# Patient Record
Sex: FEMALE | Race: WHITE | NOT HISPANIC OR LATINO | Employment: OTHER | URBAN - METROPOLITAN AREA
[De-identification: names, ages, dates, MRNs, and addresses within clinical notes are randomized per-mention and may not be internally consistent; named-entity substitution may affect disease eponyms.]

---

## 2018-01-22 ENCOUNTER — OFFICE VISIT (OUTPATIENT)
Dept: CARDIOLOGY | Facility: MEDICAL CENTER | Age: 63
End: 2018-01-22
Payer: COMMERCIAL

## 2018-01-22 VITALS
DIASTOLIC BLOOD PRESSURE: 70 MMHG | SYSTOLIC BLOOD PRESSURE: 118 MMHG | HEART RATE: 70 BPM | HEIGHT: 60 IN | BODY MASS INDEX: 25.13 KG/M2 | OXYGEN SATURATION: 94 % | WEIGHT: 128 LBS

## 2018-01-22 DIAGNOSIS — M35.02 SJOGREN'S SYNDROME WITH LUNG INVOLVEMENT (HCC): ICD-10-CM

## 2018-01-22 DIAGNOSIS — I34.0 MITRAL VALVE INSUFFICIENCY, UNSPECIFIED ETIOLOGY: ICD-10-CM

## 2018-01-22 DIAGNOSIS — I34.0 NON-RHEUMATIC MITRAL REGURGITATION: ICD-10-CM

## 2018-01-22 DIAGNOSIS — I71.20 THORACIC AORTIC ANEURYSM WITHOUT RUPTURE (HCC): ICD-10-CM

## 2018-01-22 DIAGNOSIS — J41.0 SIMPLE CHRONIC BRONCHITIS (HCC): ICD-10-CM

## 2018-01-22 DIAGNOSIS — I35.1 NONRHEUMATIC AORTIC VALVE INSUFFICIENCY: ICD-10-CM

## 2018-01-22 DIAGNOSIS — Z95.2 H/O MECHANICAL AORTIC VALVE REPLACEMENT: ICD-10-CM

## 2018-01-22 DIAGNOSIS — Z79.01 WARFARIN ANTICOAGULATION: ICD-10-CM

## 2018-01-22 PROBLEM — M35.00 SJOEGREN SYNDROME: Status: ACTIVE | Noted: 2018-01-22

## 2018-01-22 LAB — EKG IMPRESSION: NORMAL

## 2018-01-22 PROCEDURE — 99204 OFFICE O/P NEW MOD 45 MIN: CPT | Performed by: INTERNAL MEDICINE

## 2018-01-22 PROCEDURE — 93000 ELECTROCARDIOGRAM COMPLETE: CPT | Performed by: INTERNAL MEDICINE

## 2018-01-22 RX ORDER — CHOLECALCIFEROL (VITAMIN D3) 125 MCG
CAPSULE ORAL
COMMUNITY

## 2018-01-22 RX ORDER — GLUCOSAMINE/D3/BOSWELLIA SERRA 1500MG-400
TABLET ORAL
COMMUNITY
End: 2022-10-11

## 2018-01-22 RX ORDER — ROSUVASTATIN CALCIUM 20 MG/1
20 TABLET, COATED ORAL EVERY EVENING
COMMUNITY
End: 2019-01-17 | Stop reason: SDUPTHER

## 2018-01-22 RX ORDER — CELECOXIB 200 MG/1
200 CAPSULE ORAL 2 TIMES DAILY
COMMUNITY
End: 2018-12-10

## 2018-01-22 RX ORDER — AZITHROMYCIN 250 MG/1
250 TABLET, FILM COATED ORAL DAILY
COMMUNITY
End: 2018-09-18 | Stop reason: SDUPTHER

## 2018-01-22 RX ORDER — DIMENHYDRINATE 50 MG
TABLET ORAL 2 TIMES DAILY
COMMUNITY

## 2018-01-22 RX ORDER — ASCORBIC ACID 500 MG
500 TABLET ORAL DAILY
COMMUNITY

## 2018-01-22 RX ORDER — WARFARIN SODIUM 5 MG/1
5 TABLET ORAL DAILY
COMMUNITY
End: 2018-07-13 | Stop reason: SDUPTHER

## 2018-01-22 ASSESSMENT — ENCOUNTER SYMPTOMS
WEIGHT LOSS: 0
COUGH: 0
PALPITATIONS: 0
INSOMNIA: 0
NAUSEA: 0
HEARTBURN: 0
SHORTNESS OF BREATH: 0
MYALGIAS: 0
BRUISES/BLEEDS EASILY: 0
LOSS OF CONSCIOUSNESS: 0
EYES NEGATIVE: 1
NERVOUS/ANXIOUS: 0
DIZZINESS: 0

## 2018-01-22 NOTE — PROGRESS NOTES
Subjective:   Marline Perry is a 62 y.o. female who presents today as a new patient. She is here to establish care as a new patient after moving here from [place].    In with her   Feels healthy and active, enjoying being here  No setbacks although she is back in antibiotics for her chronic upper respiratory infections    Has followed with her cardiologist twice a year, she gets an echo every year. Very diligent with her warfarin. No bleeding    Medications as directed, no setbacks. Has a primary in [place] but looking for one here.      History   Smoking Status   • Never Smoker   Smokeless Tobacco   • Never Used     Allergies   Allergen Reactions   • Spironolactone Rash     ALL OVER BODY    • Neomycin Rash     CONTACT DERMATITIS    • Tape      Adhesives-RASH      Outpatient Encounter Prescriptions as of 1/22/2018   Medication Sig Dispense Refill   • warfarin (COUMADIN) 5 MG Tab Take 6 mg by mouth every day.     • rosuvastatin (CRESTOR) 20 MG Tab Take 20 mg by mouth every evening.     • azithromycin (ZITHROMAX) 250 MG Tab Take 250 mg by mouth every day.     • ascorbic acid (ASCORBIC ACID) 500 MG Tab Take 500 mg by mouth every day.     • Biotin 59149 MCG Tab Take  by mouth.     • Flaxseed, Linseed, (FLAX SEED OIL) 1000 MG Cap Take  by mouth 2 Times a Day.     • Cholecalciferol (VITAMIN D3) 2000 units Tab Take  by mouth.     • celecoxib (CELEBREX) 200 MG Cap Take 200 mg by mouth 2 times a day.     • conjugated estrogen (PREMARIN) 0.625 MG Tab Take 0.625 mg by mouth every 7 days.       No facility-administered encounter medications on file as of 1/22/2018.      Review of Systems   Constitutional: Negative for malaise/fatigue and weight loss.   Eyes: Negative.    Respiratory: Negative for cough and shortness of breath.         Chronic bronchitis, back on antibiotics. Dry eyes and dry mouth concomitant with her other diseases   Cardiovascular: Negative for chest pain, palpitations and leg swelling.    Gastrointestinal: Negative for heartburn and nausea.   Genitourinary: Negative.    Musculoskeletal: Negative for myalgias.   Neurological: Negative for dizziness and loss of consciousness.   Endo/Heme/Allergies: Does not bruise/bleed easily.   Psychiatric/Behavioral: The patient is not nervous/anxious and does not have insomnia.    All other systems reviewed and are negative.       Objective:   /70   Pulse 70   Ht 1.524 m (5')   Wt 58.1 kg (128 lb)   SpO2 94%   BMI 25.00 kg/m²     Physical Exam   Constitutional: She is oriented to person, place, and time. She appears well-developed and well-nourished.   HENT:   Head: Normocephalic and atraumatic.   Neck: No JVD present.   Cardiovascular: Normal rate, regular rhythm and intact distal pulses.    Murmur (2-6 blowing systolic across the precordium, well-healed scar, prominent click) heard.  Abdominal: Bowel sounds are normal.   Musculoskeletal: She exhibits no edema or tenderness.   Lymphadenopathy:     She has no cervical adenopathy.   Neurological: She is alert and oriented to person, place, and time. She exhibits normal muscle tone. Coordination normal.   Skin: Skin is warm and dry. No rash noted.   Psychiatric: She has a normal mood and affect. Her behavior is normal.       Assessment:     1. Mitral valve insufficiency, unspecified etiology  EKG   2. Non-rheumatic mitral regurgitation     3. Nonrheumatic aortic valve insufficiency     4. H/O mechanical aortic valve replacement     5. Sjogren's syndrome with lung involvement (CMS-HCC)     6. Simple chronic bronchitis (CMS-[facility])     7. Warfarin anticoagulation     8. Thoracic aortic aneurysm without rupture (CMS-[facility])         Medical Decision Making:  Today's Assessment / Status / Plan:     To EKG done today and reviewed by me, sinus rhythm normal intervals, normal EKG      Severe aortic insufficiency status post aortic valve. Mechanical with rudy arch replacement for aneurysm  Notes are reviewed, they're  very detailed and quite good  Discussed pathophysiology and prognosis  Annual echo in the fall, she has moderate advancing mitral regurgitation which appears stable the last few echoes    Likely needs CAT scans at least once every 2 or 3 years a stable   We will check with her prior cardiologist to see if this has been done, MRA is also feasible with her valve      Mitral regurgitation  Exam seems clinically stable  Talked about physiology particularly as it relates to her pulmonary disease and pulmonary pressures  Echo in the fall    Lipids and blood pressure will be followed by her primary, questions answered  Coumadin will always need a bridge with her mechanical valve  Endocarditis prophylaxis    RTC 6 months sooner with concerns. Echo will be ordered at that point

## 2018-01-23 ENCOUNTER — TELEPHONE (OUTPATIENT)
Dept: CARDIOLOGY | Facility: MEDICAL CENTER | Age: 63
End: 2018-01-23

## 2018-01-23 DIAGNOSIS — Z95.2 H/O MECHANICAL AORTIC VALVE REPLACEMENT: ICD-10-CM

## 2018-01-23 DIAGNOSIS — Z79.01 WARFARIN ANTICOAGULATION: ICD-10-CM

## 2018-01-23 NOTE — TELEPHONE ENCOUNTER
----- Message -----  From: Marline Perry  Sent: 1/22/2018   1:09 PM  To: Rae Singh  Subject: Test Result Question                             Re:  INR Monitoring for Coumadin.  Hello:   I have a home monitoring unit and have been calling in results to a monitoring agency ([employer]), and also calling in INR results to my former cardiologist's office ([facility]).      I am due to complete an INR check this week and need to know -  1) To whom should I call in results in Dr. Marcus's office for INR consultation?  2)  Is there a specific individual or separate number to whom I should speak with or leave a message?    Thank you,  Marline    =======================================================================    Referral to [facility] initiated    Rich reply message sent to pt.

## 2018-01-24 ENCOUNTER — ANTICOAGULATION MONITORING (OUTPATIENT)
Dept: VASCULAR LAB | Facility: MEDICAL CENTER | Age: 63
End: 2018-01-24

## 2018-01-24 ENCOUNTER — TELEPHONE (OUTPATIENT)
Dept: CARDIOLOGY | Facility: MEDICAL CENTER | Age: 63
End: 2018-01-24

## 2018-01-24 ENCOUNTER — TELEPHONE (OUTPATIENT)
Dept: VASCULAR LAB | Facility: MEDICAL CENTER | Age: 63
End: 2018-01-24

## 2018-01-24 DIAGNOSIS — Z95.2 H/O MECHANICAL AORTIC VALVE REPLACEMENT: ICD-10-CM

## 2018-01-24 LAB — INR PPP: 3 (ref 2–3.5)

## 2018-01-24 NOTE — TELEPHONE ENCOUNTER
----- Message -----  From: Marline Perry  Sent: 1/22/2018   1:09 PM  To: Rae Singh  Subject: Test Result Question                             Re:  INR Monitoring for Coumadin.  Hello:   I have a home monitoring unit and have been calling in results to a monitoring agency ([email or web address]), and also calling in INR results to my former cardiologist's office (their [facility]).      I am due to complete an INR check this week and need to know -  1) To whom should I call in results in Dr. Marcus's office for INR consultation?  2)  Is there a specific individual or separate number to whom I should speak with or leave a message?    Thank you,  Marline    =======================================================================    Referral to [facility] initiated.    Rich reply message sent to pt regarding above info.    FYI to Yossi DURAN

## 2018-01-24 NOTE — PROGRESS NOTES
Anticoagulation Summary  As of 1/24/2018    INR goal:   2.0-3.0   TTR:   --   Today's INR:   No new INR was available at the time of this encounter.   Maintenance plan:   6 mg (6 mg x 1) on Wed, Sat; 5 mg (5 mg x 1) all other days   Weekly total:   37 mg   Plan last modified:   Yossi Khalil, PharmD (1/24/2018)   Next INR check:   1/24/2018   Target end date:   Indefinite    Indications    H/O mechanical aortic valve replacement [Z95.2]             Anticoagulation Episode Summary     INR check location:   Home Draw    Preferred lab:       Send INR reminders to:       Comments:   [facility]      Anticoagulation Care Providers     Provider Role Specialty Phone number    Chinyere Marcus M.D. Referring Cardiology [phone]    Yossi Khalil, PharmD Responsible          Anticoagulation Patient Findings    New referral for anticoagulation management. Has been on warfarin for > 16 years. Hx of aortic mechanical valve replacement and TAA repair. Has a home monitor through [facility]. Confirmed previous target INR range of 2-3 from [facility]. Spoke to patient on phone. Confirmed currently warfarin regimen as listed above. She will test her INR today and call results to clinic.    Yossi Khalil, PharmD

## 2018-01-25 NOTE — TELEPHONE ENCOUNTER
Initial anticoagulation clinic note and most recent cardiology reviewed    Pending further recommendations, we will continue with indefinite anticoagulation with warfarin for mechanical aortic valve as directed by cardiology    Will defer all other cardio vascular care, aside from anticoagulation, including surveillance of aortic disease to cardiology unless otherwise requested    Michael J. Bloch, MD  Anticoagulation Center    Cc: Dora Marcus

## 2018-01-30 ENCOUNTER — ANTICOAGULATION MONITORING (OUTPATIENT)
Dept: VASCULAR LAB | Facility: MEDICAL CENTER | Age: 63
End: 2018-01-30

## 2018-01-30 DIAGNOSIS — Z95.2 H/O MECHANICAL AORTIC VALVE REPLACEMENT: ICD-10-CM

## 2018-02-14 ENCOUNTER — ANTICOAGULATION MONITORING (OUTPATIENT)
Dept: VASCULAR LAB | Facility: MEDICAL CENTER | Age: 63
End: 2018-02-14

## 2018-02-14 DIAGNOSIS — Z95.2 H/O MECHANICAL AORTIC VALVE REPLACEMENT: ICD-10-CM

## 2018-02-14 LAB
INR PPP: 2.5 (ref 2–3.5)
INR PPP: 2.5 (ref 2–3.5)

## 2018-02-15 NOTE — PROGRESS NOTES
OP Anticoagulation Telephone Note    Date: 2/14/2018  Anticoagulation Summary  As of 2/14/2018    INR goal:   2.0-3.0   TTR:   100.0 % (1.6 wk)   Today's INR:   2.5   Maintenance plan:   6 mg (6 mg x 1) on Wed, Sat; 5 mg (5 mg x 1) all other days   Weekly total:   37 mg   No change documented:   August Ferrell Ass't   Plan last modified:   Yossi Khalil PharmD (1/24/2018)   Next INR check:   2/28/2018   Target end date:   Indefinite    Indications    H/O mechanical aortic valve replacement [Z95.2]             Anticoagulation Episode Summary     INR check location:   Home Draw    Preferred lab:       Send INR reminders to:       Comments:   [facility]      Anticoagulation Care Providers     Provider Role Specialty Phone number    Chinyere Marcus M.D. Referring Cardiology [phone]    Yossi Khalil, PharmD Responsible          Plan:  Spoke with patient on the phone. Patient is therapeutic today. Patient denies any changes in medications or diet. Patient denies any signs or symptoms of bleeding or clotting. Instructed patient to call clinic if any unusual bleeding or bruising occurs. Will continue dosing as outlined above. Will follow-up with patient in 2 weeks.    Rosanna Christianson, Medical Assistant

## 2018-02-15 NOTE — PROGRESS NOTES
OP Anticoagulation Telephone Note    Date: 2/14/2018  Anticoagulation Summary  As of 2/14/2018    INR goal:   2.0-3.0   TTR:   100.0 % (1.6 wk)   Today's INR:   2.5   Maintenance plan:   6 mg (6 mg x 1) on Wed, Sat; 5 mg (5 mg x 1) all other days   Weekly total:   37 mg   No change documented:   August Ferrell Ass't   Plan last modified:   Yossi Khalil PharmD (1/24/2018)   Next INR check:   2/28/2018   Target end date:   Indefinite    Indications    H/O mechanical aortic valve replacement [Z95.2]             Anticoagulation Episode Summary     INR check location:   Home Draw    Preferred lab:       Send INR reminders to:       Comments:   [facility]      Anticoagulation Care Providers     Provider Role Specialty Phone number    Chinyere Marcus M.D. Referring Cardiology [phone]    Yossi Khalil PharmD Responsible          Anticoagulation Patient Findings  Patient Findings     Negatives:   Signs/symptoms of thrombosis, Signs/symptoms of bleeding, Laboratory test error suspected, Change in health, Change in alcohol use, Change in activity, Upcoming invasive procedure, Emergency department visit, Upcoming dental procedure, Missed doses, Extra doses, Change in medications, Change in diet/appetite, Hospital admission, Bruising, Other complaints      Plan:  Spoke with patient on the phone. Patient is therapeutic today. Patient denies any changes in medications or diet. Patient denies any signs or symptoms of bleeding or clotting. Instructed patient to call clinic if any unusual bleeding or bruising occurs. Will continue dosing as outlined above. Will follow-up with patient in 2 weeks.    Rosanna Christianson, Medical Assistant    BRANDIE an in agreement with above stated plan.  Tulio Sotelo, SantoD

## 2018-03-01 ENCOUNTER — ANTICOAGULATION MONITORING (OUTPATIENT)
Dept: VASCULAR LAB | Facility: MEDICAL CENTER | Age: 63
End: 2018-03-01

## 2018-03-01 DIAGNOSIS — Z95.2 H/O MECHANICAL AORTIC VALVE REPLACEMENT: ICD-10-CM

## 2018-03-01 LAB — INR PPP: 3.4 (ref 2–3.5)

## 2018-03-01 NOTE — PROGRESS NOTES
Anticoagulation Summary  As of 3/1/2018    INR goal:   2.0-3.0   TTR:   74.4 % (3.7 wk)   Today's INR:   3.4!   Maintenance plan:   6 mg (6 mg x 1) on Wed, Sat; 5 mg (5 mg x 1) all other days   Weekly total:   37 mg   Plan last modified:   Yossi Khalil, PharmD (1/24/2018)   Next INR check:   3/15/2018   Target end date:   Indefinite    Indications    H/O mechanical aortic valve replacement [Z95.2]             Anticoagulation Episode Summary     INR check location:   Home Draw    Preferred lab:       Send INR reminders to:       Comments:   [facility]      Anticoagulation Care Providers     Provider Role Specialty Phone number    Chinyere Marcus M.D. Referring Cardiology [phone]    Yossi Khalil, PharmD Responsible          Anticoagulation Patient Findings    Spoke with patient.  INR is SUPRA therapeutic.   Pt hasn't been having as much greens.   Pt denies any unusual s/s of bleeding, bruising, clotting or any changes to diet or medications. Denies any etoh, cranberries, supplements, or illness.   Pt verifies warfarin weekly dosing.     Will have pt HOLD her warfarin dose today and then resume her normal warfarin dosing.     Repeat INR in 2 weeks.     Griselda Hutchinson, PharmD

## 2018-03-16 ENCOUNTER — ANTICOAGULATION MONITORING (OUTPATIENT)
Dept: VASCULAR LAB | Facility: MEDICAL CENTER | Age: 63
End: 2018-03-16

## 2018-03-16 DIAGNOSIS — Z95.2 H/O MECHANICAL AORTIC VALVE REPLACEMENT: ICD-10-CM

## 2018-03-16 LAB — INR PPP: 3.1 (ref 2–3.5)

## 2018-03-16 NOTE — PROGRESS NOTES
Anticoagulation Summary  As of 3/16/2018    INR goal:   2.0-3.0   TTR:   47.2 % (1.4 mo)   Today's INR:   3.1!   Maintenance plan:   5 mg (5 mg x 1) on Wed, Sat; 6 mg (6 mg x 1) all other days   Weekly total:   40 mg   Plan last modified:   Mu Mann, PharmD (3/16/2018)   Next INR check:   3/30/2018   Target end date:   Indefinite    Indications    H/O mechanical aortic valve replacement [Z95.2]             Anticoagulation Episode Summary     INR check location:   Home Draw    Preferred lab:       Send INR reminders to:       Comments:   [facility]      Anticoagulation Care Providers     Provider Role Specialty Phone number    Chinyere Marcus M.D. Referring Cardiology [phone]    Yossi Khalil, PharmD Responsible          Anticoagulation Patient Findings    HPI:  Marline Orlando, on anticoagulation therapy with warfarin for Mech Valve.  Changes to current medical/health status since last appt: none  Denies signs/symptoms of bleeding and/or thrombosis since the last appt.    Denies any interval changes to diet  Denies any interval changes to medications since last appt.   Denies any complications or cost restrictions with current therapy.   Confirmed dosing regimen. Pt was taking warfarin slightly differently than how calendar reflects.  Pt consumed large amount of alcohol, likely the reason for elevated INR.     A/P   INR  SUPRA-therapeutic.   Reduce today then Pt is to continue with current warfarin dosing regimen.     Next INR in 2 week(s).    Mu Mann, PharmD

## 2018-04-02 ENCOUNTER — ANTICOAGULATION MONITORING (OUTPATIENT)
Dept: VASCULAR LAB | Facility: MEDICAL CENTER | Age: 63
End: 2018-04-02

## 2018-04-02 DIAGNOSIS — Z95.2 H/O MECHANICAL AORTIC VALVE REPLACEMENT: ICD-10-CM

## 2018-04-02 LAB — INR PPP: 2.8 (ref 2–3.5)

## 2018-04-03 NOTE — PROGRESS NOTES
OP Telephone Anticoagulation Service Note    Date: 4/3/2018      Anticoagulation Summary  As of 4/2/2018    INR goal:   2.0-3.0   TTR:   52.9 % (1.9 mo)   Today's INR:   2.8   Maintenance plan:   5 mg (5 mg x 1) on Wed, Sat; 6 mg (6 mg x 1) all other days   Weekly total:   40 mg   Plan last modified:   Mu Mann, PharmD (3/16/2018)   Next INR check:   4/16/2018   Target end date:   Indefinite    Indications    H/O mechanical aortic valve replacement [Z95.2]             Anticoagulation Episode Summary     INR check location:   Home Draw    Preferred lab:       Send INR reminders to:       Comments:   [facility]      Anticoagulation Care Providers     Provider Role Specialty Phone number    Chinyere Marcus M.D. Referring Cardiology [phone]    Yossi Khalil, PharmD Responsible          Anticoagulation Patient Findings        Plan: INR is in range. Left message on patient's answering machine/voicemail. Instructed patient to call back with any concerns regarding any unusual bleeding or bruising, any medication or diet changes or any signs or symptoms of thrombosis. Instructed patient to resume medication as outlined above. Patient to follow up in 2 week(s).       Cindi Luevano, SantoD

## 2018-04-19 ENCOUNTER — ANTICOAGULATION MONITORING (OUTPATIENT)
Dept: VASCULAR LAB | Facility: MEDICAL CENTER | Age: 63
End: 2018-04-19

## 2018-04-19 ENCOUNTER — OFFICE VISIT (OUTPATIENT)
Dept: PULMONOLOGY | Facility: HOSPICE | Age: 63
End: 2018-04-19
Payer: COMMERCIAL

## 2018-04-19 VITALS
HEIGHT: 60 IN | BODY MASS INDEX: 24.58 KG/M2 | TEMPERATURE: 97.3 F | HEART RATE: 86 BPM | RESPIRATION RATE: 16 BRPM | DIASTOLIC BLOOD PRESSURE: 70 MMHG | OXYGEN SATURATION: 95 % | SYSTOLIC BLOOD PRESSURE: 116 MMHG | WEIGHT: 125.2 LBS

## 2018-04-19 DIAGNOSIS — M35.00 SJOGREN'S SYNDROME, WITH UNSPECIFIED ORGAN INVOLVEMENT (HCC): ICD-10-CM

## 2018-04-19 DIAGNOSIS — Z79.01 WARFARIN ANTICOAGULATION: ICD-10-CM

## 2018-04-19 DIAGNOSIS — Z78.9 NONSMOKER: ICD-10-CM

## 2018-04-19 DIAGNOSIS — J47.1 BRONCHIECTASIS WITH ACUTE EXACERBATION (HCC): ICD-10-CM

## 2018-04-19 DIAGNOSIS — Z95.2 H/O MECHANICAL AORTIC VALVE REPLACEMENT: ICD-10-CM

## 2018-04-19 LAB — INR PPP: 3.5 (ref 2–3.5)

## 2018-04-19 PROCEDURE — 99204 OFFICE O/P NEW MOD 45 MIN: CPT | Performed by: INTERNAL MEDICINE

## 2018-04-19 RX ORDER — PREDNISONE 10 MG/1
TABLET ORAL
Qty: 18 TAB | Refills: 0 | Status: SHIPPED | OUTPATIENT
Start: 2018-04-19 | End: 2018-08-14

## 2018-04-19 RX ORDER — WARFARIN SODIUM 6 MG/1
6 TABLET ORAL DAILY
COMMUNITY
End: 2018-04-20 | Stop reason: SDUPTHER

## 2018-04-19 NOTE — PROGRESS NOTES
"Chief Complaint   Patient presents with   • Establish Care     Self Referral for Bronchiectasis       HPI: This patient is a 62 y.o. Female who is self-referred to establish pulmonary care. She moved from [place] where she was followed by pulmonology for bronchiectasis. Medical records are being requested from Dr. Blas in [place], [place]. She was diagnosed with bronchiectasis in 2008 and undergone bronchoscopy in the past. She is unclear of any specific infections such as  MAC or Pseudomonas. She had felt well up until Christmas 2017 when she developed a productive cough. She has been treated with multiple courses of antibiotics without subjective benefit. She had sputum cultures which allegedly grew \"Staph aureus\", treated with Bactrim. Sputum is described as \"green\", and she denies associated hemoptysis, dyspnea, fevers, night sweats or weight loss. She is a lifelong non-smoker. She is compliant with daily Breo 100ug inhaler. She rarely requires her albuterol inhaler. She is a lifelong nonsmoker.      Past Medical History:   Diagnosis Date   • Bronchiectasis (CMS-HCC)    • Bronchitis    • Chickenpox    • COPD (chronic obstructive pulmonary disease) (CMS-HCC)    • Hyperlipidemia    • Influenza    • Migraines    • Mumps    • Nasal drainage    • Sjogren's disease (CMS-HCC)        Social History     Social History   • Marital status:      Spouse name: N/A   • Number of children: N/A   • Years of education: N/A     Occupational History   • Not on file.     Social History Main Topics   • Smoking status: Never Smoker   • Smokeless tobacco: Never Used   • Alcohol use 2.4 oz/week     4 Shots of liquor per week   • Drug use: No   • Sexual activity: Not on file     Other Topics Concern   • Not on file     Social History Narrative   • No narrative on file       Family History   Problem Relation Age of Onset   • Arthritis Mother    • Hypertension Father    • Kidney Disease Father    • Arthritis Sister    • No " Known Problems Brother    • No Known Problems Brother    • No Known Problems Son        Current Outpatient Prescriptions on File Prior to Visit   Medication Sig Dispense Refill   • warfarin (COUMADIN) 5 MG Tab Take 5 mg by mouth every day. Take 1 5mg tablet 2 days a week     • rosuvastatin (CRESTOR) 20 MG Tab Take 20 mg by mouth every evening.     • azithromycin (ZITHROMAX) 250 MG Tab Take 250 mg by mouth every day.     • ascorbic acid (ASCORBIC ACID) 500 MG Tab Take 500 mg by mouth every day.     • Biotin 08339 MCG Tab Take  by mouth.     • Flaxseed, Linseed, (FLAX SEED OIL) 1000 MG Cap Take  by mouth 2 Times a Day.     • Cholecalciferol (VITAMIN D3) 2000 units Tab Take  by mouth.     • celecoxib (CELEBREX) 200 MG Cap Take 200 mg by mouth 2 times a day.     • conjugated estrogen (PREMARIN) 0.625 MG Tab Take 0.625 mg by mouth every 7 days.       No current facility-administered medications on file prior to visit.        Allergies: Spironolactone; Neomycin; and Tape    ROS:   Constitutional: Denies fevers, chills, night sweats, +fatigue, denies weight loss  Eyes: Denies vision loss, pain, drainage, double vision  Ears, Nose, Throat: Denies earache, difficulty hearing, tinnitus, +nasal congestion, +hoarseness  Cardiovascular: + chest pain, tightness, denies palpitations, orthopnea or edema  Respiratory: As in history of present illness  Sleep: Denies daytime sleepiness, snoring, apneas, insomnia, morning headaches  GI: Denies heartburn, +dysphagia, denies nausea, abdominal pain, diarrhea,+constipation  : Denies frequent urination, hematuria, discharge or painful urination  Musculoskeletal: +back pain, painful joints, sore muscles  Neurological: Denies weakness or headaches  Skin: No rashes    Blood pressure 116/70, pulse 86, temperature 36.3 °C (97.3 °F), resp. rate 16, height 1.524 m (5'), weight 56.8 kg (125 lb 3.2 oz), SpO2 95 %.    Physical Exam:  Appearance: Well-nourished, well-developed, in no acute  distress  HEENT: Normocephalic, atraumatic, white sclera, PERRLA, oropharynx clear  Neck: No adenopathy or masses  Respiratory: no intercostal retractions or accessory muscle use  Lungs auscultation: Diminished breath sounds, scattered rhonchi  Cardiovascular: Regular rate rhythm. No murmurs, rubs or gallops.  No LE edema  Abdomen: soft, nondistended  Gait: Normal  Digits: No clubbing, cyanosis  Motor: No focal deficits  Orientation: Oriented to time, person and place    Diagnosis:  1. Bronchiectasis with acute exacerbation ([facility])  CULTURE RESP W/O GRAM STAIN    AFB CULTURE    CT-CHEST, HIGH RESOLUTION LUNG   2. Warfarin anticoagulation     3. Sjogren's syndrome, with unspecified organ involvement (CMS-HCC)     4. Nonsmoker         Plan:  The patient has a long-time history of bronchiectasis first diagnosed in 2008 in [place], [place], with acute exacerbation over the past 5 months. She has been followed by pulmonology in [place]. Medical records are requested from Dr. Doron Blas #[phone].   Obtain HRCT chest, sputum bacterial, fungal, AFB cultures.  Continue Breo 100ug 1 puff QD with albuterol HFA PRN.   Start prednisone 30 mg daily for 3 days tapered by 10 mg every 3 days.   Return for after CT scan.

## 2018-04-19 NOTE — PROGRESS NOTES
Anticoagulation Summary  As of 4/19/2018    INR goal:   2.0-3.0   TTR:   47.4 % (2.5 mo)   Today's INR:   3.5!   Maintenance plan:   5 mg (5 mg x 1) on Wed, Sat; 6 mg (6 mg x 1) all other days   Weekly total:   40 mg   Plan last modified:   Mu Mann, PharmD (3/16/2018)   Next INR check:   5/3/2018   Target end date:   Indefinite    Indications    H/O mechanical aortic valve replacement [Z95.2]             Anticoagulation Episode Summary     INR check location:   Home Draw    Preferred lab:       Send INR reminders to:       Comments:   [facility]      Anticoagulation Care Providers     Provider Role Specialty Phone number    Chinyere Marcus M.D. Referring Cardiology [phone]    Yossi Khalil, PharmD Responsible          Anticoagulation Patient Findings    HPI:  Marline Perry, on anticoagulation therapy with warfarin for Mechanical valve.   Changes to current medical/health status since last appt: none.   Denies signs/symptoms of bleeding and/or thrombosis since the last appt.    Pt states she skipped greens the last few days.   Denies any interval changes to medications since last appt.   Denies any complications or cost restrictions with current therapy.     A/P   INR  SUB-therapeutic.   Reduce today then Pt is to continue with current warfarin dosing regimen. Pt to resume previous diet.    Next INR in 2 week(s).    Mu Mann, PharmD

## 2018-04-20 RX ORDER — WARFARIN SODIUM 6 MG/1
6 TABLET ORAL DAILY
Qty: 30 TAB | Refills: 3 | Status: SHIPPED | OUTPATIENT
Start: 2018-04-20 | End: 2018-04-27 | Stop reason: SDUPTHER

## 2018-04-23 ENCOUNTER — HOSPITAL ENCOUNTER (OUTPATIENT)
Facility: MEDICAL CENTER | Age: 63
End: 2018-04-23
Attending: INTERNAL MEDICINE
Payer: COMMERCIAL

## 2018-04-23 DIAGNOSIS — J47.1 BRONCHIECTASIS WITH ACUTE EXACERBATION (HCC): ICD-10-CM

## 2018-04-23 LAB
GRAM STN SPEC: NORMAL
RHODAMINE-AURAMINE STN SPEC: NORMAL
SIGNIFICANT IND 70042: NORMAL
SIGNIFICANT IND 70042: NORMAL
SITE SITE: NORMAL
SITE SITE: NORMAL
SOURCE SOURCE: NORMAL
SOURCE SOURCE: NORMAL

## 2018-04-23 PROCEDURE — 87206 SMEAR FLUORESCENT/ACID STAI: CPT

## 2018-04-23 PROCEDURE — 87015 SPECIMEN INFECT AGNT CONCNTJ: CPT

## 2018-04-23 PROCEDURE — 87116 MYCOBACTERIA CULTURE: CPT

## 2018-04-23 PROCEDURE — 87205 SMEAR GRAM STAIN: CPT

## 2018-04-24 ENCOUNTER — TELEPHONE (OUTPATIENT)
Dept: PULMONOLOGY | Facility: HOSPICE | Age: 63
End: 2018-04-24

## 2018-04-24 NOTE — TELEPHONE ENCOUNTER
Called patient and left a voice mail informing her that her sputum culture was contaminated and needs to be redone.told patient to call back if she had any questions

## 2018-04-25 ENCOUNTER — APPOINTMENT (OUTPATIENT)
Dept: RADIOLOGY | Facility: MEDICAL CENTER | Age: 63
End: 2018-04-25
Attending: INTERNAL MEDICINE
Payer: COMMERCIAL

## 2018-04-26 ENCOUNTER — HOSPITAL ENCOUNTER (OUTPATIENT)
Dept: RADIOLOGY | Facility: MEDICAL CENTER | Age: 63
End: 2018-04-26
Attending: INTERNAL MEDICINE
Payer: COMMERCIAL

## 2018-04-26 DIAGNOSIS — J47.1 BRONCHIECTASIS WITH ACUTE EXACERBATION (HCC): ICD-10-CM

## 2018-04-26 PROCEDURE — 71250 CT THORAX DX C-: CPT

## 2018-04-27 ENCOUNTER — HOSPITAL ENCOUNTER (OUTPATIENT)
Facility: MEDICAL CENTER | Age: 63
End: 2018-04-27
Attending: INTERNAL MEDICINE
Payer: COMMERCIAL

## 2018-04-27 LAB
GRAM STN SPEC: NORMAL
SIGNIFICANT IND 70042: NORMAL
SITE SITE: NORMAL
SOURCE SOURCE: NORMAL

## 2018-04-27 PROCEDURE — 87186 SC STD MICRODIL/AGAR DIL: CPT

## 2018-04-27 PROCEDURE — 87070 CULTURE OTHR SPECIMN AEROBIC: CPT

## 2018-04-27 PROCEDURE — 87205 SMEAR GRAM STAIN: CPT

## 2018-04-27 PROCEDURE — 87077 CULTURE AEROBIC IDENTIFY: CPT

## 2018-04-27 RX ORDER — WARFARIN SODIUM 6 MG/1
6 TABLET ORAL DAILY
Qty: 30 TAB | Refills: 3 | Status: SHIPPED | OUTPATIENT
Start: 2018-04-27 | End: 2018-05-07 | Stop reason: SDUPTHER

## 2018-04-30 LAB
BACTERIA SPEC RESP CULT: ABNORMAL
BACTERIA SPEC RESP CULT: ABNORMAL
GRAM STN SPEC: ABNORMAL
SIGNIFICANT IND 70042: ABNORMAL
SITE SITE: ABNORMAL
SOURCE SOURCE: ABNORMAL

## 2018-05-07 ENCOUNTER — TELEPHONE (OUTPATIENT)
Dept: VASCULAR LAB | Facility: MEDICAL CENTER | Age: 63
End: 2018-05-07

## 2018-05-07 ENCOUNTER — ANTICOAGULATION MONITORING (OUTPATIENT)
Dept: VASCULAR LAB | Facility: MEDICAL CENTER | Age: 63
End: 2018-05-07

## 2018-05-07 DIAGNOSIS — Z95.2 H/O MECHANICAL AORTIC VALVE REPLACEMENT: ICD-10-CM

## 2018-05-07 LAB — INR PPP: 3.5 (ref 2–3.5)

## 2018-05-07 RX ORDER — WARFARIN SODIUM 6 MG/1
6 TABLET ORAL DAILY
Qty: 90 TAB | Refills: 1 | Status: SHIPPED | OUTPATIENT
Start: 2018-05-07 | End: 2019-05-09

## 2018-05-07 NOTE — PROGRESS NOTES
Anticoagulation Summary  As of 5/7/2018    INR goal:   2.0-3.0   TTR:   38.2 % (3.1 mo)   Today's INR:   3.5!   Warfarin maintenance plan:   5 mg (5 mg x 1) on Wed, Sat; 6 mg (6 mg x 1) all other days   Weekly warfarin total:   40 mg   Plan last modified:   Santo LakeD (3/16/2018)   Next INR check:   5/14/2018   Target end date:   Indefinite    Indications    H/O mechanical aortic valve replacement [Z95.2]             Anticoagulation Episode Summary     INR check location:   Home Draw    Preferred lab:       Send INR reminders to:       Comments:   [facility]      Anticoagulation Care Providers     Provider Role Specialty Phone number    Chinyere Marcus M.D. Referring Cardiology [phone]    Yossi Khalil, PharmD Responsible          Anticoagulation Patient Findings        Spoke to patient on the phone.   INR  supra-therapeutic today is the last day of steroids .   Denies signs/symptoms of bleeding and/or thrombosis.   Follow up appointment in 1 week(s).    Hold today Continue weekly warfarin dose as noted      Brian Ribera, PharmD

## 2018-05-08 ENCOUNTER — ANTICOAGULATION MONITORING (OUTPATIENT)
Dept: VASCULAR LAB | Facility: MEDICAL CENTER | Age: 63
End: 2018-05-08

## 2018-05-08 ENCOUNTER — TELEPHONE (OUTPATIENT)
Dept: PULMONOLOGY | Facility: HOSPICE | Age: 63
End: 2018-05-08

## 2018-05-08 DIAGNOSIS — R05.9 COUGH: ICD-10-CM

## 2018-05-08 DIAGNOSIS — Z95.2 H/O MECHANICAL AORTIC VALVE REPLACEMENT: ICD-10-CM

## 2018-05-08 RX ORDER — PREDNISONE 10 MG/1
TABLET ORAL
Qty: 18 TAB | Refills: 0 | Status: SHIPPED | OUTPATIENT
Start: 2018-05-08 | End: 2018-08-14

## 2018-05-08 NOTE — TELEPHONE ENCOUNTER
Dr. Talbot,    Pt reports that she has an on going cough, states that she has completed a round of Prednisone yesterday, still has some sob and wheezing on exertion, no fever or chills and is taking inhalers as directed.  Pt states that she is expecting to receive an rx for Azithromycin from her mail order pharmacy and would like to know if she is to take another round of prednisone she would need to contact the coumadin clinic.  Otherwise the pt would like to know what she should do?    Last seen: 4/19/18- Dr. Talbot  Next ov: 6/14/18  Bronchiectasis w/ acute exacerbation

## 2018-05-09 NOTE — TELEPHONE ENCOUNTER
Pt notified of Dr. Talbot's last message and informed that rx for prednisone was sent to pharmacy.

## 2018-05-14 ENCOUNTER — ANTICOAGULATION MONITORING (OUTPATIENT)
Dept: VASCULAR LAB | Facility: MEDICAL CENTER | Age: 63
End: 2018-05-14

## 2018-05-14 DIAGNOSIS — Z95.2 H/O MECHANICAL AORTIC VALVE REPLACEMENT: ICD-10-CM

## 2018-05-14 LAB — INR PPP: 1.9 (ref 2–3.5)

## 2018-05-14 NOTE — PROGRESS NOTES
Anticoagulation Summary  As of 5/14/2018    INR goal:   2.0-3.0   TTR:   39.9 % (3.3 mo)   Today's INR:   1.9!   Warfarin maintenance plan:   6 mg (6 mg x 1) every day   Weekly warfarin total:   42 mg   Plan last modified:   Brian Ribera PharmD (5/14/2018)   Next INR check:   5/21/2018   Target end date:   Indefinite    Indications    H/O mechanical aortic valve replacement [Z95.2]             Anticoagulation Episode Summary     INR check location:   Home Draw    Preferred lab:       Send INR reminders to:       Comments:   Alere      Anticoagulation Care Providers     Provider Role Specialty Phone number    Chinyere Marcus M.D. Referring Cardiology [phone]    Yossi Khalil, PharmD Responsible          Anticoagulation Patient Findings    Spoke to patient on the phone.   INR  sub-therapeutic. She will start Prednisone soon,   Denies signs/symptoms of bleeding and/or thrombosis.   Follow up appointment in 1 week(s) or 3 days after starting the steroid, whichever is first.     Increase weekly warfarin dose as noted      Brian Ribera, Analilia

## 2018-05-22 ENCOUNTER — ANTICOAGULATION MONITORING (OUTPATIENT)
Dept: VASCULAR LAB | Facility: MEDICAL CENTER | Age: 63
End: 2018-05-22

## 2018-05-22 DIAGNOSIS — Z95.2 H/O MECHANICAL AORTIC VALVE REPLACEMENT: ICD-10-CM

## 2018-05-22 LAB — INR PPP: 4 (ref 2–3.5)

## 2018-05-22 NOTE — PROGRESS NOTES
Anticoagulation Summary  As of 5/22/2018    INR goal:   2.0-3.0   TTR:   40.5 % (3.6 mo)   Today's INR:   4.0!   Warfarin maintenance plan:   6 mg (6 mg x 1) every day   Weekly warfarin total:   42 mg   Plan last modified:   Brian Ribera PharmD (5/14/2018)   Next INR check:   5/29/2018   Target end date:   Indefinite    Indications    H/O mechanical aortic valve replacement [Z95.2]             Anticoagulation Episode Summary     INR check location:   Home Draw    Preferred lab:       Send INR reminders to:       Comments:   Alere      Anticoagulation Care Providers     Provider Role Specialty Phone number    Chinyere Marcus M.D. Referring Cardiology [phone]    Yossi Khalil, PharmD Responsible          Anticoagulation Patient Findings      INR  supra-therapeutic.   Left a voice message for the patient, asked patient to please call the anticoagulation clinic if they have any signs/symptoms of bleeding and/or thrombosis or any changes to diet or medications.    Follow up appointment in 1 week(s)    Hold tonight then continue weekly warfarin dose as noted      Brian Ribera, PharmD

## 2018-05-29 ENCOUNTER — ANTICOAGULATION MONITORING (OUTPATIENT)
Dept: VASCULAR LAB | Facility: MEDICAL CENTER | Age: 63
End: 2018-05-29

## 2018-05-29 DIAGNOSIS — Z95.2 H/O MECHANICAL AORTIC VALVE REPLACEMENT: ICD-10-CM

## 2018-05-29 LAB — INR PPP: 3.1 (ref 2–3.5)

## 2018-05-29 NOTE — PROGRESS NOTES
Anticoagulation Summary  As of 5/29/2018    INR goal:   2.0-3.0   TTR:   38.0 % (3.8 mo)   Today's INR:   3.1!   Warfarin maintenance plan:   5 mg (5 mg x 1) on Mon; 6 mg (6 mg x 1) all other days   Weekly warfarin total:   41 mg   Plan last modified:   Mu Mann, PharmD (5/29/2018)   Next INR check:   6/5/2018   Target end date:   Indefinite    Indications    H/O mechanical aortic valve replacement [Z95.2]             Anticoagulation Episode Summary     INR check location:   Home Draw    Preferred lab:       Send INR reminders to:       Comments:   Alere      Anticoagulation Care Providers     Provider Role Specialty Phone number    Chinyere Marcus M.D. Referring Cardiology [phone]    Yossi Khalil, PharmD Responsible          Anticoagulation Patient Findings    Left voicemail message to report a SUPRA therapeutic INR of 3.1.  Pt to begin reduced warfarin dosing regimen. Requested pt contact the clinic for any s/s of unusual bleeding, bruising, clotting or any changes to diet or medication. FU 1 weeks.    Mu Mann, PharmD

## 2018-06-14 ENCOUNTER — OFFICE VISIT (OUTPATIENT)
Dept: PULMONOLOGY | Facility: HOSPICE | Age: 63
End: 2018-06-14
Payer: COMMERCIAL

## 2018-06-14 VITALS
RESPIRATION RATE: 15 BRPM | SYSTOLIC BLOOD PRESSURE: 126 MMHG | HEIGHT: 60 IN | OXYGEN SATURATION: 95 % | BODY MASS INDEX: 25.09 KG/M2 | WEIGHT: 127.8 LBS | HEART RATE: 72 BPM | TEMPERATURE: 97.7 F | DIASTOLIC BLOOD PRESSURE: 72 MMHG

## 2018-06-14 DIAGNOSIS — M35.00 SJOGREN'S SYNDROME, WITH UNSPECIFIED ORGAN INVOLVEMENT (HCC): ICD-10-CM

## 2018-06-14 DIAGNOSIS — J47.9 BRONCHIECTASIS WITHOUT COMPLICATION (HCC): ICD-10-CM

## 2018-06-14 DIAGNOSIS — Z79.01 WARFARIN ANTICOAGULATION: ICD-10-CM

## 2018-06-14 DIAGNOSIS — Z78.9 NONSMOKER: ICD-10-CM

## 2018-06-14 PROCEDURE — 99214 OFFICE O/P EST MOD 30 MIN: CPT | Performed by: INTERNAL MEDICINE

## 2018-06-14 NOTE — PROGRESS NOTES
"Chief Complaint   Patient presents with   • Follow-Up     CT, lab results     HPI: This patient is a 62 y.o. Female who returns for bronchiectasis. She moved from [place] where she was followed by pulmonology. Medical records are reviewed from Dr. Blas in [place], [place]. She was diagnosed with bronchiectasis in 2008 and has undergone bronchoscopy in the past. She denies any specific associated respite infections. She had felt well up until Christmas 2017 when she developed a productive cough. She has been treated with multiple courses of antibiotics without subjective benefit.  Prednisone has been of benefit.  She had sputum cultures which allegedly grew \"Staph aureus\", treated with Bactrim. Sputum is described as \"green\", and she denies associated hemoptysis, dyspnea, fevers, night sweats or weight loss. She is a lifelong nonsmoker. She is compliant with daily Breo 100ug inhaler and azithromycin. She rarely requires her albuterol inhaler.   Sputum cultures could not be performed as the samples were inadequate.  Chest CAT scan was personally reviewed shows mild right lower lobe bronchiectasis, no consolidation, infiltrates or masses.    Past Medical History:   Diagnosis Date   • Bronchiectasis (HCC)    • Bronchitis    • Chickenpox    • COPD (chronic obstructive pulmonary disease) (HCC)    • Hyperlipidemia    • Influenza    • Migraines    • Mumps    • Nasal drainage    • Sjogren's disease (HCC)        Social History     Social History   • Marital status:      Spouse name: N/A   • Number of children: N/A   • Years of education: N/A     Occupational History   • Not on file.     Social History Main Topics   • Smoking status: Never Smoker   • Smokeless tobacco: Never Used   • Alcohol use 2.4 oz/week     4 Shots of liquor per week   • Drug use: No   • Sexual activity: Not on file     Other Topics Concern   • Not on file     Social History Narrative   • No narrative on file       Family History   Problem " Relation Age of Onset   • Arthritis Mother    • Hypertension Father    • Kidney Disease Father    • Arthritis Sister    • No Known Problems Brother    • No Known Problems Brother    • No Known Problems Son        Current Outpatient Prescriptions on File Prior to Visit   Medication Sig Dispense Refill   • warfarin (COUMADIN) 6 MG Tab Take 1 Tab by mouth every day. 90 Tab 1   • Fluticasone Furoate-Vilanterol (BREO) 100-25 MCG/INH AEROSOL POWDER, BREATH ACTIVATED Inhale 1 Puff by mouth every day.     • rosuvastatin (CRESTOR) 20 MG Tab Take 20 mg by mouth every evening.     • azithromycin (ZITHROMAX) 250 MG Tab Take 250 mg by mouth every day.     • ascorbic acid (ASCORBIC ACID) 500 MG Tab Take 500 mg by mouth every day.     • Biotin 72895 MCG Tab Take  by mouth.     • Flaxseed, Linseed, (FLAX SEED OIL) 1000 MG Cap Take  by mouth 2 Times a Day.     • Cholecalciferol (VITAMIN D3) 2000 units Tab Take  by mouth.     • celecoxib (CELEBREX) 200 MG Cap Take 200 mg by mouth 2 times a day.     • conjugated estrogen (PREMARIN) 0.625 MG Tab Take 0.625 mg by mouth every 7 days.     • predniSONE (DELTASONE) 10 MG Tab Take 30mg x 3 days, then take 20mg x 3 days, then take 10mg x 3 days, with food, then discontinue. 18 Tab 0   • predniSONE (DELTASONE) 10 MG Tab Take 30mg x 3 days, then take 20mg x 3 days, then take 10mg x 3 days, with food, then discontinue. 18 Tab 0   • warfarin (COUMADIN) 5 MG Tab Take 5 mg by mouth every day. Take 1 5mg tablet 2 days a week       No current facility-administered medications on file prior to visit.        Allergies: Spironolactone; Neomycin; and Tape    ROS:   Constitutional: Denies fevers, chills, night sweats, fatigue or weight loss  Eyes: Denies vision loss, pain, drainage, double vision  Ears, Nose, Throat: Denies earache, difficulty hearing, tinnitus, nasal congestion, hoarseness  Cardiovascular: Denies chest pain, tightness, palpitations, orthopnea or edema  Respiratory: As in HPI  Sleep:  Denies daytime sleepiness, snoring, apneas, insomnia, morning headaches  GI: Denies heartburn, dysphagia, nausea, abdominal pain, diarrhea or constipation  : Denies frequent urination, hematuria, discharge or painful urination  Musculoskeletal: Denies back pain, painful joints, sore muscles  Neurological: Denies weakness or headaches  Skin: No rashes    Blood pressure 126/72, pulse 72, temperature 36.5 °C (97.7 °F), resp. rate 15, height 1.524 m (5'), weight 58 kg (127 lb 12.8 oz), SpO2 95 %.    Physical Exam:  Appearance: Well-nourished, well-developed, in no acute distress  HEENT: Normocephalic, atraumatic, white sclera, PERRLA, oropharynx clear  Neck: No adenopathy or masses  Respiratory: no intercostal retractions or accessory muscle use  Lungs auscultation: Clear to auscultation bilaterally  Cardiovascular: Regular rate rhythm. No murmurs, rubs or gallops.  No LE edema  Abdomen: soft, nondistended  Gait: Normal  Digits: No clubbing, cyanosis  Motor: No focal deficits  Orientation: Oriented to time, person and place    Diagnosis:  1. Bronchiectasis without complication (HCC)  Tiotropium Bromide Monohydrate (SPIRIVA RESPIMAT) 2.5 MCG/ACT Aero Soln   2. Nonsmoker     3. Warfarin anticoagulation     4. Sjogren's syndrome, with unspecified organ involvement (HCC)         Plan:  The patient's chest CAT scan shows focal right lower lobe bronchiectasis, no evidence of pneumonia.  Sputum culture were nondiagnostic.  She is on daily suppressive azithromycin and ICS/LABA inhaler with persistent symptoms.  Recommend adding Spiriva Respimat 2.5ug 2 puffs QD.  Increase fluids for hydration.  Exercise, mucolytics, flutter valve can be beneficial for sputum expectoration. Consider nebulizer if symptoms persist.  Pneumococcal vaccine and annual influenza vaccine advised.  Return in about 3 months (around 9/14/2018).

## 2018-06-15 ENCOUNTER — ANTICOAGULATION MONITORING (OUTPATIENT)
Dept: VASCULAR LAB | Facility: MEDICAL CENTER | Age: 63
End: 2018-06-15

## 2018-06-15 DIAGNOSIS — Z95.2 H/O MECHANICAL AORTIC VALVE REPLACEMENT: ICD-10-CM

## 2018-06-15 LAB — INR PPP: 3.5 (ref 2–3.5)

## 2018-06-15 NOTE — PROGRESS NOTES
Anticoagulation Summary  As of 6/15/2018    INR goal:   2.0-3.0   TTR:   33.1 % (4.4 mo)   Today's INR:   3.5!   Warfarin maintenance plan:   5 mg (5 mg x 1) on Mon, Wed, Fri; 6 mg (6 mg x 1) all other days   Weekly warfarin total:   39 mg   Plan last modified:   Brian Ribera PharmD (6/15/2018)   Next INR check:   6/29/2018   Target end date:   Indefinite    Indications    H/O mechanical aortic valve replacement [Z95.2]             Anticoagulation Episode Summary     INR check location:   Home Draw    Preferred lab:       Send INR reminders to:       Comments:   Alere      Anticoagulation Care Providers     Provider Role Specialty Phone number    Chinyere Marcus M.D. Referring Cardiology [phone]    Yossi Khalil, PharmD Responsible          Anticoagulation Patient Findings      INR  supra-therapeutic.   Left a voice message for the patient, asked patient to please call the anticoagulation clinic if they have any signs/symptoms of bleeding and/or thrombosis or any changes to diet or medications.    Follow up appointment in 2 week(s)    Decrease weekly warfarin dose as noted      Brian Ribera, PharmD

## 2018-06-18 LAB
MYCOBACTERIUM SPEC CULT: NORMAL
RHODAMINE-AURAMINE STN SPEC: NORMAL
SIGNIFICANT IND 70042: NORMAL
SITE SITE: NORMAL
SOURCE SOURCE: NORMAL

## 2018-06-28 ENCOUNTER — ANTICOAGULATION MONITORING (OUTPATIENT)
Dept: VASCULAR LAB | Facility: MEDICAL CENTER | Age: 63
End: 2018-06-28

## 2018-06-28 DIAGNOSIS — Z95.2 H/O MECHANICAL AORTIC VALVE REPLACEMENT: ICD-10-CM

## 2018-06-28 LAB — INR PPP: 3.5 (ref 2–3.5)

## 2018-06-28 NOTE — PROGRESS NOTES
Anticoagulation Summary  As of 6/28/2018    INR goal:   2.0-3.0   TTR:   30.1 % (4.8 mo)   Today's INR:   3.5!   Warfarin maintenance plan:   5 mg (5 mg x 1) every day   Weekly warfarin total:   35 mg   Plan last modified:   Marilyn Plummer PharmD (6/28/2018)   Next INR check:   7/12/2018   Target end date:   Indefinite    Indications    H/O mechanical aortic valve replacement [Z95.2]             Anticoagulation Episode Summary     INR check location:   Home Draw    Preferred lab:       Send INR reminders to:       Comments:   Brad      Anticoagulation Care Providers     Provider Role Specialty Phone number    Chinyere Marcus M.D. Referring Cardiology [phone]    Yossi Khalil, PharmD Responsible          Anticoagulation Patient Findings    Spoke with Marline to report a supra therapeutic INR of 3.5.  Will decrease weekly dose by 10%. Pt denies any unusual s/s of bleeding, bruising, clotting or any changes to diet or medications.  Follow up in 2 weeks, to reduce risk of adverse events related to this high risk medication,  Warfarin.    Marilyn Plummer, SantoD

## 2018-07-13 ENCOUNTER — ANTICOAGULATION MONITORING (OUTPATIENT)
Dept: VASCULAR LAB | Facility: MEDICAL CENTER | Age: 63
End: 2018-07-13

## 2018-07-13 DIAGNOSIS — Z95.2 H/O MECHANICAL AORTIC VALVE REPLACEMENT: ICD-10-CM

## 2018-07-13 LAB — INR PPP: 2.2 (ref 2–3.5)

## 2018-07-13 NOTE — PROGRESS NOTES
OP Anticoagulation Service Note    Date: 7/13/2018  Anticoagulation Summary  As of 7/13/2018    INR goal:   2.0-3.0   TTR:   33.1 % (5.3 mo)   Today's INR:   2.2   Warfarin maintenance plan:   5 mg (5 mg x 1) every day   Weekly warfarin total:   35 mg   No change documented:   August Beard Ass't   Plan last modified:   Marilyn Plummer PharmD (6/28/2018)   Next INR check:   7/27/2018   Target end date:   Indefinite    Indications    H/O mechanical aortic valve replacement [Z95.2]             Anticoagulation Episode Summary     INR check location:   Home Draw    Preferred lab:       Send INR reminders to:       Comments:   Alere      Anticoagulation Care Providers     Provider Role Specialty Phone number    Chinyere Marcus M.D. Referring Cardiology [phone]    Santo EncisoD Responsible          Anticoagulation Patient Findings  Patient Findings     Negatives:   Signs/symptoms of thrombosis, Signs/symptoms of bleeding, Laboratory test error suspected, Change in health, Change in alcohol use, Change in activity, Upcoming invasive procedure, Emergency department visit, Upcoming dental procedure, Missed doses, Extra doses, Change in medications, Change in diet/appetite, Hospital admission, Bruising, Other complaints        Plan: Spoke to patient. Patient is therapeutic and will remain on the same dose. Patient reports no unusual bleeding or bruising and no changes to medication or diet. Patient is to be checked again in 2 weeks.    August Beard. Ass't  [facility]    I have reviewed and am in agreement with the above stated plan on 7-13-18.  Tulio Sotelo, SantoD

## 2018-07-18 RX ORDER — WARFARIN SODIUM 5 MG/1
TABLET ORAL
Qty: 45 TAB | Refills: 6 | Status: SHIPPED | OUTPATIENT
Start: 2018-07-18 | End: 2018-09-13 | Stop reason: SDUPTHER

## 2018-07-27 ENCOUNTER — ANTICOAGULATION MONITORING (OUTPATIENT)
Dept: VASCULAR LAB | Facility: MEDICAL CENTER | Age: 63
End: 2018-07-27

## 2018-07-27 DIAGNOSIS — Z95.2 H/O MECHANICAL AORTIC VALVE REPLACEMENT: ICD-10-CM

## 2018-07-27 LAB — INR PPP: 2.7 (ref 2–3.5)

## 2018-07-28 NOTE — PROGRESS NOTES
Anticoagulation Summary  As of 7/27/2018    INR goal:   2.0-3.0   TTR:   38.5 % (5.8 mo)   Today's INR:   2.7   Warfarin maintenance plan:   5 mg (5 mg x 1) every day   Weekly warfarin total:   35 mg   Plan last modified:   Marilyn Plummer PharmD (6/28/2018)   Next INR check:   8/10/2018   Target end date:   Indefinite    Indications    H/O mechanical aortic valve replacement [Z95.2]             Anticoagulation Episode Summary     INR check location:   Home Draw    Preferred lab:       Send INR reminders to:       Comments:   Brad      Anticoagulation Care Providers     Provider Role Specialty Phone number    Chinyere Marcus M.D. Referring Cardiology [phone]    Santo EncisoD Responsible          Anticoagulation Patient Findings    Left voicemail message to report a therapeutic INR of 2.7.  Pt to continue with current warfarin dosing regimen. Requested pt contact the clinic for any s/s of unusual bleeding, bruising, clotting or any changes to diet or medication. FU 2 weeks.    Marilyn Plummer, SantoD

## 2018-08-13 ENCOUNTER — ANTICOAGULATION MONITORING (OUTPATIENT)
Dept: VASCULAR LAB | Facility: MEDICAL CENTER | Age: 63
End: 2018-08-13

## 2018-08-13 DIAGNOSIS — Z95.2 H/O MECHANICAL AORTIC VALVE REPLACEMENT: ICD-10-CM

## 2018-08-13 LAB
INR PPP: 3.5 (ref 2–3.5)
INR PPP: 3.5 (ref 2–3.5)

## 2018-08-13 NOTE — PROGRESS NOTES
Anticoagulation Summary  As of 8/13/2018             INR goal:   2.0-3.0   TTR:   38.4 % (6.4 mo)   Today's INR:   3.5!   Warfarin maintenance plan:   5 mg (5 mg x 1) every day   Weekly warfarin total:   35 mg   Plan last modified:   Marilyn Plummer, PharmD (6/28/2018)   Next INR check:   8/27/2018   Target end date:   Indefinite    Indications    H/O mechanical aortic valve replacement [Z95.2]                                Anticoagulation Episode Summary              INR check location:   Home Draw     Preferred lab:         Send INR reminders to:         Comments:   Brad                     Anticoagulation Care Providers      Provider Role Specialty Phone number     Chinyere Marcus M.D. Referring Cardiology [phone]     Yossi Khalil, SantoD Responsible              Anticoagulation Patient Findings  HPI:  Marline Perry, on anticoagulation therapy with warfarin for history of aortic valve replacement.  Changes to current medical/health status since last appt: none  Denies signs/symptoms of bleeding and/or thrombosis since the last appt.    Patient reported eating a few less salads over the past week and drinking a couple wine coolers.   Denies any interval changes to medications since last appt.   Denies any complications or cost restrictions with current therapy.      A/P   INR SUPRA-therapeutic.      Instructed patient to HOLD tonight's dose of warfarin then to continue with 5 mg daily. Instructed her to monitor salad and wine cooler intake.     Next INR in 2 week(s).     Nikki Enamorado, PharmD

## 2018-08-13 NOTE — PROGRESS NOTES
Anticoagulation Summary  As of 8/13/2018    INR goal:   2.0-3.0   TTR:   38.4 % (6.4 mo)   Today's INR:   3.5!   Warfarin maintenance plan:   5 mg (5 mg x 1) every day   Weekly warfarin total:   35 mg   Plan last modified:   Marilyn Plummer, PharmD (6/28/2018)   Next INR check:   8/27/2018   Target end date:   Indefinite    Indications    H/O mechanical aortic valve replacement [Z95.2]             Anticoagulation Episode Summary     INR check location:   Home Draw    Preferred lab:       Send INR reminders to:       Comments:   Brad      Anticoagulation Care Providers     Provider Role Specialty Phone number    Chinyere Marcus M.D. Referring Cardiology [phone]    Yossi Khalil, PharmD Responsible          Anticoagulation Patient Findings    HPI:  Marline Perry, on anticoagulation therapy with warfarin for history of aortic valve replacement.  Changes to current medical/health status since last appt: none  Denies signs/symptoms of bleeding and/or thrombosis since the last appt.    Patient reported eating a few less salads over the past week and drinking a couple wine coolers.   Denies any interval changes to medications since last appt.   Denies any complications or cost restrictions with current therapy.     A/P   INR SUPRA-therapeutic.     Instructed patient to HOLD tonight's dose of warfarin then to continue with 5 mg daily. Instructed her to monitor salad and wine cooler intake.    Next INR in 2 week(s).    Nikki Enamorado, PharmD

## 2018-08-14 ENCOUNTER — OFFICE VISIT (OUTPATIENT)
Dept: CARDIOLOGY | Facility: MEDICAL CENTER | Age: 63
End: 2018-08-14
Payer: COMMERCIAL

## 2018-08-14 VITALS
DIASTOLIC BLOOD PRESSURE: 63 MMHG | SYSTOLIC BLOOD PRESSURE: 126 MMHG | WEIGHT: 122 LBS | HEART RATE: 88 BPM | BODY MASS INDEX: 23.95 KG/M2 | OXYGEN SATURATION: 94 % | HEIGHT: 60 IN

## 2018-08-14 DIAGNOSIS — Z79.01 WARFARIN ANTICOAGULATION: ICD-10-CM

## 2018-08-14 DIAGNOSIS — I71.20 THORACIC AORTIC ANEURYSM WITHOUT RUPTURE (HCC): ICD-10-CM

## 2018-08-14 DIAGNOSIS — I34.0 NON-RHEUMATIC MITRAL REGURGITATION: ICD-10-CM

## 2018-08-14 DIAGNOSIS — Z95.2 H/O MECHANICAL AORTIC VALVE REPLACEMENT: ICD-10-CM

## 2018-08-14 DIAGNOSIS — I35.1 NONRHEUMATIC AORTIC VALVE INSUFFICIENCY: ICD-10-CM

## 2018-08-14 PROBLEM — J41.0 SIMPLE CHRONIC BRONCHITIS (HCC): Status: RESOLVED | Noted: 2018-01-22 | Resolved: 2018-08-14

## 2018-08-14 PROCEDURE — 99214 OFFICE O/P EST MOD 30 MIN: CPT | Performed by: INTERNAL MEDICINE

## 2018-08-14 ASSESSMENT — ENCOUNTER SYMPTOMS
DEPRESSION: 0
LOSS OF CONSCIOUSNESS: 0
DIZZINESS: 0
INSOMNIA: 0
SPUTUM PRODUCTION: 1
HEARTBURN: 0
FEVER: 0
COUGH: 1
NERVOUS/ANXIOUS: 0
PND: 0
SHORTNESS OF BREATH: 1
MUSCULOSKELETAL NEGATIVE: 1
CHILLS: 0
EYES NEGATIVE: 1
PALPITATIONS: 0

## 2018-08-14 NOTE — PROGRESS NOTES
Chief Complaint   Patient presents with   • Hyperlipidemia     follow up       Subjective:   Marline Perry is a 62 y.o. female who presents today in follow-up in regards to her valvular heart disease, severe aortic insufficiency in 2001 that was remedied by aortic valve replacement/question mechanical and concomitant arch repair with Dr. Child at [place] as well as mitral valve disease details unknown     doing well, still tired  In with her   Enjoying traveling, [place] and [place], children are doing well  They do not miss [facility], been here almost a year  Still following with cardiology there in regards to long-term disability    Medications as directed including warfarin    Past Medical History:   Diagnosis Date   • Bronchiectasis (HCC)    • Bronchitis    • Chickenpox    • COPD (chronic obstructive pulmonary disease) ([facility])    • Hyperlipidemia    • Influenza    • Migraines    • Mumps    • Nasal drainage    • Sjogren's disease ([facility])      Past Surgical History:   Procedure Laterality Date   • AORTIC VALVE REPLACEMENT     • BRONCHOSCOPY     • HYSTERECTOMY LAPAROSCOPY     • SINUSCOPE       Family History   Problem Relation Age of Onset   • Arthritis Mother    • Hypertension Father    • Kidney Disease Father    • Arthritis Sister    • No Known Problems Brother    • No Known Problems Brother    • No Known Problems Son      Social History     Social History   • Marital status:      Spouse name: N/A   • Number of children: N/A   • Years of education: N/A     Occupational History   • Not on file.     Social History Main Topics   • Smoking status: Never Smoker   • Smokeless tobacco: Never Used   • Alcohol use 2.4 oz/week     4 Shots of liquor per week   • Drug use: No   • Sexual activity: Not on file     Other Topics Concern   • Not on file     Social History Narrative   • No narrative on file     Allergies   Allergen Reactions   • Spironolactone Rash     ALL OVER BODY    • Neomycin Rash      CONTACT DERMATITIS    • Tape      Adhesives-RASH      Outpatient Encounter Prescriptions as of 8/14/2018   Medication Sig Dispense Refill   • warfarin (COUMADIN) 5 MG Tab Take 1 tab po q day or as directed by clinic 45 Tab 6   • Tiotropium Bromide Monohydrate (SPIRIVA RESPIMAT) 2.5 MCG/ACT Aero Soln Inhale 2 Inhalation by mouth every day. Assemble and prime. 1 Inhaler 6   • warfarin (COUMADIN) 6 MG Tab Take 1 Tab by mouth every day. 90 Tab 1   • Fluticasone Furoate-Vilanterol (BREO) 100-25 MCG/INH AEROSOL POWDER, BREATH ACTIVATED Inhale 1 Puff by mouth every day.     • rosuvastatin (CRESTOR) 20 MG Tab Take 20 mg by mouth every evening.     • azithromycin (ZITHROMAX) 250 MG Tab Take 250 mg by mouth every day.     • ascorbic acid (ASCORBIC ACID) 500 MG Tab Take 500 mg by mouth every day.     • Biotin 68631 MCG Tab Take  by mouth.     • Flaxseed, Linseed, (FLAX SEED OIL) 1000 MG Cap Take  by mouth 2 Times a Day.     • Cholecalciferol (VITAMIN D3) 2000 units Tab Take  by mouth.     • celecoxib (CELEBREX) 200 MG Cap Take 200 mg by mouth 2 times a day.     • conjugated estrogen (PREMARIN) 0.625 MG Tab Take 0.625 mg by mouth every 7 days.     • [DISCONTINUED] predniSONE (DELTASONE) 10 MG Tab Take 30mg x 3 days, then take 20mg x 3 days, then take 10mg x 3 days, with food, then discontinue. (Patient not taking: Reported on 8/14/2018) 18 Tab 0   • [DISCONTINUED] predniSONE (DELTASONE) 10 MG Tab Take 30mg x 3 days, then take 20mg x 3 days, then take 10mg x 3 days, with food, then discontinue. (Patient not taking: Reported on 8/14/2018) 18 Tab 0     No facility-administered encounter medications on file as of 8/14/2018.      Review of Systems   Constitutional: Positive for malaise/fatigue. Negative for chills and fever.   Eyes: Negative.    Respiratory: Positive for cough, sputum production and shortness of breath.    Cardiovascular: Negative for chest pain, palpitations, leg swelling and PND.   Gastrointestinal: Negative for  heartburn.   Musculoskeletal: Negative.    Neurological: Negative for dizziness and loss of consciousness.   Psychiatric/Behavioral: Negative for depression. The patient is not nervous/anxious and does not have insomnia.    All other systems reviewed and are negative.       Objective:   /63   Pulse 88   Ht 1.524 m (5')   Wt 55.3 kg (122 lb)   SpO2 94%   BMI 23.83 kg/m²     Physical Exam   Constitutional: She is oriented to person, place, and time. She appears well-developed and well-nourished.   HENT:   Head: Normocephalic and atraumatic.   Eyes: Pupils are equal, round, and reactive to light. EOM are normal. No scleral icterus.   Neck: No JVD present. No thyromegaly present.   Cardiovascular: Normal rate, regular rhythm and intact distal pulses.    Murmur (2 out of 6 systolic murmur blowing, well-healed sternal scar normal carotid upstrokes no JVD) heard.  Pulmonary/Chest: Breath sounds normal. No respiratory distress.   Musculoskeletal: She exhibits no edema or tenderness.   Lymphadenopathy:     She has no cervical adenopathy.   Neurological: She is alert and oriented to person, place, and time. No cranial nerve deficit. She exhibits normal muscle tone. Coordination normal.   Skin: Skin is warm and dry. No rash noted.   Psychiatric: She has a normal mood and affect. Her behavior is normal.       Assessment:     1. Warfarin anticoagulation     2. Thoracic aortic aneurysm without rupture (HCC)  Echocardiogram Comp w/o Cont   3. Nonrheumatic aortic valve insufficiency     4. H/O mechanical aortic valve replacement     5. Non-rheumatic mitral regurgitation         Medical Decision Making:  Today's Assessment / Status / Plan:     Valve disease  Exam unchanged, notes from her prior cardiologist are a bit confusing, she does know she has a mechanical valve, many of his notes say it is the mitral valve and/or her aortic valve but I do not have the op notes from 2001  Doing well, respiratory symptoms likely due  to her bronchiectasis  Repeat echo to discern nature of the valves     PAD  Status post arch repair  Repeat CAT scan may be in 1 year, will continue following with her primary cardiologist  Medications as is    Spent more than 30 minutes going over anatomy E TC  Echo as above RTC 6 months

## 2018-08-28 ENCOUNTER — ANTICOAGULATION MONITORING (OUTPATIENT)
Dept: VASCULAR LAB | Facility: MEDICAL CENTER | Age: 63
End: 2018-08-28

## 2018-08-28 DIAGNOSIS — Z95.2 H/O MECHANICAL AORTIC VALVE REPLACEMENT: ICD-10-CM

## 2018-08-28 LAB — INR PPP: 2.3 (ref 2–3.5)

## 2018-08-29 NOTE — PROGRESS NOTES
Anticoagulation Summary  As of 8/28/2018    INR goal:   2.0-3.0   TTR:   39.8 % (6.9 mo)   Today's INR:   2.3   Warfarin maintenance plan:   5 mg (5 mg x 1) every day   Weekly warfarin total:   35 mg   Plan last modified:   Marilyn Plummer, PharmD (6/28/2018)   Next INR check:   9/11/2018   Target end date:   Indefinite    Indications    H/O mechanical aortic valve replacement [Z95.2]             Anticoagulation Episode Summary     INR check location:   Home Draw    Preferred lab:       Send INR reminders to:       Comments:   Brad      Anticoagulation Care Providers     Provider Role Specialty Phone number    Chinyere Marcus M.D. Referring Cardiology [phone]    Yossi Khalil, PharmD Responsible          Anticoagulation Patient Findings    Left voicemail message to report a therapeutic INR of 2.3.  Pt to continue with current warfarin dosing regimen. Requested pt contact the clinic for any s/s of unusual bleeding, bruising, clotting or any changes to diet or medication. FU 2 weeks.  Tulio Sotelo, PharmD

## 2018-09-11 ENCOUNTER — HOSPITAL ENCOUNTER (OUTPATIENT)
Dept: CARDIOLOGY | Facility: MEDICAL CENTER | Age: 63
End: 2018-09-11
Attending: INTERNAL MEDICINE
Payer: COMMERCIAL

## 2018-09-11 DIAGNOSIS — I71.20 THORACIC AORTIC ANEURYSM WITHOUT RUPTURE (HCC): ICD-10-CM

## 2018-09-11 PROCEDURE — 93308 TTE F-UP OR LMTD: CPT | Mod: 26 | Performed by: INTERNAL MEDICINE

## 2018-09-11 PROCEDURE — 93306 TTE W/DOPPLER COMPLETE: CPT

## 2018-09-12 LAB
LV EJECT FRACT  99904: 65
LV EJECT FRACT MOD 2C 99903: 61.95
LV EJECT FRACT MOD 4C 99902: 64.95
LV EJECT FRACT MOD BP 99901: 63.51

## 2018-09-13 ENCOUNTER — ANTICOAGULATION MONITORING (OUTPATIENT)
Dept: VASCULAR LAB | Facility: MEDICAL CENTER | Age: 63
End: 2018-09-13

## 2018-09-13 DIAGNOSIS — Z95.2 H/O MECHANICAL AORTIC VALVE REPLACEMENT: ICD-10-CM

## 2018-09-13 LAB — INR PPP: 3.3 (ref 2–3.5)

## 2018-09-13 RX ORDER — WARFARIN SODIUM 5 MG/1
TABLET ORAL
Qty: 90 TAB | Refills: 1 | Status: SHIPPED | OUTPATIENT
Start: 2018-09-13 | End: 2019-03-31 | Stop reason: SDUPTHER

## 2018-09-13 NOTE — PROGRESS NOTES
Anticoagulation Summary  As of 9/13/2018    INR goal:   2.0-3.0   TTR:   42.0 % (7.4 mo)   Today's INR:   3.3!   Warfarin maintenance plan:   5 mg (5 mg x 1) every day   Weekly warfarin total:   35 mg   Plan last modified:   Marilyn Plummer, PharmD (6/28/2018)   Next INR check:      Target end date:   Indefinite    Indications    H/O mechanical aortic valve replacement [Z95.2]             Anticoagulation Episode Summary     INR check location:   Home Draw    Preferred lab:       Send INR reminders to:       Comments:   Alere      Anticoagulation Care Providers     Provider Role Specialty Phone number    Chinyere Marcus M.D. Referring Cardiology [phone]    Yossi Khalil, PharmD Responsible          Anticoagulation Patient Findings        Spoke to patient on the phone.   INR  supra-therapeutic.   Still on azithro  Denies signs/symptoms of bleeding and/or thrombosis.   Denies changes to diet or medications.   Follow up appointment in 2 week(s).    2.5mg today then continue weekly warfarin dose as noted    Sent Rx to mail order for 90 days per pt     Brian Ribera, PharmD

## 2018-09-14 ENCOUNTER — TELEPHONE (OUTPATIENT)
Dept: CARDIOLOGY | Facility: MEDICAL CENTER | Age: 63
End: 2018-09-14

## 2018-09-14 NOTE — TELEPHONE ENCOUNTER
----- Message -----   From: Chinyere Marcus M.D.   Sent: 9/13/2018  12:58 PM   To: Bianca Noble R.N.     The heart and the aorta and the valve look excellent.  Great news on echo      Attempted to call pt, no answer, left vm to call back

## 2018-09-18 ENCOUNTER — OFFICE VISIT (OUTPATIENT)
Dept: PULMONOLOGY | Facility: HOSPICE | Age: 63
End: 2018-09-18
Payer: COMMERCIAL

## 2018-09-18 VITALS
WEIGHT: 123 LBS | DIASTOLIC BLOOD PRESSURE: 68 MMHG | HEART RATE: 87 BPM | TEMPERATURE: 97.5 F | RESPIRATION RATE: 16 BRPM | HEIGHT: 60 IN | BODY MASS INDEX: 24.15 KG/M2 | SYSTOLIC BLOOD PRESSURE: 118 MMHG | OXYGEN SATURATION: 99 %

## 2018-09-18 DIAGNOSIS — Z95.2 H/O MECHANICAL AORTIC VALVE REPLACEMENT: ICD-10-CM

## 2018-09-18 DIAGNOSIS — J47.9 BRONCHIECTASIS WITHOUT COMPLICATION (HCC): ICD-10-CM

## 2018-09-18 DIAGNOSIS — B37.0 ORAL THRUSH: ICD-10-CM

## 2018-09-18 DIAGNOSIS — Z23 NEED FOR VACCINATION: ICD-10-CM

## 2018-09-18 PROCEDURE — 90471 IMMUNIZATION ADMIN: CPT | Performed by: NURSE PRACTITIONER

## 2018-09-18 PROCEDURE — 99214 OFFICE O/P EST MOD 30 MIN: CPT | Mod: 25 | Performed by: NURSE PRACTITIONER

## 2018-09-18 PROCEDURE — 90686 IIV4 VACC NO PRSV 0.5 ML IM: CPT | Performed by: NURSE PRACTITIONER

## 2018-09-18 RX ORDER — AZITHROMYCIN 250 MG/1
250 TABLET, FILM COATED ORAL DAILY
Qty: 90 TAB | Refills: 1 | Status: SHIPPED | OUTPATIENT
Start: 2018-09-18 | End: 2018-12-10

## 2018-09-18 RX ORDER — AZITHROMYCIN 250 MG/1
250 TABLET, FILM COATED ORAL DAILY
Qty: 90 TAB | Refills: 1 | Status: SHIPPED | OUTPATIENT
Start: 2018-09-18 | End: 2018-09-18 | Stop reason: SDUPTHER

## 2018-09-18 ASSESSMENT — ENCOUNTER SYMPTOMS
PSYCHIATRIC NEGATIVE: 1
HEMOPTYSIS: 0
EYE DISCHARGE: 0
SPUTUM PRODUCTION: 0
WHEEZING: 0
COUGH: 1
CONSTITUTIONAL NEGATIVE: 1
CARDIOVASCULAR NEGATIVE: 1
SHORTNESS OF BREATH: 0
NEUROLOGICAL NEGATIVE: 1
GASTROINTESTINAL NEGATIVE: 1
BRUISES/BLEEDS EASILY: 0
MUSCULOSKELETAL NEGATIVE: 1
EYE PAIN: 0

## 2018-09-18 NOTE — PROGRESS NOTES
Chief Complaint   Patient presents with   • Follow-Up     Bronchiectasis         HPI: This patient is a 62 y.o. female, who presents for three-month follow-up bronchiectasis.  She was last seen in June with Dr Talbot.    To reiterate, the patient is a lifelong non-smoker.  She recently relocated here from [place] where she was followed by pulmonology. She was diagnosed with bronchiectasis in 2008 and has undergone bronchoscopy in the past. Chest CAT scan was reviewed by Dr Talbot at last visit shows mild right lower lobe bronchiectasis, no consolidation, infiltrates or mass.    The patient is compliant with daily Breo 100ug inhaler and daily suppressive azithromycin.  She rarely requires use of albuterol.  Spiriva was added at the time of the patient's last visit.  She feels this has been somewhat beneficial.  She continues to report a mild cough.  The cough will fluctuate between green and brown sputum and clear sputum.  Currently sputum is clear.  She denies frequent wheeze, she denies fever, chills, night sweats, unintentional weight loss or malaise.  She requests refill of daily suppressive azithromycin.  She is tried to go off this in the past and had recurrent flares of her bronchiectasis requiring multiple rounds of antibiotics.  She does not have a nebulizer or flutter valve.    She has a history of mechanical valve replacement and is on chronic anticoagulation.  She follows with cardiology Dr. Marcus.  She denies shortness of breath, chest pain, pressure or palpitations    Past Medical History:   Diagnosis Date   • Bronchiectasis (HCC)    • Bronchitis    • Chickenpox    • COPD (chronic obstructive pulmonary disease) ([facility])    • Hyperlipidemia    • Influenza    • Migraines    • Mumps    • Nasal drainage    • Sjogren's disease ([facility])        Social History   Substance Use Topics   • Smoking status: Never Smoker   • Smokeless tobacco: Never Used   • Alcohol use 2.4 oz/week     4 Shots of liquor per week        Family History   Problem Relation Age of Onset   • Arthritis Mother    • Hypertension Father    • Kidney Disease Father    • Arthritis Sister    • No Known Problems Brother    • No Known Problems Brother    • No Known Problems Son        Immunization History   Administered Date(s) Administered   • Influenza (IM) Preservative Free 01/10/2018   • Influenza TIV (IM) 01/10/2018   • TD Vaccine 07/03/2011       Current medications as of today   Current Outpatient Prescriptions   Medication Sig Dispense Refill   • azithromycin (ZITHROMAX) 250 MG Tab Take 1 Tab by mouth every day. 90 Tab 1   • nystatin (MYCOSTATIN) 877186 UNIT/ML Suspension Swish and swallow 1 teaspoonful (= 5mL) 4 times daily. 250 mL 0   • warfarin (COUMADIN) 5 MG Tab Take 1 tab po q day or as directed by clinic 90 Tab 1   • Tiotropium Bromide Monohydrate (SPIRIVA RESPIMAT) 2.5 MCG/ACT Aero Soln Inhale 2 Inhalation by mouth every day. Assemble and prime. 1 Inhaler 6   • warfarin (COUMADIN) 6 MG Tab Take 1 Tab by mouth every day. 90 Tab 1   • Fluticasone Furoate-Vilanterol (BREO) 100-25 MCG/INH AEROSOL POWDER, BREATH ACTIVATED Inhale 1 Puff by mouth every day.     • rosuvastatin (CRESTOR) 20 MG Tab Take 20 mg by mouth every evening.     • ascorbic acid (ASCORBIC ACID) 500 MG Tab Take 500 mg by mouth every day.     • Biotin 18379 MCG Tab Take  by mouth.     • Flaxseed, Linseed, (FLAX SEED OIL) 1000 MG Cap Take  by mouth 2 Times a Day.     • Cholecalciferol (VITAMIN D3) 2000 units Tab Take  by mouth.     • celecoxib (CELEBREX) 200 MG Cap Take 200 mg by mouth 2 times a day.     • conjugated estrogen (PREMARIN) 0.625 MG Tab Take 0.625 mg by mouth every 7 days.       No current facility-administered medications for this visit.        Allergies: Spironolactone; Neomycin; and Tape    Blood pressure 118/68, pulse 87, temperature 36.4 °C (97.5 °F), resp. rate 16, height 1.524 m (5'), weight 55.8 kg (123 lb), SpO2 99 %.      Review of Systems   Constitutional:  Negative.    HENT: Negative.    Eyes: Negative for pain and discharge.   Respiratory: Positive for cough. Negative for hemoptysis, sputum production, shortness of breath and wheezing.    Cardiovascular: Negative.    Gastrointestinal: Negative.    Musculoskeletal: Negative.    Skin: Negative.    Neurological: Negative.    Endo/Heme/Allergies: Negative for environmental allergies. Does not bruise/bleed easily.   Psychiatric/Behavioral: Negative.        Physical Exam   Constitutional: She is oriented to person, place, and time and well-developed, well-nourished, and in no distress.   HENT:   Head: Normocephalic and atraumatic.   Thrush present on posterior tongue and oropharynx   Eyes: Pupils are equal, round, and reactive to light.   Neck: Normal range of motion. Neck supple. No tracheal deviation present.   Cardiovascular: Normal rate, regular rhythm and normal heart sounds.    Pulmonary/Chest: Effort normal and breath sounds normal. No respiratory distress. She has no wheezes. She has no rales.   Musculoskeletal: Normal range of motion.   Neurological: She is alert and oriented to person, place, and time. Gait normal.   Skin: Skin is warm and dry.   Psychiatric: Mood, memory, affect and judgment normal.   Vitals reviewed.      Diagnoses/Plan:    1. Need for vaccination    - INFLUENZA VACCINE QUAD INJ >3Y(PF)    2. Bronchiectasis without complication (HCC)  Symptoms are currently stable.  She will continue bronchodilator therapy, continue daily suppressive azithromycin.  I will send a prescription to her pharmacy for this.  I have recommended that she use a nebulizer with flutter valve twice per day.   - DME NEBULIZER    3. Oral thrush  Will treat oral thrush with nystatin.  Patient denies sore throat or hoarseness.  - nystatin (MYCOSTATIN) 140580 UNIT/ML Suspension; Swish and swallow 1 teaspoonful (= 5mL) 4 times daily.  Dispense: 250 mL; Refill: 0    4. H/O mechanical aortic valve replacement  She has reestablished  with cardiology.  She requires chronic anticoagulation and follows with the Coumadin clinic.  She denies signs or symptoms of bleeding.  She understands changes and antibiotics can affect her INR.  Her INR has stabilized with daily azithromycin.      She will follow-up here in 6 months, sooner if necessary    This dictation was created using voice recognition software. The accuracy of the dictation is limited to the abilities of the software. I expect there may be some errors of grammar and possibly content.

## 2018-10-01 ENCOUNTER — TELEPHONE (OUTPATIENT)
Dept: PULMONOLOGY | Facility: HOSPICE | Age: 63
End: 2018-10-01

## 2018-10-01 NOTE — TELEPHONE ENCOUNTER
Marline called to ask what her nebulizer should be set on and what medication is used for her nebulizer. She also wants to know how she uses her nebulizer with the flutter valve.

## 2018-10-02 NOTE — TELEPHONE ENCOUNTER
Bridgette's note indicates that she wants her to use the nebulizer and flutter valve 2 times a day. If she needs instructions on how to use the nebulizer she will need to contact her DME that supplied the device. She should have Albuterol to use in the nebulizer. I can send in an RX. Some DME's supply medications as well. Please see who provided her nebulizer to see if they supply medication as well. If they don't I can send a script to her local pharmacy.

## 2018-10-04 ENCOUNTER — ANTICOAGULATION MONITORING (OUTPATIENT)
Dept: VASCULAR LAB | Facility: MEDICAL CENTER | Age: 63
End: 2018-10-04

## 2018-10-04 DIAGNOSIS — Z95.2 H/O MECHANICAL AORTIC VALVE REPLACEMENT: ICD-10-CM

## 2018-10-04 LAB — INR PPP: 2.5 (ref 2–3.5)

## 2018-10-05 NOTE — PROGRESS NOTES
OP Anticoagulation Telephone Note    Date: 10/5/2018  Anticoagulation Summary  As of 10/4/2018    INR goal:   2.0-3.0   TTR:   43.8 % (8.1 mo)   Today's INR:   2.5   Warfarin maintenance plan:   5 mg (5 mg x 1) every day   Weekly warfarin total:   35 mg   No change documented:   Rosanna Christianson, Med Ass't   Plan last modified:   Marilyn Plummer, PharmD (6/28/2018)   Next INR check:   10/11/2018   Target end date:   Indefinite    Indications    H/O mechanical aortic valve replacement [Z95.2]             Anticoagulation Episode Summary     INR check location:   Home Draw    Preferred lab:       Send INR reminders to:       Comments:   Alere      Anticoagulation Care Providers     Provider Role Specialty Phone number    Chinyere Marcus M.D. Referring Cardiology [phone]    Yossi Khalil, PharmD Responsible          Anticoagulation Patient Findings  Patient Findings     Negatives:   Signs/symptoms of thrombosis, Signs/symptoms of bleeding, Laboratory test error suspected, Change in health, Change in alcohol use, Change in activity, Upcoming invasive procedure, Emergency department visit, Upcoming dental procedure, Missed doses, Extra doses, Change in medications, Change in diet/appetite, Hospital admission, Bruising, Other complaints      Plan:  Left message on patient's answering machine/ voicemail. Instructed patient to call back with any concerns regarding any unusual bleeding or bruising, any medication or diet changes, or any signs or symptoms of thrombosis. Instructed patient to resume medication as outlined above. Patient to follow up in 2 weeks.     Rosanna Christianson, Medical Assistant    I have reviewed and am in agreement with the above stated plan on 10-5-18.  Tulio Sotelo, SantoD

## 2018-10-08 ENCOUNTER — TELEPHONE (OUTPATIENT)
Dept: PULMONOLOGY | Facility: HOSPICE | Age: 63
End: 2018-10-08

## 2018-10-08 NOTE — TELEPHONE ENCOUNTER
Patient called to know how to use the nebulizer and flutter if together or at separate times because her DME said to contact her provider, she would also like a Rx sent to [facility]. And on the flutter setting where it should be on.

## 2018-10-15 ENCOUNTER — PATIENT MESSAGE (OUTPATIENT)
Dept: CARDIOLOGY | Facility: MEDICAL CENTER | Age: 63
End: 2018-10-15

## 2018-10-15 ENCOUNTER — TELEPHONE (OUTPATIENT)
Dept: PULMONOLOGY | Facility: HOSPICE | Age: 63
End: 2018-10-15

## 2018-10-15 ENCOUNTER — OFFICE VISIT (OUTPATIENT)
Dept: CARDIOLOGY | Facility: MEDICAL CENTER | Age: 63
End: 2018-10-15
Payer: COMMERCIAL

## 2018-10-15 VITALS
HEART RATE: 74 BPM | DIASTOLIC BLOOD PRESSURE: 60 MMHG | BODY MASS INDEX: 23.95 KG/M2 | HEIGHT: 60 IN | SYSTOLIC BLOOD PRESSURE: 130 MMHG | WEIGHT: 122 LBS | OXYGEN SATURATION: 94 %

## 2018-10-15 DIAGNOSIS — J47.9 BRONCHIECTASIS WITHOUT COMPLICATION (HCC): ICD-10-CM

## 2018-10-15 DIAGNOSIS — I34.0 NON-RHEUMATIC MITRAL REGURGITATION: ICD-10-CM

## 2018-10-15 DIAGNOSIS — I71.20 THORACIC AORTIC ANEURYSM WITHOUT RUPTURE (HCC): ICD-10-CM

## 2018-10-15 DIAGNOSIS — Z95.2 H/O MECHANICAL AORTIC VALVE REPLACEMENT: ICD-10-CM

## 2018-10-15 DIAGNOSIS — I35.1 NONRHEUMATIC AORTIC VALVE INSUFFICIENCY: ICD-10-CM

## 2018-10-15 DIAGNOSIS — Z79.01 WARFARIN ANTICOAGULATION: ICD-10-CM

## 2018-10-15 PROBLEM — Z23 NEED FOR VACCINATION: Status: RESOLVED | Noted: 2018-09-18 | Resolved: 2018-10-15

## 2018-10-15 PROCEDURE — 99214 OFFICE O/P EST MOD 30 MIN: CPT | Performed by: INTERNAL MEDICINE

## 2018-10-15 RX ORDER — ALBUTEROL SULFATE 2.5 MG/3ML
2.5 SOLUTION RESPIRATORY (INHALATION) EVERY 4 HOURS PRN
Qty: 120 ML | Refills: 11 | Status: SHIPPED | OUTPATIENT
Start: 2018-10-15 | End: 2018-10-18 | Stop reason: SDUPTHER

## 2018-10-15 ASSESSMENT — ENCOUNTER SYMPTOMS
EYES NEGATIVE: 1
HEARTBURN: 0
DEPRESSION: 0
INSOMNIA: 0
NAUSEA: 0
NERVOUS/ANXIOUS: 0
PND: 0
COUGH: 1
FEVER: 0
DIZZINESS: 0
SPUTUM PRODUCTION: 1
PALPITATIONS: 0
SHORTNESS OF BREATH: 0
MUSCULOSKELETAL NEGATIVE: 1
CHILLS: 0
LOSS OF CONSCIOUSNESS: 0

## 2018-10-15 NOTE — TELEPHONE ENCOUNTER
Please call the patient and reviewed with her how to use nebulizer with flutter valve.  I would like for her to use her nebulizer and complete a treatment twice per day.  Immediately followed by flutter valve use for a few minutes as tolerated.  This should loosen secretions and promote sputum clearance. I have sent albuterol to pharmacy for her to use in her nebulizer.

## 2018-10-15 NOTE — PROGRESS NOTES
Chief Complaint   Patient presents with   • Hyperlipidemia     follow up echo results       Subjective:   Marline Perry is a 62 y.o. female who presents today in follow-up in regards to her valvular heart disease, severe aortic insufficiency in 2001 that was remedied by aortic valve replacement/question mechanical and concomitant arch repair with Dr. Child at [facility] as well as mitral valve disease details unknown     In with her  as usual  Enjoying traveling, [place] and [place], children are still doing well  They do not miss [facility], been here over a year  Still following with cardiology there in regards to long-term disability  Frustrated waiting for pulmonary, enjoys seeing them, struggles with her lung disease    Medications as directed including warfarin    Past Medical History:   Diagnosis Date   • Bronchiectasis ([facility])    • Bronchitis    • Chickenpox    • COPD (chronic obstructive pulmonary disease) ([facility])    • Hyperlipidemia    • Influenza    • Migraines    • Mumps    • Nasal drainage    • Sjogren's disease (HCC)      Past Surgical History:   Procedure Laterality Date   • AORTIC VALVE REPLACEMENT     • BRONCHOSCOPY     • HYSTERECTOMY LAPAROSCOPY     • SINUSCOPE       Family History   Problem Relation Age of Onset   • Arthritis Mother    • Hypertension Father    • Kidney Disease Father    • Arthritis Sister    • No Known Problems Brother    • No Known Problems Brother    • No Known Problems Son      Social History     Social History   • Marital status:      Spouse name: N/A   • Number of children: N/A   • Years of education: N/A     Occupational History   • Not on file.     Social History Main Topics   • Smoking status: Never Smoker   • Smokeless tobacco: Never Used   • Alcohol use 2.4 oz/week     4 Shots of liquor per week   • Drug use: No   • Sexual activity: Not on file     Other Topics Concern   • Not on file     Social History Narrative   • No narrative on file      Allergies   Allergen Reactions   • Spironolactone Rash     ALL OVER BODY    • Neomycin Rash     CONTACT DERMATITIS    • Tape      Adhesives-RASH      Outpatient Encounter Prescriptions as of 10/15/2018   Medication Sig Dispense Refill   • azithromycin (ZITHROMAX) 250 MG Tab Take 1 Tab by mouth every day. 90 Tab 1   • warfarin (COUMADIN) 5 MG Tab Take 1 tab po q day or as directed by clinic 90 Tab 1   • Tiotropium Bromide Monohydrate (SPIRIVA RESPIMAT) 2.5 MCG/ACT Aero Soln Inhale 2 Inhalation by mouth every day. Assemble and prime. 1 Inhaler 6   • warfarin (COUMADIN) 6 MG Tab Take 1 Tab by mouth every day. 90 Tab 1   • Fluticasone Furoate-Vilanterol (BREO) 100-25 MCG/INH AEROSOL POWDER, BREATH ACTIVATED Inhale 1 Puff by mouth every day.     • rosuvastatin (CRESTOR) 20 MG Tab Take 20 mg by mouth every evening.     • ascorbic acid (ASCORBIC ACID) 500 MG Tab Take 500 mg by mouth every day.     • Biotin 90374 MCG Tab Take  by mouth.     • Flaxseed, Linseed, (FLAX SEED OIL) 1000 MG Cap Take  by mouth 2 Times a Day.     • Cholecalciferol (VITAMIN D3) 2000 units Tab Take  by mouth.     • celecoxib (CELEBREX) 200 MG Cap Take 200 mg by mouth 2 times a day.     • conjugated estrogen (PREMARIN) 0.625 MG Tab Take 0.625 mg by mouth every 7 days.     • [DISCONTINUED] nystatin (MYCOSTATIN) 477523 UNIT/ML Suspension Swish and swallow 1 teaspoonful (= 5mL) 4 times daily. 250 mL 0     No facility-administered encounter medications on file as of 10/15/2018.      Review of Systems   Constitutional: Positive for malaise/fatigue. Negative for chills and fever.   Eyes: Negative.    Respiratory: Positive for cough and sputum production. Negative for shortness of breath.    Cardiovascular: Negative for chest pain, palpitations, leg swelling and PND.   Gastrointestinal: Negative for heartburn and nausea.   Musculoskeletal: Negative.    Neurological: Negative for dizziness and loss of consciousness.   Psychiatric/Behavioral: Negative for  depression. The patient is not nervous/anxious and does not have insomnia.    All other systems reviewed and are negative.       Objective:   /60 (BP Location: Left arm, Patient Position: Sitting)   Pulse 74   Ht 1.524 m (5')   Wt 55.3 kg (122 lb)   SpO2 94%   BMI 23.83 kg/m²     Physical Exam   Constitutional: She is oriented to person, place, and time. She appears well-developed and well-nourished.   Patient seen and examined again today, changes noted   HENT:   Head: Normocephalic and atraumatic.   Mouth/Throat: No oropharyngeal exudate.   Eyes: Pupils are equal, round, and reactive to light. EOM are normal. No scleral icterus.   Neck: No JVD present. No thyromegaly present.   Cardiovascular: Normal rate, regular rhythm and intact distal pulses.    Murmur (2 out of 6 systolic murmur blowing, well-healed sternal scar normal carotid upstrokes no JVD) heard.  Pulmonary/Chest: Effort normal and breath sounds normal. No respiratory distress. She has no wheezes. She has no rales. She exhibits no tenderness.   Abdominal: Bowel sounds are normal. She exhibits no distension.   Musculoskeletal: She exhibits no edema or tenderness.   Lymphadenopathy:     She has no cervical adenopathy.   Neurological: She is alert and oriented to person, place, and time. No cranial nerve deficit. She exhibits normal muscle tone. Coordination normal.   Skin: Skin is warm and dry. No rash noted.   Psychiatric: She has a normal mood and affect. Her behavior is normal.       Assessment:     1. Non-rheumatic mitral regurgitation     2. Warfarin anticoagulation     3. Thoracic aortic aneurysm without rupture (HCC)     4. Nonrheumatic aortic valve insufficiency     5. H/O mechanical aortic valve replacement         Medical Decision Making:  Today's Assessment / Status / Plan:     Valve disease  Exam unchanged, notes from her prior cardiologist are a bit confusing, she does know she has a mechanical valve, many of his notes say it is the  mitral valve and/or her aortic valve but I do not have the op notes from 2001  Echo reviewed as his CAT scan.  We looked at images together  Mitral valve disease appreciated repeat in 1 year  Doing well, respiratory symptoms likely due to her bronchiectasis    Lung disease  I did send a note to her lung providers so she can follow-up    PAD  Status post arch repair  Looked at images as above  Repeat CAT scan may be in 1 year, will continue following with her primary cardiologist  Medications as is  Reviewed lab work from PCP she is getting it done again next month in [place]  She is seeing cardiology there in 6 months    Spent more than 30 minutes going over anatomy ETC as we do each time  RTC 6 months

## 2018-10-16 ENCOUNTER — HOSPITAL ENCOUNTER (OUTPATIENT)
Dept: LAB | Facility: MEDICAL CENTER | Age: 63
End: 2018-10-16
Attending: FAMILY MEDICINE
Payer: COMMERCIAL

## 2018-10-16 LAB
ALBUMIN SERPL BCP-MCNC: 4.3 G/DL (ref 3.2–4.9)
ALBUMIN/GLOB SERPL: 1.3 G/DL
ALP SERPL-CCNC: 69 U/L (ref 30–99)
ALT SERPL-CCNC: 39 U/L (ref 2–50)
ANION GAP SERPL CALC-SCNC: 7 MMOL/L (ref 0–11.9)
AST SERPL-CCNC: 30 U/L (ref 12–45)
BASOPHILS # BLD AUTO: 1.2 % (ref 0–1.8)
BASOPHILS # BLD: 0.08 K/UL (ref 0–0.12)
BILIRUB SERPL-MCNC: 0.7 MG/DL (ref 0.1–1.5)
BUN SERPL-MCNC: 19 MG/DL (ref 8–22)
CALCIUM SERPL-MCNC: 9.4 MG/DL (ref 8.5–10.5)
CHLORIDE SERPL-SCNC: 105 MMOL/L (ref 96–112)
CHOLEST SERPL-MCNC: 149 MG/DL (ref 100–199)
CO2 SERPL-SCNC: 27 MMOL/L (ref 20–33)
CREAT SERPL-MCNC: 0.84 MG/DL (ref 0.5–1.4)
EOSINOPHIL # BLD AUTO: 0.09 K/UL (ref 0–0.51)
EOSINOPHIL NFR BLD: 1.3 % (ref 0–6.9)
ERYTHROCYTE [DISTWIDTH] IN BLOOD BY AUTOMATED COUNT: 40.5 FL (ref 35.9–50)
GLOBULIN SER CALC-MCNC: 3.3 G/DL (ref 1.9–3.5)
GLUCOSE SERPL-MCNC: 81 MG/DL (ref 65–99)
HCT VFR BLD AUTO: 43.1 % (ref 37–47)
HDLC SERPL-MCNC: 60 MG/DL
HGB BLD-MCNC: 14.5 G/DL (ref 12–16)
IMM GRANULOCYTES # BLD AUTO: 0.01 K/UL (ref 0–0.11)
IMM GRANULOCYTES NFR BLD AUTO: 0.1 % (ref 0–0.9)
LDLC SERPL CALC-MCNC: 77 MG/DL
LYMPHOCYTES # BLD AUTO: 1.66 K/UL (ref 1–4.8)
LYMPHOCYTES NFR BLD: 24.3 % (ref 22–41)
MCH RBC QN AUTO: 30.7 PG (ref 27–33)
MCHC RBC AUTO-ENTMCNC: 33.6 G/DL (ref 33.6–35)
MCV RBC AUTO: 91.3 FL (ref 81.4–97.8)
MONOCYTES # BLD AUTO: 0.63 K/UL (ref 0–0.85)
MONOCYTES NFR BLD AUTO: 9.2 % (ref 0–13.4)
NEUTROPHILS # BLD AUTO: 4.35 K/UL (ref 2–7.15)
NEUTROPHILS NFR BLD: 63.9 % (ref 44–72)
NRBC # BLD AUTO: 0 K/UL
NRBC BLD-RTO: 0 /100 WBC
PLATELET # BLD AUTO: 223 K/UL (ref 164–446)
PMV BLD AUTO: 9.6 FL (ref 9–12.9)
POTASSIUM SERPL-SCNC: 4.1 MMOL/L (ref 3.6–5.5)
PROT SERPL-MCNC: 7.6 G/DL (ref 6–8.2)
RBC # BLD AUTO: 4.72 M/UL (ref 4.2–5.4)
SODIUM SERPL-SCNC: 139 MMOL/L (ref 135–145)
TRIGL SERPL-MCNC: 58 MG/DL (ref 0–149)
WBC # BLD AUTO: 6.8 K/UL (ref 4.8–10.8)

## 2018-10-16 PROCEDURE — 80061 LIPID PANEL: CPT

## 2018-10-16 PROCEDURE — 80053 COMPREHEN METABOLIC PANEL: CPT

## 2018-10-16 PROCEDURE — 36415 COLL VENOUS BLD VENIPUNCTURE: CPT

## 2018-10-16 PROCEDURE — 85025 COMPLETE CBC W/AUTO DIFF WBC: CPT

## 2018-10-18 DIAGNOSIS — J47.9 BRONCHIECTASIS WITHOUT COMPLICATION (HCC): ICD-10-CM

## 2018-10-18 RX ORDER — ALBUTEROL SULFATE 2.5 MG/3ML
2.5 SOLUTION RESPIRATORY (INHALATION) EVERY 4 HOURS PRN
Qty: 360 ML | Refills: 3 | Status: SHIPPED | OUTPATIENT
Start: 2018-10-18 | End: 2021-06-02

## 2018-10-18 NOTE — PATIENT COMMUNICATION
Spoke to patient, advised her to use neb followed by flutter valve. Patient will call us with additional questions./ap

## 2018-10-19 ENCOUNTER — ANTICOAGULATION MONITORING (OUTPATIENT)
Dept: VASCULAR LAB | Facility: MEDICAL CENTER | Age: 63
End: 2018-10-19

## 2018-10-19 DIAGNOSIS — Z95.2 H/O MECHANICAL AORTIC VALVE REPLACEMENT: ICD-10-CM

## 2018-10-19 LAB — INR PPP: 3.1 (ref 2–3.5)

## 2018-10-19 NOTE — PROGRESS NOTES
Anticoagulation Summary  As of 10/19/2018    INR goal:   2.0-3.0   TTR:   46.1 % (8.6 mo)   Today's INR:   3.1!   Warfarin maintenance plan:   5 mg (5 mg x 1) every day   Weekly warfarin total:   35 mg   Plan last modified:   Marilyn Plummer, PharmD (6/28/2018)   Next INR check:   11/2/2018   Target end date:   Indefinite    Indications    H/O mechanical aortic valve replacement [Z95.2]             Anticoagulation Episode Summary     INR check location:   Home Draw    Preferred lab:       Send INR reminders to:       Comments:   Alere      Anticoagulation Care Providers     Provider Role Specialty Phone number    Chinyere Marcus M.D. Referring Cardiology [phone]    Yossi Khalil, PharmD Responsible          Anticoagulation Patient Findings    HPI:  Marline Perry, on anticoagulation therapy with warfarin for mechanical valve.   Changes to current medical/health status since last appt: none  Denies signs/symptoms of bleeding and/or thrombosis since the last appt.    Denies any interval changes to diet  Denies any interval changes to medications since last appt.   Denies any complications or cost restrictions with current therapy.     A/P   INR  SUPRA -therapeutic.   Reduce today then Pt is to continue with current warfarin dosing regimen.     Next INR in 2 week(s).    Mu Mann, PharmD

## 2018-11-06 ENCOUNTER — ANTICOAGULATION MONITORING (OUTPATIENT)
Dept: VASCULAR LAB | Facility: MEDICAL CENTER | Age: 63
End: 2018-11-06

## 2018-11-06 DIAGNOSIS — Z95.2 H/O MECHANICAL AORTIC VALVE REPLACEMENT: ICD-10-CM

## 2018-11-06 LAB — INR PPP: 3.7 (ref 2–3.5)

## 2018-11-07 NOTE — PROGRESS NOTES
Anticoagulation Summary  As of 11/6/2018    INR goal:   2.0-3.0   TTR:   43.1 % (9.2 mo)   Today's INR:   3.7!   Warfarin maintenance plan:   5 mg (5 mg x 1) every day   Weekly warfarin total:   35 mg   Plan last modified:   Mu Mann, PharmD (11/6/2018)   Next INR check:   11/20/2018   Target end date:   Indefinite    Indications    H/O mechanical aortic valve replacement [Z95.2]             Anticoagulation Episode Summary     INR check location:   Home Draw    Preferred lab:       Send INR reminders to:       Comments:   Alere      Anticoagulation Care Providers     Provider Role Specialty Phone number    Chinyere Marcus M.D. Referring Cardiology [phone]    Yossi Khalil, PharmD Responsible          Anticoagulation Patient Findings    HPI:  Marline Perry, on anticoagulation therapy with warfarin for mechanical valve.   Changes to current medical/health status since last appt: none  Denies signs/symptoms of bleeding and/or thrombosis since the last appt.    Pt states she stopped eating greens for a few days.   Denies any interval changes to medications since last appt.   Denies any complications or cost restrictions with current therapy.     A/P   INR  SUPRA-therapeutic.   Hold today then Pt is to continue with current warfarin dosing regimen.     Next INR in 2 week(s).    Mu Mann, PharmD

## 2018-11-21 ENCOUNTER — ANTICOAGULATION MONITORING (OUTPATIENT)
Dept: VASCULAR LAB | Facility: MEDICAL CENTER | Age: 63
End: 2018-11-21

## 2018-11-21 DIAGNOSIS — Z95.2 H/O MECHANICAL AORTIC VALVE REPLACEMENT: ICD-10-CM

## 2018-11-21 LAB — INR PPP: 2.1 (ref 2–3.5)

## 2018-11-21 NOTE — PROGRESS NOTES
OP Telephone Anticoagulation Service Note    Date: 11/21/2018      Anticoagulation Summary  As of 11/21/2018    INR goal:   2.0-3.0   TTR:   43.8 % (9.7 mo)   Today's INR:   2.1   Warfarin maintenance plan:   5 mg (5 mg x 1) every day   Weekly warfarin total:   35 mg   Plan last modified:   Mu Mann, PharmD (11/6/2018)   Next INR check:   12/5/2018   Target end date:   Indefinite    Indications    H/O mechanical aortic valve replacement [Z95.2]             Anticoagulation Episode Summary     INR check location:   Home Draw    Preferred lab:       Send INR reminders to:       Comments:   Alere      Anticoagulation Care Providers     Provider Role Specialty Phone number    Chinyere Marcus M.D. Referring Cardiology [phone]    Yossi Khalil, PharmD Responsible          Anticoagulation Patient Findings      INR therapeutic at 2.1.  Spoke w/ pt on phone.  Verified regimen w/ pt.  Pt to continue on with current regimen. Pt stated that she did miss a dose yesterday but is still therapeutic.  NO s/s bleeding reported per pt.  NO changes in diet reported per pt.  NO changes in medications reported per pt.  Check INR in 2 week(s).  Instructed pt to call clinic at [phone] if there are any questions.  Pt stated understanding.    Huseyin Conrad, Pharmacy Intern

## 2018-12-07 ENCOUNTER — TELEPHONE (OUTPATIENT)
Dept: MEDICAL GROUP | Facility: PHYSICIAN GROUP | Age: 63
End: 2018-12-07

## 2018-12-07 NOTE — LETTER
[facility]  Yuliet Iyer M.D.  [address]  [place] [address]  Fax: [phone]   Authorization for Release/Disclosure of   Protected Health Information   Name: MARLINE PERRY : 1955 SSN: [id number]   Address: [place] [address] Phone:    [phone] (home)    I authorize the entity listed below to release/disclose the PHI below to:   [facility]/Pcp Not In Computer and Pcp Not In Computer   Provider or Entity Name:Dr.Dean Dempsey     Address   City, State, Zip   Phone:      Fax:[phone]     Reason for request: continuity of care   Information to be released:    [  ] LAST COLONOSCOPY,  including any PATH REPORT and follow-up  [  ] LAST FIT/COLOGUARD RESULT [  ] LAST DEXA  [  ] LAST MAMMOGRAM  [  ] LAST PAP  [  ] LAST LABS [  ] RETINA EXAM REPORT  [  ] IMMUNIZATION RECORDS  [ XXX ] Release all info      [  ] Check here and initial the line next to each item to release ALL health information INCLUDING  _____ Care and treatment for drug and / or alcohol abuse  _____ HIV testing, infection status, or AIDS  _____ Genetic Testing    DATES OF SERVICE OR TIME PERIOD TO BE DISCLOSED: _____________  I understand and acknowledge that:  * This Authorization may be revoked at any time by you in writing, except if your health information has already been used or disclosed.  * Your health information that will be used or disclosed as a result of you signing this authorization could be re-disclosed by the recipient. If this occurs, your re-disclosed health information may no longer be protected by State or Federal laws.  * You may refuse to sign this Authorization. Your refusal will not affect your ability to obtain treatment.  * This Authorization becomes effective upon signing and will  on (date) __________.      If no date is indicated, this Authorization will  one (1) year from the signature date.    Name: Marline Perry    Signature: Continuity of care    Date:     12/7/2018       PLEASE FAX REQUESTED RECORDS BACK TO: [phone]

## 2018-12-07 NOTE — TELEPHONE ENCOUNTER
Future Appointments       Provider Department Center    12/10/2018 9:15 AM Yuliet Iyer M.D. [facility]    3/18/2019 9:00 AM [facility]IAN[facility] Pulmonary Medicine     5/3/2019 9:15 AM Chinyere Marcus M.D. [facility]         NEW PATIENT VISIT PRE-VISIT PLANNING    1.  [employer] Patient is checked in Patient Demographics? YES    2.  Immunizations were updated in [facility] using WebIZ?: Yes       •  Web Iz Recommendations: TDAP and ZOSTAVAX (Shingles)    3.  Is this appointment scheduled as a Hospital Follow-Up? No    4.  Patient is due for the following Health Maintenance Topics:   Health Maintenance Due   Topic Date Due   • PAP SMEAR  11/24/1976   • MAMMOGRAM  11/24/1995   • COLONOSCOPY  11/24/2005   • IMM ZOSTER VACCINES (1 of 2) 11/24/2005   • IMM DTaP/Tdap/Td Vaccine (1 - Tdap) 07/04/2011       5.  Reviewed/Updated the following with patient:   •   Preferred Pharmacy? YES       •   Preferred Lab? YES       •   Preferred Communication? YES       •   Allergies? YES       •   Medications? YES. Was Abstract Encounter opened and chart updated? YES       •   Social History? YES. Was Abstract Encounter opened and chart updated? YES       •   Family History (document living status of immediate family members and if + hx of cancer, diabetes, hypertension, hyperlipidemia, heart attack, stroke) YES. Was Abstract Encounter opened and chart updated? YES    6.  Updated Care Team?       •   DME Company (gait device, O2, CPAP, etc.) YES       •   Other Specialists (eye doctor, derm, GYN, cardiology, endo, etc): YES    7.  MDX printed for Provider? NO    8.  Patient was informed to arrive 15 min prior to their   scheduled appointment and bring in their medication bottles.

## 2018-12-10 ENCOUNTER — OFFICE VISIT (OUTPATIENT)
Dept: MEDICAL GROUP | Facility: PHYSICIAN GROUP | Age: 63
End: 2018-12-10
Payer: COMMERCIAL

## 2018-12-10 VITALS
OXYGEN SATURATION: 93 % | BODY MASS INDEX: 24.15 KG/M2 | HEART RATE: 77 BPM | RESPIRATION RATE: 18 BRPM | TEMPERATURE: 97.5 F | HEIGHT: 60 IN | DIASTOLIC BLOOD PRESSURE: 80 MMHG | WEIGHT: 123 LBS | SYSTOLIC BLOOD PRESSURE: 122 MMHG

## 2018-12-10 DIAGNOSIS — Z90.710 HISTORY OF HYSTERECTOMY: ICD-10-CM

## 2018-12-10 DIAGNOSIS — Z12.31 ENCOUNTER FOR SCREENING MAMMOGRAM FOR BREAST CANCER: ICD-10-CM

## 2018-12-10 DIAGNOSIS — M35.00 SJOGREN'S SYNDROME, WITH UNSPECIFIED ORGAN INVOLVEMENT (HCC): ICD-10-CM

## 2018-12-10 DIAGNOSIS — Z79.01 WARFARIN ANTICOAGULATION: ICD-10-CM

## 2018-12-10 DIAGNOSIS — Z12.12 SCREENING FOR COLORECTAL CANCER: ICD-10-CM

## 2018-12-10 DIAGNOSIS — Z12.11 SCREENING FOR COLORECTAL CANCER: ICD-10-CM

## 2018-12-10 DIAGNOSIS — M35.9 DIFFUSE CONNECTIVE TISSUE DISEASE (HCC): ICD-10-CM

## 2018-12-10 PROCEDURE — 99203 OFFICE O/P NEW LOW 30 MIN: CPT | Performed by: FAMILY MEDICINE

## 2018-12-10 RX ORDER — SUMATRIPTAN 50 MG/1
TABLET, FILM COATED ORAL
COMMUNITY
Start: 2018-10-18 | End: 2023-05-25

## 2018-12-10 ASSESSMENT — PATIENT HEALTH QUESTIONNAIRE - PHQ9
CLINICAL INTERPRETATION OF PHQ2 SCORE: 2
5. POOR APPETITE OR OVEREATING: 1 - SEVERAL DAYS
SUM OF ALL RESPONSES TO PHQ QUESTIONS 1-9: 8

## 2018-12-10 NOTE — PROGRESS NOTES
cc: chronic pain and SOB      Subjective:     Marline Perry is a 63 y.o. female presenting for the following:     Warfarin: patient has been taking warfarin chronically. Feels well. No CP, palpitations. Does have home machine and checks INR regularly.     Sjogren and mixed connective tissue disease:  Patient was seeing a rheumatologist years ago for this problem but her joint pain and fatigue had been stable for a few years and so she only saw her PCP. She does have difficulty with pain in many joints and if she works more than a few hours she will need to rest due to the fatigue. She does normally take tylenol for the pain but takes celebrex a few times a week as well.     Sees Pulmonary for her chronic SOB/bronchiectasis. Is using spiriva and breo. Her symptoms has been partially controlled with this. No flairs recently. She is unable to walk for distances or up stairs but this is chronic for her. No CP, swelling, productive cough.     Has been on vaginal premarin once weekly since her hysterectomy. Had hysterectomy in the 1980s for endometriosis. Non-malignant. She has not had any vaginal dryness. No hot flashes.       Review of systems:  All others reviewed and are negative.       Current Outpatient Prescriptions:   •  SUMAtriptan (IMITREX) 50 MG Tab, , Disp: , Rfl:   •  albuterol (PROVENTIL) 2.5mg/3ml Nebu Soln solution for nebulization, 3 mL by Nebulization route every four hours as needed for Shortness of Breath., Disp: 360 mL, Rfl: 3  •  warfarin (COUMADIN) 5 MG Tab, Take 1 tab po q day or as directed by clinic, Disp: 90 Tab, Rfl: 1  •  Tiotropium Bromide Monohydrate (SPIRIVA RESPIMAT) 2.5 MCG/ACT Aero Soln, Inhale 2 Inhalation by mouth every day. Assemble and prime., Disp: 1 Inhaler, Rfl: 6  •  warfarin (COUMADIN) 6 MG Tab, Take 1 Tab by mouth every day., Disp: 90 Tab, Rfl: 1  •  Fluticasone Furoate-Vilanterol (BREO) 100-25 MCG/INH AEROSOL POWDER, BREATH ACTIVATED, Inhale 1 Puff by mouth every day.,  Disp: , Rfl:   •  rosuvastatin (CRESTOR) 20 MG Tab, Take 20 mg by mouth every evening., Disp: , Rfl:   •  ascorbic acid (ASCORBIC ACID) 500 MG Tab, Take 500 mg by mouth every day., Disp: , Rfl:   •  Biotin 32838 MCG Tab, Take  by mouth., Disp: , Rfl:   •  Flaxseed, Linseed, (FLAX SEED OIL) 1000 MG Cap, Take  by mouth 2 Times a Day., Disp: , Rfl:   •  Cholecalciferol (VITAMIN D3) 2000 units Tab, Take  by mouth., Disp: , Rfl:     Allergies, past medical history, past surgical history, family history, social history reviewed and updated    Objective:     Vitals: /80 (BP Location: Right arm, Patient Position: Sitting, BP Cuff Size: Adult)   Pulse 77   Temp 36.4 °C (97.5 °F) (Temporal)   Resp 18   Ht 1.524 m (5')   Wt 55.8 kg (123 lb)   SpO2 93%   BMI 24.02 kg/m²   General: Alert, pleasant, NAD  HEENT: Normocephalic.   EOMI, no icterus or pallor.  Conjunctivae and lids normal. External ears normal. Oropharynx non-erythematous, mucous membranes moist.    Neck supple.  No thyromegaly or masses palpated. No cervical or supraclavicular lymphadenopathy.  Heart: Regular rate and rhythm.    Respiratory: Normal respiratory effort.  Clear to auscultation bilaterally.  Abdomen: Non-distended, soft  Skin: Warm, dry, no rashes.  Musculoskeletal: Gait is normal.    Extremities: No leg edema.    Neurological:  CN2-12 grossly intact  Psych:  Affect is normal,, grooming is appropriate.    Assessment/Plan:     Marline was seen today for annual exam.    Diagnoses and all orders for this visit:      Sjogren's syndrome, with unspecified organ involvement (HCC)/Diffuse connective tissue disease (HCC)/Warfarin anticoagulation: Patient was difficulty with chronic pain due to rheumatologic disease.  Explained today that Celebrex is not a good choice for her pain, due to chronic warfarin.  Patient also with chronic fatigue and joint pain that is not well treated.  -     REFERRAL TO RHEUMATOLOGY      History of hysterectomy: no  menopausal or vaginal symptoms. Suggest trial of stopping premarin cream, as may not be needed.     Encounter for screening mammogram for breast cancer No family history of breast cancer in a first degree relative. No breast pain or masses, breast skin changes, or nipple discharge.   -     MA-SCREENING MAMMO BILAT W/TOMOSYNTHESIS W/CAD; Future    Screening for colorectal cancer Patient agrees to screening for colon cancer.  Currently without symptoms- no abdominal pain, changes in bowel habit, weight loss, blood in stool, or melena. Denies history of polyps.   -     OCCULT BLOOD FECES IMMUNOASSAY (FIT); Future      Return in about 3 months (around 3/10/2019).

## 2018-12-14 ENCOUNTER — TELEPHONE (OUTPATIENT)
Dept: MEDICAL GROUP | Facility: PHYSICIAN GROUP | Age: 63
End: 2018-12-14

## 2018-12-14 DIAGNOSIS — M35.9 DIFFUSE CONNECTIVE TISSUE DISEASE (HCC): ICD-10-CM

## 2018-12-14 DIAGNOSIS — M35.00 SJOGREN'S SYNDROME, WITH UNSPECIFIED ORGAN INVOLVEMENT (HCC): ICD-10-CM

## 2018-12-14 NOTE — TELEPHONE ENCOUNTER
Message received regarding rheumatology referral:  Your referral for Marline Perry has been received. Per our order set for Sjogrens syndrome and connective tissue disorder, we request for you to order the following:   ILIANA, C3, C4, dsDNA, TPO, anti-thyroglobulin, ANCA, CH50, KORI panel, UA, Beck Ab, Rheumatoid factor, CCP and CPK.        I have now ordered these labs and will have MA let patient know that she needs to have a lab draw.

## 2018-12-21 ENCOUNTER — ANTICOAGULATION MONITORING (OUTPATIENT)
Dept: VASCULAR LAB | Facility: MEDICAL CENTER | Age: 63
End: 2018-12-21

## 2018-12-21 DIAGNOSIS — Z95.2 H/O MECHANICAL AORTIC VALVE REPLACEMENT: ICD-10-CM

## 2018-12-21 LAB — INR PPP: 2 (ref 2–3.5)

## 2018-12-21 NOTE — PROGRESS NOTES
Anticoagulation Summary  As of 12/21/2018    INR goal:   2.0-3.0   TTR:   49.0 % (10.7 mo)   Today's INR:   2.0   Warfarin maintenance plan:   5 mg (5 mg x 1) every day   Weekly warfarin total:   35 mg   No change documented:   Gabriela Gonzalez Med Ass't   Plan last modified:   Mu Mann, PharmD (11/6/2018)   Next INR check:   1/4/2019   Target end date:   Indefinite    Indications    H/O mechanical aortic valve replacement [Z95.2]             Anticoagulation Episode Summary     INR check location:   Home Draw    Preferred lab:       Send INR reminders to:       Comments:   Alere      Anticoagulation Care Providers     Provider Role Specialty Phone number    Chiynere Marcus M.D. Referring Cardiology [phone]    Yossi Khalil, PharmD Responsible          Anticoagulation Patient Findings  Patient Findings     Negatives:   Signs/symptoms of thrombosis, Signs/symptoms of bleeding, Laboratory test error suspected, Change in health, Change in alcohol use, Change in activity, Upcoming invasive procedure, Emergency department visit, Upcoming dental procedure, Missed doses, Extra doses, Change in medications, Change in diet/appetite, Hospital admission, Bruising, Other complaints      Spoke with patient to report a therapeutic INR.  Pt instructed to continue with current warfarin dosing regimen. Pt denies any s/s of bleeding, bruising, clotting or any changes to diet or medication.  Will follow up in 2 weeks.    August Eng Ass't      I have reviewed and concur with the above plan     Brian Ribera, SantoD

## 2018-12-26 ENCOUNTER — HOSPITAL ENCOUNTER (OUTPATIENT)
Dept: RADIOLOGY | Facility: MEDICAL CENTER | Age: 63
End: 2018-12-26
Attending: FAMILY MEDICINE
Payer: COMMERCIAL

## 2018-12-26 DIAGNOSIS — Z12.31 ENCOUNTER FOR SCREENING MAMMOGRAM FOR BREAST CANCER: ICD-10-CM

## 2018-12-26 PROCEDURE — 77067 SCR MAMMO BI INCL CAD: CPT

## 2018-12-29 DIAGNOSIS — J47.9 BRONCHIECTASIS WITHOUT COMPLICATION (HCC): ICD-10-CM

## 2018-12-31 RX ORDER — TIOTROPIUM BROMIDE INHALATION SPRAY 3.12 UG/1
SPRAY, METERED RESPIRATORY (INHALATION)
Qty: 1 INHALER | Refills: 5 | Status: SHIPPED | OUTPATIENT
Start: 2018-12-31 | End: 2019-09-07 | Stop reason: SDUPTHER

## 2018-12-31 NOTE — TELEPHONE ENCOUNTER
Caller Name: Marline Perry                 Call Back Number: [phone] (home)         Patient approves a detailed voicemail message: N\A    Have we ever prescribed this med? Yes.  If yes, what date? 6/14/18    Last OV: 9/18/18 ANITA DUONG     Next OV: 3/8/19 AD Jacinto     DX: 2. Bronchiectasis without complication (HCC)    Medications:  Current Outpatient Prescriptions   Medication Sig Dispense Refill   • SUMAtriptan (IMITREX) 50 MG Tab      • albuterol (PROVENTIL) 2.5mg/3ml Nebu Soln solution for nebulization 3 mL by Nebulization route every four hours as needed for Shortness of Breath. 360 mL 3   • warfarin (COUMADIN) 5 MG Tab Take 1 tab po q day or as directed by clinic 90 Tab 1   • Tiotropium Bromide Monohydrate (SPIRIVA RESPIMAT) 2.5 MCG/ACT Aero Soln Inhale 2 Inhalation by mouth every day. Assemble and prime. 1 Inhaler 6   • warfarin (COUMADIN) 6 MG Tab Take 1 Tab by mouth every day. 90 Tab 1   • Fluticasone Furoate-Vilanterol (BREO) 100-25 MCG/INH AEROSOL POWDER, BREATH ACTIVATED Inhale 1 Puff by mouth every day.     • rosuvastatin (CRESTOR) 20 MG Tab Take 20 mg by mouth every evening.     • ascorbic acid (ASCORBIC ACID) 500 MG Tab Take 500 mg by mouth every day.     • Biotin 44132 MCG Tab Take  by mouth.     • Flaxseed, Linseed, (FLAX SEED OIL) 1000 MG Cap Take  by mouth 2 Times a Day.     • Cholecalciferol (VITAMIN D3) 2000 units Tab Take  by mouth.       No current facility-administered medications for this visit.

## 2019-01-03 ENCOUNTER — HOSPITAL ENCOUNTER (OUTPATIENT)
Dept: LAB | Facility: MEDICAL CENTER | Age: 64
End: 2019-01-03
Attending: FAMILY MEDICINE
Payer: COMMERCIAL

## 2019-01-03 DIAGNOSIS — M35.00 SJOGREN'S SYNDROME, WITH UNSPECIFIED ORGAN INVOLVEMENT (HCC): ICD-10-CM

## 2019-01-03 DIAGNOSIS — M35.9 DIFFUSE CONNECTIVE TISSUE DISEASE (HCC): ICD-10-CM

## 2019-01-03 LAB
APPEARANCE UR: CLEAR
BACTERIA #/AREA URNS HPF: NEGATIVE /HPF
BILIRUB UR QL STRIP.AUTO: NEGATIVE
C3 SERPL-MCNC: 168 MG/DL (ref 87–200)
C4 SERPL-MCNC: 51 MG/DL (ref 19–52)
CK SERPL-CCNC: 85 U/L (ref 0–154)
COLOR UR: YELLOW
EPI CELLS #/AREA URNS HPF: ABNORMAL /HPF
GLUCOSE UR STRIP.AUTO-MCNC: NEGATIVE MG/DL
HYALINE CASTS #/AREA URNS LPF: ABNORMAL /LPF
KETONES UR STRIP.AUTO-MCNC: NEGATIVE MG/DL
LEUKOCYTE ESTERASE UR QL STRIP.AUTO: ABNORMAL
MICRO URNS: ABNORMAL
NITRITE UR QL STRIP.AUTO: NEGATIVE
PH UR STRIP.AUTO: 5.5 [PH]
PROT UR QL STRIP: NEGATIVE MG/DL
RBC # URNS HPF: ABNORMAL /HPF
RBC UR QL AUTO: NEGATIVE
RHEUMATOID FACT SER IA-ACNC: <10 IU/ML (ref 0–14)
SP GR UR STRIP.AUTO: 1.01
THYROPEROXIDASE AB SERPL-ACNC: 2.4 IU/ML (ref 0–9)
UROBILINOGEN UR STRIP.AUTO-MCNC: 0.2 MG/DL
WBC #/AREA URNS HPF: ABNORMAL /HPF

## 2019-01-03 PROCEDURE — 86038 ANTINUCLEAR ANTIBODIES: CPT

## 2019-01-03 PROCEDURE — 86431 RHEUMATOID FACTOR QUANT: CPT

## 2019-01-03 PROCEDURE — 86160 COMPLEMENT ANTIGEN: CPT

## 2019-01-03 PROCEDURE — 82550 ASSAY OF CK (CPK): CPT

## 2019-01-03 PROCEDURE — 81001 URINALYSIS AUTO W/SCOPE: CPT

## 2019-01-03 PROCEDURE — 86255 FLUORESCENT ANTIBODY SCREEN: CPT

## 2019-01-03 PROCEDURE — 86800 THYROGLOBULIN ANTIBODY: CPT

## 2019-01-03 PROCEDURE — 86162 COMPLEMENT TOTAL (CH50): CPT

## 2019-01-03 PROCEDURE — 86039 ANTINUCLEAR ANTIBODIES (ANA): CPT

## 2019-01-03 PROCEDURE — 86200 CCP ANTIBODY: CPT

## 2019-01-03 PROCEDURE — 86376 MICROSOMAL ANTIBODY EACH: CPT

## 2019-01-03 PROCEDURE — 86235 NUCLEAR ANTIGEN ANTIBODY: CPT | Mod: 91

## 2019-01-03 PROCEDURE — 86225 DNA ANTIBODY NATIVE: CPT

## 2019-01-03 PROCEDURE — 36415 COLL VENOUS BLD VENIPUNCTURE: CPT

## 2019-01-05 LAB
ANCA IGG TITR SER IF: NORMAL {TITER}
CCP IGG SERPL-ACNC: 4 UNITS (ref 0–19)
DSDNA AB TITR SER CLIF: NORMAL {TITER}
NUCLEAR IGG SER QL IA: DETECTED
THYROGLOB AB SERPL-ACNC: <0.9 IU/ML (ref 0–4)

## 2019-01-06 LAB
ANA INTERPRETIVE COMMENT Q5143: ABNORMAL
ANA PATTERN Q5144: ABNORMAL
ANA TITER Q5145: ABNORMAL
ANTINUCLEAR ANTIBODY (ANA), HEP-2, IGG Q5142: DETECTED
CH50 SERPL-ACNC: 232 CAE UNITS (ref 60–144)
ENA SM IGG SER-ACNC: 0 AU/ML (ref 0–40)
ENA SM IGG SER-ACNC: 0 AU/ML (ref 0–40)
ENA SS-B IGG SER IA-ACNC: 1 AU/ML (ref 0–40)
SSA52 R0ENA AB IGG Q0420: 90 AU/ML (ref 0–40)
SSA60 R0ENA AB IGG Q0419: 53 AU/ML (ref 0–40)
U1 SNRNP IGG SER QL: 0 AU/ML (ref 0–40)

## 2019-01-07 ENCOUNTER — HOSPITAL ENCOUNTER (OUTPATIENT)
Dept: RADIOLOGY | Facility: MEDICAL CENTER | Age: 64
End: 2019-01-07

## 2019-01-10 ENCOUNTER — ANTICOAGULATION MONITORING (OUTPATIENT)
Dept: VASCULAR LAB | Facility: MEDICAL CENTER | Age: 64
End: 2019-01-10

## 2019-01-10 DIAGNOSIS — Z95.2 H/O MECHANICAL AORTIC VALVE REPLACEMENT: ICD-10-CM

## 2019-01-10 LAB — INR PPP: 3.1 (ref 2–3.5)

## 2019-01-10 NOTE — PROGRESS NOTES
Anticoagulation Summary  As of 1/10/2019    INR goal:   2.0-3.0   TTR:   51.5 % (11.4 mo)   Today's INR:   3.1!   Warfarin maintenance plan:   5 mg (5 mg x 1) every day   Weekly warfarin total:   35 mg   Plan last modified:   Mu Mann, PharmD (11/6/2018)   Next INR check:   1/17/2019   Target end date:   Indefinite    Indications    H/O mechanical aortic valve replacement [Z95.2]             Anticoagulation Episode Summary     INR check location:   Home Draw    Preferred lab:       Send INR reminders to:       Comments:   Brad      Anticoagulation Care Providers     Provider Role Specialty Phone number    Chinyere Marcus M.D. Referring Cardiology [phone]    Yossi Khalil, PharmD Responsible          Anticoagulation Patient Findings      Spoke with Marline.  INR is SUPRA therapeutic.   Pt denies any unusual s/s of bleeding, bruising, clotting. Denies any , cranberries, supplements, or illness.   Pt claims she may have had a little more greens and etoh the past few days.   Pt states she is no longer taking celecoxib.   Pt verifies warfarin weekly dosing.   CHADSVASC = 1  Clot hx N/A    Will have pt decrease the dose tonight and take 2.5 mg (5 mg x 0.5) and then continue with her weekly warfarin schedule as outlined above.     Repeat INR in 1 week(s).     Discussed with Farrukh Kay, Pharmacy Intern

## 2019-01-17 ENCOUNTER — ANTICOAGULATION MONITORING (OUTPATIENT)
Dept: VASCULAR LAB | Facility: MEDICAL CENTER | Age: 64
End: 2019-01-17

## 2019-01-17 ENCOUNTER — ANTICOAGULATION MONITORING (OUTPATIENT)
Dept: MEDICAL GROUP | Facility: MEDICAL CENTER | Age: 64
End: 2019-01-17

## 2019-01-17 DIAGNOSIS — Z95.2 H/O MECHANICAL AORTIC VALVE REPLACEMENT: ICD-10-CM

## 2019-01-17 DIAGNOSIS — E78.5 HYPERLIPIDEMIA, UNSPECIFIED HYPERLIPIDEMIA TYPE: Primary | ICD-10-CM

## 2019-01-17 LAB
INR PPP: 2.5 (ref 2–3.5)
INR PPP: 2.5 (ref 2–3.5)

## 2019-01-17 RX ORDER — ROSUVASTATIN CALCIUM 20 MG/1
20 TABLET, COATED ORAL EVERY EVENING
Qty: 90 TAB | Refills: 3 | Status: SHIPPED | OUTPATIENT
Start: 2019-01-17 | End: 2020-01-13

## 2019-01-17 NOTE — PROGRESS NOTES
Anticoagulation Summary  As of 1/17/2019    INR goal:   2.0-3.0   TTR:   52.1 % (11.6 mo)   Today's INR:   2.5   Warfarin maintenance plan:   5 mg (5 mg x 1) every day   Weekly warfarin total:   35 mg   Plan last modified:   Mu Mann, PharmD (11/6/2018)   Next INR check:      Target end date:   Indefinite    Indications    H/O mechanical aortic valve replacement [Z95.2]             Anticoagulation Episode Summary     INR check location:   Home Draw    Preferred lab:       Send INR reminders to:       Comments:   Alere      Anticoagulation Care Providers     Provider Role Specialty Phone number    Chinyere Marcus M.D. Referring Cardiology [phone]    Yossi Khalil, PharmD Responsible          Anticoagulation Patient Findings      Spoke with patient to report a therapeutic INR.    Pt instructed to continue with current warfarin dosing regimen, confirms dosing.   Pt denies any s/s of bleeding, bruising, clotting or any changes to diet.  Pt reports that she is still taking azithromycin every day.     Will follow up in 2 week(s).     Poncho Beck, Pharmacy Intern

## 2019-02-14 ENCOUNTER — ANTICOAGULATION MONITORING (OUTPATIENT)
Dept: VASCULAR LAB | Facility: MEDICAL CENTER | Age: 64
End: 2019-02-14

## 2019-02-14 DIAGNOSIS — Z95.2 H/O MECHANICAL AORTIC VALVE REPLACEMENT: ICD-10-CM

## 2019-02-14 LAB — INR PPP: 3.5 (ref 2–3.5)

## 2019-02-15 NOTE — PROGRESS NOTES
Anticoagulation Summary  As of 2/14/2019    INR goal:   2.0-3.0   TTR:   52.0 % (1 y)   INR used for dosing:   3.5! (2/14/2019)   Warfarin maintenance plan:   5 mg (5 mg x 1) every day   Weekly warfarin total:   35 mg   Plan last modified:   Mu Mann, PharmD (11/6/2018)   Next INR check:   2/28/2019   Target end date:   Indefinite    Indications    H/O mechanical aortic valve replacement [Z95.2]             Anticoagulation Episode Summary     INR check location:   Home Draw    Preferred lab:       Send INR reminders to:       Comments:   Alebrian      Anticoagulation Care Providers     Provider Role Specialty Phone number    Chinyere Marcus M.D. Referring Cardiology [phone]    Yossi Khalil, PharmD Responsible          Anticoagulation Patient Findings    HPI:  Marline Perry, on anticoagulation therapy with warfarin for mechanical valve.   Changes to current medical/health status since last appt: none  Denies signs/symptoms of bleeding and/or thrombosis since the last appt.    Pt states she ate less greeens than usual, but will begin eating her usual amount.   Denies any interval changes to medications since last appt.   Denies any complications or cost restrictions with current therapy.     A/P   INR  SUPRA-therapeutic.   Hold today, Pt is to continue with current warfarin dosing regimen.     Next INR in 2 week(s).    Mu Mann, PharmD

## 2019-02-25 ENCOUNTER — HOSPITAL ENCOUNTER (OUTPATIENT)
Dept: LAB | Facility: MEDICAL CENTER | Age: 64
End: 2019-02-25
Attending: FAMILY MEDICINE
Payer: COMMERCIAL

## 2019-02-25 ENCOUNTER — OFFICE VISIT (OUTPATIENT)
Dept: MEDICAL GROUP | Facility: PHYSICIAN GROUP | Age: 64
End: 2019-02-25
Payer: COMMERCIAL

## 2019-02-25 VITALS
TEMPERATURE: 97 F | HEART RATE: 82 BPM | SYSTOLIC BLOOD PRESSURE: 124 MMHG | BODY MASS INDEX: 24.15 KG/M2 | HEIGHT: 60 IN | OXYGEN SATURATION: 95 % | WEIGHT: 123 LBS | RESPIRATION RATE: 18 BRPM | DIASTOLIC BLOOD PRESSURE: 82 MMHG

## 2019-02-25 DIAGNOSIS — M35.9 DIFFUSE CONNECTIVE TISSUE DISEASE (HCC): ICD-10-CM

## 2019-02-25 DIAGNOSIS — M25.511 CHRONIC PAIN OF BOTH SHOULDERS: ICD-10-CM

## 2019-02-25 DIAGNOSIS — J47.9 BRONCHIECTASIS WITHOUT COMPLICATION (HCC): ICD-10-CM

## 2019-02-25 DIAGNOSIS — M35.00 SJOGREN'S SYNDROME, WITH UNSPECIFIED ORGAN INVOLVEMENT (HCC): ICD-10-CM

## 2019-02-25 DIAGNOSIS — G89.29 OTHER CHRONIC PAIN: ICD-10-CM

## 2019-02-25 DIAGNOSIS — M25.512 CHRONIC PAIN OF BOTH SHOULDERS: ICD-10-CM

## 2019-02-25 DIAGNOSIS — G89.29 CHRONIC PAIN OF BOTH SHOULDERS: ICD-10-CM

## 2019-02-25 LAB — ERYTHROCYTE [SEDIMENTATION RATE] IN BLOOD BY WESTERGREN METHOD: 17 MM/HOUR (ref 0–30)

## 2019-02-25 PROCEDURE — 85652 RBC SED RATE AUTOMATED: CPT

## 2019-02-25 PROCEDURE — 36415 COLL VENOUS BLD VENIPUNCTURE: CPT

## 2019-02-25 PROCEDURE — 99214 OFFICE O/P EST MOD 30 MIN: CPT | Performed by: FAMILY MEDICINE

## 2019-02-25 RX ORDER — PREDNISONE 20 MG/1
40 TABLET ORAL DAILY
Qty: 10 TAB | Refills: 0 | Status: SHIPPED | OUTPATIENT
Start: 2019-02-25 | End: 2019-03-02

## 2019-02-25 RX ORDER — AZITHROMYCIN 250 MG/1
250 TABLET, FILM COATED ORAL DAILY
COMMUNITY
End: 2019-03-26

## 2019-02-25 ASSESSMENT — PATIENT HEALTH QUESTIONNAIRE - PHQ9: CLINICAL INTERPRETATION OF PHQ2 SCORE: 0

## 2019-02-25 NOTE — PROGRESS NOTES
cc: worsening chronic pain      Subjective:     Marline Perry is a 63 y.o. female presenting for the following:     Chronic bronchiectasis: patient is seeing pulmonology regularly. Currently on azithromycin 250mg daily. She is at her baseline SOB. She is okay at rest but has difficulty with walking more than 100ft or with any stairs. This is normal for her.     Chronic pain/Sjorgens/Diffuse connective tissue disease: Patient has had difficulty with chronic pain present over her shoulders and diffusely over her pelvis to knees for many years.  She was previously taking Celebrex regularly from her Rheumatologist with good result but stopped this a few months ago when she was told to not take Celebrex with warfarin. Her pain is now not well controlled at all. She is waking up with muscle stiffness and having to limit her activity during the day. Only able to complete a few chores each day before the pain and fatigue become overwhelming and she needs to rest.     Review of systems:  All others reviewed and are negative.       Current Outpatient Prescriptions:   •  azithromycin (ZITHROMAX) 250 MG Tab, Take 250 mg by mouth every day., Disp: , Rfl:   •  predniSONE (DELTASONE) 20 MG Tab, Take 2 Tabs by mouth every day for 5 days., Disp: 10 Tab, Rfl: 0  •  rosuvastatin (CRESTOR) 20 MG Tab, Take 1 Tab by mouth every evening., Disp: 90 Tab, Rfl: 3  •  SPIRIVA RESPIMAT 2.5 MCG/ACT Aero Soln, 2 INHALATIONS BY MOUTH EVERY DAY . ASSEMBLE AND PRIME, Disp: 1 Inhaler, Rfl: 5  •  SUMAtriptan (IMITREX) 50 MG Tab, , Disp: , Rfl:   •  albuterol (PROVENTIL) 2.5mg/3ml Nebu Soln solution for nebulization, 3 mL by Nebulization route every four hours as needed for Shortness of Breath., Disp: 360 mL, Rfl: 3  •  warfarin (COUMADIN) 5 MG Tab, Take 1 tab po q day or as directed by clinic, Disp: 90 Tab, Rfl: 1  •  warfarin (COUMADIN) 6 MG Tab, Take 1 Tab by mouth every day., Disp: 90 Tab, Rfl: 1  •  Fluticasone Furoate-Vilanterol (BREO) 100-25  MCG/INH AEROSOL POWDER, BREATH ACTIVATED, Inhale 1 Puff by mouth every day., Disp: , Rfl:   •  ascorbic acid (ASCORBIC ACID) 500 MG Tab, Take 500 mg by mouth every day., Disp: , Rfl:   •  Biotin 84169 MCG Tab, Take  by mouth., Disp: , Rfl:   •  Flaxseed, Linseed, (FLAX SEED OIL) 1000 MG Cap, Take  by mouth 2 Times a Day., Disp: , Rfl:   •  Cholecalciferol (VITAMIN D3) 2000 units Tab, Take  by mouth., Disp: , Rfl:     Allergies, past medical history, past surgical history, family history, social history reviewed and updated    Objective:     Vitals: /82 (BP Location: Left arm, Patient Position: Sitting, BP Cuff Size: Adult)   Pulse 82   Temp 36.1 °C (97 °F) (Temporal)   Resp 18   Ht 1.524 m (5')   Wt 55.8 kg (123 lb)   SpO2 95%   BMI 24.02 kg/m²   General: Alert, pleasant, NAD  HEENT:   Neck supple.  No thyromegaly or masses palpated. No cervical or supraclavicular lymphadenopathy.  Heart: Regular rate and rhythm.  S1 and S2 normal.  No murmurs appreciated.  Respiratory: Normal respiratory effort.  Clear to auscultation bilaterally.  Skin: Warm, dry, no rashes.  Musculoskeletal: Shoulders: full passive ROM but limited active abduction and rotation due to pain.   Extremities: No leg edema.    Neurological: gait is normal, CN2-12 grossly intact  Psych:  Affect is normal, judgement is good, memory is intact, grooming is appropriate.    Assessment/Plan:     Marline was seen today for medication management.    Diagnoses and all orders for this visit:    Sjogren's syndrome, with unspecified organ involvement (HCC)/Other chronic pain/Diffuse connective tissue disease (HCC)/Chronic pain of both shoulders: Patient previously controlled pain with Celebrex but this is not recommended in the setting of chronic warfarin.  Pain is very limiting for patient's function.  Diffuse pain over her shoulders hips and thighs possibly from diffuse connective tissue disease but I also have concern for polymyalgia rheumatica. Will  obtain ESR to evaluate this further. Patient is awaiting an appointment with a new rheumatologist but will likely be more months before she can have an appointment due to shortage of practitioners.  Discussed the risks of long-term prednisone use today and patient is hesitant to try this.  Will trial 5-day burst of prednisone and patient to monitor for symptom improvement.  -     WESTERGREN SED RATE; Future  Other orders  -     predniSONE (DELTASONE) 20 MG Tab; Take 2 Tabs by mouth every day for 5 days.      Bronchiectasis without complication (HCC): Chronic stable but poorly controlled problem and patient is seeing pulmonology regularly.      Return in about 3 months (around 5/25/2019).

## 2019-02-28 ENCOUNTER — OFFICE VISIT (OUTPATIENT)
Dept: DERMATOLOGY | Facility: IMAGING CENTER | Age: 64
End: 2019-02-28
Payer: COMMERCIAL

## 2019-02-28 VITALS — WEIGHT: 120 LBS | HEIGHT: 60 IN | TEMPERATURE: 98.7 F | BODY MASS INDEX: 23.56 KG/M2

## 2019-02-28 DIAGNOSIS — L85.3 XEROSIS CUTIS: ICD-10-CM

## 2019-02-28 DIAGNOSIS — L73.9 FOLLICULITIS: ICD-10-CM

## 2019-02-28 DIAGNOSIS — L68.9 HYPERTRICHOSIS: ICD-10-CM

## 2019-02-28 DIAGNOSIS — L28.0 NEURODERMATITIS: ICD-10-CM

## 2019-02-28 DIAGNOSIS — L60.3 NAIL DYSTROPHY: ICD-10-CM

## 2019-02-28 PROCEDURE — 99203 OFFICE O/P NEW LOW 30 MIN: CPT | Performed by: DERMATOLOGY

## 2019-02-28 NOTE — PROGRESS NOTES
CC: Nail fungus and spots on face    New patient here for:   toe nail fungus x 5 years great toes, has been treated in the past with both topical and oral medications . Was seen by a podiatrist last year was advised to use bleach soak . Has improved some but not completely gone .  Trx most recently 3 months without resolution.    HPI: white little bumps on jaw line no treatments   Time present: 1 year   Painful lesion: No  Itching lesion: No  Enlarging lesion: No  Anything make it better or worse?no  Hair on chin, may be worsening, but no known ingrown hairs noted.  Tried aggressive scrubbing to treat.    Scalp irritation/sensitivity.  No rashes/lesions.  Notes when brushes hair.  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 Has been able to control symptoms with use of soft hair brushes, tender care.     Dryness of hands/skin.  Has not tried to treat.  No known aggravating/alleviating factors.  Denies hx of thyroid problems on prior routine screening.     History of skin cancer: No  History of biopsies:No  History of blistering/severe sunburns: yes   Family history of skin cancer:No   Family history of atypical moles:No    Tobacco use: Never  Alcohol use:DRINKS: occasionally in social situations    ROS: no fevers/chills. No itch.   DermPMH: no skin cancer/melanoma  No problem-specific Assessment & Plan notes found for this encounter.    Relevant PMH: connective tissue dz - sjoegren syndrome. Artificial heart valve-warfarin  Social:non smoker    PE: Gen:WDWN female in NAD. Skin: focal exam: face/eyes/lips - without rashes/lesions, chin - white coarse hairs noted.  Few papules present -folliculocentric.  Fingernails - striations of thumb nails, otherwise free of dystrophy.  Toenails - great toenails with yellowing and lifting.  Subungual debris, left nail.     A/P: neurodermatitis/scalp dysesthesia:  -counseled re: dx/tx  -in setting of good control with change of hair brushes - uses ones with soft tips, will not proceed with  additional/alternate trx at this time    Xerosis, skin:   -provided handout of moisturizers to try  -enc f/u PCP for thyroid tests, if sx develop    Nail dystrophy, consider onychomycosis, likely:  -counseled re: dx/tx  -will proceed with topical trifecta - to avoid oral/risks  -file nail with pumice/emery board-->amlactin cream-->lamisil cream OTC-->may's vaporrub/tincture of thymol.  Can cover with bandaid  -trx X 6-12 months  -consider add of ciclopirox if no effective treatment  -consider f/u Podiatry for nail trimming/avulsion     Hypertrichosis/folliculitis, face:  -counseled re: dx/tx  -gentle water washing  -poor candidate for laser hair removal secondary to pigment loss of hair  -consider physical methods to remove hair without plucking    I have reviewed medications relevant to my specialty.    This was a 30-minute visit; greater than 50% was spent counseling the patient regarding skin findings and treatments and sun protection/skin cancer detection.

## 2019-03-01 ENCOUNTER — ANTICOAGULATION MONITORING (OUTPATIENT)
Dept: VASCULAR LAB | Facility: MEDICAL CENTER | Age: 64
End: 2019-03-01

## 2019-03-01 DIAGNOSIS — Z95.2 H/O MECHANICAL AORTIC VALVE REPLACEMENT: ICD-10-CM

## 2019-03-01 LAB — INR PPP: 3.1 (ref 2–3.5)

## 2019-03-01 NOTE — PROGRESS NOTES
OP Anticoagulation Telephone Note    Date: 3/1/2019       Plan:  Spoke with pt on the phone. Pt is slightly supratherapeutic today. Denies any changes in medications or diet. Denies any s/sx of bleeding or clotting. Will continue warfarin dosing as outlined below and follow-up with pt in 2wks.    Increase green vegetable intake by 1 extra serving/wk    Anticoagulation Summary  As of 3/1/2019    INR goal:   2.0-3.0   TTR:   50.0 % (1.1 y)   INR used for dosing:   3.1! (3/1/2019)   Warfarin maintenance plan:   5 mg (5 mg x 1) every day   Weekly warfarin total:   35 mg   Plan last modified:   Mu Mann, PharmD (11/6/2018)   Next INR check:   3/15/2019   Target end date:   Indefinite    Indications    H/O mechanical aortic valve replacement [Z95.2]             Anticoagulation Episode Summary     INR check location:   Home Draw    Preferred lab:       Send INR reminders to:       Comments:   [facility]      Anticoagulation Care Providers     Provider Role Specialty Phone number    Chinyere Marcus M.D. Referring Cardiology [phone]    Yossi Khalil, PharmD Responsible                [facility]  [facility]

## 2019-03-18 ENCOUNTER — APPOINTMENT (OUTPATIENT)
Dept: PULMONOLOGY | Facility: HOSPICE | Age: 64
End: 2019-03-18
Payer: COMMERCIAL

## 2019-03-19 ENCOUNTER — ANTICOAGULATION MONITORING (OUTPATIENT)
Dept: VASCULAR LAB | Facility: MEDICAL CENTER | Age: 64
End: 2019-03-19

## 2019-03-19 DIAGNOSIS — Z95.2 H/O MECHANICAL AORTIC VALVE REPLACEMENT: ICD-10-CM

## 2019-03-19 LAB — INR PPP: 3.2 (ref 2–3.5)

## 2019-03-19 NOTE — PROGRESS NOTES
Anticoagulation Summary  As of 3/19/2019    INR goal:   2.0-3.0   TTR:   47.8 % (1.1 y)   INR used for dosing:   3.2! (3/19/2019)   Warfarin maintenance plan:   2.5 mg (5 mg x 0.5) every Tue; 5 mg (5 mg x 1) all other days   Weekly warfarin total:   32.5 mg   Plan last modified:   Brian Ribera PharmD (3/19/2019)   Next INR check:   4/2/2019   Target end date:   Indefinite    Indications    H/O mechanical aortic valve replacement [Z95.2]             Anticoagulation Episode Summary     INR check location:   Home Draw    Preferred lab:       Send INR reminders to:       Comments:   Ale[facility]      Anticoagulation Care Providers     Provider Role Specialty Phone number    Chinyere Marcus M.D. Referring Cardiology [phone]    Yossi Khalil, PharmD Responsible          Anticoagulation Patient Findings      Spoke to patient on the phone.   INR  supra-therapeutic.   Denies signs/symptoms of bleeding and/or thrombosis.   Denies changes to diet or medications.   Follow up appointment in 2 week(s).    Decrease weekly warfarin dose as noted      Brian Ribera, PharmD

## 2019-03-26 ENCOUNTER — OFFICE VISIT (OUTPATIENT)
Dept: PULMONOLOGY | Facility: HOSPICE | Age: 64
End: 2019-03-26
Payer: COMMERCIAL

## 2019-03-26 VITALS
OXYGEN SATURATION: 93 % | WEIGHT: 130.4 LBS | SYSTOLIC BLOOD PRESSURE: 114 MMHG | HEART RATE: 83 BPM | HEIGHT: 60 IN | BODY MASS INDEX: 25.6 KG/M2 | RESPIRATION RATE: 16 BRPM | DIASTOLIC BLOOD PRESSURE: 60 MMHG

## 2019-03-26 DIAGNOSIS — J47.9 BRONCHIECTASIS WITHOUT COMPLICATION (HCC): ICD-10-CM

## 2019-03-26 DIAGNOSIS — J41.0 SIMPLE CHRONIC BRONCHITIS (HCC): ICD-10-CM

## 2019-03-26 DIAGNOSIS — I34.0 NON-RHEUMATIC MITRAL REGURGITATION: ICD-10-CM

## 2019-03-26 DIAGNOSIS — M35.9 DIFFUSE CONNECTIVE TISSUE DISEASE (HCC): ICD-10-CM

## 2019-03-26 PROCEDURE — 99214 OFFICE O/P EST MOD 30 MIN: CPT | Performed by: NURSE PRACTITIONER

## 2019-03-26 RX ORDER — AZITHROMYCIN 250 MG/1
TABLET, FILM COATED ORAL
Qty: 12 TAB | Refills: 3 | Status: SHIPPED | OUTPATIENT
Start: 2019-03-26 | End: 2019-04-04 | Stop reason: SDUPTHER

## 2019-03-26 ASSESSMENT — ENCOUNTER SYMPTOMS
EYE DISCHARGE: 0
GASTROINTESTINAL NEGATIVE: 1
CONSTITUTIONAL NEGATIVE: 1
MUSCULOSKELETAL NEGATIVE: 1
WHEEZING: 0
SPUTUM PRODUCTION: 0
BRUISES/BLEEDS EASILY: 0
EYE PAIN: 0
HEMOPTYSIS: 0
PSYCHIATRIC NEGATIVE: 1
COUGH: 0
CARDIOVASCULAR NEGATIVE: 1
SHORTNESS OF BREATH: 1
NEUROLOGICAL NEGATIVE: 1

## 2019-03-26 NOTE — PROGRESS NOTES
Chief Complaint   Patient presents with   • Follow-Up     Bronchiectasis Last seen 09/18/18         HPI: This patient is a 63 y.o. female, who presents for follow-up bronchiectasis.  Her  accompanies her today.     To reiterate, the patient is a lifelong non-smoker.  She  relocated here from [place] where she was followed by pulmonology. She was diagnosed with bronchiectasis in 2008 and has undergone bronchoscopy in the past. Chest CAT scan was reviewed by Dr Talbot at last visit shows mild right lower lobe bronchiectasis, no consolidation, infiltrates or mass. Marline is compliant with daily Breo 100/25, Spiriva daily and daily suppressive azithromycin daily, she reports being on suppressive antibiotics for many years.  She rarely requires use of albuterol.  She was ordered nebulizer and flutter valve at the time of her last visit, she is been using this sporadically.  She denies productive cough or wheeze.  Dyspnea is baseline and only with significant exertion.    Patient has a history of CTD pending evaluation with rheumatology.  HRCT April 2018 showed no evidence of ILD. Recent serology shows elevated markers consistent with diagnosis of Sjogren's.  Elevated ILIANA, SSA     She has a history of mechanical valve replacement and is on chronic anticoagulation.  She follows with cardiology Dr. Marcus.    Past Medical History:   Diagnosis Date   • Bronchiectasis (HCC)    • Bronchitis    • Chickenpox    • COPD (chronic obstructive pulmonary disease) (HCC)    • Hyperlipidemia    • Influenza    • Migraines    • Mumps    • Nasal drainage    • Sjogren's disease (HCC)        Social History   Substance Use Topics   • Smoking status: Never Smoker   • Smokeless tobacco: Never Used   • Alcohol use 2.4 oz/week     4 Shots of liquor per week       Family History   Problem Relation Age of Onset   • Arthritis Mother    • Hypertension Father    • Kidney Disease Father    • Arthritis Sister    • No Known Problems Brother    •  No Known Problems Brother    • No Known Problems Son    • Heart Attack Maternal Grandmother    • Stroke Maternal Grandfather    • Heart Disease Paternal Grandmother    • No Known Problems Paternal Grandfather        Immunization History   Administered Date(s) Administered   • Influenza (IM) Preservative Free 01/10/2018   • Influenza TIV (IM) 01/10/2018   • Influenza Vaccine Quad Inj (Pf) 09/18/2018   • TD Vaccine 07/03/2011       Current medications as of today   Current Outpatient Prescriptions   Medication Sig Dispense Refill   • rosuvastatin (CRESTOR) 20 MG Tab Take 1 Tab by mouth every evening. 90 Tab 3   • SPIRIVA RESPIMAT 2.5 MCG/ACT Aero Soln 2 INHALATIONS BY MOUTH EVERY DAY . ASSEMBLE AND PRIME 1 Inhaler 5   • SUMAtriptan (IMITREX) 50 MG Tab      • warfarin (COUMADIN) 5 MG Tab Take 1 tab po q day or as directed by clinic 90 Tab 1   • Fluticasone Furoate-Vilanterol (BREO) 100-25 MCG/INH AEROSOL POWDER, BREATH ACTIVATED Inhale 1 Puff by mouth every day.     • ascorbic acid (ASCORBIC ACID) 500 MG Tab Take 500 mg by mouth every day.     • Biotin 91524 MCG Tab Take  by mouth.     • Flaxseed, Linseed, (FLAX SEED OIL) 1000 MG Cap Take  by mouth 2 Times a Day.     • Cholecalciferol (VITAMIN D3) 2000 units Tab Take  by mouth.     • albuterol (PROVENTIL) 2.5mg/3ml Nebu Soln solution for nebulization 3 mL by Nebulization route every four hours as needed for Shortness of Breath. 360 mL 3   • warfarin (COUMADIN) 6 MG Tab Take 1 Tab by mouth every day. (Patient not taking: Reported on 3/26/2019) 90 Tab 1     No current facility-administered medications for this visit.        Allergies: Spironolactone; Neomycin; and Tape    Blood pressure 114/60, pulse 83, resp. rate 16, height 1.524 m (5'), weight 59.1 kg (130 lb 6.4 oz), SpO2 93 %.      Review of Systems   Constitutional: Negative.    HENT: Negative.    Eyes: Negative for pain and discharge.   Respiratory: Positive for shortness of breath. Negative for cough, hemoptysis,  sputum production and wheezing.    Cardiovascular: Negative.    Gastrointestinal: Negative.    Musculoskeletal: Negative.    Skin: Negative.    Neurological: Negative.    Endo/Heme/Allergies: Negative for environmental allergies. Does not bruise/bleed easily.   Psychiatric/Behavioral: Negative.        Physical Exam   Constitutional: She is oriented to person, place, and time and well-developed, well-nourished, and in no distress.   HENT:   Head: Normocephalic and atraumatic.   Mouth/Throat: Oropharynx is clear and moist.   Eyes: Pupils are equal, round, and reactive to light.   Neck: Normal range of motion. Neck supple. No tracheal deviation present.   Cardiovascular: Normal rate and regular rhythm.    Murmur heard.  Pulmonary/Chest: Effort normal and breath sounds normal. No respiratory distress. She has no wheezes. She has no rales.   No evidence of bibasilar crackles   Musculoskeletal: Normal range of motion.   Neurological: She is alert and oriented to person, place, and time. Gait normal.   Skin: Skin is warm and dry.   Psychiatric: Mood, memory, affect and judgment normal.   Vitals reviewed.      Diagnoses/Plan:    1. Bronchiectasis without complication (HCC)  Stable, she will continue current bronchodilators.  She is encouraged to use flutter valve and albuterol as needed.  I recommended reducing suppressive antibiotics to Monday Wednesday Friday schedule. Rx provided.  Should she have any increased symptoms we can resume daily dosing.  She understands her Coumadin dosing will need to be adjusted with decrease in azithromycin.  I have encouraged her to follow-up with Coumadin clinic ASAP.    2. Diffuse connective tissue disease (HCC)  Recommended updated pulmonary function testing given history of CTD.  Recent serology shows elevated markers.  She is pending follow-up with rheumatology.  - PULMONARY FUNCTION TESTS -Test requested: Complete Pulmonary Function Test; Future    3. Non-rheumatic mitral  regurgitation  Stable, remains anticoagulated with Coumadin follows with Dr. Marcus.  She denies signs or symptoms of bleeding.    I like her to follow-up in 4-6 weeks for pulmonary function testing                This dictation was created using voice recognition software. The accuracy of the dictation is limited to the abilities of the software. I expect there may be some errors of grammar and possibly content.

## 2019-04-03 ENCOUNTER — ANTICOAGULATION MONITORING (OUTPATIENT)
Dept: VASCULAR LAB | Facility: MEDICAL CENTER | Age: 64
End: 2019-04-03

## 2019-04-03 DIAGNOSIS — Z95.2 H/O MECHANICAL AORTIC VALVE REPLACEMENT: ICD-10-CM

## 2019-04-03 LAB — INR PPP: 2.4 (ref 2–3.5)

## 2019-04-03 NOTE — PROGRESS NOTES
Anticoagulation Summary  As of 4/3/2019    INR goal:   2.0-3.0   TTR:   48.7 % (1.2 y)   INR used for dosin.4 (4/3/2019)   Warfarin maintenance plan:   5 mg (5 mg x 1) every day   Weekly warfarin total:   35 mg   Plan last modified:   Tulio Sotelo PharmD (4/3/2019)   Next INR check:   2019   Target end date:   Indefinite    Indications    H/O mechanical aortic valve replacement [Z95.2]             Anticoagulation Episode Summary     INR check location:   Home Draw    Preferred lab:       Send INR reminders to:       Comments:   Brad      Anticoagulation Care Providers     Provider Role Specialty Phone number    Chinyere Marcus M.D. Referring Cardiology [phone]    Santo EncisoD Responsible          Anticoagulation Patient Findings  Patient Findings     Negatives:   Signs/symptoms of thrombosis, Signs/symptoms of bleeding, Laboratory test error suspected, Change in health, Change in alcohol use, Change in activity, Upcoming invasive procedure, Emergency department visit, Upcoming dental procedure, Missed doses, Extra doses, Change in medications, Change in diet/appetite, Hospital admission, Bruising, Other complaints        Spoke with patient today regarding therapeutic INR of 2.4.  Patient denies any signs/symptoms of bruising or bleeding or any changes in diet and medications.  Instructed patient to call clinic with any questions or concerns.  Pt is to continue with current warfarin dosing regimen.  Follow up in 2 weeks, to reduce risk of adverse events related to this high risk medication,  Warfarin.    Tulio Sotelo, Analilia

## 2019-04-04 DIAGNOSIS — J47.9 BRONCHIECTASIS WITHOUT COMPLICATION (HCC): ICD-10-CM

## 2019-04-04 RX ORDER — AZITHROMYCIN 250 MG/1
TABLET, FILM COATED ORAL
Qty: 36 TAB | Refills: 3 | Status: SHIPPED | OUTPATIENT
Start: 2019-04-04 | End: 2019-08-20

## 2019-04-04 NOTE — TELEPHONE ENCOUNTER
The patient called and she wants a 90 day supply for mail order.      Have we ever prescribed this med? Yes.  If yes, what date? 3/26/2019 - 90 day supply    Last OV: 3/26/2019    Next OV: 5/7/2019    DX: Bronchiectasis    Medications: Azithromycin

## 2019-04-18 ENCOUNTER — ANTICOAGULATION MONITORING (OUTPATIENT)
Dept: VASCULAR LAB | Facility: MEDICAL CENTER | Age: 64
End: 2019-04-18

## 2019-04-18 DIAGNOSIS — Z95.2 H/O MECHANICAL AORTIC VALVE REPLACEMENT: ICD-10-CM

## 2019-04-18 LAB — INR PPP: 3.2 (ref 2–3.5)

## 2019-04-18 NOTE — PROGRESS NOTES
Anticoagulation Summary  As of 4/18/2019    INR goal:   2.0-3.0   TTR:   49.6 % (1.2 y)   INR used for dosing:   3.2! (4/18/2019)   Warfarin maintenance plan:   5 mg (5 mg x 1) every day   Weekly warfarin total:   35 mg   Plan last modified:   Santo GreenD (4/3/2019)   Next INR check:   5/2/2019   Target end date:   Indefinite    Indications    H/O mechanical aortic valve replacement [Z95.2]             Anticoagulation Episode Summary     INR check location:   Home Draw    Preferred lab:       Send INR reminders to:       Comments:   [facility]      Anticoagulation Care Providers     Provider Role Specialty Phone number    Chinyere Marcus M.D. Referring Cardiology [phone]    Santo EncisoD Responsible          Anticoagulation Patient Findings    Spoke with patient to report a SUPRA-therapeutic INR of 3.2    Patient denies any changes to current medications, diet, or any unusual s/sx of bleeding, bruising, or clotting. Instructed patient to contact clinic with any questions or concerns.     Patient to DECREASE warfarin dose to 2.5 mg TODAY only, and then to continue with current warfarin dosing regimen as stated above. Follow-up INR in 2 weeks, May 2.     Ivette Whyte, Pharmacy Intern  Discussed warfarin dosing plan with Griselda Hutchinson, SantoD

## 2019-05-01 ENCOUNTER — ANTICOAGULATION MONITORING (OUTPATIENT)
Dept: VASCULAR LAB | Facility: MEDICAL CENTER | Age: 64
End: 2019-05-01

## 2019-05-01 DIAGNOSIS — Z95.2 H/O MECHANICAL AORTIC VALVE REPLACEMENT: ICD-10-CM

## 2019-05-01 LAB — INR PPP: 2.4 (ref 2–3.5)

## 2019-05-01 NOTE — PROGRESS NOTES
Anticoagulation Summary  As of 2019    INR goal:   2.0-3.0   TTR:   50.4 % (1.2 y)   INR used for dosin.40 (2019)   Warfarin maintenance plan:   5 mg (5 mg x 1) every day   Weekly warfarin total:   35 mg   Plan last modified:   Tulio Sotelo, PharmD (4/3/2019)   Next INR check:   5/15/2019   Target end date:   Indefinite    Indications    H/O mechanical aortic valve replacement [Z95.2]             Anticoagulation Episode Summary     INR check location:   Home Draw    Preferred lab:       Send INR reminders to:       Comments:   Brad      Anticoagulation Care Providers     Provider Role Specialty Phone number    Chinyere Marcus M.D. Referring Cardiology [phone]    Yossi Khalil, PharmD Responsible          Anticoagulation Patient Findings    Spoke with Marline to report a therapeutic IRN of 2.4. Continue current dosing regimen.  Follow up in 2 weeks, to reduce the risk of adverse events related to this high risk medication, warfarin.    Marilyn Plummer, Clinical Pharmacist

## 2019-05-07 ENCOUNTER — OFFICE VISIT (OUTPATIENT)
Dept: PULMONOLOGY | Facility: HOSPICE | Age: 64
End: 2019-05-07
Payer: COMMERCIAL

## 2019-05-07 ENCOUNTER — NON-PROVIDER VISIT (OUTPATIENT)
Dept: PULMONOLOGY | Facility: HOSPICE | Age: 64
End: 2019-05-07
Attending: NURSE PRACTITIONER
Payer: COMMERCIAL

## 2019-05-07 VITALS
RESPIRATION RATE: 16 BRPM | BODY MASS INDEX: 24.54 KG/M2 | SYSTOLIC BLOOD PRESSURE: 90 MMHG | OXYGEN SATURATION: 96 % | HEART RATE: 86 BPM | WEIGHT: 125 LBS | DIASTOLIC BLOOD PRESSURE: 54 MMHG | HEIGHT: 60 IN

## 2019-05-07 DIAGNOSIS — M35.9 DIFFUSE CONNECTIVE TISSUE DISEASE (HCC): ICD-10-CM

## 2019-05-07 DIAGNOSIS — R05.9 COUGH: ICD-10-CM

## 2019-05-07 DIAGNOSIS — J47.9 BRONCHIECTASIS WITHOUT COMPLICATION (HCC): ICD-10-CM

## 2019-05-07 PROBLEM — J41.0 SIMPLE CHRONIC BRONCHITIS (HCC): Status: RESOLVED | Noted: 2018-01-22 | Resolved: 2019-05-07

## 2019-05-07 PROCEDURE — 94726 PLETHYSMOGRAPHY LUNG VOLUMES: CPT | Performed by: INTERNAL MEDICINE

## 2019-05-07 PROCEDURE — 94060 EVALUATION OF WHEEZING: CPT | Performed by: INTERNAL MEDICINE

## 2019-05-07 PROCEDURE — 94729 DIFFUSING CAPACITY: CPT | Performed by: INTERNAL MEDICINE

## 2019-05-07 PROCEDURE — 99214 OFFICE O/P EST MOD 30 MIN: CPT | Performed by: NURSE PRACTITIONER

## 2019-05-07 ASSESSMENT — PULMONARY FUNCTION TESTS
FEV1/FVC: 81
FEV1/FVC_PERCENT_PREDICTED: 103
FEV1/FVC_PERCENT_CHANGE: 2
FEV1: 1.78
FVC: 2.24
FEV1_PREDICTED: 2.09
FVC_LLN: 2.28
FEV1_PERCENT_PREDICTED: 85
FEV1/FVC: 79
FEV1/FVC_PERCENT_PREDICTED: 105
FEV1_PERCENT_CHANGE: 6
FVC: 2.39
FEV1/FVC_PERCENT_PREDICTED: 105
FEV1/FVC: 80
FVC_PERCENT_PREDICTED: 81
FEV1/FVC_PERCENT_LLN: 64
FVC_PREDICTED: 2.73
FEV1/FVC: 81.17
FEV1/FVC_PERCENT_PREDICTED: 107
FEV1/FVC_PREDICTED: 77
FEV1_PERCENT_CHANGE: 9
FVC_PERCENT_PREDICTED: 87
FEV1/FVC_PERCENT_CHANGE: 150
FEV1_LLN: 1.75
FEV1: 1.94
FEV1/FVC_PERCENT_PREDICTED: 77
FEV1_PERCENT_PREDICTED: 93

## 2019-05-07 ASSESSMENT — ENCOUNTER SYMPTOMS
EYE DISCHARGE: 0
GASTROINTESTINAL NEGATIVE: 1
COUGH: 1
CONSTITUTIONAL NEGATIVE: 1
BRUISES/BLEEDS EASILY: 0
EYE PAIN: 0
HEMOPTYSIS: 0
PSYCHIATRIC NEGATIVE: 1
NEUROLOGICAL NEGATIVE: 1
SPUTUM PRODUCTION: 0
SHORTNESS OF BREATH: 0
MUSCULOSKELETAL NEGATIVE: 1
CARDIOVASCULAR NEGATIVE: 1
WHEEZING: 0

## 2019-05-07 NOTE — PATIENT INSTRUCTIONS
1. Continue current inhalers  2. Start using nebulizer 2 x per day with the flutter valve  3. Start Mucinex 2 x per day  4. Continue azithromycin MWF  5. F/U in 6 months, sooner if needed

## 2019-05-07 NOTE — PROCEDURES
Good patient effort & cooperation.  The results of this test meet the ATS/ERS standards for acceptability and repeatability.  Predicted equations for Spirometry are N-Lesly II, ITS for lung volumes, and [facility] for DLCO.  The DLCO was uncorrected for Hgb.  A bronchodilator of Ventolin HFA- 2puffs via spacer were administered.  DLCO was performed during dilation period.    The FVC is 2.39 L or 87%, FEV1 is 1.94 L or 93%, FEV1/FVC: 81%.  Total lung capacity: 104%.  DLCO: 87%.  Normal flow volume loop.  No bronchodilator response by ATS guidelines.    Interpretation:  Normal pulmonary function and gas transfer.

## 2019-05-07 NOTE — PROGRESS NOTES
Chief Complaint   Patient presents with   • Results     PFT 05/07/19   • Follow-Up     Bronchiectasis without complication Last Seen 03/26/19         HPI: This patient is a 63 y.o. female, who presents for follow-up with PFT results.  She has a history of bronchiectasis.    To reiterate, the patient is a lifelong non-smoker.  She relocated here from [place] where she was followed by pulmonology. She was diagnosed with bronchiectasis in 2008 and has undergone bronchoscopy in the past. Chest CAT scan was reviewed by Dr Talbot at previous visit shows mild right lower lobe bronchiectasis, no consolidation, infiltrates or mass. Marline is compliant with daily Breo 100/25, Spiriva daily and daily suppressive azithromycin, which was started by pulmonary in [place].  She has not had any problems with pneumonia or respiratory infections in quite some time.  At her last visit I had advised her to decrease suppressive azithromycin to Monday Wednesday Friday.Since that time she is noticed a mild increase in cough at night.  Cough is productive of yellow to clear sputum.  She denies fever, chills, night sweats.  She denies increased dyspnea or wheeze.       Patient has a history of CTD pending evaluation with rheumatology.  HRCT April 2018 showed no evidence of ILD. Recent serology shows elevated markers consistent with diagnosis of Sjogren's.  Elevated ILIANA, SSA.  PFTs were done to rule out any restrictive process/lung involvement.  PFTs are essentially normal and indicate an FEV1 1.78 L 85% predicted, FEV1 FVC ratio of 80, TLC is normal at 104, DLCO 87% but corrects to 123%.  There is significant bronchodilator response in the small airways.     She has a history of mechanical valve replacement and is on chronic anticoagulation.  She follows with cardiology Dr. Marcus.  She denies chest pain, palpitations or pressure.  She denies unusual signs or symptoms of bleeding.    Past Medical History:   Diagnosis Date   • Bronchiectasis  ([facility])    • Bronchitis    • Chickenpox    • COPD (chronic obstructive pulmonary disease) ([facility])    • Hyperlipidemia    • Influenza    • Migraines    • Mumps    • Nasal drainage    • Sjogren's disease ([facility])        Social History   Substance Use Topics   • Smoking status: Never Smoker   • Smokeless tobacco: Never Used   • Alcohol use 2.4 oz/week     4 Shots of liquor per week       Family History   Problem Relation Age of Onset   • Arthritis Mother    • Hypertension Father    • Kidney Disease Father    • Arthritis Sister    • No Known Problems Brother    • No Known Problems Brother    • No Known Problems Son    • Heart Attack Maternal Grandmother    • Stroke Maternal Grandfather    • Heart Disease Paternal Grandmother    • No Known Problems Paternal Grandfather        Immunization History   Administered Date(s) Administered   • Influenza (IM) Preservative Free 01/10/2018   • Influenza TIV (IM) 01/10/2018   • Influenza Vaccine Quad Inj (Pf) 09/18/2018   • TD Vaccine 07/03/2011       Current medications as of today   Current Outpatient Prescriptions   Medication Sig Dispense Refill   • azithromycin (ZITHROMAX) 250 MG Tab Take one tab PO on M,W,F 36 Tab 3   • warfarin (COUMADIN) 5 MG Tab Take 0.5 to 1 tablets by mouth daily as directed by Coumadin Clinic. 90 Tab 1   • rosuvastatin (CRESTOR) 20 MG Tab Take 1 Tab by mouth every evening. 90 Tab 3   • SPIRIVA RESPIMAT 2.5 MCG/ACT Aero Soln 2 INHALATIONS BY MOUTH EVERY DAY . ASSEMBLE AND PRIME 1 Inhaler 5   • SUMAtriptan (IMITREX) 50 MG Tab      • albuterol (PROVENTIL) 2.5mg/3ml Nebu Soln solution for nebulization 3 mL by Nebulization route every four hours as needed for Shortness of Breath. 360 mL 3   • Fluticasone Furoate-Vilanterol (BREO) 100-25 MCG/INH AEROSOL POWDER, BREATH ACTIVATED Inhale 1 Puff by mouth every day.     • ascorbic acid (ASCORBIC ACID) 500 MG Tab Take 500 mg by mouth every day.     • Biotin 47745 MCG Tab Take  by mouth.     • Flaxseed, Linseed, (FLAX SEED  OIL) 1000 MG Cap Take  by mouth 2 Times a Day.     • Cholecalciferol (VITAMIN D3) 2000 units Tab Take  by mouth.     • warfarin (COUMADIN) 6 MG Tab Take 1 Tab by mouth every day. (Patient not taking: Reported on 3/26/2019) 90 Tab 1     No current facility-administered medications for this visit.        Allergies: Spironolactone; Neomycin; and Tape    BP (!) 90/54 (BP Location: Left arm, Patient Position: Sitting, BP Cuff Size: Adult)   Pulse 86   Resp 16   Ht 1.524 m (5')   Wt 56.7 kg (125 lb)   SpO2 96%       Review of Systems   Constitutional: Negative.    HENT: Negative.    Eyes: Negative for pain and discharge.   Respiratory: Positive for cough. Negative for hemoptysis, sputum production, shortness of breath and wheezing.    Cardiovascular: Negative.    Gastrointestinal: Negative.    Musculoskeletal: Negative.    Skin: Negative.    Neurological: Negative.    Endo/Heme/Allergies: Negative for environmental allergies. Does not bruise/bleed easily.   Psychiatric/Behavioral: Negative.        Physical Exam   Constitutional: She is oriented to person, place, and time and well-developed, well-nourished, and in no distress.   HENT:   Head: Normocephalic and atraumatic.   Mouth/Throat: Oropharynx is clear and moist.   Eyes: Pupils are equal, round, and reactive to light.   Neck: Normal range of motion. Neck supple. No tracheal deviation present.   Cardiovascular: Normal rate, regular rhythm and normal heart sounds.    No murmur heard.  Pulmonary/Chest: Effort normal and breath sounds normal. No respiratory distress. She has no wheezes. She has no rales.   Musculoskeletal: Normal range of motion.   Neurological: She is alert and oriented to person, place, and time.   Skin: Skin is warm and dry.   Psychiatric: Mood, memory, affect and judgment normal.   Vitals reviewed.      Diagnoses/Plan:    1. Bronchiectasis without complication (HCC)  She will continue current bronchodilators, Breo and Symbicort daily.  She is  encouraged to use her nebulizer twice per day with flutter valve.  I have also encouraged her to start Mucinex BID.    2. Cough  She understands with bronchiectasis she will have a chronic cough.  She has noticed increased productive cough particularly at night since cutting back on suppressive antibiotics from daily to Monday Wednesday Friday schedule.  Her cough has been disruptive to her sleep.  She is encouraged to try the above measures.  If no improvement can resume daily suppressive azithromycin.    3. Diffuse connective tissue disease (HCC)  She is pending evaluation with rheumatology.  I provided her the name of 2 rheumatologists one in [place] if she can get a sooner appointment.  There is no evidence of interstitial involvement at this time.    She will follow-up in 6 months, sooner if needed.            This dictation was created using voice recognition software. The accuracy of the dictation is limited to the abilities of the software. I expect there may be some errors of grammar and possibly content.

## 2019-05-09 ENCOUNTER — OFFICE VISIT (OUTPATIENT)
Dept: MEDICAL GROUP | Facility: PHYSICIAN GROUP | Age: 64
End: 2019-05-09
Payer: COMMERCIAL

## 2019-05-09 VITALS
RESPIRATION RATE: 18 BRPM | WEIGHT: 125 LBS | BODY MASS INDEX: 24.54 KG/M2 | TEMPERATURE: 97.6 F | OXYGEN SATURATION: 94 % | HEART RATE: 88 BPM | DIASTOLIC BLOOD PRESSURE: 74 MMHG | HEIGHT: 60 IN | SYSTOLIC BLOOD PRESSURE: 124 MMHG

## 2019-05-09 DIAGNOSIS — Z02.89 ENCOUNTER FOR COMPLETION OF FORM WITH PATIENT: ICD-10-CM

## 2019-05-09 DIAGNOSIS — G89.29 OTHER CHRONIC PAIN: ICD-10-CM

## 2019-05-09 DIAGNOSIS — M35.9 DIFFUSE CONNECTIVE TISSUE DISEASE (HCC): ICD-10-CM

## 2019-05-09 DIAGNOSIS — J47.9 BRONCHIECTASIS WITHOUT COMPLICATION (HCC): ICD-10-CM

## 2019-05-09 PROCEDURE — 99214 OFFICE O/P EST MOD 30 MIN: CPT | Performed by: FAMILY MEDICINE

## 2019-05-09 NOTE — PROGRESS NOTES
cc: chronic pain       Subjective:     Marline Perry is a 63 y.o. female presenting for the following:     Patient has been struggling with her chronic pain and fatigue. 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 Muscle pain and joint pain. She does try to be functional, but the pain is always present and is limiting her function regularly. And then, due to her bronchiectasis, she has a chronic cough and shortness of breath with exercise that can flair her pain as well.     On a good day, she can walk about 100ft without stopping, on a bad day, she can only go about 20ft. She can only sometimes lindsey on uneven surfaces. She has pain if she sits for too long.   Unable to climb more than a few stairs without stopping to rest due to the cough and joint pain.     She has difficulty with computer work due to eye strain and repetitive motions of the arms and hands will cause pain and she will drop things.     Review of systems:  All others reviewed and are negative.       Current Outpatient Prescriptions:   •  azithromycin (ZITHROMAX) 250 MG Tab, Take one tab PO on M,W,F, Disp: 36 Tab, Rfl: 3  •  warfarin (COUMADIN) 5 MG Tab, Take 0.5 to 1 tablets by mouth daily as directed by [facility]., Disp: 90 Tab, Rfl: 1  •  rosuvastatin (CRESTOR) 20 MG Tab, Take 1 Tab by mouth every evening., Disp: 90 Tab, Rfl: 3  •  SPIRIVA RESPIMAT 2.5 MCG/ACT Aero Soln, 2 INHALATIONS BY MOUTH EVERY DAY . ASSEMBLE AND PRIME, Disp: 1 Inhaler, Rfl: 5  •  SUMAtriptan (IMITREX) 50 MG Tab, , Disp: , Rfl:   •  albuterol (PROVENTIL) 2.5mg/3ml Nebu Soln solution for nebulization, 3 mL by Nebulization route every four hours as needed for Shortness of Breath., Disp: 360 mL, Rfl: 3  •  Fluticasone Furoate-Vilanterol (BREO) 100-25 MCG/INH AEROSOL POWDER, BREATH ACTIVATED, Inhale 1 Puff by mouth every day., Disp: , Rfl:   •   ascorbic acid (ASCORBIC ACID) 500 MG Tab, Take 500 mg by mouth every day., Disp: , Rfl:   •  Biotin 95340 MCG Tab, Take  by mouth., Disp: , Rfl:   •  Flaxseed, Linseed, (FLAX SEED OIL) 1000 MG Cap, Take  by mouth 2 Times a Day., Disp: , Rfl:   •  Cholecalciferol (VITAMIN D3) 2000 units Tab, Take  by mouth., Disp: , Rfl:     Allergies, past medical history, past surgical history, family history, social history reviewed and updated    Objective:     Vitals: /74 (BP Location: Left arm, Patient Position: Sitting, BP Cuff Size: Adult)   Pulse 88   Temp 36.4 °C (97.6 °F) (Temporal)   Resp 18   Ht 1.524 m (5')   Wt 56.7 kg (125 lb)   SpO2 94%   BMI 24.41 kg/m²   General: Alert, pleasant, NAD, +cough during interview  Eyes with mild bilateral conjunctival injection.   Heart: Regular rate and rhythm.   Respiratory: Normal respiratory effort.  Faint crackles bases b/l.   Musculoskeletal: Gait is normal.  Moves all extremities well.  Extremities: No leg edema.    Neurological: Slight limp with walking CN2-12 grossly intact  Psych:  Affect is normal, judgement is good, memory is intact, grooming is appropriate.    Assessment/Plan:     Marline was seen today for follow-up.    Diagnoses and all orders for this visit:    Filled out form today for patient's disability.  She has had difficulty with work since 2016 and her symptoms are mostly unchanged. Patient was seen for 25 minutes face to face of which > 50% of appointment time was spent on counseling and coordination of care regarding the above.    Other chronic pain    Diffuse connective tissue disease (HCC)    Bronchiectasis without complication (HCC)    Encounter for completion of form with patient    Return in about 3 months (around 8/9/2019), or if symptoms worsen or fail to improve.

## 2019-05-15 ENCOUNTER — TELEPHONE (OUTPATIENT)
Dept: PULMONOLOGY | Facility: HOSPICE | Age: 64
End: 2019-05-15

## 2019-05-15 DIAGNOSIS — J47.9 BRONCHIECTASIS WITHOUT COMPLICATION (HCC): ICD-10-CM

## 2019-05-15 NOTE — TELEPHONE ENCOUNTER
Dark green or lemon yellow sputum, cough with a wheeze and seal like bark. Pt is unable to sleep. This has been on going for about 1 week now. Pt is currently taking azithromycin (ZITHROMAX) 250 MG Tab 3 times weekly. Pt is wanting some relief. Should she make an apt or should she try something else?    Pt has tried multiple OTC medication with no help. She is aslo using the nebulizer in the evenings. I advised she could use this every 4 to 6 hrs PRN.     Denies all other symptoms .

## 2019-05-15 NOTE — TELEPHONE ENCOUNTER
Spoke to pt. She agrees to start daily abx. She will call back in a week or so id symptoms persist

## 2019-05-16 ENCOUNTER — TELEPHONE (OUTPATIENT)
Dept: MEDICAL GROUP | Facility: PHYSICIAN GROUP | Age: 64
End: 2019-05-16

## 2019-05-16 DIAGNOSIS — M35.9 DIFFUSE CONNECTIVE TISSUE DISEASE (HCC): ICD-10-CM

## 2019-05-16 DIAGNOSIS — M35.00 SJOGREN'S SYNDROME, WITH UNSPECIFIED ORGAN INVOLVEMENT (HCC): ICD-10-CM

## 2019-05-16 NOTE — TELEPHONE ENCOUNTER
Patient called and LVM that if she is to go back on  daily abx she will need a new Rx sent to [facility], the active Rx is only for 3 days a week.   Please advice.

## 2019-05-16 NOTE — TELEPHONE ENCOUNTER
1. Caller Name: Marline                                         Call Back Number: [phone] (home)       Patient approves a detailed voicemail message: N\A    2. SPECIFIC Action To Be Taken: Referral pending, please sign.    3. Diagnosis/Clinical Reason for Request: Rheumatology     4. Specialty & Provider Name/Lab/Imaging Location: [facility]    5. Is appointment scheduled for requested order/referral: no    Patient was not informed they will receive a return phone call from the office ONLY if there are any questions before processing their request. Advised to call back if they haven't received a call from the referral department in 5 days.    Cesar Givens

## 2019-05-21 ENCOUNTER — ANTICOAGULATION MONITORING (OUTPATIENT)
Dept: VASCULAR LAB | Facility: MEDICAL CENTER | Age: 64
End: 2019-05-21

## 2019-05-21 DIAGNOSIS — Z95.2 H/O MECHANICAL AORTIC VALVE REPLACEMENT: ICD-10-CM

## 2019-05-21 LAB — INR PPP: 2.7 (ref 2–3.5)

## 2019-05-22 NOTE — PROGRESS NOTES
Anticoagulation Summary  As of 2019    INR goal:   2.0-3.0   TTR:   52.5 % (1.3 y)   INR used for dosin.70 (2019)   Warfarin maintenance plan:   5 mg (5 mg x 1) every day   Weekly warfarin total:   35 mg   No change documented:   Samaria Montelongo, Med Ass't   Plan last modified:   Tulio Sotelo, PharmD (4/3/2019)   Next INR check:   2019   Target end date:   Indefinite    Indications    H/O mechanical aortic valve replacement [Z95.2]             Anticoagulation Episode Summary     INR check location:   Home Draw    Preferred lab:       Send INR reminders to:       Comments:   Alere      Anticoagulation Care Providers     Provider Role Specialty Phone number    Chinyere Marcus M.D. Referring Cardiology [phone]    Yossi Khalil, PharmD Responsible          Anticoagulation Patient Findings  Patient Findings     Negatives:   Signs/symptoms of thrombosis, Signs/symptoms of bleeding, Laboratory test error suspected, Change in health, Change in alcohol use, Change in activity, Upcoming invasive procedure, Emergency department visit, Upcoming dental procedure, Missed doses, Extra doses, Change in medications, Change in diet/appetite, Hospital admission, Bruising, Other complaints         Spoke with patient to report a therapeutic INR.  Pt instructed to continue with current warfarin dosing regimen. Pt denies any s/s of bleeding, bruising, clotting or any changes to diet or medication.  Will follow up in 2 weeks.    Samaria Montelongo, Med Ass't    19  Luke Hutchinson, PharmD

## 2019-06-06 ENCOUNTER — ANTICOAGULATION MONITORING (OUTPATIENT)
Dept: VASCULAR LAB | Facility: MEDICAL CENTER | Age: 64
End: 2019-06-06

## 2019-06-06 DIAGNOSIS — Z95.2 H/O MECHANICAL AORTIC VALVE REPLACEMENT: ICD-10-CM

## 2019-06-06 LAB — INR PPP: 2 (ref 2–3.5)

## 2019-06-07 NOTE — PROGRESS NOTES
Anticoagulation Summary  As of 2019    INR goal:   2.0-3.0   TTR:   54.0 % (1.3 y)   INR used for dosin.00 (2019)   Warfarin maintenance plan:   5 mg (5 mg x 1) every day   Weekly warfarin total:   35 mg   Plan last modified:   Marilyn Plummer (2019)   Next INR check:   2019   Target end date:   Indefinite    Indications    H/O mechanical aortic valve replacement [Z95.2]             Anticoagulation Episode Summary     INR check location:   Home Draw    Preferred lab:       Send INR reminders to:       Comments:   Denny       Anticoagulation Care Providers     Provider Role Specialty Phone number    Chinyere Marcus M.D. Referring Cardiology [phone]    Yossi Khalil, PharmD Responsible          Anticoagulation Patient Findings       Spoke with Marline to report a therpaeutic INR of 2.0. Continue current dosing regimen.  Follow up in 2 weeks, to reduce the risk of adverse events related to this high risk medication, warfarin.    Marilyn Plummer, Clinical Pharmacist

## 2019-06-18 DIAGNOSIS — J47.9 BRONCHIECTASIS WITHOUT COMPLICATION (HCC): ICD-10-CM

## 2019-06-18 NOTE — TELEPHONE ENCOUNTER
CHRISTIANE Montoya 16 minutes ago (8:51 AM)         ASAP Please.   I need a new prescription for Breo Ellipta 100/25 mcg medication.  [facility] is usual provider, 90 day supply by mail is usual. When I called for refill they said new prescription needed as there are 0 refills.     I only have a week left - please fill ASAP.     Thank you.               Have we ever prescribed this med? Yes.  If yes, what date? 01/09/18     Last OV: 05/07/19 Andres APRN     Next OV: 11/04/19 Andres APRN     DX: Bronchiectasis without complication     Medications: Fluticasone Furoate-Vilanterol (BREO) 100-25 MCG/INH AEROSOL POWDER, BREATH ACTIVATED

## 2019-06-24 ENCOUNTER — ANTICOAGULATION MONITORING (OUTPATIENT)
Dept: VASCULAR LAB | Facility: MEDICAL CENTER | Age: 64
End: 2019-06-24

## 2019-06-24 DIAGNOSIS — Z95.2 H/O MECHANICAL AORTIC VALVE REPLACEMENT: ICD-10-CM

## 2019-06-24 LAB — INR PPP: 3 (ref 2–3.5)

## 2019-06-24 NOTE — PROGRESS NOTES
Anticoagulation Summary  As of 6/24/2019    INR goal:   2.0-3.0   TTR:   55.7 % (1.4 y)   INR used for dosing:   3.00 (6/24/2019)   Warfarin maintenance plan:   5 mg (5 mg x 1) every day   Weekly warfarin total:   35 mg   No change documented:   Gabriela Gonzalez Med Ass't   Plan last modified:   Marilyn M Filter (6/7/2019)   Next INR check:   7/8/2019   Target end date:   Indefinite    Indications    H/O mechanical aortic valve replacement [Z95.2]             Anticoagulation Episode Summary     INR check location:   Home Draw    Preferred lab:       Send INR reminders to:       Comments:   Denny       Anticoagulation Care Providers     Provider Role Specialty Phone number    Chinyere Marcus M.D. Referring Cardiology [phone]    Yossi Khalil, PharmD Responsible          Anticoagulation Patient Findings  Patient Findings     Negatives:   Signs/symptoms of thrombosis, Signs/symptoms of bleeding, Laboratory test error suspected, Change in health, Change in alcohol use, Change in activity, Upcoming invasive procedure, Emergency department visit, Upcoming dental procedure, Missed doses, Extra doses, Change in medications, Change in diet/appetite, Hospital admission, Bruising, Other complaints      Left voicemail message to report 3.0 therapeutic INR of 2.0-3.0.  Patient to continue with current warfarin dosing regimen. Requested pt contact the clinic for any change to diet or medication, and to report any signs or symptoms of bleeding, bruising or clotting.  Pt to follow up in 2 weeks.  Gabriela Gonzalez Med Ass't      I have reviewed and concur with the above plan     Brian Ribera, SantoD

## 2019-07-08 ENCOUNTER — ANTICOAGULATION MONITORING (OUTPATIENT)
Dept: MEDICAL GROUP | Facility: PHYSICIAN GROUP | Age: 64
End: 2019-07-08

## 2019-07-08 DIAGNOSIS — Z95.2 H/O MECHANICAL AORTIC VALVE REPLACEMENT: ICD-10-CM

## 2019-07-08 LAB — INR PPP: 3.4 (ref 2–3.5)

## 2019-07-08 NOTE — PROGRESS NOTES
Anticoagulation Summary  As of 7/8/2019    INR goal:   2.0-3.0   TTR:   54.2 % (1.4 y)   INR used for dosing:   3.40! (7/8/2019)   Warfarin maintenance plan:   5 mg (5 mg x 1) every day   Weekly warfarin total:   35 mg   Plan last modified:   Marilyn AMARAL Filter (6/7/2019)   Next INR check:   7/22/2019   Target end date:   Indefinite    Indications    H/O mechanical aortic valve replacement [Z95.2]             Anticoagulation Episode Summary     INR check location:   Home Draw    Preferred lab:       Send INR reminders to:       Comments:   Denny       Anticoagulation Care Providers     Provider Role Specialty Phone number    Chinyere Marcus M.D. Referring Cardiology [phone]    Yossi Khalil, PharmD Responsible          Anticoagulation Patient Findings        Spoke to patient on the phone.   INR  supra-therapeutic.   Denies signs/symptoms of bleeding and/or thrombosis.   Denies changes to diet or medications.   Follow up appointment in 2 week(s).    2.5mg today then continue weekly warfarin dose as noted      Brian Ribera, PharmD

## 2019-07-23 ENCOUNTER — ANTICOAGULATION MONITORING (OUTPATIENT)
Dept: VASCULAR LAB | Facility: MEDICAL CENTER | Age: 64
End: 2019-07-23

## 2019-07-23 DIAGNOSIS — Z95.2 H/O MECHANICAL AORTIC VALVE REPLACEMENT: ICD-10-CM

## 2019-07-23 LAB — INR PPP: 1.8 (ref 2–3.5)

## 2019-07-24 NOTE — PROGRESS NOTES
Anticoagulation Summary  As of 2019    INR goal:   2.0-3.0   TTR:   54.4 % (1.5 y)   INR used for dosin.80! (2019)   Warfarin maintenance plan:   5 mg (5 mg x 1) every day   Weekly warfarin total:   35 mg   Plan last modified:   Marilyn Plummer (2019)   Next INR check:      Target end date:   Indefinite    Indications    H/O mechanical aortic valve replacement [Z95.2]             Anticoagulation Episode Summary     INR check location:   Home Draw    Preferred lab:       Send INR reminders to:       Comments:   Denny       Anticoagulation Care Providers     Provider Role Specialty Phone number    Chinyere Marcus M.D. Referring Cardiology [phone]    Yossi Khalil, PharmD Responsible          Anticoagulation Patient Findings          Spoke with Marline to report a sub therapeutic INR of 1.8.  Pt ETOH consumption is erratic and not consistent.  Will bolus bolus dose with 7.5mg tonight, then resume current dosing regimen. Follow up in 1 weeks, to reduce the risk of adverse events related to this high risk medication, warfarin.    Marilyn Plummer, Clinical Pharmacist

## 2019-08-01 ENCOUNTER — ANTICOAGULATION MONITORING (OUTPATIENT)
Dept: MEDICAL GROUP | Facility: PHYSICIAN GROUP | Age: 64
End: 2019-08-01

## 2019-08-01 DIAGNOSIS — Z95.2 H/O MECHANICAL AORTIC VALVE REPLACEMENT: ICD-10-CM

## 2019-08-01 LAB — INR PPP: 3.1 (ref 2–3.5)

## 2019-08-01 NOTE — PROGRESS NOTES
Anticoagulation Summary  As of 8/1/2019    INR goal:   2.0-3.0   TTR:   54.8 % (1.5 y)   INR used for dosing:   3.10! (8/1/2019)   Warfarin maintenance plan:   5 mg (5 mg x 1) every day   Weekly warfarin total:   35 mg   Plan last modified:   Brian Ribera, PharmD (8/1/2019)   Next INR check:   8/8/2019   Target end date:   Indefinite    Indications    H/O mechanical aortic valve replacement [Z95.2]             Anticoagulation Episode Summary     INR check location:   Home Draw    Preferred lab:       Send INR reminders to:       Comments:   Denny CELESTIN      Anticoagulation Care Providers     Provider Role Specialty Phone number    Chinyere Marcus M.D. Referring Cardiology [phone]    Yossi Khalil, PharmD Responsible          Anticoagulation Patient Findings      INR  supra-therapeutic.   Left a voice message for the patient, asked patient to please call the anticoagulation clinic if they have any signs/symptoms of bleeding and/or thrombosis or any changes to diet or medications.    Follow up appointment in 1 week(s)    2.5mg today then continue weekly warfarin dose as noted      Brian Ribera, PharmD

## 2019-08-13 ENCOUNTER — HOSPITAL ENCOUNTER (OUTPATIENT)
Dept: LAB | Facility: MEDICAL CENTER | Age: 64
End: 2019-08-13
Attending: INTERNAL MEDICINE
Payer: COMMERCIAL

## 2019-08-13 LAB
CRP SERPL HS-MCNC: 0.09 MG/DL (ref 0–0.75)
HCV AB SER QL: NEGATIVE

## 2019-08-13 PROCEDURE — 86140 C-REACTIVE PROTEIN: CPT

## 2019-08-13 PROCEDURE — 36415 COLL VENOUS BLD VENIPUNCTURE: CPT

## 2019-08-13 PROCEDURE — 86803 HEPATITIS C AB TEST: CPT

## 2019-08-20 ENCOUNTER — OFFICE VISIT (OUTPATIENT)
Dept: CARDIOLOGY | Facility: MEDICAL CENTER | Age: 64
End: 2019-08-20
Payer: COMMERCIAL

## 2019-08-20 VITALS
WEIGHT: 125 LBS | BODY MASS INDEX: 24.54 KG/M2 | DIASTOLIC BLOOD PRESSURE: 64 MMHG | HEART RATE: 70 BPM | SYSTOLIC BLOOD PRESSURE: 124 MMHG | HEIGHT: 60 IN | OXYGEN SATURATION: 94 %

## 2019-08-20 DIAGNOSIS — I71.20 THORACIC AORTIC ANEURYSM WITHOUT RUPTURE (HCC): ICD-10-CM

## 2019-08-20 DIAGNOSIS — Z79.01 WARFARIN ANTICOAGULATION: ICD-10-CM

## 2019-08-20 DIAGNOSIS — I34.0 NON-RHEUMATIC MITRAL REGURGITATION: ICD-10-CM

## 2019-08-20 DIAGNOSIS — I35.1 NONRHEUMATIC AORTIC VALVE INSUFFICIENCY: ICD-10-CM

## 2019-08-20 DIAGNOSIS — Z95.2 H/O MECHANICAL AORTIC VALVE REPLACEMENT: ICD-10-CM

## 2019-08-20 DIAGNOSIS — R01.1 UNDIAGNOSED CARDIAC MURMURS: ICD-10-CM

## 2019-08-20 PROCEDURE — 99214 OFFICE O/P EST MOD 30 MIN: CPT | Performed by: INTERNAL MEDICINE

## 2019-08-20 ASSESSMENT — ENCOUNTER SYMPTOMS
INSOMNIA: 0
SPEECH CHANGE: 0
ABDOMINAL PAIN: 0
SHORTNESS OF BREATH: 1
COUGH: 1
BLURRED VISION: 0
WHEEZING: 0
POLYDIPSIA: 0
NAUSEA: 0
FALLS: 0
BRUISES/BLEEDS EASILY: 0
SPUTUM PRODUCTION: 1
NERVOUS/ANXIOUS: 0
EYE DISCHARGE: 0
PALPITATIONS: 0
DEPRESSION: 0
CLAUDICATION: 0
HEARTBURN: 0
HEMOPTYSIS: 0
MYALGIAS: 1
CHILLS: 0
EYE PAIN: 0
MEMORY LOSS: 0
FEVER: 0
DIZZINESS: 0
VOMITING: 0
PND: 0
LOSS OF CONSCIOUSNESS: 0

## 2019-08-20 NOTE — PROGRESS NOTES
Chief Complaint   Patient presents with   • Mitral Stenosis/Insufficiency     follow up       Subjective:   Marline Perry is a 63 y.o. female who presents today in follow-up in regards to her complex valvular heart disease and history of thoracic aortic ascending repair    I met her and her  in January 2018 after moving here from [place].  Their daughter lives in [place] and they travel to [place].  Enjoying the summer  In 2001 after getting a second opinion it was realized that she had severe AI and needed mechanical aortic valve replacement as well as an arch repair which was done by Dr. Child at [facility].  She has mitral valve disease which we have been monitoring    She is on long-term disability and still follows in [place] for this    Aches and pains, saw an outside rheumatologist.  The visit was upsetting to her as the doctor did not feel that she had legitimate concerns.  Told she has to wait a year to get into see another rheumatologist.  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      Past Medical History:   Diagnosis Date   • Bronchiectasis ([facility])    • Bronchitis    • Chickenpox    • COPD (chronic obstructive pulmonary disease) ([facility])    • Hyperlipidemia    • Influenza    • Migraines    • Mumps    • Nasal drainage    • Sjogren's disease ([facility])      Past Surgical History:   Procedure Laterality Date   • AORTIC VALVE REPLACEMENT     • BRONCHOSCOPY     • HYSTERECTOMY LAPAROSCOPY     • SINUSCOPE       Family History   Problem Relation Age of Onset   • Arthritis Mother    • Hypertension Father    • Kidney Disease Father    • Arthritis Sister    • No Known Problems Brother    • No Known Problems Brother    • No Known Problems Son    • Heart Attack Maternal Grandmother    • Stroke Maternal Grandfather    • Heart Disease Paternal Grandmother    • No Known Problems Paternal Grandfather      Social History     Socioeconomic History   • Marital status:      Spouse name: Not on file   • Number of  children: Not on file   • Years of education: Not on file   • Highest education level: Not on file   Occupational History   • Not on file   Social Needs   • Financial resource strain: Not on file   • Food insecurity:     Worry: Not on file     Inability: Not on file   • Transportation needs:     Medical: Not on file     Non-medical: Not on file   Tobacco Use   • Smoking status: Never Smoker   • Smokeless tobacco: Never Used   Substance and Sexual Activity   • Alcohol use: Yes     Alcohol/week: 2.4 oz     Types: 4 Shots of liquor per week   • Drug use: No   • Sexual activity: Not on file   Lifestyle   • Physical activity:     Days per week: Not on file     Minutes per session: Not on file   • Stress: Not on file   Relationships   • Social connections:     Talks on phone: Not on file     Gets together: Not on file     Attends [employer] service: Not on file     Active member of club or organization: Not on file     Attends meetings of clubs or organizations: Not on file     Relationship status: Not on file   • Intimate partner violence:     Fear of current or ex partner: Not on file     Emotionally abused: Not on file     Physically abused: Not on file     Forced sexual activity: Not on file   Other Topics Concern   • Not on file   Social History Narrative   • Not on file     Allergies   Allergen Reactions   • Spironolactone Rash     ALL OVER BODY    • Neomycin Rash     CONTACT DERMATITIS    • Tape      Adhesives-RASH      Outpatient Encounter Medications as of 8/20/2019   Medication Sig Dispense Refill   • Fluticasone Furoate-Vilanterol (BREO) 100-25 MCG/INH AEROSOL POWDER, BREATH ACTIVATED Inhale 1 Puff by mouth every day. 3 Each 3   • azithromycin (ZITHROMAX) 250 MG Tab Take one tab PO on M,W,F 36 Tab 3   • warfarin (COUMADIN) 5 MG Tab Take 0.5 to 1 tablets by mouth daily as directed by [facility]. 90 Tab 1   • rosuvastatin (CRESTOR) 20 MG Tab Take 1 Tab by mouth every evening. 90 Tab 3   • SPIRIVA RESPIMAT 2.5  MCG/ACT Aero Soln 2 INHALATIONS BY MOUTH EVERY DAY . ASSEMBLE AND PRIME 1 Inhaler 5   • SUMAtriptan (IMITREX) 50 MG Tab      • albuterol (PROVENTIL) 2.5mg/3ml Nebu Soln solution for nebulization 3 mL by Nebulization route every four hours as needed for Shortness of Breath. 360 mL 3   • ascorbic acid (ASCORBIC ACID) 500 MG Tab Take 500 mg by mouth every day.     • Biotin 42256 MCG Tab Take  by mouth.     • Flaxseed, Linseed, (FLAX SEED OIL) 1000 MG Cap Take  by mouth 2 Times a Day.     • Cholecalciferol (VITAMIN D3) 2000 units Tab Take  by mouth.       No facility-administered encounter medications on file as of 8/20/2019.      Review of Systems   Constitutional: Positive for malaise/fatigue. Negative for chills and fever.   HENT: Negative for congestion and hearing loss.    Eyes: Negative for blurred vision, pain and discharge.   Respiratory: Positive for cough, sputum production and shortness of breath. Negative for hemoptysis and wheezing.    Cardiovascular: Positive for chest pain. Negative for palpitations, claudication, leg swelling and PND.   Gastrointestinal: Negative for abdominal pain, heartburn, nausea and vomiting.   Musculoskeletal: Positive for joint pain and myalgias. Negative for falls.   Skin: Negative for itching and rash.   Neurological: Negative for dizziness, speech change and loss of consciousness.   Endo/Heme/Allergies: Positive for environmental allergies. Negative for polydipsia. Does not bruise/bleed easily.   Psychiatric/Behavioral: Negative for depression and memory loss. The patient is not nervous/anxious and does not have insomnia.    All other systems reviewed and are negative.       Objective:   /64 (BP Location: Left arm, Patient Position: Sitting)   Pulse 70   Ht 1.524 m (5')   Wt 56.7 kg (125 lb)   SpO2 94%   BMI 24.41 kg/m²     Physical Exam   Constitutional: She is oriented to person, place, and time. She appears well-developed and well-nourished. No distress.   Patient  seen and examined again today changes noted   HENT:   Head: Normocephalic and atraumatic.   Eyes: Pupils are equal, round, and reactive to light. EOM are normal. No scleral icterus.   Neck: Neck supple. No JVD present.   Cardiovascular: Normal rate, regular rhythm and intact distal pulses.   Murmur (2 out of 6 systolic murmur blowing, well-healed sternal scar normal carotid upstrokes no JVD) heard.  Pulmonary/Chest: Effort normal and breath sounds normal. No respiratory distress. She has no wheezes. She has no rales. She exhibits no tenderness.   Abdominal: Bowel sounds are normal. She exhibits no distension.   Musculoskeletal: Normal range of motion. She exhibits no edema.   Neurological: She is alert and oriented to person, place, and time. No cranial nerve deficit. She exhibits normal muscle tone. Coordination normal.   Skin: Skin is warm and dry. No rash noted. She is not diaphoretic. No pallor.   Psychiatric: She has a normal mood and affect. Her behavior is normal.       Assessment:     1. Undiagnosed cardiac murmurs  EC-ECHOCARDIOGRAM COMPLETE W/O CONT   2. Warfarin anticoagulation     3. Thoracic aortic aneurysm without rupture (HCC)     4. Non-rheumatic mitral regurgitation     5. Nonrheumatic aortic valve insufficiency     6. H/O mechanical aortic valve replacement         Medical Decision Making:  Today's Assessment / Status / Plan:     Valvular heart disease  Talked at length and went over anatomy of her complex repair almost 20 years ago  She still suffers as this was not diagnosed for some time although she had felt very badly, very related to PTSD in my opinion  Reassurance given  Ordered echo for this year.  We looked at the images of her echo from last year mechanical valve looks great  She still has significant mitral valve prolapse in my opinion the mitral regurgitation looks moderate.  We discussed.  Repeat this fall.    Aortopathy  She is very well versed in the pathophysiology of her  disease  CAT scan was ordered by her pulmonologist last year, not do an annual basis from an aortic standpoint but certainly every 2 to 3 years if radiation permits.  MRA is also a possibility.  The last scan done here was not designated for the aorta  Statin, no coronary disease at the time of surgery.  Labs annually with PCP    Anticoagulation  Warfarin  Follows with Coumadin clinic, bridging required likely with aortic valve but not a necessity based on guidelines    RTC 6 months echo prior  I did take the liberty of reaching out to rheumatology here to see if we could certainly get her in earlier.  We discussed this as well    anticoag

## 2019-08-21 ENCOUNTER — TELEPHONE (OUTPATIENT)
Dept: CARDIOLOGY | Facility: MEDICAL CENTER | Age: 64
End: 2019-08-21

## 2019-08-21 NOTE — TELEPHONE ENCOUNTER
Chinyere Marcus M.D.  Ksenia Wells R.N.             Could you let Marline know     They can squeeze her in much sooner in [place], its a bit of a drive but may be worth it…     :)    Previous Messages      ----- Message -----   From: Faiza Ha M.D.   Sent: 8/20/2019   5:56 PM PDT   To: Chinyere Marcus M.D., *   Subject: RE: pt on a wait list                             At this time in [place] our wait list is extremely long even to schedule until we hire someone new, I am there part time but would be able to schedule the patient at my [place] office sooner if she would not mind driving there, I will have my MA reach out to see.     Faiza       ----- Message -----   From: Chinyere Marcus M.D.   Sent: 8/20/2019   8:33 AM PDT   To: Faiza Ha M.D.   Subject: pt on a wait list                                 Hi there!     I have a nice patient really struggling to get in as a new patient.  Has a hx of rheumatoid like sxs.   No real rush - but in the next 1-2 months would be great.     Could you help?     Thank you so much!   Chinyere HOPPER             Attempted to call pt, no answer, left vm to call back

## 2019-08-22 ENCOUNTER — ANTICOAGULATION MONITORING (OUTPATIENT)
Dept: VASCULAR LAB | Facility: MEDICAL CENTER | Age: 64
End: 2019-08-22

## 2019-08-22 DIAGNOSIS — Z95.2 H/O MECHANICAL AORTIC VALVE REPLACEMENT: ICD-10-CM

## 2019-08-22 LAB — INR PPP: 3.2 (ref 2–3.5)

## 2019-08-22 NOTE — PROGRESS NOTES
Anticoagulation Summary  As of 8/22/2019    INR goal:   2.0-3.0   TTR:   52.7 % (1.5 y)   INR used for dosing:   3.20! (8/22/2019)   Warfarin maintenance plan:   5 mg (5 mg x 1) every day   Weekly warfarin total:   35 mg   Plan last modified:   Brian Ribera, PharmD (8/1/2019)   Next INR check:      Target end date:   Indefinite    Indications    H/O mechanical aortic valve replacement [Z95.2]             Anticoagulation Episode Summary     INR check location:   Home Draw    Preferred lab:       Send INR reminders to:       Comments:   Denny       Anticoagulation Care Providers     Provider Role Specialty Phone number    Chinyere Marcus M.D. Referring Cardiology [phone]    Yossi Khalil, PharmD Responsible          Anticoagulation Patient Findings    Spoke to patient on the phone to report a Supratherapeutic INR of 3.2.  Pt to Decrease today's dose, 2.5 mg today then resume current regimen. Reports no changes in medications. Patient reports she has eaten a less greens this last week. Reports no S/S of bleeding or bruising. Follow up in 1 week.    Discussed plan with Marilyn Simons, Pharmacy Intern

## 2019-08-27 ENCOUNTER — OFFICE VISIT (OUTPATIENT)
Dept: MEDICAL GROUP | Facility: PHYSICIAN GROUP | Age: 64
End: 2019-08-27
Payer: COMMERCIAL

## 2019-08-27 VITALS
DIASTOLIC BLOOD PRESSURE: 68 MMHG | WEIGHT: 125 LBS | HEIGHT: 60 IN | HEART RATE: 74 BPM | SYSTOLIC BLOOD PRESSURE: 112 MMHG | BODY MASS INDEX: 24.54 KG/M2 | TEMPERATURE: 98.2 F | RESPIRATION RATE: 18 BRPM | OXYGEN SATURATION: 94 %

## 2019-08-27 DIAGNOSIS — G89.29 OTHER CHRONIC PAIN: ICD-10-CM

## 2019-08-27 DIAGNOSIS — M79.7 FIBROMYALGIA: ICD-10-CM

## 2019-08-27 PROCEDURE — 99214 OFFICE O/P EST MOD 30 MIN: CPT | Performed by: FAMILY MEDICINE

## 2019-08-27 RX ORDER — AMITRIPTYLINE HYDROCHLORIDE 10 MG/1
10 TABLET, FILM COATED ORAL EVERY EVENING
Qty: 90 TAB | Refills: 1 | Status: SHIPPED | OUTPATIENT
Start: 2019-08-27 | End: 2020-02-05

## 2019-08-27 RX ORDER — SERTRALINE HYDROCHLORIDE 25 MG/1
25 TABLET, FILM COATED ORAL DAILY
Qty: 90 TAB | Refills: 1 | Status: SHIPPED | OUTPATIENT
Start: 2019-08-27 | End: 2020-02-05

## 2019-08-30 ENCOUNTER — HOSPITAL ENCOUNTER (OUTPATIENT)
Dept: CARDIOLOGY | Facility: MEDICAL CENTER | Age: 64
End: 2019-08-30
Attending: INTERNAL MEDICINE
Payer: COMMERCIAL

## 2019-08-30 DIAGNOSIS — R01.1 UNDIAGNOSED CARDIAC MURMURS: ICD-10-CM

## 2019-08-30 LAB
LV EJECT FRACT  99904: 65
LV EJECT FRACT MOD 2C 99903: 67.64
LV EJECT FRACT MOD 4C 99902: 62.34
LV EJECT FRACT MOD BP 99901: 62.7

## 2019-08-30 PROCEDURE — 93306 TTE W/DOPPLER COMPLETE: CPT | Mod: 26 | Performed by: INTERNAL MEDICINE

## 2019-08-30 PROCEDURE — 93306 TTE W/DOPPLER COMPLETE: CPT

## 2019-09-04 ENCOUNTER — ANTICOAGULATION MONITORING (OUTPATIENT)
Dept: VASCULAR LAB | Facility: MEDICAL CENTER | Age: 64
End: 2019-09-04

## 2019-09-04 DIAGNOSIS — Z95.2 H/O MECHANICAL AORTIC VALVE REPLACEMENT: ICD-10-CM

## 2019-09-04 LAB — INR PPP: 3 (ref 2–3.5)

## 2019-09-04 NOTE — PROGRESS NOTES
OP Anticoagulation Service Note    Date: 9/4/2019  Anticoagulation Summary  As of 9/4/2019    INR goal:   2.0-3.0   TTR:   51.5 % (1.6 y)   INR used for dosing:   3.00 (9/4/2019)   Warfarin maintenance plan:   5 mg (5 mg x 1) every day   Weekly warfarin total:   35 mg   No change documented:   Celso Kaufman Med Ass't   Plan last modified:   Santo JohnsonD (8/1/2019)   Next INR check:   9/18/2019   Target end date:   Indefinite    Indications    H/O mechanical aortic valve replacement [Z95.2]             Anticoagulation Episode Summary     INR check location:   Home Draw    Preferred lab:       Send INR reminders to:       Comments:   Denny       Anticoagulation Care Providers     Provider Role Specialty Phone number    Chinyere Marcus M.D. Referring Cardiology [phone]    Santo EncisoD Responsible          Anticoagulation Patient Findings  Patient Findings     Negatives:   Signs/symptoms of thrombosis, Signs/symptoms of bleeding, Laboratory test error suspected, Change in health, Change in alcohol use, Change in activity, Upcoming invasive procedure, Emergency department visit, Upcoming dental procedure, Missed doses, Extra doses, Change in medications, Change in diet/appetite, Hospital admission, Bruising, Other complaints        Plan: Spoke to patient. Patient is therapeutic and will remain on the same dose. Patient reports no unusual bleeding or bruising and no changes to medication or diet. Patient is to be checked again in 2 weeks.    August Beard. Ass't  [employer] [facility]    I have reviewed and am in agreement with the above stated plan on 9-4-19.  Tulio Sotelo, SantoD, BCACP

## 2019-09-05 ENCOUNTER — OFFICE VISIT (OUTPATIENT)
Dept: DERMATOLOGY | Facility: IMAGING CENTER | Age: 64
End: 2019-09-05
Payer: COMMERCIAL

## 2019-09-05 DIAGNOSIS — L73.9 FOLLICULITIS: ICD-10-CM

## 2019-09-05 DIAGNOSIS — L60.3 NAIL DYSTROPHY: ICD-10-CM

## 2019-09-05 DIAGNOSIS — B35.1 ONYCHOMYCOSIS: ICD-10-CM

## 2019-09-05 PROCEDURE — 99213 OFFICE O/P EST LOW 20 MIN: CPT | Performed by: DERMATOLOGY

## 2019-09-05 NOTE — PROGRESS NOTES
"CC: Nail fungus, Rash on jawline    Subjective: Previously seen patient here for nail fungus followup and rash on jaw.    Toenails moderate improvement - left is responding well. Right great toenail is still very affected and thickened. Using trifecta as recommended at last visit.  Bumps on jaw line still present.  Related to picking? And presence of coarse hair.      From prior notes:  \"toe nail fungus x 5 years great toes, has been treated in the past with both topical and oral medications . Was seen by a podiatrist last year was advised to use bleach soak . Has improved some but not completely gone .  Trx most recently 3 months without resolution.\"    ROS: no fevers/chills. No itch.    DermPMH: no skin cancer/melanoma  No problem-specific Assessment & Plan notes found for this encounter.    Relevant PMH: connective tissue dz - sjoegren syndrome. Artificial heart valve-warfarin  Social:non smoker    PE: Gen:WDWN female in NAD. Skin: focal exam: face/eyes/lips - without rashes/lesions, chin - white coarse hairs noted.  one papule present -folliculocentric.  Fingernails - striations of thumb nails, otherwise free of dystrophy.  Toenails - great toenails with yellowing and lifting - right worse than left - which is showing half clearance from prox end.  Subungual debris, left nail.     A/P:   Nail dystrophy, consider onychomycosis, likely:  -counseled again re: dx/tx  -will proceed with topical trifecta - to avoid oral/risks  -file nail with pumice/emery board (reminded top down to thin nail plate)-->amlactin cream-->lamisil cream OTC-->may's vaporrub/tincture of thymol.  Can cover with bandaid  -trx X 6-12 months  -consider add of ciclopirox if no effective treatment  -consider f/u Podiatry for nail trimming/avulsion     Hypertrichosis/folliculitis, face:  -counseled re: dx/tx  -gentle water washing; lite cleanser/moisturizer if needed.  -poor candidate for laser hair removal secondary to pigment loss of " hair  -consider physical methods to remove hair without plucking      I have reviewed medications relevant to my specialty.    F/u PRN

## 2019-09-06 ENCOUNTER — TELEPHONE (OUTPATIENT)
Dept: CARDIOLOGY | Facility: MEDICAL CENTER | Age: 64
End: 2019-09-06

## 2019-09-06 NOTE — TELEPHONE ENCOUNTER
Result Notes for EC-ECHOCARDIOGRAM COMPLETE W/O CONT   Notes recorded by Chinyere Marcus M.D. on 8/30/2019 at 2:16 PM PDT    Great news strong and normal looking heart, aorta and valve looks beautiful.  Follow-up as planned    Could you ask her if she made any headway getting into rheumatology?  We had tried to help her…     Returned pt call and reviewed MD recommendations.  She verbalizes understanding and states she has an appointment with a rheumatologist in January.  She states no other concerns or questions at this time and is appreciative of information given.

## 2019-09-23 ENCOUNTER — ANTICOAGULATION MONITORING (OUTPATIENT)
Dept: VASCULAR LAB | Facility: MEDICAL CENTER | Age: 64
End: 2019-09-23

## 2019-09-23 DIAGNOSIS — Z95.2 H/O MECHANICAL AORTIC VALVE REPLACEMENT: ICD-10-CM

## 2019-09-23 LAB — INR PPP: 2 (ref 2–3.5)

## 2019-09-23 NOTE — PROGRESS NOTES
Anticoagulation Summary  As of 2019    INR goal:   2.0-3.0   TTR:   53.1 % (1.6 y)   INR used for dosin.00 (2019)   Warfarin maintenance plan:   5 mg (5 mg x 1) every day   Weekly warfarin total:   35 mg   Plan last modified:   Santo JohnsonD (2019)   Next INR check:   10/7/2019   Target end date:   Indefinite    Indications    H/O mechanical aortic valve replacement [Z95.2]             Anticoagulation Episode Summary     INR check location:   Home Draw    Preferred lab:       Send INR reminders to:       Comments:   Denny       Anticoagulation Care Providers     Provider Role Specialty Phone number    Chinyere Marcus M.D. Referring Cardiology [phone]    Santo EncisoD Responsible          Anticoagulation Patient Findings  Patient Findings     Negatives:   Signs/symptoms of thrombosis, Signs/symptoms of bleeding, Laboratory test error suspected, Change in health, Change in alcohol use, Change in activity, Upcoming invasive procedure, Emergency department visit, Upcoming dental procedure, Missed doses, Extra doses, Change in medications, Change in diet/appetite, Hospital admission, Bruising, Other complaints        Spoke with the patient on the phone today, reporting a therapeutic INR of 2.0.  Confirmed the current warfarin dosing regimen and patient compliance. Patient denies any interval changes to diet and/or medications. Patient denies any signs/symptoms of bleeding or clotting.  Patient instructed to continue with the current warfarin dosing regimen, and asked to follow up again in 2 weeks.    Pollo BlanchardD

## 2019-09-27 DIAGNOSIS — Z79.01 CHRONIC ANTICOAGULATION: ICD-10-CM

## 2019-09-30 RX ORDER — WARFARIN SODIUM 5 MG/1
5 TABLET ORAL DAILY
Qty: 90 TAB | Refills: 1 | Status: SHIPPED | OUTPATIENT
Start: 2019-09-30 | End: 2020-03-30

## 2019-10-10 ENCOUNTER — ANTICOAGULATION MONITORING (OUTPATIENT)
Dept: VASCULAR LAB | Facility: MEDICAL CENTER | Age: 64
End: 2019-10-10

## 2019-10-10 DIAGNOSIS — Z95.2 H/O MECHANICAL AORTIC VALVE REPLACEMENT: ICD-10-CM

## 2019-10-10 LAB — INR PPP: 3.3 (ref 2–3.5)

## 2019-10-10 NOTE — PROGRESS NOTES
Anticoagulation Summary  As of 10/10/2019    INR goal:   2.0-3.0   TTR:   53.7 % (1.7 y)   INR used for dosing:   3.30! (10/10/2019)   Warfarin maintenance plan:   5 mg (5 mg x 1) every day   Weekly warfarin total:   35 mg   Plan last modified:   Santo JohnsonD (8/1/2019)   Next INR check:   10/24/2019   Target end date:   Indefinite    Indications    H/O mechanical aortic valve replacement [Z95.2]             Anticoagulation Episode Summary     INR check location:   Home Draw    Preferred lab:       Send INR reminders to:       Comments:   Denny       Anticoagulation Care Providers     Provider Role Specialty Phone number    Chinyere Marcus M.D. Referring Cardiology [phone]    Ysosi Khalil, PharmD Responsible          Anticoagulation Patient Findings  Patient Findings     Positives:   Change in diet/appetite (less vitamin K )          Spoke with pt.  INR is supra therapeutic due to decreased vitamin K intake.   Pt denies any unusual s/s of bleeding, bruising, clotting or any changes to medications. Denies any etoh, cranberries, supplements, or illness.   Pt verifies warfarin weekly dosing.     Will have pt reduce dose x 1 day, then continue current regimen.  Pt to return to her normal diet.    Repeat INR in 2 week(s).     Diann Gonzalez, PharmD

## 2019-10-14 ENCOUNTER — OFFICE VISIT (OUTPATIENT)
Dept: MEDICAL GROUP | Facility: PHYSICIAN GROUP | Age: 64
End: 2019-10-14
Payer: COMMERCIAL

## 2019-10-14 VITALS
TEMPERATURE: 97.6 F | DIASTOLIC BLOOD PRESSURE: 78 MMHG | OXYGEN SATURATION: 92 % | BODY MASS INDEX: 24.17 KG/M2 | RESPIRATION RATE: 18 BRPM | WEIGHT: 128 LBS | SYSTOLIC BLOOD PRESSURE: 122 MMHG | HEIGHT: 61 IN | HEART RATE: 90 BPM

## 2019-10-14 DIAGNOSIS — E78.00 HYPERCHOLESTEREMIA: ICD-10-CM

## 2019-10-14 DIAGNOSIS — I71.20 THORACIC AORTIC ANEURYSM WITHOUT RUPTURE (HCC): ICD-10-CM

## 2019-10-14 DIAGNOSIS — Z79.899 ON STATIN THERAPY: ICD-10-CM

## 2019-10-14 DIAGNOSIS — Z12.11 SCREENING FOR COLORECTAL CANCER: ICD-10-CM

## 2019-10-14 DIAGNOSIS — M35.9 DIFFUSE CONNECTIVE TISSUE DISEASE (HCC): ICD-10-CM

## 2019-10-14 DIAGNOSIS — J47.9 BRONCHIECTASIS WITHOUT COMPLICATION (HCC): ICD-10-CM

## 2019-10-14 DIAGNOSIS — Z23 NEED FOR VACCINATION: ICD-10-CM

## 2019-10-14 DIAGNOSIS — Z12.12 SCREENING FOR COLORECTAL CANCER: ICD-10-CM

## 2019-10-14 DIAGNOSIS — I34.0 NON-RHEUMATIC MITRAL REGURGITATION: ICD-10-CM

## 2019-10-14 DIAGNOSIS — Z79.01 WARFARIN ANTICOAGULATION: ICD-10-CM

## 2019-10-14 PROBLEM — G43.109 MIGRAINE WITH AURA AND WITHOUT STATUS MIGRAINOSUS, NOT INTRACTABLE: Status: ACTIVE | Noted: 2019-10-14

## 2019-10-14 PROBLEM — G43.009 MIGRAINE WITHOUT AURA AND WITHOUT STATUS MIGRAINOSUS, NOT INTRACTABLE: Status: ACTIVE | Noted: 2019-10-14

## 2019-10-14 PROCEDURE — 90715 TDAP VACCINE 7 YRS/> IM: CPT | Performed by: FAMILY MEDICINE

## 2019-10-14 PROCEDURE — 90686 IIV4 VACC NO PRSV 0.5 ML IM: CPT | Performed by: FAMILY MEDICINE

## 2019-10-14 PROCEDURE — 99396 PREV VISIT EST AGE 40-64: CPT | Mod: 25 | Performed by: FAMILY MEDICINE

## 2019-10-14 PROCEDURE — 90471 IMMUNIZATION ADMIN: CPT | Performed by: FAMILY MEDICINE

## 2019-10-14 PROCEDURE — 90472 IMMUNIZATION ADMIN EACH ADD: CPT | Performed by: FAMILY MEDICINE

## 2019-10-14 NOTE — PROGRESS NOTES
cc: annual exam       Subjective:     Marline Perry is a 63 y.o. female presenting for the following:     Patient feels in her normal stat of health today.     Bronchiectasis: patent has been using breo and spiriva and the azithromycin on a rotating schedule. Her breathing has been stable but this is a chronic symptomatic disease. She is unable to walk more than around the house before getting short of breath. No new symptoms.     Fibromyalgia/chronic pain: Zoloft and amitriptyline have improved the pain. She still has daily pain but the intensity is less. She did have some headache when she first started but this has faded. No new symptoms or pain.    Anticoagulation-patient is stable on her warfarin.  Denies any new bruising or bleeding.  Her diet is fairly stable.    Review of systems:  All others reviewed and are negative.       Current Outpatient Medications:   •  warfarin (COUMADIN) 5 MG Tab, Take 1 Tab by mouth every day., Disp: 90 Tab, Rfl: 1  •  SPIRIVA RESPIMAT 2.5 MCG/ACT Aero Soln, 2 INHALATIONS BY MOUTH EVERY DAY . ASSEMBLE AND PRIME, Disp: 1 Inhaler, Rfl: 5  •  sertraline (ZOLOFT) 25 MG tablet, Take 1 Tab by mouth every day., Disp: 90 Tab, Rfl: 1  •  amitriptyline (ELAVIL) 10 MG Tab, Take 1 Tab by mouth every evening., Disp: 90 Tab, Rfl: 1  •  Fluticasone Furoate-Vilanterol (BREO) 100-25 MCG/INH AEROSOL POWDER, BREATH ACTIVATED, Inhale 1 Puff by mouth every day., Disp: 3 Each, Rfl: 3  •  rosuvastatin (CRESTOR) 20 MG Tab, Take 1 Tab by mouth every evening., Disp: 90 Tab, Rfl: 3  •  SUMAtriptan (IMITREX) 50 MG Tab, , Disp: , Rfl:   •  albuterol (PROVENTIL) 2.5mg/3ml Nebu Soln solution for nebulization, 3 mL by Nebulization route every four hours as needed for Shortness of Breath., Disp: 360 mL, Rfl: 3  •  ascorbic acid (ASCORBIC ACID) 500 MG Tab, Take 500 mg by mouth every day., Disp: , Rfl:   •  Biotin 85670 MCG Tab, Take  by mouth., Disp: , Rfl:   •  Flaxseed, Linseed, (FLAX SEED OIL) 1000 MG Cap,  "Take  by mouth 2 Times a Day., Disp: , Rfl:   •  Cholecalciferol (VITAMIN D3) 2000 units Tab, Take  by mouth., Disp: , Rfl:     Allergies, past medical history, past surgical history, family history, social history reviewed and updated    Objective:     Vitals: /78 (BP Location: Right arm, Patient Position: Sitting, BP Cuff Size: Adult)   Pulse 90   Temp 36.4 °C (97.6 °F) (Temporal)   Resp 18   Ht 1.549 m (5' 1\")   Wt 58.1 kg (128 lb)   SpO2 92%   BMI 24.19 kg/m²   General: Alert, pleasant, NAD  HEENT: Normocephalic.    Neck supple.  No thyromegaly or masses palpated. No cervical or supraclavicular lymphadenopathy.  Heart: Regular rate and rhythm.    Respiratory: Normal respiratory effort.    Abdomen: Non-distended, soft  Skin: Warm, dry, no rashes.  Musculoskeletal:   Moves all extremities well.  Extremities: No leg edema.    Neurological: No tremors, CN2-12 grossly intact  Psych:  Affect is normal, judgement is good, memory is intact, grooming is appropriate.    Assessment/Plan:     Marline was seen today for annual exam.    Diagnoses and all orders for this visit:    Bronchiectasis without complication (HCC)/Non-rheumatic mitral regurgitation: Chronic stable problem.    Warfarin anticoagulation: Patient doing well with no symptoms.    Thoracic aortic aneurysm without rupture (HCC): Asymptomatic.    Diffuse connective tissue disease (HCC): Symptoms have been improved with amitriptyline and Zoloft.  But still with chronic pain daily.    On statin therapy: Patient taking statin without side effect.  I will check CMP to ensure no transaminitis.  -     Lipid Profile; Future  -     Comp Metabolic Panel; Future    Hypercholesteremia  -     Lipid Profile; Future    Need for vaccination  -     Influenza Vaccine Quad Injection (PF)  -     Tdap Vaccine =>8YO IM    Screening for colorectal cancer Patient agrees to screening for colon cancer.  Currently without symptoms- no abdominal pain, changes in bowel habit, " weight loss, blood in stool, or melena. Denies history of polyps.   -     OCCULT BLOOD FECES IMMUNOASSAY (FIT); Future      Return in about 6 months (around 4/14/2020), or if symptoms worsen or fail to improve.

## 2019-10-29 ENCOUNTER — ANTICOAGULATION MONITORING (OUTPATIENT)
Dept: VASCULAR LAB | Facility: MEDICAL CENTER | Age: 64
End: 2019-10-29

## 2019-10-29 DIAGNOSIS — Z95.2 H/O MECHANICAL AORTIC VALVE REPLACEMENT: ICD-10-CM

## 2019-10-29 LAB — INR PPP: 3.1 (ref 2–3.5)

## 2019-10-29 NOTE — PROGRESS NOTES
Anticoagulation Summary  As of 10/29/2019    INR goal:   2.0-3.0   TTR:   52.1 % (1.7 y)   INR used for dosing:   3.10! (10/29/2019)   Warfarin maintenance plan:   5 mg (5 mg x 1) every day   Weekly warfarin total:   35 mg   Plan last modified:   Brian Ribera, PharmD (8/1/2019)   Next INR check:   11/12/2019   Target end date:   Indefinite    Indications    H/O mechanical aortic valve replacement [Z95.2]             Anticoagulation Episode Summary     INR check location:   Home Draw    Preferred lab:       Send INR reminders to:       Comments:   Denny CELESTIN      Anticoagulation Care Providers     Provider Role Specialty Phone number    Chinyere Marcus M.D. Referring Cardiology [phone]    Yossi Khalil, PharmD Responsible          Anticoagulation Patient Findings          Spoke with Marline to report a supra therapeutic INR of 3.1.  Will reduce tonight's dose to 2.5mg then resume current dosing regimen. Follow up in 2 weeks, to reduce the risk of adverse events related to this high risk medication, warfarin.    Marilyn Plummer, Clinical Pharmacist

## 2019-11-04 ENCOUNTER — OFFICE VISIT (OUTPATIENT)
Dept: PULMONOLOGY | Facility: HOSPICE | Age: 64
End: 2019-11-04
Payer: COMMERCIAL

## 2019-11-04 VITALS
DIASTOLIC BLOOD PRESSURE: 80 MMHG | HEART RATE: 88 BPM | SYSTOLIC BLOOD PRESSURE: 126 MMHG | RESPIRATION RATE: 16 BRPM | OXYGEN SATURATION: 93 % | BODY MASS INDEX: 23.79 KG/M2 | HEIGHT: 61 IN | WEIGHT: 126 LBS

## 2019-11-04 DIAGNOSIS — M35.9 DIFFUSE CONNECTIVE TISSUE DISEASE (HCC): ICD-10-CM

## 2019-11-04 DIAGNOSIS — J47.9 BRONCHIECTASIS WITHOUT COMPLICATION (HCC): ICD-10-CM

## 2019-11-04 DIAGNOSIS — M79.7 FIBROMYALGIA: ICD-10-CM

## 2019-11-04 PROCEDURE — 99214 OFFICE O/P EST MOD 30 MIN: CPT | Performed by: NURSE PRACTITIONER

## 2019-11-04 RX ORDER — AZITHROMYCIN 250 MG/1
TABLET, FILM COATED ORAL
Qty: 90 TAB | Refills: 2 | Status: SHIPPED | OUTPATIENT
Start: 2019-11-04 | End: 2020-01-15

## 2019-11-04 ASSESSMENT — ENCOUNTER SYMPTOMS
BRUISES/BLEEDS EASILY: 0
COUGH: 1
CONSTITUTIONAL NEGATIVE: 1
EYE PAIN: 0
GASTROINTESTINAL NEGATIVE: 1
WHEEZING: 0
HEMOPTYSIS: 0
SHORTNESS OF BREATH: 0
NEUROLOGICAL NEGATIVE: 1
CARDIOVASCULAR NEGATIVE: 1
MUSCULOSKELETAL NEGATIVE: 1
SPUTUM PRODUCTION: 0
EYE DISCHARGE: 0
PSYCHIATRIC NEGATIVE: 1

## 2019-11-04 NOTE — PROGRESS NOTES
Chief Complaint   Patient presents with   • Follow-Up     Bronchiectasis without complication, Last Seen 05/07/19          HPI: This patient is a 63 y.o. female, who presents for six-month follow-up bronchiectasis.     To reiterate, the patient is a lifelong non-smoker.  She relocated here from [place] where she was followed by pulmonology. She was diagnosed with bronchiectasis in 2008 and has undergone bronchoscopy in the past. Chest CAT scan was reviewed by Dr Talbot at previous visit shows mild right lower lobe bronchiectasis, no consolidation, infiltrates or mass. Marline is compliant with daily Breo 100/25, Spiriva daily and suppressive azithromycin MWF (previously daily), which was started by pulmonary in [place].    We attempted to wean her off suppressive antibiotics but she had a flare of her bronchiectasis.  She has been continuing with suppressive azithromycin Monday Wednesday Friday.  She reports cough but it is manageable and not disruptive to sleep.  It is primarily in the mornings.  She denies wheeze or dyspnea.  cough productive of clear to yellow secretions.  She requests refill of azithromycin.    Patient has a history of CTD and Sjogren's .  Recently seen by rheumatologist in [place].  She has planned follow-up in January.  HRCT April 2018 showed no evidence of ILD.   PFTs are essentially normal and indicate an FEV1 1.78 L 85% predicted, FEV1 FVC ratio of 80, TLC is normal at 104, DLCO 87% but corrects to 123%.  There is significant bronchodilator response in the small airways.     She has a history of mechanical valve replacement and is on chronic anticoagulation.  She follows with cardiology Dr. Marcus.      Past Medical History:   Diagnosis Date   • Bronchiectasis (HCC)    • Bronchitis    • Chickenpox    • COPD (chronic obstructive pulmonary disease) (HCC)    • Hyperlipidemia    • Influenza    • Migraines    • Mumps    • Nasal drainage    • Sjogren's disease ([facility])        Social History     Tobacco  Use   • Smoking status: Never Smoker   • Smokeless tobacco: Never Used   Substance Use Topics   • Alcohol use: Yes     Alcohol/week: 2.4 oz     Types: 4 Shots of liquor per week   • Drug use: No       Family History   Problem Relation Age of Onset   • Arthritis Mother    • Hypertension Father    • Kidney Disease Father    • Arthritis Sister    • No Known Problems Brother    • No Known Problems Brother    • No Known Problems Son    • Heart Attack Maternal Grandmother    • Stroke Maternal Grandfather    • Heart Disease Paternal Grandmother    • No Known Problems Paternal Grandfather        Immunization History   Administered Date(s) Administered   • Influenza (IM) Preservative Free 01/10/2018   • Influenza TIV (IM) 01/10/2018   • Influenza Vaccine Quad Inj (Pf) 09/18/2018, 10/14/2019   • Pneumococcal polysaccharide vaccine (PPSV-23) 10/18/2018   • TD Vaccine 07/03/2011   • Tdap Vaccine 10/14/2019       Current medications as of today   Current Outpatient Medications   Medication Sig Dispense Refill   • gabapentin (NEURONTIN) 100 MG Cap 100mg at night for 1 week, then 200mg at ngiht for 1 week, then 300mg at night 90 Cap 3   • warfarin (COUMADIN) 5 MG Tab Take 1 Tab by mouth every day. 90 Tab 1   • SPIRIVA RESPIMAT 2.5 MCG/ACT Aero Soln 2 INHALATIONS BY MOUTH EVERY DAY . ASSEMBLE AND PRIME 1 Inhaler 5   • sertraline (ZOLOFT) 25 MG tablet Take 1 Tab by mouth every day. 90 Tab 1   • amitriptyline (ELAVIL) 10 MG Tab Take 1 Tab by mouth every evening. 90 Tab 1   • Fluticasone Furoate-Vilanterol (BREO) 100-25 MCG/INH AEROSOL POWDER, BREATH ACTIVATED Inhale 1 Puff by mouth every day. 3 Each 3   • rosuvastatin (CRESTOR) 20 MG Tab Take 1 Tab by mouth every evening. 90 Tab 3   • SUMAtriptan (IMITREX) 50 MG Tab      • albuterol (PROVENTIL) 2.5mg/3ml Nebu Soln solution for nebulization 3 mL by Nebulization route every four hours as needed for Shortness of Breath. 360 mL 3   • ascorbic acid (ASCORBIC ACID) 500 MG Tab Take 500 mg  "by mouth every day.     • Biotin 30611 MCG Tab Take  by mouth.     • Flaxseed, Linseed, (FLAX SEED OIL) 1000 MG Cap Take  by mouth 2 Times a Day.     • Cholecalciferol (VITAMIN D3) 2000 units Tab Take  by mouth.       No current facility-administered medications for this visit.        Allergies: Spironolactone; Neomycin; and Tape    /80 (BP Location: Right arm, Patient Position: Sitting, BP Cuff Size: Adult)   Pulse 88   Resp 16   Ht 1.549 m (5' 1\")   Wt 57.2 kg (126 lb)   SpO2 93%       Review of Systems   Constitutional: Negative.    HENT: Negative.    Eyes: Negative for pain and discharge.   Respiratory: Positive for cough. Negative for hemoptysis, sputum production, shortness of breath and wheezing.    Cardiovascular: Negative.    Gastrointestinal: Negative.    Musculoskeletal: Negative.    Skin: Negative.    Neurological: Negative.    Endo/Heme/Allergies: Negative for environmental allergies. Does not bruise/bleed easily.   Psychiatric/Behavioral: Negative.        Physical Exam   Constitutional: She is oriented to person, place, and time and well-developed, well-nourished, and in no distress.   HENT:   Head: Normocephalic and atraumatic.   Eyes: Pupils are equal, round, and reactive to light.   Neck: Normal range of motion. Neck supple. No tracheal deviation present.   Cardiovascular: Normal rate, regular rhythm and normal heart sounds.   Pulmonary/Chest: Effort normal and breath sounds normal. No respiratory distress. She has no wheezes. She has no rales.   Musculoskeletal: Normal range of motion.   Neurological: She is alert and oriented to person, place, and time.   Skin: Skin is warm and dry.   Psychiatric: Mood, memory, affect and judgment normal.       Diagnoses/Plan:    1. Bronchiectasis without complication (HCC)  Symptoms are stable.  She will continue current bronchodilators.  She will continue Monday Wednesday Friday azithromycin.  I am sending in order to use azithromycin daily should she " need to step up due to cough.  She will continue albuterol as needed.    2. Fibromyalgia  Recent diagnosis, managed by rheumatology, has scheduled follow-up in January    3. Diffuse connective tissue disease (HCC)  Also history of Sjogren    She will follow-up in 6 months at the pulmonary clinic, sooner if needed            This dictation was created using voice recognition software. The accuracy of the dictation is limited to the abilities of the software. I expect there may be some errors of grammar and possibly content.

## 2019-11-17 LAB — INR PPP: 2.9 (ref 2–3.5)

## 2019-11-18 ENCOUNTER — ANTICOAGULATION MONITORING (OUTPATIENT)
Dept: VASCULAR LAB | Facility: MEDICAL CENTER | Age: 64
End: 2019-11-18

## 2019-11-18 DIAGNOSIS — Z95.2 H/O MECHANICAL AORTIC VALVE REPLACEMENT: ICD-10-CM

## 2019-11-18 NOTE — PROGRESS NOTES
Anticoagulation Summary  As of 2019    INR goal:   2.0-3.0   TTR:   52.1 % (1.8 y)   INR used for dosin.90 (2019)   Warfarin maintenance plan:   5 mg (5 mg x 1) every day   Weekly warfarin total:   35 mg   Plan last modified:   Santo JohnsonD (2019)   Next INR check:   2019   Target end date:   Indefinite    Indications    H/O mechanical aortic valve replacement [Z95.2]             Anticoagulation Episode Summary     INR check location:   Home Draw    Preferred lab:       Send INR reminders to:       Comments:   Denny       Anticoagulation Care Providers     Provider Role Specialty Phone number    Chinyere Marcus M.D. Referring Cardiology [phone]    Yossi Khalil, PharmD Responsible          Anticoagulation Patient Findings  Patient Findings     Negatives:   Signs/symptoms of thrombosis, Signs/symptoms of bleeding, Laboratory test error suspected, Change in health, Change in alcohol use, Change in activity, Upcoming invasive procedure, Emergency department visit, Upcoming dental procedure, Missed doses, Extra doses, Change in medications, Change in diet/appetite, Hospital admission, Bruising, Other complaints        Spoke with patient today regarding therapeutic INR of 2.9.  Patient denies any signs/symptoms of bruising or bleeding or any changes in diet and medications.  Instructed patient to call clinic with any questions or concerns.  Pt is to continue with current warfarin dosing regimen.  Follow up in 2 weeks, to reduce risk of adverse events related to this high risk medication,  Warfarin.    Tulio Sotelo, PharmD, BCACP

## 2019-12-04 ENCOUNTER — ANTICOAGULATION MONITORING (OUTPATIENT)
Dept: VASCULAR LAB | Facility: MEDICAL CENTER | Age: 64
End: 2019-12-04

## 2019-12-04 DIAGNOSIS — Z95.2 H/O MECHANICAL AORTIC VALVE REPLACEMENT: ICD-10-CM

## 2019-12-04 LAB — INR PPP: 3.2 (ref 2–3.5)

## 2019-12-04 NOTE — PROGRESS NOTES
Anticoagulation Summary  As of 12/4/2019    INR goal:   2.0-3.0   TTR:   51.6 % (1.8 y)   INR used for dosing:   3.20! (12/4/2019)   Warfarin maintenance plan:   2.5 mg (5 mg x 0.5) every Wed; 5 mg (5 mg x 1) all other days   Weekly warfarin total:   32.5 mg   Plan last modified:   Mu Mann, PharmD (12/4/2019)   Next INR check:   12/18/2019   Target end date:   Indefinite    Indications    H/O mechanical aortic valve replacement [Z95.2]             Anticoagulation Episode Summary     INR check location:   Home Draw    Preferred lab:       Send INR reminders to:       Comments:   Denny CELESTIN      Anticoagulation Care Providers     Provider Role Specialty Phone number    Chinyere Marcus M.D. Referring Cardiology [phone]    Yossi Khalil, PharmD Responsible          Anticoagulation Patient Findings          HPI:  Marline Perry, on anticoagulation therapy with warfarin for Mechanical valve.   Changes to current medical/health status since last appt: none.  Denies signs/symptoms of bleeding and/or thrombosis since the last appt.    Denies any interval changes to diet  Denies any interval changes to medications since last appt.   Denies any complications or cost restrictions with current therapy.     A/P   INR  SUPRA -therapeutic.   Begin reduced regimen.     Next INR in 2 week(s).    Mu Mann, PharmD

## 2019-12-23 ENCOUNTER — ANTICOAGULATION MONITORING (OUTPATIENT)
Dept: VASCULAR LAB | Facility: MEDICAL CENTER | Age: 64
End: 2019-12-23

## 2019-12-23 DIAGNOSIS — Z95.2 H/O MECHANICAL AORTIC VALVE REPLACEMENT: ICD-10-CM

## 2019-12-23 LAB — INR PPP: 3.3 (ref 2–3.5)

## 2019-12-23 NOTE — PROGRESS NOTES
Anticoagulation Summary  As of 12/23/2019    INR goal:   2.0-3.0   TTR:   50.2 % (1.9 y)   INR used for dosing:   3.30! (12/23/2019)   Warfarin maintenance plan:   2.5 mg (5 mg x 0.5) every Wed; 5 mg (5 mg x 1) all other days   Weekly warfarin total:   32.5 mg   Plan last modified:   Mu Mann PharmD (12/4/2019)   Next INR check:   1/6/2020   Target end date:   Indefinite    Indications    H/O mechanical aortic valve replacement [Z95.2]             Anticoagulation Episode Summary     INR check location:   Home Draw    Preferred lab:       Send INR reminders to:       Comments:   Denny       Anticoagulation Care Providers     Provider Role Specialty Phone number    Chinyere Marcsu M.D. Referring Cardiology [phone]    Yossi Khalil, PharmD Responsible          Anticoagulation Patient Findings        Spoke to patient on the phone.   INR  -therapeutic.   Denies signs/symptoms of bleeding and/or thrombosis.   Denies changes to diet or medications.   Follow up appointment in 2 week(s).    2.5mg today then continue weekly warfarin dose as noted      Brian Ribera, PharmD, MS, BCACP, LCC    This note was created using voice recognition software (Dragon). The accuracy of the dictation is limited by the abilities of the software. I have reviewed the note prior to signing, however some errors in grammar and context are still possible. If you have any questions related to this note please do not hesitate to contact our office.

## 2019-12-31 ENCOUNTER — TELEPHONE (OUTPATIENT)
Dept: RHEUMATOLOGY | Facility: MEDICAL CENTER | Age: 64
End: 2019-12-31

## 2019-12-31 NOTE — TELEPHONE ENCOUNTER
1. Caller Name: Marline Perry                                         Call Back Number:       Patient approves a detailed voicemail message: N\A    Patient left message at [facility]. She states that she was referred by Dr. Ha for aquatic physical therapy and they do not accept her insurance. She would like to request a referral for [facility], as they will accept her insurance and offer aquatic PT.     Routed to [facility].

## 2019-12-31 NOTE — TELEPHONE ENCOUNTER
New Order for aquatic PT placed with specification to [facility]

## 2020-01-12 DIAGNOSIS — E78.5 HYPERLIPIDEMIA, UNSPECIFIED HYPERLIPIDEMIA TYPE: ICD-10-CM

## 2020-01-13 ENCOUNTER — ANTICOAGULATION MONITORING (OUTPATIENT)
Dept: MEDICAL GROUP | Facility: PHYSICIAN GROUP | Age: 65
End: 2020-01-13

## 2020-01-13 DIAGNOSIS — Z95.2 H/O MECHANICAL AORTIC VALVE REPLACEMENT: ICD-10-CM

## 2020-01-13 LAB — INR PPP: 3.4 (ref 2–3.5)

## 2020-01-14 NOTE — PROGRESS NOTES
Anticoagulation Summary  As of 1/13/2020    INR goal:   2.0-3.0   TTR:   48.7 % (1.9 y)   INR used for dosing:   3.40! (1/13/2020)   Warfarin maintenance plan:   2.5 mg (5 mg x 0.5) every Wed; 5 mg (5 mg x 1) all other days   Weekly warfarin total:   32.5 mg   Plan last modified:   Mu Mann, PharmD (12/4/2019)   Next INR check:   1/27/2020   Target end date:   Indefinite    Indications    H/O mechanical aortic valve replacement [Z95.2]             Anticoagulation Episode Summary     INR check location:   Home Draw    Preferred lab:       Send INR reminders to:       Comments:   Denny       Anticoagulation Care Providers     Provider Role Specialty Phone number    Chinyere Marcus M.D. Referring Cardiology [phone]    Yossi Khalil, PharmD Responsible          Anticoagulation Patient Findings  Patient Findings     Positives:   Change in alcohol use (increased intake in EtOH), Change in diet/appetite (decreased intake in vit K)          Spoke with pt.  INR is supra therapeutic.   Pt denies any unusual s/s of bleeding, bruising, clotting or any changes to medications. Denies any cranberries, supplements, or illness.   Pt verifies warfarin weekly dosing.     Will have pt reduce dose x 1 day, then continue regimen. Pt will return to normal intake of vit K instead of reducing dosing maintenance regimen.    Repeat INR in 2 week(s).     Diann Gonzalez, PharmD

## 2020-01-15 ENCOUNTER — OFFICE VISIT (OUTPATIENT)
Dept: MEDICAL GROUP | Facility: PHYSICIAN GROUP | Age: 65
End: 2020-01-15
Payer: COMMERCIAL

## 2020-01-15 ENCOUNTER — APPOINTMENT (OUTPATIENT)
Dept: RADIOLOGY | Facility: IMAGING CENTER | Age: 65
End: 2020-01-15
Attending: FAMILY MEDICINE
Payer: COMMERCIAL

## 2020-01-15 ENCOUNTER — HOSPITAL ENCOUNTER (OUTPATIENT)
Dept: LAB | Facility: MEDICAL CENTER | Age: 65
End: 2020-01-15
Attending: INTERNAL MEDICINE
Payer: COMMERCIAL

## 2020-01-15 ENCOUNTER — APPOINTMENT (OUTPATIENT)
Dept: RADIOLOGY | Facility: IMAGING CENTER | Age: 65
End: 2020-01-15
Attending: INTERNAL MEDICINE
Payer: COMMERCIAL

## 2020-01-15 VITALS
WEIGHT: 128.3 LBS | OXYGEN SATURATION: 93 % | HEART RATE: 96 BPM | HEIGHT: 61 IN | SYSTOLIC BLOOD PRESSURE: 110 MMHG | RESPIRATION RATE: 16 BRPM | BODY MASS INDEX: 24.22 KG/M2 | TEMPERATURE: 97 F | DIASTOLIC BLOOD PRESSURE: 80 MMHG

## 2020-01-15 DIAGNOSIS — G89.29 CHRONIC PAIN OF RIGHT KNEE: ICD-10-CM

## 2020-01-15 DIAGNOSIS — G89.29 CHRONIC SI JOINT PAIN: ICD-10-CM

## 2020-01-15 DIAGNOSIS — M25.561 CHRONIC PAIN OF RIGHT KNEE: ICD-10-CM

## 2020-01-15 DIAGNOSIS — M35.00 SJOGREN'S SYNDROME WITHOUT EXTRAGLANDULAR INVOLVEMENT (HCC): ICD-10-CM

## 2020-01-15 DIAGNOSIS — Z12.31 ENCOUNTER FOR SCREENING MAMMOGRAM FOR BREAST CANCER: ICD-10-CM

## 2020-01-15 DIAGNOSIS — G89.29 CHRONIC SACROILIAC JOINT PAIN: ICD-10-CM

## 2020-01-15 DIAGNOSIS — R53.83 FATIGUE, UNSPECIFIED TYPE: ICD-10-CM

## 2020-01-15 DIAGNOSIS — M53.3 CHRONIC SI JOINT PAIN: ICD-10-CM

## 2020-01-15 DIAGNOSIS — M53.3 CHRONIC SACROILIAC JOINT PAIN: ICD-10-CM

## 2020-01-15 LAB
25(OH)D3 SERPL-MCNC: 76 NG/ML (ref 30–100)
ALBUMIN SERPL BCP-MCNC: 4.4 G/DL (ref 3.2–4.9)
ALBUMIN/GLOB SERPL: 1.3 G/DL
ALP SERPL-CCNC: 77 U/L (ref 30–99)
ALT SERPL-CCNC: 54 U/L (ref 2–50)
ANION GAP SERPL CALC-SCNC: 10 MMOL/L (ref 0–11.9)
AST SERPL-CCNC: 43 U/L (ref 12–45)
BASOPHILS # BLD AUTO: 0.9 % (ref 0–1.8)
BASOPHILS # BLD: 0.07 K/UL (ref 0–0.12)
BILIRUB SERPL-MCNC: 0.7 MG/DL (ref 0.1–1.5)
BUN SERPL-MCNC: 15 MG/DL (ref 8–22)
C3 SERPL-MCNC: 168 MG/DL (ref 87–200)
C4 SERPL-MCNC: 63 MG/DL (ref 19–52)
CALCIUM SERPL-MCNC: 9.6 MG/DL (ref 8.5–10.5)
CHLORIDE SERPL-SCNC: 106 MMOL/L (ref 96–112)
CO2 SERPL-SCNC: 22 MMOL/L (ref 20–33)
CREAT SERPL-MCNC: 0.91 MG/DL (ref 0.5–1.4)
CRP SERPL HS-MCNC: 0.22 MG/DL (ref 0–0.75)
EOSINOPHIL # BLD AUTO: 0.16 K/UL (ref 0–0.51)
EOSINOPHIL NFR BLD: 2 % (ref 0–6.9)
ERYTHROCYTE [DISTWIDTH] IN BLOOD BY AUTOMATED COUNT: 43.4 FL (ref 35.9–50)
ERYTHROCYTE [SEDIMENTATION RATE] IN BLOOD BY WESTERGREN METHOD: 20 MM/HOUR (ref 0–30)
FERRITIN SERPL-MCNC: 114.4 NG/ML (ref 10–291)
GLOBULIN SER CALC-MCNC: 3.3 G/DL (ref 1.9–3.5)
GLUCOSE SERPL-MCNC: 77 MG/DL (ref 65–99)
HCT VFR BLD AUTO: 43.4 % (ref 37–47)
HGB BLD-MCNC: 14.6 G/DL (ref 12–16)
IMM GRANULOCYTES # BLD AUTO: 0.09 K/UL (ref 0–0.11)
IMM GRANULOCYTES NFR BLD AUTO: 1.1 % (ref 0–0.9)
IRON SATN MFR SERPL: 28 % (ref 15–55)
IRON SERPL-MCNC: 100 UG/DL (ref 40–170)
LYMPHOCYTES # BLD AUTO: 1.67 K/UL (ref 1–4.8)
LYMPHOCYTES NFR BLD: 21.1 % (ref 22–41)
MCH RBC QN AUTO: 31 PG (ref 27–33)
MCHC RBC AUTO-ENTMCNC: 33.6 G/DL (ref 33.6–35)
MCV RBC AUTO: 92.1 FL (ref 81.4–97.8)
MONOCYTES # BLD AUTO: 0.65 K/UL (ref 0–0.85)
MONOCYTES NFR BLD AUTO: 8.2 % (ref 0–13.4)
NEUTROPHILS # BLD AUTO: 5.26 K/UL (ref 2–7.15)
NEUTROPHILS NFR BLD: 66.7 % (ref 44–72)
NRBC # BLD AUTO: 0 K/UL
NRBC BLD-RTO: 0 /100 WBC
PLATELET # BLD AUTO: 250 K/UL (ref 164–446)
PMV BLD AUTO: 9.7 FL (ref 9–12.9)
POTASSIUM SERPL-SCNC: 3.9 MMOL/L (ref 3.6–5.5)
PROT SERPL-MCNC: 7.7 G/DL (ref 6–8.2)
RBC # BLD AUTO: 4.71 M/UL (ref 4.2–5.4)
SODIUM SERPL-SCNC: 138 MMOL/L (ref 135–145)
T4 FREE SERPL-MCNC: 3.23 NG/DL (ref 0.53–1.43)
TIBC SERPL-MCNC: 354 UG/DL (ref 250–450)
TSH SERPL DL<=0.005 MIU/L-ACNC: 1.49 UIU/ML (ref 0.38–5.33)
VIT B12 SERPL-MCNC: 273 PG/ML (ref 211–911)
WBC # BLD AUTO: 7.9 K/UL (ref 4.8–10.8)

## 2020-01-15 PROCEDURE — 82955 ASSAY OF G6PD ENZYME: CPT

## 2020-01-15 PROCEDURE — 82306 VITAMIN D 25 HYDROXY: CPT

## 2020-01-15 PROCEDURE — 86812 HLA TYPING A B OR C: CPT

## 2020-01-15 PROCEDURE — 80053 COMPREHEN METABOLIC PANEL: CPT

## 2020-01-15 PROCEDURE — 85025 COMPLETE CBC W/AUTO DIFF WBC: CPT

## 2020-01-15 PROCEDURE — 84439 ASSAY OF FREE THYROXINE: CPT

## 2020-01-15 PROCEDURE — 84165 PROTEIN E-PHORESIS SERUM: CPT

## 2020-01-15 PROCEDURE — 86140 C-REACTIVE PROTEIN: CPT

## 2020-01-15 PROCEDURE — 85652 RBC SED RATE AUTOMATED: CPT

## 2020-01-15 PROCEDURE — 82607 VITAMIN B-12: CPT

## 2020-01-15 PROCEDURE — 84443 ASSAY THYROID STIM HORMONE: CPT

## 2020-01-15 PROCEDURE — 82784 ASSAY IGA/IGD/IGG/IGM EACH: CPT

## 2020-01-15 PROCEDURE — 99213 OFFICE O/P EST LOW 20 MIN: CPT | Performed by: FAMILY MEDICINE

## 2020-01-15 PROCEDURE — 82728 ASSAY OF FERRITIN: CPT

## 2020-01-15 PROCEDURE — 72202 X-RAY EXAM SI JOINTS 3/> VWS: CPT | Mod: TC | Performed by: PHYSICIAN ASSISTANT

## 2020-01-15 PROCEDURE — 84155 ASSAY OF PROTEIN SERUM: CPT

## 2020-01-15 PROCEDURE — 86160 COMPLEMENT ANTIGEN: CPT | Mod: 91

## 2020-01-15 PROCEDURE — 83550 IRON BINDING TEST: CPT

## 2020-01-15 PROCEDURE — 36415 COLL VENOUS BLD VENIPUNCTURE: CPT

## 2020-01-15 PROCEDURE — 73562 X-RAY EXAM OF KNEE 3: CPT | Mod: TC,RT | Performed by: FAMILY MEDICINE

## 2020-01-15 PROCEDURE — 83540 ASSAY OF IRON: CPT

## 2020-01-15 RX ORDER — CYCLOSPORINE 0.5 MG/ML
1 EMULSION OPHTHALMIC 2 TIMES DAILY
COMMUNITY

## 2020-01-15 ASSESSMENT — PATIENT HEALTH QUESTIONNAIRE - PHQ9: CLINICAL INTERPRETATION OF PHQ2 SCORE: 0

## 2020-01-15 NOTE — PATIENT INSTRUCTIONS
For constipation: I suggest a 1-2 time daily fiber supplement, such as metamucil.     When this doesn't do the trick, I suggest miralax.

## 2020-01-15 NOTE — PROGRESS NOTES
cc: Right knee pain      Subjective:     Marline Perry is a 64 y.o. female presenting for the following:     Patient does have some chronic bilateral knee pain. But the right knee has been slowly worsening over the last 6 months.  She now does have a limp on some days.  This is worse by the end of the day.  It is sometimes limiting her activity.  She does not have any weakness or changes in sensation but she does not trust the knee as much as she used to.  No falls or trauma.    The pain is usually along the medial knee and radiates deep into the knee.  No swelling or warmth.  She otherwise feels well with no fever/chills, rash.    Review of systems:  All others reviewed and are negative.       Current Outpatient Medications:   •  cyclosporin (RESTASIS) 0.05 % ophthalmic emulsion, Place 1 Drop in both eyes 2 times a day., Disp: , Rfl:   •  gabapentin (NEURONTIN) 100 MG Cap, 100mg at night for 1 week, then 200mg at ngiht for 1 week, then 300mg at night, Disp: 90 Cap, Rfl: 3  •  warfarin (COUMADIN) 5 MG Tab, Take 1 Tab by mouth every day., Disp: 90 Tab, Rfl: 1  •  SPIRIVA RESPIMAT 2.5 MCG/ACT Aero Soln, 2 INHALATIONS BY MOUTH EVERY DAY . ASSEMBLE AND PRIME, Disp: 1 Inhaler, Rfl: 5  •  sertraline (ZOLOFT) 25 MG tablet, Take 1 Tab by mouth every day., Disp: 90 Tab, Rfl: 1  •  amitriptyline (ELAVIL) 10 MG Tab, Take 1 Tab by mouth every evening., Disp: 90 Tab, Rfl: 1  •  Fluticasone Furoate-Vilanterol (BREO) 100-25 MCG/INH AEROSOL POWDER, BREATH ACTIVATED, Inhale 1 Puff by mouth every day., Disp: 3 Each, Rfl: 3  •  rosuvastatin (CRESTOR) 20 MG Tab, Take 1 Tab by mouth every evening., Disp: 90 Tab, Rfl: 3  •  SUMAtriptan (IMITREX) 50 MG Tab, , Disp: , Rfl:   •  albuterol (PROVENTIL) 2.5mg/3ml Nebu Soln solution for nebulization, 3 mL by Nebulization route every four hours as needed for Shortness of Breath., Disp: 360 mL, Rfl: 3  •  ascorbic acid (ASCORBIC ACID) 500 MG Tab, Take 500 mg by mouth every day., Disp: , Rfl:  "  •  Biotin 48667 MCG Tab, Take  by mouth., Disp: , Rfl:   •  Flaxseed, Linseed, (FLAX SEED OIL) 1000 MG Cap, Take  by mouth 2 Times a Day., Disp: , Rfl:   •  Cholecalciferol (VITAMIN D3) 2000 units Tab, Take  by mouth., Disp: , Rfl:     Allergies, past medical history, past surgical history, family history, social history reviewed and updated    Objective:     Vitals: /80 (BP Location: Right arm, Patient Position: Sitting, BP Cuff Size: Adult)   Pulse 96   Temp 36.1 °C (97 °F) (Temporal)   Resp 16   Ht 1.549 m (5' 1\")   Wt 58.2 kg (128 lb 4.8 oz)   SpO2 93%   BMI 24.24 kg/m²   General: Alert, pleasant, NAD  Heart: Regular rate   Respiratory: Normal respiratory effort.    Skin: Warm, dry, no rashes in exposed areas.  Musculoskeletal: Right knee without swelling or deformity.  No effusion.  Full range of motion.  No laxity.  Patient able to bear weight on the knee.  Slight limp.    Assessment/Plan:     Marline was seen today for knee pain.    Diagnoses and all orders for this visit:    Chronic pain of right knee: Likely an arthritis flare.  Very low suspicion of septic joint or ligament tear.  Will obtain x-ray.  Patient is about to start aqua physical therapy next week.  Patient to return to clinic if pain is not improving with this physical therapy in 4-6 weeks or sooner for any worsening.  -     DX-KNEE 3 VIEWS RIGHT; Future    Encounter for screening mammogram for breast cancer:   -     MA-SCREENING MAMMO BILAT W/TOMOSYNTHESIS W/CAD; Future    Patient also mentions that she suffers from some occasional constipation.  This is not severe but it is bothersome.  Suggest daily fiber supplementation and MiraLAX as needed.    Return if symptoms worsen or fail to improve.  "

## 2020-01-16 RX ORDER — ROSUVASTATIN CALCIUM 20 MG/1
TABLET, COATED ORAL
Qty: 90 TAB | Refills: 0 | Status: SHIPPED | OUTPATIENT
Start: 2020-01-16 | End: 2020-04-15

## 2020-01-17 LAB
HLA-B27 QL FC: NEGATIVE
IGA SERPL-MCNC: 490 MG/DL (ref 68–408)
IGG SERPL-MCNC: 1150 MG/DL (ref 768–1632)
IGM SERPL-MCNC: 148 MG/DL (ref 35–263)

## 2020-01-19 LAB
ALBUMIN SERPL ELPH-MCNC: 4.24 G/DL (ref 3.75–5.01)
ALPHA1 GLOB SERPL ELPH-MCNC: 0.35 G/DL (ref 0.19–0.46)
ALPHA2 GLOB SERPL ELPH-MCNC: 0.57 G/DL (ref 0.48–1.05)
B-GLOBULIN SERPL ELPH-MCNC: 1.23 G/DL (ref 0.48–1.1)
G6PD RBC-CCNC: 13.4 U/G HB (ref 9.9–16.6)
GAMMA GLOB SERPL ELPH-MCNC: 1.21 G/DL (ref 0.62–1.51)
INTERPRETATION SERPL IFE-IMP: ABNORMAL
MONOCLON BAND OBS SERPL: ABNORMAL
PATHOLOGY STUDY: ABNORMAL
PROT SERPL-MCNC: 7.6 G/DL (ref 6.3–8.2)

## 2020-01-27 ENCOUNTER — ANTICOAGULATION MONITORING (OUTPATIENT)
Dept: VASCULAR LAB | Facility: MEDICAL CENTER | Age: 65
End: 2020-01-27

## 2020-01-27 DIAGNOSIS — Z95.2 H/O MECHANICAL AORTIC VALVE REPLACEMENT: ICD-10-CM

## 2020-01-27 LAB — INR PPP: 2.3 (ref 2–3.5)

## 2020-01-27 NOTE — PROGRESS NOTES
Anticoagulation Summary  As of 2020    INR goal:   2.0-3.0   TTR:   49.0 % (2 y)   INR used for dosin.30 (2020)   Warfarin maintenance plan:   2.5 mg (5 mg x 0.5) every Wed; 5 mg (5 mg x 1) all other days   Weekly warfarin total:   32.5 mg   No change documented:   Anay Patel   Plan last modified:   Mu Mann, PharmD (2019)   Next INR check:   2/10/2020   Target end date:   Indefinite    Indications    H/O mechanical aortic valve replacement [Z95.2]             Anticoagulation Episode Summary     INR check location:   Home Draw    Preferred lab:       Send INR reminders to:       Comments:   Denny       Anticoagulation Care Providers     Provider Role Specialty Phone number    Chinyere Marcus M.D. Referring Cardiology [phone]    Yossi Khalil, PharmD Responsible          Anticoagulation Patient Findings  Patient Findings     Negatives:   Signs/symptoms of thrombosis, Signs/symptoms of bleeding, Laboratory test error suspected, Change in health, Change in alcohol use, Change in activity, Upcoming invasive procedure, Emergency department visit, Upcoming dental procedure, Missed doses, Extra doses, Change in medications, Change in diet/appetite, Hospital admission, Bruising, Other complaints        Spoke with the patient on the phone today, reporting a therapeutic INR of 2.3. Confirmed the current warfarin dosing regimen and patient compliance. Patient denies any interval changes to diet and/or medications. Patient denies any signs/symptoms of bleeding or clotting.  Patient instructed to continue with the current warfarin dosing regimen, and asked to follow up again in 2 weeks.     Pollo Patel  PharmD

## 2020-02-05 RX ORDER — AMITRIPTYLINE HYDROCHLORIDE 10 MG/1
TABLET, FILM COATED ORAL
Qty: 90 TAB | Refills: 0 | Status: SHIPPED | OUTPATIENT
Start: 2020-02-05 | End: 2020-05-06

## 2020-02-05 RX ORDER — SERTRALINE HYDROCHLORIDE 25 MG/1
TABLET, FILM COATED ORAL
Qty: 90 TAB | Refills: 0 | Status: SHIPPED | OUTPATIENT
Start: 2020-02-05 | End: 2020-05-06

## 2020-02-05 NOTE — TELEPHONE ENCOUNTER
Was the patient seen in the last year in this department? Yes    Does patient have an active prescription for medications requested? No     Received Request Via: Pharmacy      Pt met protocol?: Yes   Pt last ov 1/2020    Lab Results   Component Value Date/Time    SODIUM 138 01/15/2020 11:54 AM    POTASSIUM 3.9 01/15/2020 11:54 AM    CHLORIDE 106 01/15/2020 11:54 AM    CO2 22 01/15/2020 11:54 AM    GLUCOSE 77 01/15/2020 11:54 AM    BUN 15 01/15/2020 11:54 AM    CREATININE 0.91 01/15/2020 11:54 AM

## 2020-02-07 ENCOUNTER — HOSPITAL ENCOUNTER (OUTPATIENT)
Dept: LAB | Facility: MEDICAL CENTER | Age: 65
End: 2020-02-07
Attending: FAMILY MEDICINE
Payer: COMMERCIAL

## 2020-02-07 DIAGNOSIS — E78.00 HYPERCHOLESTEREMIA: ICD-10-CM

## 2020-02-07 DIAGNOSIS — Z79.899 ON STATIN THERAPY: ICD-10-CM

## 2020-02-07 DIAGNOSIS — E05.90 HYPERTHYROIDISM: ICD-10-CM

## 2020-02-07 LAB
ALBUMIN SERPL BCP-MCNC: 4.4 G/DL (ref 3.2–4.9)
ALBUMIN/GLOB SERPL: 1.4 G/DL
ALP SERPL-CCNC: 80 U/L (ref 30–99)
ALT SERPL-CCNC: 62 U/L (ref 2–50)
ANION GAP SERPL CALC-SCNC: 5 MMOL/L (ref 0–11.9)
AST SERPL-CCNC: 49 U/L (ref 12–45)
BILIRUB SERPL-MCNC: 0.5 MG/DL (ref 0.1–1.5)
BUN SERPL-MCNC: 17 MG/DL (ref 8–22)
CALCIUM SERPL-MCNC: 9.8 MG/DL (ref 8.5–10.5)
CHLORIDE SERPL-SCNC: 104 MMOL/L (ref 96–112)
CHOLEST SERPL-MCNC: 151 MG/DL (ref 100–199)
CO2 SERPL-SCNC: 29 MMOL/L (ref 20–33)
CREAT SERPL-MCNC: 1 MG/DL (ref 0.5–1.4)
GLOBULIN SER CALC-MCNC: 3.1 G/DL (ref 1.9–3.5)
GLUCOSE SERPL-MCNC: 89 MG/DL (ref 65–99)
HDLC SERPL-MCNC: 50 MG/DL
LDLC SERPL CALC-MCNC: 86 MG/DL
POTASSIUM SERPL-SCNC: 4.5 MMOL/L (ref 3.6–5.5)
PROT SERPL-MCNC: 7.5 G/DL (ref 6–8.2)
SODIUM SERPL-SCNC: 138 MMOL/L (ref 135–145)
T3FREE SERPL-MCNC: 5.19 PG/ML (ref 2.4–4.2)
T4 FREE SERPL-MCNC: 1.11 NG/DL (ref 0.53–1.43)
TRIGL SERPL-MCNC: 74 MG/DL (ref 0–149)
TSH SERPL DL<=0.005 MIU/L-ACNC: 1.87 UIU/ML (ref 0.38–5.33)

## 2020-02-07 PROCEDURE — 36415 COLL VENOUS BLD VENIPUNCTURE: CPT

## 2020-02-07 PROCEDURE — 84481 FREE ASSAY (FT-3): CPT

## 2020-02-07 PROCEDURE — 80061 LIPID PANEL: CPT

## 2020-02-07 PROCEDURE — 84439 ASSAY OF FREE THYROXINE: CPT

## 2020-02-07 PROCEDURE — 84443 ASSAY THYROID STIM HORMONE: CPT

## 2020-02-07 PROCEDURE — 80053 COMPREHEN METABOLIC PANEL: CPT

## 2020-02-13 ENCOUNTER — ANTICOAGULATION MONITORING (OUTPATIENT)
Dept: VASCULAR LAB | Facility: MEDICAL CENTER | Age: 65
End: 2020-02-13

## 2020-02-13 DIAGNOSIS — Z95.2 H/O MECHANICAL AORTIC VALVE REPLACEMENT: ICD-10-CM

## 2020-02-13 LAB — INR PPP: 3.1 (ref 2–3.5)

## 2020-02-13 NOTE — PROGRESS NOTES
Anticoagulation Summary  As of 2/13/2020    INR goal:   2.0-3.0   TTR:   49.9 % (2 y)   INR used for dosing:   3.10! (2/13/2020)   Warfarin maintenance plan:   2.5 mg (5 mg x 0.5) every Wed; 5 mg (5 mg x 1) all other days   Weekly warfarin total:   32.5 mg   Plan last modified:   Mu Mann, PharmD (12/4/2019)   Next INR check:   2/27/2020   Target end date:   Indefinite    Indications    H/O mechanical aortic valve replacement [Z95.2]             Anticoagulation Episode Summary     INR check location:   Home Draw    Preferred lab:       Send INR reminders to:       Comments:   Denny CELESTIN      Anticoagulation Care Providers     Provider Role Specialty Phone number    Chinyere Marcus M.D. Referring Cardiology [phone]    Yossi Khalil, PharmD Responsible          Anticoagulation Patient Findings          Left voicemail message to report a SUPRA- therapeutic INR of 3.1.  Pt to continue with current warfarin dosing regimen as INR is on ly 0.1 out of range. . Requested pt contact the clinic for any s/s of unusual bleeding, bruising, clotting or any changes to diet or medication. FU 2 weeks.    Mu Mann, PharmD

## 2020-02-24 ENCOUNTER — OFFICE VISIT (OUTPATIENT)
Dept: MEDICAL GROUP | Facility: PHYSICIAN GROUP | Age: 65
End: 2020-02-24
Payer: COMMERCIAL

## 2020-02-24 VITALS
HEIGHT: 61 IN | OXYGEN SATURATION: 96 % | DIASTOLIC BLOOD PRESSURE: 82 MMHG | HEART RATE: 91 BPM | BODY MASS INDEX: 24.07 KG/M2 | WEIGHT: 127.5 LBS | TEMPERATURE: 97.5 F | SYSTOLIC BLOOD PRESSURE: 126 MMHG

## 2020-02-24 DIAGNOSIS — R94.4 DECREASED GFR: ICD-10-CM

## 2020-02-24 DIAGNOSIS — R94.6 ABNORMAL THYROID FUNCTION TEST: ICD-10-CM

## 2020-02-24 DIAGNOSIS — Z79.899 ON STATIN THERAPY: ICD-10-CM

## 2020-02-24 DIAGNOSIS — R74.01 TRANSAMINITIS: ICD-10-CM

## 2020-02-24 PROCEDURE — 99214 OFFICE O/P EST MOD 30 MIN: CPT | Performed by: FAMILY MEDICINE

## 2020-02-24 NOTE — PROGRESS NOTES
cc: lab abnormalities      Subjective:     Marline Perry is a 64 y.o. female presenting for the following:     Transaminitis: Patient found to have a mild transaminitis that was not present in the past.  No known history of hepatitis or hemochromatosis.  Patient feels well with no medication changes.  Denies any regular alcohol use.  No abdominal pain, appetite is normal, no weight changes.    Patient with slightly decreased GFR to 56.  This was not present in the past.  She generally does not take any ibuprofen, as she is on Coumadin.  No changes in urination, swelling, flank pain, dysuria.    Patient with a slightly elevated T4 and then on repeat with an elevated T3.  Both of these with a normal TSH. Denies feeling hot/cold, constipation/diarrhea, palpitations, racing heart, weight gain/loss, or thinning hair.    Review of systems:  All others reviewed and are negative.       Current Outpatient Medications:   •  amitriptyline (ELAVIL) 10 MG Tab, TAKE 1 TABLET EVERY EVENING, Disp: 90 Tab, Rfl: 0  •  sertraline (ZOLOFT) 25 MG tablet, TAKE 1 TABLET DAILY, Disp: 90 Tab, Rfl: 0  •  rosuvastatin (CRESTOR) 20 MG Tab, TAKE 1 TABLET EVERY EVENING, Disp: 90 Tab, Rfl: 0  •  cyclosporin (RESTASIS) 0.05 % ophthalmic emulsion, Place 1 Drop in both eyes 2 times a day., Disp: , Rfl:   •  gabapentin (NEURONTIN) 100 MG Cap, 100mg at night for 1 week, then 200mg at ngiht for 1 week, then 300mg at night, Disp: 90 Cap, Rfl: 3  •  warfarin (COUMADIN) 5 MG Tab, Take 1 Tab by mouth every day., Disp: 90 Tab, Rfl: 1  •  SPIRIVA RESPIMAT 2.5 MCG/ACT Aero Soln, 2 INHALATIONS BY MOUTH EVERY DAY . ASSEMBLE AND PRIME, Disp: 1 Inhaler, Rfl: 5  •  Fluticasone Furoate-Vilanterol (BREO) 100-25 MCG/INH AEROSOL POWDER, BREATH ACTIVATED, Inhale 1 Puff by mouth every day., Disp: 3 Each, Rfl: 3  •  SUMAtriptan (IMITREX) 50 MG Tab, , Disp: , Rfl:   •  albuterol (PROVENTIL) 2.5mg/3ml Nebu Soln solution for nebulization, 3 mL by Nebulization  "route every four hours as needed for Shortness of Breath., Disp: 360 mL, Rfl: 3  •  ascorbic acid (ASCORBIC ACID) 500 MG Tab, Take 500 mg by mouth every day., Disp: , Rfl:   •  Biotin 25598 MCG Tab, Take  by mouth., Disp: , Rfl:   •  Flaxseed, Linseed, (FLAX SEED OIL) 1000 MG Cap, Take  by mouth 2 Times a Day., Disp: , Rfl:   •  Cholecalciferol (VITAMIN D3) 2000 units Tab, Take  by mouth., Disp: , Rfl:     Allergies, past medical history, past surgical history, family history, social history reviewed and updated    Objective:     Vitals: /82 (BP Location: Left arm, Patient Position: Sitting, BP Cuff Size: Adult)   Pulse 91   Temp 36.4 °C (97.5 °F) (Temporal)   Ht 1.549 m (5' 1\")   Wt 57.8 kg (127 lb 8 oz)   SpO2 96%   BMI 24.09 kg/m²   General: Alert, pleasant, NAD  HEENT: Normocephalic.   EOMI, no icterus or pallor.  Conjunctivae and lids normal. External ears and nose normal. Oropharynx non-erythematous, mucous membranes moist.    Neck supple.  No thyromegaly or masses palpated. No cervical or supraclavicular lymphadenopathy.  Heart: Regular rate and rhythm.  S1 and S2 normal.  No murmurs appreciated.  Respiratory: Normal respiratory effort.  Clear to auscultation bilaterally.  Abdomen: Non-distended, soft, no HSM.   Skin: Warm, dry, no rashes in exposed areas.    Assessment/Plan:     Marline was seen today for follow-up.    Diagnoses and all orders for this visit:    Transaminitis: Mild and with normal liver function.  Patient with normal ferritin in the past so very unlikely hemochromatosis.  No history of IV drug use or new sexual partners but will screen for hepatitis B and HIV.  Hepatitis screen negative in the past.  Patient with previously normal CPK, will recheck this as she is on a statin.  We will also obtain right upper quadrant ultrasound to ensure no signs of liver mass.  -     HEP B SURFACE ANTIGEN; Future  -     HEP B SURFACE AB; Future  -     HEP B CORE AB TOTAL; Future  -     HIV AG/AB " COMBO ASSAY SCREENING; Future  -     Comp Metabolic Panel; Future  -     CREATINE KINASE; Future  -     US-RUQ; Future  On statin therapy  -     Comp Metabolic Panel; Future  -     CREATINE KINASE; Future    Decreased GFR: Mild and no changes in urination.  Will check for stability.  -     Comp Metabolic Panel; Future    Abnormal thyroid function test: Slightly elevated T3 and then T4 with normal TSH.  No nodule or mass on exam.  Will check thyroid uptake scan to ensure no hot nodules.  -     TSH; Future  -     FREE THYROXINE; Future  -     NM-THYROID UPTAKE 5HR SCAN; Future        Return in about 6 months (around 8/24/2020), or if symptoms worsen or fail to improve.

## 2020-02-27 DIAGNOSIS — J47.9 BRONCHIECTASIS WITHOUT COMPLICATION (HCC): ICD-10-CM

## 2020-02-27 RX ORDER — TIOTROPIUM BROMIDE INHALATION SPRAY 3.12 UG/1
SPRAY, METERED RESPIRATORY (INHALATION)
Qty: 1 INHALER | Refills: 5 | Status: SHIPPED | OUTPATIENT
Start: 2020-02-27 | End: 2020-08-31

## 2020-02-27 NOTE — TELEPHONE ENCOUNTER
Have we ever prescribed this med? Yes.  If yes, what date? 09/09/19 Andres APRN    Last OV: 11/04/19 Andres APRN    Next OV: 05/07/20 David MOYA    DX: Bronchiectasis without complication    Medications: SPIRIVA RESPIMAT 2.5 MCG/ACT Aero Soln

## 2020-03-04 ENCOUNTER — ANTICOAGULATION MONITORING (OUTPATIENT)
Dept: VASCULAR LAB | Facility: MEDICAL CENTER | Age: 65
End: 2020-03-04

## 2020-03-04 DIAGNOSIS — Z95.2 H/O MECHANICAL AORTIC VALVE REPLACEMENT: ICD-10-CM

## 2020-03-04 LAB — INR PPP: 3 (ref 2–3.5)

## 2020-03-05 NOTE — PROGRESS NOTES
Anticoagulation Summary  As of 3/4/2020    INR goal:   2.0-3.0   TTR:   48.5 % (2.1 y)   INR used for dosing:   3.00 (3/4/2020)   Warfarin maintenance plan:   2.5 mg (5 mg x 0.5) every Wed; 5 mg (5 mg x 1) all other days   Weekly warfarin total:   32.5 mg   No change documented:   Samaria HERRERA'Ravi, Med Ass't   Plan last modified:   Mu Mann, PharmD (12/4/2019)   Next INR check:   3/18/2020   Target end date:   Indefinite    Indications    H/O mechanical aortic valve replacement [Z95.2]             Anticoagulation Episode Summary     INR check location:   Home Draw    Preferred lab:       Send INR reminders to:       Comments:   Denny       Anticoagulation Care Providers     Provider Role Specialty Phone number    Chinyere Marcus M.D. Referring Cardiology [phone]    Yossi Khalil, PharmD Responsible          Anticoagulation Patient Findings  Patient Findings     Negatives:   Signs/symptoms of thrombosis, Signs/symptoms of bleeding, Laboratory test error suspected, Change in health, Change in alcohol use, Change in activity, Upcoming invasive procedure, Emergency department visit, Upcoming dental procedure, Missed doses, Extra doses, Change in medications, Change in diet/appetite, Hospital admission, Bruising, Other complaints        Spoke with patient to report a therapeutic INR.  Pt instructed to continue with current warfarin dosing regimen. Pt denies any s/s of bleeding, bruising, clotting or any changes to diet or medication.  Will follow up in 2 weeks.    Samaria Montelongo, Med Ass't    I have reviewed and concur with the above plan     Brian Ribera, SantoD

## 2020-03-11 ENCOUNTER — OFFICE VISIT (OUTPATIENT)
Dept: CARDIOLOGY | Facility: MEDICAL CENTER | Age: 65
End: 2020-03-11
Payer: COMMERCIAL

## 2020-03-11 VITALS
HEIGHT: 61 IN | WEIGHT: 131.2 LBS | OXYGEN SATURATION: 92 % | DIASTOLIC BLOOD PRESSURE: 60 MMHG | BODY MASS INDEX: 24.77 KG/M2 | SYSTOLIC BLOOD PRESSURE: 114 MMHG | HEART RATE: 93 BPM

## 2020-03-11 DIAGNOSIS — M79.7 FIBROMYALGIA: ICD-10-CM

## 2020-03-11 DIAGNOSIS — R94.4 DECREASED GFR: ICD-10-CM

## 2020-03-11 DIAGNOSIS — M35.9 DIFFUSE CONNECTIVE TISSUE DISEASE (HCC): ICD-10-CM

## 2020-03-11 DIAGNOSIS — Z79.01 WARFARIN ANTICOAGULATION: ICD-10-CM

## 2020-03-11 DIAGNOSIS — I34.0 NON-RHEUMATIC MITRAL REGURGITATION: ICD-10-CM

## 2020-03-11 DIAGNOSIS — E78.00 HYPERCHOLESTEREMIA: ICD-10-CM

## 2020-03-11 DIAGNOSIS — Z95.2 H/O MECHANICAL AORTIC VALVE REPLACEMENT: ICD-10-CM

## 2020-03-11 DIAGNOSIS — I71.20 THORACIC AORTIC ANEURYSM WITHOUT RUPTURE (HCC): ICD-10-CM

## 2020-03-11 PROCEDURE — 99214 OFFICE O/P EST MOD 30 MIN: CPT | Performed by: INTERNAL MEDICINE

## 2020-03-11 ASSESSMENT — ENCOUNTER SYMPTOMS
CARDIOVASCULAR NEGATIVE: 1
BRUISES/BLEEDS EASILY: 0
NEUROLOGICAL NEGATIVE: 1
HEMOPTYSIS: 0
WHEEZING: 0
PND: 0
STRIDOR: 0
LOSS OF CONSCIOUSNESS: 0
RESPIRATORY NEGATIVE: 1
SHORTNESS OF BREATH: 0
CHILLS: 0
SORE THROAT: 0
MUSCULOSKELETAL NEGATIVE: 1
ORTHOPNEA: 0
EYES NEGATIVE: 1
PALPITATIONS: 0
WEAKNESS: 0
GASTROINTESTINAL NEGATIVE: 1
DIZZINESS: 0
COUGH: 0
CLAUDICATION: 0
SPUTUM PRODUCTION: 0
FEVER: 0
CONSTITUTIONAL NEGATIVE: 1

## 2020-03-11 ASSESSMENT — FIBROSIS 4 INDEX: FIB4 SCORE: 1.59

## 2020-03-11 NOTE — PROGRESS NOTES
Chief Complaint   Patient presents with   • Hyperlipidemia       Subjective:   Marline Perry is a 64 y.o. female who presents today as a follow-up from prior provider for history of aortic valve replacement mitral valve regurgitation hypertension hyperlipidemia and chronic Coumadin use.  She does continue to be somewhat short of breath.  She had PFTs done last year which were normal.  Her last echocardiogram last year continue to show mild to moderate MR with a normal function mechanical aortic valve.  Her blood pressure is controlled.  Her INRs are managed by the Coumadin clinic.    Past Medical History:   Diagnosis Date   • Bronchiectasis (Formerly [facility])    • Bronchitis    • Chickenpox    • COPD (chronic obstructive pulmonary disease) (Formerly [facility])    • Hyperlipidemia    • Influenza    • Migraines    • Mumps    • Nasal drainage    • Sjogren's disease (Formerly [facility])      Past Surgical History:   Procedure Laterality Date   • AORTIC VALVE REPLACEMENT     • BRONCHOSCOPY     • HYSTERECTOMY LAPAROSCOPY     • SINUSCOPE       Family History   Problem Relation Age of Onset   • Arthritis Mother    • Hypertension Father    • Kidney Disease Father    • Arthritis Sister    • No Known Problems Brother    • No Known Problems Brother    • No Known Problems Son    • Heart Attack Maternal Grandmother    • Stroke Maternal Grandfather    • Heart Disease Paternal Grandmother    • No Known Problems Paternal Grandfather      Social History     Socioeconomic History   • Marital status:      Spouse name: Not on file   • Number of children: Not on file   • Years of education: Not on file   • Highest education level: Not on file   Occupational History   • Not on file   Social Needs   • Financial resource strain: Not on file   • Food insecurity     Worry: Not on file     Inability: Not on file   • Transportation needs     Medical: Not on file     Non-medical: Not on file   Tobacco Use   • Smoking status: Never Smoker   • Smokeless tobacco: Never Used    Substance and Sexual Activity   • Alcohol use: Yes     Alcohol/week: 2.4 oz     Types: 4 Shots of liquor per week   • Drug use: No   • Sexual activity: Not on file   Lifestyle   • Physical activity     Days per week: Not on file     Minutes per session: Not on file   • Stress: Not on file   Relationships   • Social connections     Talks on phone: Not on file     Gets together: Not on file     Attends Mandaen service: Not on file     Active member of club or organization: Not on file     Attends meetings of clubs or organizations: Not on file     Relationship status: Not on file   • Intimate partner violence     Fear of current or ex partner: Not on file     Emotionally abused: Not on file     Physically abused: Not on file     Forced sexual activity: Not on file   Other Topics Concern   • Not on file   Social History Narrative   • Not on file     Allergies   Allergen Reactions   • Spironolactone Rash     ALL OVER BODY    • Neomycin Rash     CONTACT DERMATITIS    • Tape      Adhesives-RASH      Outpatient Encounter Medications as of 3/11/2020   Medication Sig Dispense Refill   • gabapentin (NEURONTIN) 300 MG Cap Take 1 Cap by mouth every bedtime. 90 Cap 3   • SPIRIVA RESPIMAT 2.5 MCG/ACT Aero Soln INHALE 2 INHALATIONS BY MOUTH EVERY DAY . ASSEMBLE AND PRIME 1 Inhaler 5   • amitriptyline (ELAVIL) 10 MG Tab TAKE 1 TABLET EVERY EVENING 90 Tab 0   • sertraline (ZOLOFT) 25 MG tablet TAKE 1 TABLET DAILY 90 Tab 0   • rosuvastatin (CRESTOR) 20 MG Tab TAKE 1 TABLET EVERY EVENING 90 Tab 0   • cyclosporin (RESTASIS) 0.05 % ophthalmic emulsion Place 1 Drop in both eyes 2 times a day.     • warfarin (COUMADIN) 5 MG Tab Take 1 Tab by mouth every day. 90 Tab 1   • Fluticasone Furoate-Vilanterol (BREO) 100-25 MCG/INH AEROSOL POWDER, BREATH ACTIVATED Inhale 1 Puff by mouth every day. 3 Each 3   • SUMAtriptan (IMITREX) 50 MG Tab      • albuterol (PROVENTIL) 2.5mg/3ml Nebu Soln solution for nebulization 3 mL by Nebulization route  "every four hours as needed for Shortness of Breath. 360 mL 3   • ascorbic acid (ASCORBIC ACID) 500 MG Tab Take 500 mg by mouth every day.     • Biotin 35170 MCG Tab Take  by mouth.     • Flaxseed, Linseed, (FLAX SEED OIL) 1000 MG Cap Take  by mouth 2 Times a Day.     • Cholecalciferol (VITAMIN D3) 2000 units Tab Take  by mouth.       No facility-administered encounter medications on file as of 3/11/2020.      Review of Systems   Constitutional: Negative.  Negative for chills, fever and malaise/fatigue.   HENT: Negative.  Negative for sore throat.    Eyes: Negative.    Respiratory: Negative.  Negative for cough, hemoptysis, sputum production, shortness of breath, wheezing and stridor.    Cardiovascular: Negative.  Negative for chest pain, palpitations, orthopnea, claudication, leg swelling and PND.   Gastrointestinal: Negative.    Genitourinary: Negative.    Musculoskeletal: Negative.    Skin: Negative.    Neurological: Negative.  Negative for dizziness, loss of consciousness and weakness.   Endo/Heme/Allergies: Negative.  Does not bruise/bleed easily.   All other systems reviewed and are negative.       Objective:   /60 (BP Location: Left arm, Patient Position: Sitting, BP Cuff Size: Adult)   Pulse 93   Ht 1.549 m (5' 1\")   Wt 59.5 kg (131 lb 3.2 oz)   SpO2 92%   BMI 24.79 kg/m²     Physical Exam   Constitutional: She appears well-developed and well-nourished. No distress.   HENT:   Head: Normocephalic and atraumatic.   Right Ear: External ear normal.   Left Ear: External ear normal.   Nose: Nose normal.   Mouth/Throat: No oropharyngeal exudate.   Eyes: Pupils are equal, round, and reactive to light. Conjunctivae and EOM are normal. Right eye exhibits no discharge. Left eye exhibits no discharge. No scleral icterus.   Neck: Neck supple. No JVD present.   Cardiovascular: Normal rate, regular rhythm and intact distal pulses. Exam reveals no gallop and no friction rub.   No murmur heard.  Pulmonary/Chest: " Effort normal. No stridor. No respiratory distress. She has no wheezes. She has no rales. She exhibits no tenderness.   Abdominal: Soft. She exhibits no distension. There is no guarding.   Musculoskeletal: Normal range of motion.         General: No tenderness, deformity or edema.   Neurological: She is alert. She has normal reflexes. She displays normal reflexes. No cranial nerve deficit. She exhibits normal muscle tone. Coordination normal.   Skin: Skin is warm and dry. No rash noted. She is not diaphoretic. No erythema. No pallor.   Psychiatric: She has a normal mood and affect. Her behavior is normal. Judgment and thought content normal.   Nursing note and vitals reviewed.      Assessment:     1. Decreased GFR     2. Diffuse connective tissue disease (HCC)     3. Fibromyalgia     4. H/O mechanical aortic valve replacement  EC-ECHOCARDIOGRAM COMPLETE W/O CONT   5. Hypercholesteremia  EC-ECHOCARDIOGRAM COMPLETE W/O CONT   6. Non-rheumatic mitral regurgitation  EC-ECHOCARDIOGRAM COMPLETE W/O CONT   7. Thoracic aortic aneurysm without rupture (HCC)     8. Warfarin anticoagulation  EC-ECHOCARDIOGRAM COMPLETE W/O CONT       Medical Decision Making:  Today's Assessment / Status / Plan:     64-year-old female with mechanical aortic valve hypercholesteremia MR thoracic aortic aneurysm hypertension.  For her risk factors she is doing well.  We will get an echocardiogram prior to her next visit.  She will call us if she has any changes to her status.    1. Mechanical AVR    - annual echo    2. MR    - clinical follow up    3. HLD     - cont rosuva 20    4. Thoracic Aortic Aneursym    - CT in 2018 was normal

## 2020-03-12 ENCOUNTER — HOSPITAL ENCOUNTER (OUTPATIENT)
Dept: LAB | Facility: MEDICAL CENTER | Age: 65
End: 2020-03-12
Attending: FAMILY MEDICINE
Payer: COMMERCIAL

## 2020-03-12 DIAGNOSIS — Z79.899 ON STATIN THERAPY: ICD-10-CM

## 2020-03-12 DIAGNOSIS — R94.6 ABNORMAL THYROID FUNCTION TEST: ICD-10-CM

## 2020-03-12 DIAGNOSIS — R94.4 DECREASED GFR: ICD-10-CM

## 2020-03-12 DIAGNOSIS — R74.01 TRANSAMINITIS: ICD-10-CM

## 2020-03-12 LAB
ALBUMIN SERPL BCP-MCNC: 4 G/DL (ref 3.2–4.9)
ALBUMIN/GLOB SERPL: 1.1 G/DL
ALP SERPL-CCNC: 77 U/L (ref 30–99)
ALT SERPL-CCNC: 44 U/L (ref 2–50)
ANION GAP SERPL CALC-SCNC: 7 MMOL/L (ref 7–16)
AST SERPL-CCNC: 35 U/L (ref 12–45)
BILIRUB SERPL-MCNC: 0.5 MG/DL (ref 0.1–1.5)
BUN SERPL-MCNC: 22 MG/DL (ref 8–22)
CALCIUM SERPL-MCNC: 9.9 MG/DL (ref 8.5–10.5)
CHLORIDE SERPL-SCNC: 107 MMOL/L (ref 96–112)
CK SERPL-CCNC: 84 U/L (ref 0–154)
CO2 SERPL-SCNC: 25 MMOL/L (ref 20–33)
CREAT SERPL-MCNC: 0.97 MG/DL (ref 0.5–1.4)
GLOBULIN SER CALC-MCNC: 3.5 G/DL (ref 1.9–3.5)
GLUCOSE SERPL-MCNC: 88 MG/DL (ref 65–99)
HBV CORE AB SERPL QL IA: NONREACTIVE
HBV SURFACE AB SERPL IA-ACNC: <3.5 MIU/ML (ref 0–10)
HBV SURFACE AG SER QL: NORMAL
POTASSIUM SERPL-SCNC: 4.3 MMOL/L (ref 3.6–5.5)
PROT SERPL-MCNC: 7.5 G/DL (ref 6–8.2)
SODIUM SERPL-SCNC: 139 MMOL/L (ref 135–145)
T4 FREE SERPL-MCNC: 0.96 NG/DL (ref 0.53–1.43)
TSH SERPL DL<=0.005 MIU/L-ACNC: 1.74 UIU/ML (ref 0.38–5.33)

## 2020-03-12 PROCEDURE — 84443 ASSAY THYROID STIM HORMONE: CPT

## 2020-03-12 PROCEDURE — 86704 HEP B CORE ANTIBODY TOTAL: CPT

## 2020-03-12 PROCEDURE — 36415 COLL VENOUS BLD VENIPUNCTURE: CPT

## 2020-03-12 PROCEDURE — 87389 HIV-1 AG W/HIV-1&-2 AB AG IA: CPT

## 2020-03-12 PROCEDURE — 86706 HEP B SURFACE ANTIBODY: CPT

## 2020-03-12 PROCEDURE — 80053 COMPREHEN METABOLIC PANEL: CPT

## 2020-03-12 PROCEDURE — 82550 ASSAY OF CK (CPK): CPT

## 2020-03-12 PROCEDURE — 84439 ASSAY OF FREE THYROXINE: CPT

## 2020-03-12 PROCEDURE — 87340 HEPATITIS B SURFACE AG IA: CPT

## 2020-03-13 LAB — HIV 1+2 AB+HIV1 P24 AG SERPL QL IA: NORMAL

## 2020-03-27 DIAGNOSIS — Z79.01 CHRONIC ANTICOAGULATION: ICD-10-CM

## 2020-03-29 LAB — INR PPP: 3.2 (ref 2–3.5)

## 2020-03-30 ENCOUNTER — ANTICOAGULATION MONITORING (OUTPATIENT)
Dept: VASCULAR LAB | Facility: MEDICAL CENTER | Age: 65
End: 2020-03-30

## 2020-03-30 DIAGNOSIS — Z95.2 H/O MECHANICAL AORTIC VALVE REPLACEMENT: ICD-10-CM

## 2020-03-30 RX ORDER — WARFARIN SODIUM 5 MG/1
TABLET ORAL
Qty: 90 TAB | Refills: 3 | Status: SHIPPED | OUTPATIENT
Start: 2020-03-30 | End: 2021-03-26

## 2020-04-14 DIAGNOSIS — E78.5 HYPERLIPIDEMIA, UNSPECIFIED HYPERLIPIDEMIA TYPE: ICD-10-CM

## 2020-04-14 DIAGNOSIS — G89.29 OTHER CHRONIC PAIN: ICD-10-CM

## 2020-04-14 DIAGNOSIS — M79.7 FIBROMYALGIA: ICD-10-CM

## 2020-04-14 NOTE — PROGRESS NOTES
Advised pt about the message dr. Iyer displayed and knows about the referral to occupational medicine

## 2020-04-14 NOTE — PROGRESS NOTES
Patient has been on disability in the past and does need a reassessment of this.  Will place referral to occupational medicine for this evaluation.

## 2020-04-15 RX ORDER — ROSUVASTATIN CALCIUM 20 MG/1
TABLET, COATED ORAL
Qty: 90 TAB | Refills: 3 | Status: SHIPPED | OUTPATIENT
Start: 2020-04-15 | End: 2021-04-12

## 2020-04-22 ENCOUNTER — ANTICOAGULATION MONITORING (OUTPATIENT)
Dept: VASCULAR LAB | Facility: MEDICAL CENTER | Age: 65
End: 2020-04-22

## 2020-04-22 DIAGNOSIS — Z95.2 H/O MECHANICAL AORTIC VALVE REPLACEMENT: ICD-10-CM

## 2020-04-22 LAB — INR PPP: 2.6 (ref 2–3.5)

## 2020-04-22 NOTE — PROGRESS NOTES
Anticoagulation Summary  As of 2020    INR goal:   2.0-3.0   TTR:   47.6 % (2.2 y)   INR used for dosin.60 (2020)   Warfarin maintenance plan:   2.5 mg (5 mg x 0.5) every Mon, Wed; 5 mg (5 mg x 1) all other days   Weekly warfarin total:   30 mg   Plan last modified:   Santo GreenD (3/30/2020)   Next INR check:   2020   Target end date:   Indefinite    Indications    H/O mechanical aortic valve replacement [Z95.2]             Anticoagulation Episode Summary     INR check location:   Home Draw    Preferred lab:       Send INR reminders to:       Comments:   Denny       Anticoagulation Care Providers     Provider Role Specialty Phone number    Chinyere Marcus M.D. Referring Cardiology [phone]    Yossi Khalil, PharmD Responsible          Anticoagulation Patient Findings  Patient Findings     Negatives:   Signs/symptoms of thrombosis, Signs/symptoms of bleeding, Laboratory test error suspected, Change in health, Change in alcohol use, Change in activity, Upcoming invasive procedure, Emergency department visit, Upcoming dental procedure, Missed doses, Extra doses, Change in medications, Change in diet/appetite, Hospital admission, Bruising, Other complaints         Spoke with the patient on the phone today, reporting a therapeutic INR 2.6. Confirmed the current warfarin dosing regimen and patient compliance. Patient denies any interval changes to diet and/or medications. Patient denies any signs/symptoms of bleeding or clotting.  Patient instructed to continue with the current warfarin dosing regimen, and asked to follow up again in 2 weeks.    Pollo Patel  PharmD

## 2020-05-05 ENCOUNTER — OFFICE VISIT (OUTPATIENT)
Dept: MEDICAL GROUP | Facility: PHYSICIAN GROUP | Age: 65
End: 2020-05-05
Payer: COMMERCIAL

## 2020-05-05 VITALS
WEIGHT: 128.3 LBS | OXYGEN SATURATION: 96 % | HEART RATE: 86 BPM | DIASTOLIC BLOOD PRESSURE: 76 MMHG | HEIGHT: 61 IN | BODY MASS INDEX: 24.22 KG/M2 | TEMPERATURE: 98.2 F | SYSTOLIC BLOOD PRESSURE: 118 MMHG

## 2020-05-05 DIAGNOSIS — M79.7 FIBROMYALGIA: ICD-10-CM

## 2020-05-05 DIAGNOSIS — J47.9 BRONCHIECTASIS WITHOUT COMPLICATION (HCC): ICD-10-CM

## 2020-05-05 DIAGNOSIS — E53.8 VITAMIN B12 DEFICIENCY: ICD-10-CM

## 2020-05-05 DIAGNOSIS — M35.9 DIFFUSE CONNECTIVE TISSUE DISEASE (HCC): ICD-10-CM

## 2020-05-05 DIAGNOSIS — G89.29 OTHER CHRONIC PAIN: ICD-10-CM

## 2020-05-05 PROCEDURE — 99213 OFFICE O/P EST LOW 20 MIN: CPT | Performed by: FAMILY MEDICINE

## 2020-05-05 RX ORDER — CYANOCOBALAMIN 1000 UG/ML
1000 INJECTION, SOLUTION INTRAMUSCULAR; SUBCUTANEOUS ONCE
Status: COMPLETED | OUTPATIENT
Start: 2020-05-05 | End: 2020-05-05

## 2020-05-05 RX ADMIN — CYANOCOBALAMIN 1000 MCG: 1000 INJECTION, SOLUTION INTRAMUSCULAR; SUBCUTANEOUS at 13:52

## 2020-05-05 ASSESSMENT — FIBROSIS 4 INDEX: FIB4 SCORE: 1.35

## 2020-05-05 NOTE — PROGRESS NOTES
cc: Chronic pain and shortness of breath on exertion      Subjective:     Marline Perry is a 64 y.o. female presenting for the following:     Patient has now been unable to work for more than 3 years due to her chronic pain, fatigue, and shortness of breath on exertion.  She was diagnosed 7 years ago with mixed connective tissue disease and does have chronic joint pain in multiple joints.  She also does sometimes have severe fatigue.  The symptoms will flare unexpectedly and when they do flare she will often be unable to work for a few days.  She is unable to tell when she is going to have a flare.    Also, patient does have chronic bronchiectasis.  This causes a lot of shortness of breath with even minimal exercise.  Those symptoms are chronic and unchanged.    Review of systems:  All others reviewed and are negative.       Current Outpatient Medications:   •  rosuvastatin (CRESTOR) 20 MG Tab, TAKE 1 TABLET EVERY EVENING, Disp: 90 Tab, Rfl: 3  •  warfarin (COUMADIN) 5 MG Tab, Take one-half to one tablet by mouth daily or as directed by anticoagulation clinic, Disp: 90 Tab, Rfl: 3  •  gabapentin (NEURONTIN) 300 MG Cap, Take 1 Cap by mouth every bedtime., Disp: 90 Cap, Rfl: 3  •  SPIRIVA RESPIMAT 2.5 MCG/ACT Aero Soln, INHALE 2 INHALATIONS BY MOUTH EVERY DAY . ASSEMBLE AND PRIME, Disp: 1 Inhaler, Rfl: 5  •  cyclosporin (RESTASIS) 0.05 % ophthalmic emulsion, Place 1 Drop in both eyes 2 times a day., Disp: , Rfl:   •  Fluticasone Furoate-Vilanterol (BREO) 100-25 MCG/INH AEROSOL POWDER, BREATH ACTIVATED, Inhale 1 Puff by mouth every day., Disp: 3 Each, Rfl: 3  •  SUMAtriptan (IMITREX) 50 MG Tab, , Disp: , Rfl:   •  albuterol (PROVENTIL) 2.5mg/3ml Nebu Soln solution for nebulization, 3 mL by Nebulization route every four hours as needed for Shortness of Breath., Disp: 360 mL, Rfl: 3  •  ascorbic acid (ASCORBIC ACID) 500 MG Tab, Take 500 mg by mouth every day., Disp: , Rfl:   •  Biotin 15742 MCG Tab, Take  by  "mouth., Disp: , Rfl:   •  Flaxseed, Linseed, (FLAX SEED OIL) 1000 MG Cap, Take  by mouth 2 Times a Day., Disp: , Rfl:   •  Cholecalciferol (VITAMIN D3) 2000 units Tab, Take  by mouth., Disp: , Rfl:   •  sertraline (ZOLOFT) 25 MG tablet, TAKE 1 TABLET DAILY, Disp: 90 Tab, Rfl: 3  •  amitriptyline (ELAVIL) 10 MG Tab, TAKE 1 TABLET EVERY EVENING, Disp: 90 Tab, Rfl: 3    Allergies, past medical history, past surgical history, family history, social history reviewed and updated    Objective:     Vitals: /76 (BP Location: Right arm, Patient Position: Sitting, BP Cuff Size: Adult)   Pulse 86   Temp 36.8 °C (98.2 °F) (Temporal)   Ht 1.549 m (5' 1\")   Wt 58.2 kg (128 lb 4.8 oz)   SpO2 96%   BMI 24.24 kg/m²   General: Alert, pleasant, NAD  HEENT: Normocephalic.   EOMI, no icterus or pallor.   Heart: Regular rate and rhythm.  S1 and S2 normal.  No murmurs appreciated.  Respiratory: Normal respiratory effort.    Abdomen: Non-distended, soft  Skin: Warm, dry, no rashes in exposed areas.  Musculoskeletal: Gait is normal.  Moves all extremities well.  Extremities: No leg edema.    Neurological:   tone/strength normal, gait is normal, CN2-12 grossly intact  Psych:  Affect is normal, judgement is good, grooming is appropriate.    Assessment/Plan:     Marline was seen today for follow-up.    Diagnoses and all orders for this visit:    Vitamin B12 deficiency: Patient has been taking a multivitamin with a B vitamin complex.  Will give injection today to improve level.  -     cyanocobalamin (VITAMIN B-12) injection 1,000 mcg      Chronic, unchanged problems.  Patient has been unable to work for some years now.  Paperwork filled out today explaining that these problems are unchanged from last year.  She is seeing pulmonology regularly.  Bronchiectasis without complication (HCC)  Diffuse connective tissue disease (HCC)  Other chronic pain  Fibromyalgia    Patient encouraged to make appointment for mammogram when able.    Return " in about 6 months (around 11/5/2020), or if symptoms worsen or fail to improve.

## 2020-05-06 RX ORDER — SERTRALINE HYDROCHLORIDE 25 MG/1
TABLET, FILM COATED ORAL
Qty: 90 TAB | Refills: 3 | Status: SHIPPED | OUTPATIENT
Start: 2020-05-06 | End: 2021-05-12 | Stop reason: SDUPTHER

## 2020-05-06 RX ORDER — AMITRIPTYLINE HYDROCHLORIDE 10 MG/1
TABLET, FILM COATED ORAL
Qty: 90 TAB | Refills: 3 | Status: SHIPPED | OUTPATIENT
Start: 2020-05-06 | End: 2021-05-03 | Stop reason: SDUPTHER

## 2020-05-09 LAB — INR PPP: 2.1 (ref 2–3.5)

## 2020-05-11 ENCOUNTER — ANTICOAGULATION MONITORING (OUTPATIENT)
Dept: MEDICAL GROUP | Facility: PHYSICIAN GROUP | Age: 65
End: 2020-05-11

## 2020-05-11 DIAGNOSIS — Z95.2 H/O MECHANICAL AORTIC VALVE REPLACEMENT: ICD-10-CM

## 2020-05-11 NOTE — PROGRESS NOTES
Anticoagulation Summary  As of 2020    INR goal:   2.0-3.0   TTR:   48.7 % (2.3 y)   INR used for dosin.10 (2020)   Warfarin maintenance plan:   2.5 mg (5 mg x 0.5) every Mon, Wed; 5 mg (5 mg x 1) all other days   Weekly warfarin total:   30 mg   Plan last modified:   Santo GreenD (3/30/2020)   Next INR check:   2020   Target end date:   Indefinite    Indications    H/O mechanical aortic valve replacement [Z95.2]             Anticoagulation Episode Summary     INR check location:   Home Draw    Preferred lab:       Send INR reminders to:       Comments:   Denny       Anticoagulation Care Providers     Provider Role Specialty Phone number    Chinyere Marcus M.D. Referring Cardiology [phone]    Santo EncisoD Responsible          Anticoagulation Patient Findings        Spoke to patient on the phone.   INR  therapeutic.   Denies signs/symptoms of bleeding and/or thrombosis.   Denies changes to diet or medications.   Follow up appointment in 2 week(s).    Continue weekly warfarin dose as noted      Brian Ribera, PharmD, MS, BCACP, LCC    This note was created using voice recognition software (Dragon). The accuracy of the dictation is limited by the abilities of the software. I have reviewed the note prior to signing, however some errors in grammar and context are still possible. If you have any questions related to this note please do not hesitate to contact our office.

## 2020-05-13 ENCOUNTER — HOSPITAL ENCOUNTER (OUTPATIENT)
Dept: RADIOLOGY | Facility: MEDICAL CENTER | Age: 65
End: 2020-05-13
Attending: FAMILY MEDICINE
Payer: COMMERCIAL

## 2020-05-13 DIAGNOSIS — R94.6 ABNORMAL THYROID FUNCTION TEST: ICD-10-CM

## 2020-05-13 PROCEDURE — A9516 IODINE I-123 SOD IODIDE MIC: HCPCS

## 2020-06-11 DIAGNOSIS — J47.9 BRONCHIECTASIS WITHOUT COMPLICATION (HCC): ICD-10-CM

## 2020-06-12 ENCOUNTER — ANTICOAGULATION MONITORING (OUTPATIENT)
Dept: VASCULAR LAB | Facility: MEDICAL CENTER | Age: 65
End: 2020-06-12

## 2020-06-12 DIAGNOSIS — Z95.2 H/O MECHANICAL AORTIC VALVE REPLACEMENT: ICD-10-CM

## 2020-06-12 LAB — INR PPP: 3 (ref 2–3.5)

## 2020-06-12 NOTE — PROGRESS NOTES
Anticoagulation Summary  As of 6/12/2020    INR goal:   2.0-3.0   TTR:   50.7 % (2.4 y)   INR used for dosing:   3.00 (6/12/2020)   Warfarin maintenance plan:   2.5 mg (5 mg x 0.5) every Mon, Wed; 5 mg (5 mg x 1) all other days   Weekly warfarin total:   30 mg   Plan last modified:   Santo GreenD (3/30/2020)   Next INR check:   6/26/2020   Target end date:   Indefinite    Indications    H/O mechanical aortic valve replacement [Z95.2]             Anticoagulation Episode Summary     INR check location:   Home Draw    Preferred lab:       Send INR reminders to:       Comments:   Denny CELESTIN      Anticoagulation Care Providers     Provider Role Specialty Phone number    Chinyere Marcus M.D. Referring Cardiology [phone]    Yossi Khalil, PharmD Responsible          Anticoagulation Patient Findings      Spoke with patient to report a therapeutic INR.    Pt instructed to continue with current warfarin dosing regimen, confirms dosing.   Pt denies any s/s of bleeding, bruising, clotting or any changes to diet or medication.    Will follow up in 2 week(s).     Diann Gonzalez, PharmD

## 2020-06-12 NOTE — TELEPHONE ENCOUNTER
Have we ever prescribed this med? Yes.  If yes, what date? 06/18/19    Last OV: 11/04/19 with Bianca DUONG    Next OV: 8/11/2020 with Kassandra Miguel PA-C    DX: Bronchiectasis without complication (HCC) (J47.9)     Medications:   Requested Prescriptions     Pending Prescriptions Disp Refills   • BREO ELLIPTA 100-25 MCG/INH AEROSOL POWDER, BREATH ACTIVATED [Pharmacy Med Name: BREO ELLIPTA INH 30 DOSES 100/25MCG] 180 Each 3     Sig: USE 1 INHALATION DAILY

## 2020-07-01 ENCOUNTER — ANTICOAGULATION MONITORING (OUTPATIENT)
Dept: VASCULAR LAB | Facility: MEDICAL CENTER | Age: 65
End: 2020-07-01

## 2020-07-01 DIAGNOSIS — Z95.2 H/O MECHANICAL AORTIC VALVE REPLACEMENT: ICD-10-CM

## 2020-07-01 LAB — INR PPP: 2.8 (ref 2–3.5)

## 2020-07-02 NOTE — PROGRESS NOTES
OP Telephone Anticoagulation Service Note    Date: 2020      Anticoagulation Summary  As of 2020    INR goal:   2.0-3.0   TTR:   51.8 % (2.4 y)   INR used for dosin.80 (2020)   Warfarin maintenance plan:   2.5 mg (5 mg x 0.5) every Mon, Wed; 5 mg (5 mg x 1) all other days   Weekly warfarin total:   30 mg   Plan last modified:   Tulio Sotelo, PharmD (3/30/2020)   Next INR check:   2020   Target end date:   Indefinite    Indications    H/O mechanical aortic valve replacement [Z95.2]             Anticoagulation Episode Summary     INR check location:   Home Draw    Preferred lab:       Send INR reminders to:       Comments:   Denny CELESTIN      Anticoagulation Care Providers     Provider Role Specialty Phone number    Chinyere Marcus M.D. Referring Cardiology [phone]    Yossi Khalil, PharmD Responsible          Anticoagulation Patient Findings        Plan: Spoke with patient on the phone. Patient is  therapeutic today. Confirmed dosing. No missed tablets in the last week. Patient denies any changes in medications or diet. Patient denies any signs or symptoms of bleeding or clotting. Instructed patient to call clinic if any unusual bleeding or bruising occurs. Will continue dosing as outlined. Will follow-up with patient in 2 week(s).        Cindi Luevano, SantoD

## 2020-07-20 ENCOUNTER — ANTICOAGULATION MONITORING (OUTPATIENT)
Dept: MEDICAL GROUP | Facility: PHYSICIAN GROUP | Age: 65
End: 2020-07-20

## 2020-07-20 DIAGNOSIS — Z95.2 H/O MECHANICAL AORTIC VALVE REPLACEMENT: ICD-10-CM

## 2020-07-20 LAB — INR PPP: 3.4 (ref 2–3.5)

## 2020-07-20 NOTE — PROGRESS NOTES
Anticoagulation Summary  As of 7/20/2020    INR goal:   2.0-3.0   TTR:   51.4 % (2.5 y)   INR used for dosing:   3.40! (7/20/2020)   Warfarin maintenance plan:   2.5 mg (5 mg x 0.5) every Mon, Wed; 5 mg (5 mg x 1) all other days   Weekly warfarin total:   30 mg   Plan last modified:   Santo GreenD (3/30/2020)   Next INR check:   8/3/2020   Target end date:   Indefinite    Indications    H/O mechanical aortic valve replacement [Z95.2]             Anticoagulation Episode Summary     INR check location:   Home Draw    Preferred lab:       Send INR reminders to:       Comments:   Denny       Anticoagulation Care Providers     Provider Role Specialty Phone number    Chinyere Marcus M.D. Referring Cardiology [phone]    Yossi Khalil, PharmD Responsible          Anticoagulation Patient Findings  Patient Findings     Positives:   Change in diet/appetite (decrease in greens )    Negatives:   Signs/symptoms of thrombosis, Signs/symptoms of bleeding, Laboratory test error suspected, Change in health, Change in alcohol use, Change in activity, Upcoming invasive procedure, Emergency department visit, Upcoming dental procedure, Missed doses, Extra doses, Change in medications, Hospital admission, Bruising, Other complaints           Spoke with patient today regarding supra-therapeutic INR of 3.4.  Patient denies any signs/symptoms of bruising or bleeding or any changes in medications.  Instructed patient to call clinic with any questions or concerns. Patient has had a decrease in green intake over the past few days.   Instructed patient to hold dose x1 then continue current warfarin regimen as detailed above  Follow up in 2 weeks, to reduce risk of adverse events related to this high risk medication,  Warfarin.      Zoila Roger PharmD Candidate   In collaboration with Tulio Sotelo PharmD

## 2020-08-05 ENCOUNTER — OFFICE VISIT (OUTPATIENT)
Dept: MEDICAL GROUP | Facility: PHYSICIAN GROUP | Age: 65
End: 2020-08-05
Payer: COMMERCIAL

## 2020-08-05 VITALS
SYSTOLIC BLOOD PRESSURE: 116 MMHG | BODY MASS INDEX: 24.55 KG/M2 | OXYGEN SATURATION: 92 % | HEIGHT: 61 IN | HEART RATE: 88 BPM | DIASTOLIC BLOOD PRESSURE: 64 MMHG | WEIGHT: 130 LBS | TEMPERATURE: 97.4 F

## 2020-08-05 DIAGNOSIS — M35.00 SJOGREN'S SYNDROME, WITH UNSPECIFIED ORGAN INVOLVEMENT (HCC): ICD-10-CM

## 2020-08-05 DIAGNOSIS — M35.9 DIFFUSE CONNECTIVE TISSUE DISEASE (HCC): ICD-10-CM

## 2020-08-05 DIAGNOSIS — M79.7 FIBROMYALGIA: ICD-10-CM

## 2020-08-05 DIAGNOSIS — Z79.01 WARFARIN ANTICOAGULATION: ICD-10-CM

## 2020-08-05 DIAGNOSIS — Z95.2 S/P AVR (AORTIC VALVE REPLACEMENT): ICD-10-CM

## 2020-08-05 DIAGNOSIS — J47.9 BRONCHIECTASIS WITHOUT COMPLICATION (HCC): ICD-10-CM

## 2020-08-05 DIAGNOSIS — E78.00 HYPERCHOLESTEREMIA: ICD-10-CM

## 2020-08-05 PROCEDURE — 99214 OFFICE O/P EST MOD 30 MIN: CPT | Performed by: FAMILY MEDICINE

## 2020-08-05 RX ORDER — AZITHROMYCIN 250 MG/1
250 TABLET, FILM COATED ORAL DAILY
COMMUNITY
End: 2020-09-23 | Stop reason: SDUPTHER

## 2020-08-05 SDOH — HEALTH STABILITY: MENTAL HEALTH: HOW OFTEN DO YOU HAVE 6 OR MORE DRINKS ON ONE OCCASION?: NEVER

## 2020-08-05 SDOH — HEALTH STABILITY: MENTAL HEALTH: HOW MANY STANDARD DRINKS CONTAINING ALCOHOL DO YOU HAVE ON A TYPICAL DAY?: 1 OR 2

## 2020-08-05 SDOH — HEALTH STABILITY: MENTAL HEALTH: HOW OFTEN DO YOU HAVE A DRINK CONTAINING ALCOHOL?: 2-3 TIMES A WEEK

## 2020-08-05 ASSESSMENT — FIBROSIS 4 INDEX: FIB4 SCORE: 1.35

## 2020-08-05 NOTE — PROGRESS NOTES
CC: Bronchiectasis    HISTORY OF THE PRESENT ILLNESS: Patient is a 64 y.o. female. This pleasant patient is here today to establish care.    Patient is here to establish care today.  She has no specific concerns.  She does have a history of Sjogren's and connective tissue disease.  She follows with rheumatology for this issue.  Currently on any medication.  She does have chronic pain as a result of her Sjogren's and connective tissue disease.  In addition she has fibromyalgia.  She does take gabapentin for her pain.  Patient describes a wide variety of types of pain including numb buttocks.  Joint pain.  Tingling in fingers.    She also has bronchiectasis.  She does take inhalers and follow with pulmonology for this.  She is currently on 3 times weekly a azithromycin for bacterial infection prevention.  She is wondering if it would be appropriate to switch to hydroxychloroquine instead of the azithromycin.    She also has history of atrial valve replacement.  She is chronically anticoagulated on Coumadin.  Denies any recent bleeding or issues related to this.    Continues on rosuvastatin for hyperlipidemia.  Denies side effects.    Allergies: Spironolactone, Neomycin, and Tape    Current Outpatient Medications Ordered in Epic   Medication Sig Dispense Refill   • azithromycin (ZITHROMAX) 250 MG Tab Take 250 mg by mouth every day.     • BREO ELLIPTA 100-25 MCG/INH AEROSOL POWDER, BREATH ACTIVATED USE 1 INHALATION DAILY 180 Each 3   • sertraline (ZOLOFT) 25 MG tablet TAKE 1 TABLET DAILY 90 Tab 3   • amitriptyline (ELAVIL) 10 MG Tab TAKE 1 TABLET EVERY EVENING 90 Tab 3   • rosuvastatin (CRESTOR) 20 MG Tab TAKE 1 TABLET EVERY EVENING 90 Tab 3   • warfarin (COUMADIN) 5 MG Tab Take one-half to one tablet by mouth daily or as directed by anticoagulation clinic 90 Tab 3   • gabapentin (NEURONTIN) 300 MG Cap Take 1 Cap by mouth every bedtime. 90 Cap 3   • SPIRIVA RESPIMAT 2.5 MCG/ACT Aero Soln INHALE 2 INHALATIONS BY MOUTH  EVERY DAY . ASSEMBLE AND PRIME 1 Inhaler 5   • cyclosporin (RESTASIS) 0.05 % ophthalmic emulsion Place 1 Drop in both eyes 2 times a day.     • SUMAtriptan (IMITREX) 50 MG Tab      • albuterol (PROVENTIL) 2.5mg/3ml Nebu Soln solution for nebulization 3 mL by Nebulization route every four hours as needed for Shortness of Breath. 360 mL 3   • ascorbic acid (ASCORBIC ACID) 500 MG Tab Take 500 mg by mouth every day.     • Biotin 89835 MCG Tab Take  by mouth.     • Flaxseed, Linseed, (FLAX SEED OIL) 1000 MG Cap Take  by mouth 2 Times a Day.     • Cholecalciferol (VITAMIN D3) 2000 units Tab Take  by mouth.       No current Epic-ordered facility-administered medications on file.        Past Medical History:   Diagnosis Date   • Bronchiectasis (HCC)    • Bronchitis    • Chickenpox    • COPD (chronic obstructive pulmonary disease) ([facility])    • Hyperlipidemia    • Influenza    • Migraines    • Mumps    • Nasal drainage    • Sjogren's disease (HCC)        Past Surgical History:   Procedure Laterality Date   • AORTIC VALVE REPLACEMENT     • BRONCHOSCOPY     • HYSTERECTOMY LAPAROSCOPY     • SINUSCOPE         Social History     Tobacco Use   • Smoking status: Never Smoker   • Smokeless tobacco: Never Used   Substance Use Topics   • Alcohol use: Yes     Alcohol/week: 2.4 oz     Types: 4 Shots of liquor per week     Frequency: 2-3 times a week     Drinks per session: 1 or 2     Binge frequency: Never   • Drug use: No       Social History     Social History Narrative   • Not on file       Family History   Problem Relation Age of Onset   • Arthritis Mother    • Hypertension Father    • Kidney Disease Father    • Arthritis Sister    • No Known Problems Brother    • No Known Problems Brother    • No Known Problems Son    • Heart Attack Maternal Grandmother    • Stroke Maternal Grandfather    • Heart Disease Paternal Grandmother    • No Known Problems Paternal Grandfather        ROS:     - Constitutional: Negative for fever, chills,  "unexpected weight change, and fatigue/generalized weakness.     - Respiratory: Negative for cough, sputum production, chest congestion, dyspnea, wheezing, and crackles.      - Cardiovascular: Negative for chest pain, palpitations, orthopnea, PND, and bilateral lower extremity edema.       Exam: /64 (BP Location: Left arm, Patient Position: Sitting, BP Cuff Size: Adult)   Pulse 88   Temp 36.3 °C (97.4 °F) (Temporal)   Ht 1.549 m (5' 1\")   Wt 59 kg (130 lb)   SpO2 92%  Body mass index is 24.56 kg/m².    General: Well appearing, NAD  Pulmonary: Clear to ausculation.  Normal effort. No rales, ronchi, or wheezing.  Cardiovascular: Regular rate and rhythm without murmur, rubs or gallop.   Skin: Warm and dry.  No obvious lesions.  Musculoskeletal:  No extremity cyanosis, clubbing, or edema.  Psych: Normal mood and affect. Alert and oriented. Judgment and insight is normal.    Please note that this dictation was created using voice recognition software. I have made every reasonable attempt to correct obvious errors, but I expect that there are errors of grammar and possibly content that I did not discover before finalizing the note.      Assessment/Plan  Marline was seen today for establish care.    Diagnoses and all orders for this visit:    Diffuse connective tissue disease (HCC)  Sjogren's syndrome, with unspecified organ involvement (HCC)  Chronic issues, follows with rheumatology.    Fibromyalgia  Chronic issue with migratory pain.  Managed with gabapentin, amitriptyline.    Hypercholesteremia  Chronic issue, well controlled on rosuvastatin.  Due for lipid profile.  -     Lipid Profile; Future    S/P AVR (aortic valve replacement)  Warfarin anticoagulation  Chronic issues, follows with cardiology.  No concerns at this time.    Bronchiectasis without complication (HCC)  Chronic issue, follows with pulmonology.  Absolutely do not recommend switching from a azithromycin to hydroxychloroquine as hydroxychloroquine " is not an antibiotic and also not effective for COVID-19.    Follow-up in 6 months or sooner if needed.    Lynne Shaw DO  [facility]

## 2020-08-08 LAB — INR PPP: 2.8 (ref 2–3.5)

## 2020-08-10 ENCOUNTER — ANTICOAGULATION MONITORING (OUTPATIENT)
Dept: MEDICAL GROUP | Facility: PHYSICIAN GROUP | Age: 65
End: 2020-08-10

## 2020-08-10 ENCOUNTER — ANTICOAGULATION MONITORING (OUTPATIENT)
Dept: VASCULAR LAB | Facility: MEDICAL CENTER | Age: 65
End: 2020-08-10

## 2020-08-10 DIAGNOSIS — Z95.2 S/P AVR (AORTIC VALVE REPLACEMENT): ICD-10-CM

## 2020-08-10 NOTE — PROGRESS NOTES
OP   Telephone Anticoagulation Service Note      Anticoagulation Summary  As of 8/10/2020    INR goal:  2.0-3.0   TTR:  51.0 % (2.5 y)   INR used for dosin.80 (2020)   Warfarin maintenance plan:  2.5 mg (5 mg x 0.5) every Mon, Wed; 5 mg (5 mg x 1) all other days   Weekly warfarin total:  30 mg   Plan last modified:  Santo GreenD (3/30/2020)   Next INR check:  2020   Target end date:  Indefinite    Indications    S/P AVR (aortic valve replacement) [Z95.2]             Anticoagulation Episode Summary     INR check location:  Home Draw    Preferred lab:      Send INR reminders to:      Comments:  Denny       Anticoagulation Care Providers     Provider Role Specialty Phone number    Chinyere Marcus M.D. Referring Cardiology [phone]    Yossi Khalil, PharmD Responsible          Anticoagulation Patient Findings     Left a message on the patient's voicemail today, informing the patient of a therapeutic INR of 2.8. Instructed the patient to call the clinic with any changes to diet or medications, with any signs/sx of bleeding or clotting or with any questions or concerns.     Patient instructed to continue with the current warfarin dosing regimen, and asked to follow up again in 2 weeks.     Pollo BlanchardD

## 2020-08-27 ENCOUNTER — ANTICOAGULATION MONITORING (OUTPATIENT)
Dept: VASCULAR LAB | Facility: MEDICAL CENTER | Age: 65
End: 2020-08-27

## 2020-08-27 DIAGNOSIS — Z95.2 S/P AVR (AORTIC VALVE REPLACEMENT): ICD-10-CM

## 2020-08-27 LAB — INR PPP: 3.2 (ref 2–3.5)

## 2020-08-27 NOTE — PROGRESS NOTES
Anticoagulation Summary  As of 8/27/2020    INR goal:  2.0-3.0   TTR:  51.0 % (2.6 y)   INR used for dosing:  3.20 (8/25/2020)   Warfarin maintenance plan:  2.5 mg (5 mg x 0.5) every Mon, Wed; 5 mg (5 mg x 1) all other days   Weekly warfarin total:  30 mg   Plan last modified:  Santo GreenD (3/30/2020)   Next INR check:  9/10/2020   Target end date:  Indefinite    Indications    S/P AVR (aortic valve replacement) [Z95.2]             Anticoagulation Episode Summary     INR check location:  Home Draw    Preferred lab:      Send INR reminders to:      Comments:  Denny       Anticoagulation Care Providers     Provider Role Specialty Phone number    Chinyere Marcus M.D. Referring Cardiology [phone]    Santo EncisoD Responsible          Anticoagulation Patient Findings        Spoke to patient on the phone.   INR  supra-therapeutic.   Denies signs/symptoms of bleeding and/or thrombosis.   Denies changes to diet or medications.   Follow up appointment in 2 week(s).    Continue weekly warfarin dose as noted per pt and eat more salads/greens.         Brian Ribera, PharmD, MS, BCACP, LCC    This note was created using voice recognition software (Dragon). The accuracy of the dictation is limited by the abilities of the software. I have reviewed the note prior to signing, however some errors in grammar and context are still possible. If you have any questions related to this note please do not hesitate to contact our office.

## 2020-08-29 DIAGNOSIS — J47.9 BRONCHIECTASIS WITHOUT COMPLICATION (HCC): ICD-10-CM

## 2020-08-31 RX ORDER — TIOTROPIUM BROMIDE INHALATION SPRAY 3.12 UG/1
SPRAY, METERED RESPIRATORY (INHALATION)
Qty: 1 EACH | Refills: 5 | Status: SHIPPED | OUTPATIENT
Start: 2020-08-31 | End: 2021-04-12 | Stop reason: SDUPTHER

## 2020-08-31 NOTE — TELEPHONE ENCOUNTER
Have we ever prescribed this med? Yes.  If yes, what date? 02/27/2020    Last OV: 11/04/2019 with Bianca DUONG    Next OV: 09/21/2020 with Kassandra Miguel PA-C    DX:   Bronchiectasis without complication (HCC) (J47.9)    Medications:   Requested Prescriptions     Pending Prescriptions Disp Refills   • SPIRIVA RESPIMAT 2.5 MCG/ACT Aero Soln [Pharmacy Med Name: SPIRIVA RESPIMAT 2.5 MCG INH]  5     Sig: INHALE 2 INHALATIONS BY MOUTH EVERY DAY . ASSEMBLE AND PRIME

## 2020-09-13 LAB — INR PPP: 3.5 (ref 2–3.5)

## 2020-09-14 ENCOUNTER — ANTICOAGULATION MONITORING (OUTPATIENT)
Dept: MEDICAL GROUP | Facility: PHYSICIAN GROUP | Age: 65
End: 2020-09-14

## 2020-09-14 DIAGNOSIS — Z95.2 S/P AVR (AORTIC VALVE REPLACEMENT): ICD-10-CM

## 2020-09-14 NOTE — PROGRESS NOTES
Anticoagulation Summary  As of 9/14/2020    INR goal:  2.0-3.0   TTR:  50.0 % (2.6 y)   INR used for dosing:  3.50 (9/13/2020)   Warfarin maintenance plan:  2.5 mg (5 mg x 0.5) every Mon, Wed, Fri; 5 mg (5 mg x 1) all other days   Weekly warfarin total:  27.5 mg   Plan last modified:  Filiberto Weber, Pharmacy Intern (9/14/2020)   Next INR check:  9/28/2020   Target end date:  Indefinite    Indications    S/P AVR (aortic valve replacement) [Z95.2]             Anticoagulation Episode Summary     INR check location:  Home Draw    Preferred lab:      Send INR reminders to:      Comments:  Denny       Anticoagulation Care Providers     Provider Role Specialty Phone number    Chinyere Marcus M.D. Referring Cardiology [phone]    Yossi Khalil, PharmD Responsible          Anticoagulation Patient Findings  Patient Findings     Positives:  Change in diet/appetite (more wine than usual and less greens this week)    Negatives:  Signs/symptoms of thrombosis, Signs/symptoms of bleeding, Laboratory test error suspected, Change in health, Change in alcohol use, Change in activity, Upcoming invasive procedure, Emergency department visit, Upcoming dental procedure, Missed doses, Extra doses, Change in medications, Hospital admission, Bruising, Other complaints              Spoke with patient today regarding supratherapeutic INR of 3.5.  Patient denies any signs/symptoms of bruising or bleeding or any changes in diet and medications.  Instructed patient to call clinic with any questions or concerns.    Patient has been drinking wine more than usual and have been having less intake of vitamin K greens as well.    Patient is to hold X 1 and take 2.5 mg tomorrow and continue on with the current weekly regimen.    Follow up in 2 weeks, to reduce risk of adverse events related to this high risk medication,  Warfarin.    Filiberto Weber, Pharmacy Intern

## 2020-09-21 ENCOUNTER — OFFICE VISIT (OUTPATIENT)
Dept: PULMONOLOGY | Facility: HOSPICE | Age: 65
End: 2020-09-21
Payer: COMMERCIAL

## 2020-09-21 VITALS
HEART RATE: 91 BPM | WEIGHT: 129 LBS | HEIGHT: 61 IN | DIASTOLIC BLOOD PRESSURE: 80 MMHG | OXYGEN SATURATION: 96 % | BODY MASS INDEX: 24.35 KG/M2 | RESPIRATION RATE: 16 BRPM | SYSTOLIC BLOOD PRESSURE: 130 MMHG

## 2020-09-21 DIAGNOSIS — J47.9 BRONCHIECTASIS WITHOUT COMPLICATION (HCC): ICD-10-CM

## 2020-09-21 DIAGNOSIS — J47.1 BRONCHIECTASIS WITH ACUTE EXACERBATION (HCC): ICD-10-CM

## 2020-09-21 DIAGNOSIS — M35.9 DIFFUSE CONNECTIVE TISSUE DISEASE (HCC): ICD-10-CM

## 2020-09-21 DIAGNOSIS — Z23 ENCOUNTER FOR VACCINATION: ICD-10-CM

## 2020-09-21 PROCEDURE — 90471 IMMUNIZATION ADMIN: CPT | Performed by: PHYSICIAN ASSISTANT

## 2020-09-21 PROCEDURE — 90686 IIV4 VACC NO PRSV 0.5 ML IM: CPT | Performed by: PHYSICIAN ASSISTANT

## 2020-09-21 PROCEDURE — 99214 OFFICE O/P EST MOD 30 MIN: CPT | Mod: 25 | Performed by: PHYSICIAN ASSISTANT

## 2020-09-21 RX ORDER — METHYLPREDNISOLONE 4 MG/1
TABLET ORAL
Qty: 21 TAB | Refills: 0 | Status: SHIPPED | OUTPATIENT
Start: 2020-09-21 | End: 2021-01-15 | Stop reason: SDUPTHER

## 2020-09-21 ASSESSMENT — ENCOUNTER SYMPTOMS
DIZZINESS: 0
CHILLS: 0
SPUTUM PRODUCTION: 1
TREMORS: 0
COUGH: 1
ORTHOPNEA: 0
SINUS PAIN: 0
PALPITATIONS: 0
WHEEZING: 1
HEADACHES: 1
SORE THROAT: 0
INSOMNIA: 1
SHORTNESS OF BREATH: 1
FEVER: 0
WEIGHT LOSS: 0
HEARTBURN: 0

## 2020-09-21 ASSESSMENT — FIBROSIS 4 INDEX: FIB4 SCORE: 1.35

## 2020-09-21 NOTE — PROGRESS NOTES
"CC:  costrochrondritis and darker phlegm    HPI:  Marline Perry is a 64 y.o. year old female here today for follow-up on bronchiectasis. Last seen in clinic 5/7/2019 by AD Yadav.  She is a lifelong non-smoker.    Patient relocated here from [place] where she was diagnosed with bronchiectasis in 2008.  She has had bronchoscopy in the past.  Pertinent medical history includes AVR on anticoagulation, diffuse connective tissue disease, Sjogren's, followed by rheumatology, fibromyalgia, decreased GFR.    Patient is reporting increasing cough with smoky air, production dark yellow to gray-green she also reports \" costochondritis\" with deep inspiration.    Reviewed in clinic vitals including blood pressure of 130/80, heart rate of 91, O2 sat of 96% on room air and BMI of 24.37 kg/m².    Reviewed home medication regimen does Breo and spiriva in evening.  Maintenance azithromycin Monday Wednesday Friday.  Reports gabapentin for cough.  Patient does have flutter valve for assistance clearing secretions.  Does not require albuterol use.    Reviewed most recent imaging including echocardiogram obtained 9/22/2020 demonstrating mechanical aortic valve functioning normally, normal left ventricular chamber size, wall thickness and systolic function, LVEF estimated 60%, normal diastolic function, normal right ventricular size and systolic function, mildly dilated left atrium, mild to moderate mitral regurgitation.  Unable to estimate pulmonary artery pressure.    High resolution CT scan obtained 4/26/2018 demonstrated  Hyperexpanded lungs, lateral right lower lobe subsegmental atelectasis and mild bronchiectasis, no significant reticular opacities and no groundglass opacities identified, no honeycombing, mild bilateral lung base atelectasis, mild calcified plaque within the aorta.  Findings suggestive of ascending aortic aneurysmal repair.  Aortic valve replacement.                                         "                                                                                                            Reviewed pulmonary function testing obtained 5/7/2019 demonstrating FEV1 of 1.94 L or 93% predicted, FVC of 2.39 L or 87% predicted, FEV1/FVC ratio of 80%, residual volume 129% predicted, total lung capacity 104% predicted, DLCO 87% predicted, normal flow volume loop.  Per pulmonology interpretation normal PFT and gas transfer.                                                                   Review of Systems   Constitutional: Positive for malaise/fatigue. Negative for chills, fever and weight loss.   HENT: Positive for tinnitus (occasional static). Negative for congestion, hearing loss, nosebleeds, sinus pain and sore throat.         History of sinus issues, improved since surgery   Eyes:        Prescription eyeglasses   Respiratory: Positive for cough (increased with smoke), sputum production (butterscotch pudding, dark gray green ), shortness of breath and wheezing.    Cardiovascular: Positive for chest pain (tightness with activity associated with shortness of breath). Negative for palpitations, orthopnea (unable to lie flat) and leg swelling.   Gastrointestinal: Negative for heartburn (adjusts diet).        No dentures, sometimes has trouble swallowing due to dryness    Skin: Negative.    Neurological: Positive for headaches. Negative for dizziness and tremors.        Lists to right, uses hand holds   Psychiatric/Behavioral: The patient has insomnia (cough, disturbed sleep ).        Past Medical History:   Diagnosis Date   • Bronchiectasis (HCC)    • Bronchitis    • Chickenpox    • COPD (chronic obstructive pulmonary disease) (HCC)    • Hyperlipidemia    • Influenza    • Migraines    • Mumps    • Nasal drainage    • Sjogren's disease (HCC)        Past Surgical History:   Procedure Laterality Date   • AORTIC VALVE REPLACEMENT     • BRONCHOSCOPY     • HYSTERECTOMY LAPAROSCOPY     • SINUSCOPE         Family  History   Problem Relation Age of Onset   • Arthritis Mother    • Hypertension Father    • Kidney Disease Father    • Arthritis Sister    • No Known Problems Brother    • No Known Problems Brother    • No Known Problems Son    • Heart Attack Maternal Grandmother    • Stroke Maternal Grandfather    • Heart Disease Paternal Grandmother    • No Known Problems Paternal Grandfather        Social History     Socioeconomic History   • Marital status:      Spouse name: Not on file   • Number of children: Not on file   • Years of education: Not on file   • Highest education level: Not on file   Occupational History   • Not on file   Social Needs   • Financial resource strain: Not on file   • Food insecurity     Worry: Not on file     Inability: Not on file   • Transportation needs     Medical: Not on file     Non-medical: Not on file   Tobacco Use   • Smoking status: Never Smoker   • Smokeless tobacco: Never Used   Substance and Sexual Activity   • Alcohol use: Yes     Alcohol/week: 2.4 oz     Types: 4 Shots of liquor per week     Frequency: 2-3 times a week     Drinks per session: 1 or 2     Binge frequency: Never   • Drug use: No   • Sexual activity: Not on file   Lifestyle   • Physical activity     Days per week: Not on file     Minutes per session: Not on file   • Stress: Not on file   Relationships   • Social connections     Talks on phone: Not on file     Gets together: Not on file     Attends [employer] service: Not on file     Active member of club or organization: Not on file     Attends meetings of clubs or organizations: Not on file     Relationship status: Not on file   • Intimate partner violence     Fear of current or ex partner: Not on file     Emotionally abused: Not on file     Physically abused: Not on file     Forced sexual activity: Not on file   Other Topics Concern   • Not on file   Social History Narrative   • Not on file       Allergies as of 09/21/2020 - Reviewed 09/21/2020   Allergen Reaction  "Noted   • Spironolactone Rash 06/11/2008   • Neomycin Rash 06/11/2008   • Tape  06/11/2008        @Vital signs for this encounter:  Vitals:    09/21/20 0956   Height: 1.549 m (5' 1\")   Weight: 58.5 kg (129 lb)   Weight % change since last entry.: 0 %   BP: 130/80   Pulse: 91   BMI (Calculated): 24.37   Resp: 16       Current medications as of today   Current Outpatient Medications   Medication Sig Dispense Refill   • gabapentin (NEURONTIN) 300 MG Cap Take 1 Cap by mouth every bedtime. 90 Cap 3   • SPIRIVA RESPIMAT 2.5 MCG/ACT Aero Soln INHALE 2 INHALATIONS BY MOUTH EVERY DAY . ASSEMBLE AND PRIME 1 Each 5   • azithromycin (ZITHROMAX) 250 MG Tab Take 250 mg by mouth every day.     • BREO ELLIPTA 100-25 MCG/INH AEROSOL POWDER, BREATH ACTIVATED USE 1 INHALATION DAILY 180 Each 3   • sertraline (ZOLOFT) 25 MG tablet TAKE 1 TABLET DAILY 90 Tab 3   • amitriptyline (ELAVIL) 10 MG Tab TAKE 1 TABLET EVERY EVENING 90 Tab 3   • rosuvastatin (CRESTOR) 20 MG Tab TAKE 1 TABLET EVERY EVENING 90 Tab 3   • warfarin (COUMADIN) 5 MG Tab Take one-half to one tablet by mouth daily or as directed by anticoagulation clinic 90 Tab 3   • cyclosporin (RESTASIS) 0.05 % ophthalmic emulsion Place 1 Drop in both eyes 2 times a day.     • SUMAtriptan (IMITREX) 50 MG Tab      • albuterol (PROVENTIL) 2.5mg/3ml Nebu Soln solution for nebulization 3 mL by Nebulization route every four hours as needed for Shortness of Breath. 360 mL 3   • ascorbic acid (ASCORBIC ACID) 500 MG Tab Take 500 mg by mouth every day.     • Biotin 90840 MCG Tab Take  by mouth.     • Flaxseed, Linseed, (FLAX SEED OIL) 1000 MG Cap Take  by mouth 2 Times a Day.     • Cholecalciferol (VITAMIN D3) 2000 units Tab Take  by mouth.       No current facility-administered medications for this visit.          Physical Exam:   Gen:           Alert and oriented, No apparent distress. Mood and affect     appropriate, normal interaction with provider.  Eyes:          sclere white, conjunctive " "moist.  Hearing:     Grossly intact.  Dentition:    Fair dentition.  Oropharynx:   Tongue normal, posterior pharynx without erythema or exudate.  Neck:        Supple, trachea midline, no masses.  Respiratory Effort: No intercostal retractions or use of accessory muscles.   Lung Auscultation:      Few scatt crackles; no rhonchi or wheezing.  CV:            Regular rate and rhythm. No edema. No murmurs, rubs or gallops.  Digits, Nails, Ext: No clubbing, cyanosis, petechiae, or nodes.   Skin:        No rashes, lesions or ulcers noted on back or exposed skin    surfaces.                     Assessment:  1. Encounter for vaccination  Influenza Vaccine Quad Injection (PF)   2. Bronchiectasis without complication (HCC)  azithromycin (ZITHROMAX) 250 MG Tab, patient instructed to bump to daily dosing x14 days.   3. Diffuse connective tissue disease (HCC)   also Sjogren's, followed by rheumatology   4. Bronchiectasis with acute exacerbation (HCC)   Medrol dose pack       Immunizations:    Flu: 9/21/2020  Pneumovax 23: 10/18/2018  Prevnar 13: Deferred    Plan:    64 y.o. year old female here today for follow-up on bronchiectasis. Last seen in clinic 5/7/2019 by AD Yadav.  She is a lifelong non-smoker.    Patient relocated here from [place] where she was diagnosed with bronchiectasis in 2008.  She has had bronchoscopy in the past.  Pertinent medical history includes AVR on anticoagulation, diffuse connective tissue disease, Sjogren's, followed by new rheumatology, fibromyalgia, decreased GFR.    Patient is reporting increasing wheezing and cough with smoky air, production dark yellow to gray-green she also reports \" costochondritis\" with deep inspiration.    Reviewed in clinic vitals including blood pressure of 130/80, heart rate of 91, O2 sat of 96% on room air and BMI of 24.37 kg/m².    Reviewed home medication regimen does Breo and spiriva in evening.  Maintenance azithromycin Monday Wednesday Friday.  " Reports gabapentin for cough.  Patient does have flutter valve for assistance clearing secretions.  Does not require albuterol use.    Bronchiectasis: Increase flutter valve use to 3 times daily, consider albuterol use prior.  On daily Breo continue use.    Bronchiectasis exacerbation: On maintenance antibiotics, 3 times per week, increased to daily x14 days. Patient reports she sometimes requires daily dosing x1 month for exacerbations.  Notify clinic if continued symptoms.    Reviewed most recent imaging including echocardiogram obtained 9/22/2020 demonstrating mechanical aortic valve functioning normally, normal left ventricular chamber size, wall thickness and systolic function, LVEF estimated 60%, normal diastolic function, normal right ventricular size and systolic function, mildly dilated left atrium, mild to moderate mitral regurgitation.  Unable to estimate pulmonary artery pressure.    High resolution CT scan obtained 4/26/2018 demonstrated hyperexpanded lungs, lateral right lower lobe subsegmental atelectasis and mild bronchiectasis, no significant reticular opacities and no groundglass opacities identified, no honeycombing, mild bilateral lung base atelectasis, mild calcified plaque within the aorta.  Findings suggestive of ascending aortic aneurysmal repair.  Aortic valve replacement.                                                                                                                                                    Reviewed pulmonary function testing obtained 5/7/2019 demonstrating normal PFT and gas transfer.         Health discussion included review of current COVID-19 precautions including wearing mask in public, social distancing, frequent handwashing and early flu vaccination.    Encounter for vaccination: Flu shot given today.  Follow up in 6 months, sooner if needed.                    This dictation was created using voice recognition software. The accuracy of the dictation is  limited to the abilities of the software. I expect there may be some errors of grammar and possibly content.

## 2020-09-21 NOTE — PATIENT INSTRUCTIONS
1-increased wheezing and cough  2-treat with medrol dose pack  3-continue same regimen, not needing albuterol  4-indications for albuterol reviewed  5-reviewed covid 19 precautions:  Wearing mask, social distancing, frequent hand washing and early flu shot   6-flu shot today  7-follow up in 6 months, sooner if needed

## 2020-09-22 ENCOUNTER — HOSPITAL ENCOUNTER (OUTPATIENT)
Dept: CARDIOLOGY | Facility: MEDICAL CENTER | Age: 65
End: 2020-09-22
Attending: INTERNAL MEDICINE
Payer: COMMERCIAL

## 2020-09-22 DIAGNOSIS — Z95.2 H/O MECHANICAL AORTIC VALVE REPLACEMENT: ICD-10-CM

## 2020-09-22 DIAGNOSIS — Z79.01 WARFARIN ANTICOAGULATION: ICD-10-CM

## 2020-09-22 DIAGNOSIS — I34.0 NON-RHEUMATIC MITRAL REGURGITATION: ICD-10-CM

## 2020-09-22 DIAGNOSIS — E78.00 HYPERCHOLESTEREMIA: ICD-10-CM

## 2020-09-22 LAB
LV EJECT FRACT  99904: 60
LV EJECT FRACT MOD 2C 99903: 59.62
LV EJECT FRACT MOD 4C 99902: 59.47
LV EJECT FRACT MOD BP 99901: 58.51

## 2020-09-22 PROCEDURE — 93306 TTE W/DOPPLER COMPLETE: CPT | Mod: 26 | Performed by: INTERNAL MEDICINE

## 2020-09-22 PROCEDURE — 93306 TTE W/DOPPLER COMPLETE: CPT

## 2020-09-23 RX ORDER — AZITHROMYCIN 250 MG/1
250 TABLET, FILM COATED ORAL
Qty: 40 TAB | Refills: 3 | Status: SHIPPED | OUTPATIENT
Start: 2020-09-23 | End: 2022-01-17 | Stop reason: SDUPTHER

## 2020-09-30 ENCOUNTER — ANTICOAGULATION MONITORING (OUTPATIENT)
Dept: VASCULAR LAB | Facility: MEDICAL CENTER | Age: 65
End: 2020-09-30

## 2020-09-30 DIAGNOSIS — Z95.2 S/P AVR (AORTIC VALVE REPLACEMENT): ICD-10-CM

## 2020-09-30 LAB — INR PPP: 2.7 (ref 2–3.5)

## 2020-09-30 NOTE — PROGRESS NOTES
Anticoagulation Summary  As of 2020    INR goal:  2.0-3.0   TTR:  49.7 % (2.7 y)   INR used for dosin.70 (2020)   Warfarin maintenance plan:  2.5 mg (5 mg x 0.5) every Mon, Wed, Fri; 5 mg (5 mg x 1) all other days   Weekly warfarin total:  27.5 mg   Plan last modified:  Filiberto Weber, Pharmacy Intern (2020)   Next INR check:  10/14/2020   Target end date:  Indefinite    Indications    S/P AVR (aortic valve replacement) [Z95.2]             Anticoagulation Episode Summary     INR check location:  Home Draw    Preferred lab:      Send INR reminders to:      Comments:  Denny       Anticoagulation Care Providers     Provider Role Specialty Phone number    Chinyere Marcus M.D. Referring Cardiology [phone]    Yossi Khalil, PharmD Responsible          Anticoagulation Patient Findings    HPI:  Marline Perry, on anticoagulation therapy with warfarin for AVR.  Changes to current medical/health status since last appt: none  Denies signs/symptoms of bleeding and/or thrombosis since the last appt.    Denies any interval changes to diet  Denies any interval changes to medications since last appt.   Denies any complications or cost restrictions with current therapy.     A/P   INR  therapeutic.   Pt is to continue with current warfarin dosing regimen.     Next INR in 2 week(s).    Mu Mann, PharmD

## 2020-10-01 ENCOUNTER — HOSPITAL ENCOUNTER (OUTPATIENT)
Dept: LAB | Facility: MEDICAL CENTER | Age: 65
End: 2020-10-01
Attending: INTERNAL MEDICINE
Payer: COMMERCIAL

## 2020-10-01 ENCOUNTER — OFFICE VISIT (OUTPATIENT)
Dept: CARDIOLOGY | Facility: MEDICAL CENTER | Age: 65
End: 2020-10-01
Payer: COMMERCIAL

## 2020-10-01 ENCOUNTER — HOSPITAL ENCOUNTER (OUTPATIENT)
Dept: LAB | Facility: MEDICAL CENTER | Age: 65
End: 2020-10-01
Attending: FAMILY MEDICINE
Payer: COMMERCIAL

## 2020-10-01 VITALS
WEIGHT: 129.8 LBS | SYSTOLIC BLOOD PRESSURE: 112 MMHG | HEIGHT: 61 IN | BODY MASS INDEX: 24.51 KG/M2 | RESPIRATION RATE: 16 BRPM | HEART RATE: 94 BPM | OXYGEN SATURATION: 94 % | DIASTOLIC BLOOD PRESSURE: 76 MMHG

## 2020-10-01 DIAGNOSIS — I35.1 NONRHEUMATIC AORTIC VALVE INSUFFICIENCY: ICD-10-CM

## 2020-10-01 DIAGNOSIS — M79.7 FIBROMYALGIA: ICD-10-CM

## 2020-10-01 DIAGNOSIS — E78.00 HYPERCHOLESTEREMIA: ICD-10-CM

## 2020-10-01 DIAGNOSIS — Z95.2 S/P AVR (AORTIC VALVE REPLACEMENT): ICD-10-CM

## 2020-10-01 DIAGNOSIS — M35.00 SJOGREN'S SYNDROME WITHOUT EXTRAGLANDULAR INVOLVEMENT (HCC): ICD-10-CM

## 2020-10-01 DIAGNOSIS — R53.83 FATIGUE, UNSPECIFIED TYPE: ICD-10-CM

## 2020-10-01 DIAGNOSIS — Z79.01 WARFARIN ANTICOAGULATION: ICD-10-CM

## 2020-10-01 DIAGNOSIS — M35.9 DIFFUSE CONNECTIVE TISSUE DISEASE (HCC): ICD-10-CM

## 2020-10-01 DIAGNOSIS — R94.4 DECREASED GFR: ICD-10-CM

## 2020-10-01 DIAGNOSIS — I34.0 NON-RHEUMATIC MITRAL REGURGITATION: ICD-10-CM

## 2020-10-01 DIAGNOSIS — J47.9 BRONCHIECTASIS WITHOUT COMPLICATION (HCC): ICD-10-CM

## 2020-10-01 DIAGNOSIS — I71.20 THORACIC AORTIC ANEURYSM WITHOUT RUPTURE (HCC): ICD-10-CM

## 2020-10-01 LAB
ALBUMIN SERPL BCP-MCNC: 3.8 G/DL (ref 3.2–4.9)
ALBUMIN/GLOB SERPL: 1.2 G/DL
ALP SERPL-CCNC: 91 U/L (ref 30–99)
ALT SERPL-CCNC: 34 U/L (ref 2–50)
ANION GAP SERPL CALC-SCNC: 10 MMOL/L (ref 7–16)
APPEARANCE UR: ABNORMAL
AST SERPL-CCNC: 28 U/L (ref 12–45)
BACTERIA #/AREA URNS HPF: NEGATIVE /HPF
BASOPHILS # BLD AUTO: 0.6 % (ref 0–1.8)
BASOPHILS # BLD: 0.05 K/UL (ref 0–0.12)
BILIRUB SERPL-MCNC: 0.6 MG/DL (ref 0.1–1.5)
BILIRUB UR QL STRIP.AUTO: NEGATIVE
BUN SERPL-MCNC: 16 MG/DL (ref 8–22)
CALCIUM SERPL-MCNC: 9.2 MG/DL (ref 8.5–10.5)
CHLORIDE SERPL-SCNC: 103 MMOL/L (ref 96–112)
CHOLEST SERPL-MCNC: 152 MG/DL (ref 100–199)
CO2 SERPL-SCNC: 24 MMOL/L (ref 20–33)
COLOR UR: YELLOW
CREAT SERPL-MCNC: 0.8 MG/DL (ref 0.5–1.4)
CRP SERPL HS-MCNC: 0.58 MG/DL (ref 0–0.75)
EOSINOPHIL # BLD AUTO: 0.14 K/UL (ref 0–0.51)
EOSINOPHIL NFR BLD: 1.6 % (ref 0–6.9)
EPI CELLS #/AREA URNS HPF: ABNORMAL /HPF
ERYTHROCYTE [DISTWIDTH] IN BLOOD BY AUTOMATED COUNT: 46.2 FL (ref 35.9–50)
ERYTHROCYTE [SEDIMENTATION RATE] IN BLOOD BY WESTERGREN METHOD: 10 MM/HOUR (ref 0–30)
GLOBULIN SER CALC-MCNC: 3.2 G/DL (ref 1.9–3.5)
GLUCOSE SERPL-MCNC: 83 MG/DL (ref 65–99)
GLUCOSE UR STRIP.AUTO-MCNC: NEGATIVE MG/DL
HCT VFR BLD AUTO: 43.7 % (ref 37–47)
HDLC SERPL-MCNC: 58 MG/DL
HGB BLD-MCNC: 14.6 G/DL (ref 12–16)
HYALINE CASTS #/AREA URNS LPF: ABNORMAL /LPF
IMM GRANULOCYTES # BLD AUTO: 0.04 K/UL (ref 0–0.11)
IMM GRANULOCYTES NFR BLD AUTO: 0.4 % (ref 0–0.9)
KETONES UR STRIP.AUTO-MCNC: NEGATIVE MG/DL
LDLC SERPL CALC-MCNC: 75 MG/DL
LEUKOCYTE ESTERASE UR QL STRIP.AUTO: ABNORMAL
LYMPHOCYTES # BLD AUTO: 1.75 K/UL (ref 1–4.8)
LYMPHOCYTES NFR BLD: 19.6 % (ref 22–41)
MCH RBC QN AUTO: 30.7 PG (ref 27–33)
MCHC RBC AUTO-ENTMCNC: 33.4 G/DL (ref 33.6–35)
MCV RBC AUTO: 92 FL (ref 81.4–97.8)
MICRO URNS: ABNORMAL
MONOCYTES # BLD AUTO: 0.78 K/UL (ref 0–0.85)
MONOCYTES NFR BLD AUTO: 8.7 % (ref 0–13.4)
NEUTROPHILS # BLD AUTO: 6.16 K/UL (ref 2–7.15)
NEUTROPHILS NFR BLD: 69.1 % (ref 44–72)
NITRITE UR QL STRIP.AUTO: NEGATIVE
NRBC # BLD AUTO: 0 K/UL
NRBC BLD-RTO: 0 /100 WBC
PH UR STRIP.AUTO: 6.5 [PH] (ref 5–8)
PLATELET # BLD AUTO: 260 K/UL (ref 164–446)
PMV BLD AUTO: 9.5 FL (ref 9–12.9)
POTASSIUM SERPL-SCNC: 4.2 MMOL/L (ref 3.6–5.5)
PROT SERPL-MCNC: 7 G/DL (ref 6–8.2)
PROT UR QL STRIP: NEGATIVE MG/DL
RBC # BLD AUTO: 4.75 M/UL (ref 4.2–5.4)
RBC # URNS HPF: ABNORMAL /HPF
RBC UR QL AUTO: NEGATIVE
SODIUM SERPL-SCNC: 137 MMOL/L (ref 135–145)
SP GR UR STRIP.AUTO: 1.02
TRIGL SERPL-MCNC: 95 MG/DL (ref 0–149)
UROBILINOGEN UR STRIP.AUTO-MCNC: 0.2 MG/DL
WBC # BLD AUTO: 8.9 K/UL (ref 4.8–10.8)
WBC #/AREA URNS HPF: ABNORMAL /HPF

## 2020-10-01 PROCEDURE — 86431 RHEUMATOID FACTOR QUANT: CPT

## 2020-10-01 PROCEDURE — 85652 RBC SED RATE AUTOMATED: CPT

## 2020-10-01 PROCEDURE — 99214 OFFICE O/P EST MOD 30 MIN: CPT | Performed by: INTERNAL MEDICINE

## 2020-10-01 PROCEDURE — 80053 COMPREHEN METABOLIC PANEL: CPT

## 2020-10-01 PROCEDURE — 80061 LIPID PANEL: CPT

## 2020-10-01 PROCEDURE — 86140 C-REACTIVE PROTEIN: CPT

## 2020-10-01 PROCEDURE — 82607 VITAMIN B-12: CPT

## 2020-10-01 PROCEDURE — 81001 URINALYSIS AUTO W/SCOPE: CPT

## 2020-10-01 PROCEDURE — 85025 COMPLETE CBC W/AUTO DIFF WBC: CPT

## 2020-10-01 PROCEDURE — 36415 COLL VENOUS BLD VENIPUNCTURE: CPT

## 2020-10-01 PROCEDURE — 86160 COMPLEMENT ANTIGEN: CPT

## 2020-10-01 ASSESSMENT — ENCOUNTER SYMPTOMS
PALPITATIONS: 0
PND: 0
MUSCULOSKELETAL NEGATIVE: 1
SHORTNESS OF BREATH: 0
EYES NEGATIVE: 1
BRUISES/BLEEDS EASILY: 0
DIZZINESS: 0
HEMOPTYSIS: 0
WHEEZING: 0
GASTROINTESTINAL NEGATIVE: 1
CONSTITUTIONAL NEGATIVE: 1
CARDIOVASCULAR NEGATIVE: 1
LOSS OF CONSCIOUSNESS: 0
COUGH: 1
SORE THROAT: 0
SPUTUM PRODUCTION: 1
CLAUDICATION: 0
NEUROLOGICAL NEGATIVE: 1
ORTHOPNEA: 0
STRIDOR: 0
FEVER: 0
WEAKNESS: 0
CHILLS: 0

## 2020-10-01 ASSESSMENT — FIBROSIS 4 INDEX: FIB4 SCORE: 1.35

## 2020-10-01 NOTE — PROGRESS NOTES
Chief Complaint   Patient presents with   • Heart Murmur       Subjective:   Marline Perry is a 64 y.o. female who presents today as a follow-up for her high risk medication usage mechanical aortic valve and mitral regurgitation with high cholesterol.  Her last cholesterol was at goal.  She is doing well the Crestor.  Her INRs have been controlled.  Her last echocardiogram performed a couple of days ago showed normal gradients to her aortic valve and mild to moderate mitral rotation.  She is otherwise having stable symptoms.    Past Medical History:   Diagnosis Date   • Bronchiectasis (HCC)    • Bronchitis    • Chickenpox    • COPD (chronic obstructive pulmonary disease) (HCC)    • Hyperlipidemia    • Influenza    • Migraines    • Mumps    • Nasal drainage    • Sjogren's disease (HCC)      Past Surgical History:   Procedure Laterality Date   • AORTIC VALVE REPLACEMENT     • BRONCHOSCOPY     • HYSTERECTOMY LAPAROSCOPY     • SINUSCOPE       Family History   Problem Relation Age of Onset   • Arthritis Mother    • Hypertension Father    • Kidney Disease Father    • Arthritis Sister    • No Known Problems Brother    • No Known Problems Brother    • No Known Problems Son    • Heart Attack Maternal Grandmother    • Stroke Maternal Grandfather    • Heart Disease Paternal Grandmother    • No Known Problems Paternal Grandfather      Social History     Socioeconomic History   • Marital status:      Spouse name: Not on file   • Number of children: Not on file   • Years of education: Not on file   • Highest education level: Not on file   Occupational History   • Not on file   Social Needs   • Financial resource strain: Not on file   • Food insecurity     Worry: Not on file     Inability: Not on file   • Transportation needs     Medical: Not on file     Non-medical: Not on file   Tobacco Use   • Smoking status: Never Smoker   • Smokeless tobacco: Never Used   Substance and Sexual Activity   • Alcohol use: Yes      Alcohol/week: 2.4 oz     Types: 4 Shots of liquor per week     Frequency: 2-3 times a week     Drinks per session: 1 or 2     Binge frequency: Never   • Drug use: No   • Sexual activity: Not on file   Lifestyle   • Physical activity     Days per week: Not on file     Minutes per session: Not on file   • Stress: Not on file   Relationships   • Social connections     Talks on phone: Not on file     Gets together: Not on file     Attends Voodoo service: Not on file     Active member of club or organization: Not on file     Attends meetings of clubs or organizations: Not on file     Relationship status: Not on file   • Intimate partner violence     Fear of current or ex partner: Not on file     Emotionally abused: Not on file     Physically abused: Not on file     Forced sexual activity: Not on file   Other Topics Concern   • Not on file   Social History Narrative   • Not on file     Allergies   Allergen Reactions   • Spironolactone Rash     ALL OVER BODY    • Neomycin Rash     CONTACT DERMATITIS    • Tape      Adhesives-RASH      Outpatient Encounter Medications as of 10/1/2020   Medication Sig Dispense Refill   • azithromycin (ZITHROMAX) 250 MG Tab Take 1 Tab by mouth every Monday, Wednesday, and Friday. 40 Tab 3   • gabapentin (NEURONTIN) 300 MG Cap Take 1 Cap by mouth every bedtime. 90 Cap 3   • SPIRIVA RESPIMAT 2.5 MCG/ACT Aero Soln INHALE 2 INHALATIONS BY MOUTH EVERY DAY . ASSEMBLE AND PRIME 1 Each 5   • BREO ELLIPTA 100-25 MCG/INH AEROSOL POWDER, BREATH ACTIVATED USE 1 INHALATION DAILY 180 Each 3   • sertraline (ZOLOFT) 25 MG tablet TAKE 1 TABLET DAILY 90 Tab 3   • amitriptyline (ELAVIL) 10 MG Tab TAKE 1 TABLET EVERY EVENING 90 Tab 3   • rosuvastatin (CRESTOR) 20 MG Tab TAKE 1 TABLET EVERY EVENING 90 Tab 3   • warfarin (COUMADIN) 5 MG Tab Take one-half to one tablet by mouth daily or as directed by anticoagulation clinic 90 Tab 3   • cyclosporin (RESTASIS) 0.05 % ophthalmic emulsion Place 1 Drop in both eyes 2  "times a day.     • SUMAtriptan (IMITREX) 50 MG Tab      • albuterol (PROVENTIL) 2.5mg/3ml Nebu Soln solution for nebulization 3 mL by Nebulization route every four hours as needed for Shortness of Breath. 360 mL 3   • ascorbic acid (ASCORBIC ACID) 500 MG Tab Take 500 mg by mouth every day.     • Biotin 12169 MCG Tab Take  by mouth.     • Flaxseed, Linseed, (FLAX SEED OIL) 1000 MG Cap Take  by mouth 2 Times a Day.     • Cholecalciferol (VITAMIN D3) 2000 units Tab Take  by mouth.     • methylPREDNISolone (MEDROL) 4 MG Tablet Therapy Pack Follow schedule on package instructions. (Patient not taking: Reported on 10/1/2020) 21 Tab 0     No facility-administered encounter medications on file as of 10/1/2020.      Review of Systems   Constitutional: Negative.  Negative for chills, fever and malaise/fatigue.   HENT: Negative.  Negative for sore throat.    Eyes: Negative.    Respiratory: Positive for cough and sputum production. Negative for hemoptysis, shortness of breath, wheezing and stridor.    Cardiovascular: Negative.  Negative for chest pain, palpitations, orthopnea, claudication, leg swelling and PND.   Gastrointestinal: Negative.    Genitourinary: Negative.    Musculoskeletal: Negative.    Skin: Negative.    Neurological: Negative.  Negative for dizziness, loss of consciousness and weakness.   Endo/Heme/Allergies: Negative.  Does not bruise/bleed easily.   All other systems reviewed and are negative.       Objective:   /76 (BP Location: Left arm, Patient Position: Sitting, BP Cuff Size: Adult)   Pulse 94   Resp 16   Ht 1.549 m (5' 1\")   Wt 58.9 kg (129 lb 12.8 oz)   SpO2 94%   BMI 24.53 kg/m²     Physical Exam   Constitutional: She appears well-developed and well-nourished. No distress.   HENT:   Head: Normocephalic and atraumatic.   Right Ear: External ear normal.   Left Ear: External ear normal.   Nose: Nose normal.   Mouth/Throat: No oropharyngeal exudate.   Eyes: Pupils are equal, round, and reactive " to light. Conjunctivae and EOM are normal. Right eye exhibits no discharge. Left eye exhibits no discharge. No scleral icterus.   Neck: Neck supple. No JVD present.   Cardiovascular: Normal rate, regular rhythm and intact distal pulses. Exam reveals no gallop and no friction rub.   Murmur heard.  Mechanical aortic valve sounds with a preserved A2   Pulmonary/Chest: Effort normal. No stridor. No respiratory distress. She has no wheezes. She has no rales. She exhibits no tenderness.   Abdominal: Soft. She exhibits no distension. There is no guarding.   Musculoskeletal: Normal range of motion.         General: No tenderness, deformity or edema.   Neurological: She is alert. She has normal reflexes. She displays normal reflexes. No cranial nerve deficit. She exhibits normal muscle tone. Coordination normal.   Skin: Skin is warm and dry. No rash noted. She is not diaphoretic. No erythema. No pallor.   Psychiatric: She has a normal mood and affect. Her behavior is normal. Judgment and thought content normal.   Nursing note and vitals reviewed.    Echocardiogram: Dated 9/22/2020 personally under myself showing normal LV systolic function and aortic valve which is functioning well and mild to moderate MR.    Echocardiogram: Dated 8/30/2019 personally interpreted myself showing normal LV systolic function mild to moderate MR and a normal aortic valve.    Lab Results   Component Value Date/Time    CHOLSTRLTOT 151 02/07/2020 10:50 AM    LDL 86 02/07/2020 10:50 AM    HDL 50 02/07/2020 10:50 AM    TRIGLYCERIDE 74 02/07/2020 10:50 AM       Lab Results   Component Value Date/Time    SODIUM 139 03/12/2020 09:30 AM    POTASSIUM 4.3 03/12/2020 09:30 AM    CHLORIDE 107 03/12/2020 09:30 AM    CO2 25 03/12/2020 09:30 AM    GLUCOSE 88 03/12/2020 09:30 AM    BUN 22 03/12/2020 09:30 AM    CREATININE 0.97 03/12/2020 09:30 AM     Lab Results   Component Value Date/Time    ALKPHOSPHAT 77 03/12/2020 09:30 AM    ASTSGOT 35 03/12/2020 09:30 AM     ALTSGPT 44 03/12/2020 09:30 AM    TBILIRUBIN 0.5 03/12/2020 09:30 AM        Assessment:     1. Fibromyalgia     2. Hypercholesteremia     3. Nonrheumatic aortic valve insufficiency     4. Non-rheumatic mitral regurgitation     5. S/P AVR (aortic valve replacement)     6. Thoracic aortic aneurysm without rupture (HCC)     7. Warfarin anticoagulation     8. Decreased GFR     9. Diffuse connective tissue disease (HCC)     10. Bronchiectasis without complication (HCC)         Medical Decision Making:  Today's Assessment / Status / Plan:     64-year-old female with mechanical aortic valve replaced in 2004 with hypertension hyperlipidemia thoracic aortic aneurysm and MR.  We will follow-up on all these.  She needs an annual echo for her mechanical aortic valve.  Otherwise I refill her medications.  We will see her back in 1 year.    1. Mechanical AVR    - annual echo     2. MR    - clinical follow up     3. HLD     - cont rosuva 20     4. Thoracic Aortic Aneursym    - CT in 2018 was normal

## 2020-10-02 LAB
C3 SERPL-MCNC: 149.9 MG/DL (ref 87–200)
C4 SERPL-MCNC: 43.3 MG/DL (ref 19–52)
RHEUMATOID FACT SER IA-ACNC: 11 IU/ML (ref 0–14)
VIT B12 SERPL-MCNC: 937 PG/ML (ref 211–911)

## 2020-10-19 LAB — INR PPP: 3.2 (ref 2–3.5)

## 2020-10-20 ENCOUNTER — ANTICOAGULATION MONITORING (OUTPATIENT)
Dept: VASCULAR LAB | Facility: MEDICAL CENTER | Age: 65
End: 2020-10-20

## 2020-10-20 DIAGNOSIS — Z95.2 S/P AVR (AORTIC VALVE REPLACEMENT): ICD-10-CM

## 2020-10-20 NOTE — PROGRESS NOTES
OP   Telephone Anticoagulation Service Note      Anticoagulation Summary  As of 10/20/2020    INR goal:  2.0-3.0   TTR:  49.9 % (2.7 y)   INR used for dosing:  3.20 (10/19/2020)   Warfarin maintenance plan:  2.5 mg (5 mg x 0.5) every Mon, Wed, Fri; 5 mg (5 mg x 1) all other days   Weekly warfarin total:  27.5 mg   Plan last modified:  Filiberto Weber, Pharmacy Intern (9/14/2020)   Next INR check:  11/2/2020   Target end date:  Indefinite    Indications    S/P AVR (aortic valve replacement) [Z95.2]             Anticoagulation Episode Summary     INR check location:  Home Draw    Preferred lab:      Send INR reminders to:      Comments:  Denny       Anticoagulation Care Providers     Provider Role Specialty Phone number    Chinyere Marcus M.D. Referring Cardiology [phone]    Yossi Khalil, PharmD Responsible          Anticoagulation Patient Findings  Patient Findings     Positives:  Extra doses    Comments:  Patient did not take 2.5mg TIW, was only doing it BIW         Spoke with the patient on the phone today, reporting a SUPRA-therapeutic INR of 3.2. Confirmed the current warfarin dosing regimen and patient compliance. Patient reports taking 2.5mg only BIW instead of TIW as documented, as she thought it was only a one time adjustment.  Patient denies any interval changes to diet and/or medications. Patient denies any signs/symptoms of bleeding or clotting.  Patient instructed to begin correct weekly regimen to 2.5mg on Mon, Wed, Fri and 5mg ROW. Patient will retest again in 2 weeks.     Pollo Patel  PharmD

## 2020-11-06 ENCOUNTER — ANTICOAGULATION MONITORING (OUTPATIENT)
Dept: VASCULAR LAB | Facility: MEDICAL CENTER | Age: 65
End: 2020-11-06

## 2020-11-06 DIAGNOSIS — Z95.2 S/P AVR (AORTIC VALVE REPLACEMENT): ICD-10-CM

## 2020-11-06 LAB — INR PPP: 3.2 (ref 2–3.5)

## 2020-11-06 NOTE — PROGRESS NOTES
"OP   Telephone Anticoagulation Service Note      Anticoagulation Summary  As of 11/6/2020    INR goal:  2.0-3.0   TTR:  49.0 % (2.8 y)   INR used for dosing:  3.20 (11/6/2020)   Warfarin maintenance plan:  2.5 mg (5 mg x 0.5) every Mon, Wed, Fri; 5 mg (5 mg x 1) all other days   Weekly warfarin total:  27.5 mg   Plan last modified:  Anay Patel (11/6/2020)   Next INR check:  11/20/2020   Target end date:  Indefinite    Indications    S/P AVR (aortic valve replacement) [Z95.2]             Anticoagulation Episode Summary     INR check location:  Home Draw    Preferred lab:      Send INR reminders to:      Comments:  Denny CELESTIN      Anticoagulation Care Providers     Provider Role Specialty Phone number    Chinyere Marcus M.D. Referring Cardiology [phone]    Yossi Khalil, PharmD Responsible          Anticoagulation Patient Findings  Patient Findings     Positives:  Change in diet/appetite         Spoke with the patient on the phone today, reporting a SUPRA-therapeutic INR of 3.2. Confirmed the current warfarin dosing regimen and patient compliance. Patient reports she has NOT been eating her greens per her \"usual\". Patient denies any interval changes to medications. Patient denies any signs/symptoms of bleeding or clotting.  Recommend that the patient begin reduced weekly regimen, (5mg Sun, Tues, Thurs and 2.5mg ROW),  but patient declined dose change, as she is sure it is due to diet this past week. She agrees to HOLD her dose TONIGHT ONLY, then resume her current regimen. Patient will recheck in 2 weeks.     Pollo Patel  PharmD      "

## 2020-11-25 ENCOUNTER — ANTICOAGULATION MONITORING (OUTPATIENT)
Dept: VASCULAR LAB | Facility: MEDICAL CENTER | Age: 65
End: 2020-11-25

## 2020-11-25 DIAGNOSIS — Z95.2 S/P AVR (AORTIC VALVE REPLACEMENT): ICD-10-CM

## 2020-11-25 LAB — INR PPP: 3 (ref 2–3.5)

## 2020-11-25 NOTE — PROGRESS NOTES
OP   Telephone Anticoagulation Service Note      Anticoagulation Summary  As of 11/25/2020    INR goal:  2.0-3.0   TTR:  48.1 % (2.8 y)   INR used for dosing:  3.00 (11/25/2020)   Warfarin maintenance plan:  2.5 mg (5 mg x 0.5) every Mon, Wed, Fri; 5 mg (5 mg x 1) all other days   Weekly warfarin total:  27.5 mg   No change documented:  Anay Patel   Plan last modified:  Anay Patel (11/6/2020)   Next INR check:  12/9/2020   Target end date:  Indefinite    Indications    S/P AVR (aortic valve replacement) [Z95.2]             Anticoagulation Episode Summary     INR check location:  Home Draw    Preferred lab:      Send INR reminders to:      Comments:  Denny CELESTIN      Anticoagulation Care Providers     Provider Role Specialty Phone number    Chinyere Marcus M.D. Referring Cardiology [phone]    Santo EncisoD Responsible          Anticoagulation Patient Findings    Spoke with the patient on the phone today, reporting a therapeutic INR of 3.0.   Confirmed the current dosing regimen.   Patient denies any interval changes to diet and/or medications. Patient denies any signs/symptoms of bleeding or clotting.  Patient instructed to continue with the current warfarin dosing regimen, and asked to follow up again in 2 weeks.     Pollo Patel  PharmD

## 2020-12-16 ENCOUNTER — ANTICOAGULATION MONITORING (OUTPATIENT)
Dept: VASCULAR LAB | Facility: MEDICAL CENTER | Age: 65
End: 2020-12-16

## 2020-12-16 DIAGNOSIS — Z95.2 S/P AVR (AORTIC VALVE REPLACEMENT): ICD-10-CM

## 2020-12-16 LAB — INR PPP: 2.8 (ref 2–3.5)

## 2020-12-17 NOTE — PROGRESS NOTES
Anticoagulation Summary  As of 2020    INR goal:  2.0-3.0   TTR:  49.2 % (2.9 y)   INR used for dosin.80 (2020)   Warfarin maintenance plan:  2.5 mg (5 mg x 0.5) every Mon, Wed, Fri; 5 mg (5 mg x 1) all other days   Weekly warfarin total:  27.5 mg   Plan last modified:  Anay Patel (2020)   Next INR check:  2020   Target end date:  Indefinite    Indications    S/P AVR (aortic valve replacement) [Z95.2]             Anticoagulation Episode Summary     INR check location:  Home Draw    Preferred lab:      Send INR reminders to:      Comments:  Denny CELESTIN      Anticoagulation Care Providers     Provider Role Specialty Phone number    Chinyere Marcus M.D. Referring Cardiology [phone]    Santo EncisoD Responsible          Anticoagulation Patient Findings      Spoke with patient to report a therapeutic INR.    Pt instructed to continue with current warfarin dosing regimen, confirms dosing.   Pt denies any s/s of bleeding, bruising, clotting or any changes to diet or medication.    Will follow up in 2 week(s).     Diann Gonzalez, PharmD

## 2021-01-02 ENCOUNTER — ANTICOAGULATION MONITORING (OUTPATIENT)
Dept: VASCULAR LAB | Facility: MEDICAL CENTER | Age: 66
End: 2021-01-02

## 2021-01-02 DIAGNOSIS — Z95.2 S/P AVR (AORTIC VALVE REPLACEMENT): ICD-10-CM

## 2021-01-02 LAB — INR PPP: 2.9 (ref 2–3.5)

## 2021-01-04 NOTE — PROGRESS NOTES
OP Anticoagulation Service Note    Date: 2021    Anticoagulation Summary  As of 2021    INR goal:  2.0-3.0   TTR:  50.0 % (2.9 y)   INR used for dosin.90 (2021)   Warfarin maintenance plan:  2.5 mg (5 mg x 0.5) every Mon, Wed, Fri; 5 mg (5 mg x 1) all other days   Weekly warfarin total:  27.5 mg   Plan last modified:  Anay Patel (2020)   Next INR check:  2021   Target end date:  Indefinite    Indications    S/P AVR (aortic valve replacement) [Z95.2]             Anticoagulation Episode Summary     INR check location:  Home Draw    Preferred lab:      Send INR reminders to:      Comments:  Denny CELESTIN      Anticoagulation Care Providers     Provider Role Specialty Phone number    Chinyere Marcus M.D. Referring Cardiology [phone]    Yossi Khalil, PharmD Responsible          Anticoagulation Patient Findings      This appointment was conducted over the phone.     HPI:   The reason for today's call is to prevent morbidity and mortality from a blood clot and/or stroke and to reduce the risk of bleeding while on a anticoagulant.     PCP:  Lynne Shaw D.O.  [facility] [address]    Assessment:     • INR  therapeutic.       Current Outpatient Medications:   •  azithromycin, 250 mg, Oral, MO, WE + FR  •  methylPREDNISolone, Follow schedule on package instructions.  •  gabapentin, 300 mg, Oral, QHS  •  Spiriva Respimat, INHALE 2 INHALATIONS BY MOUTH EVERY DAY . ASSEMBLE AND PRIME  •  Breo Ellipta, USE 1 INHALATION DAILY  •  sertraline, TAKE 1 TABLET DAILY  •  amitriptyline, TAKE 1 TABLET EVERY EVENING  •  rosuvastatin, TAKE 1 TABLET EVERY EVENING  •  warfarin, Take one-half to one tablet by mouth daily or as directed by anticoagulation clinic  •  cyclosporin, 1 Drop, Both Eyes, BID  •  SUMAtriptan,   •  albuterol, 2.5 mg, Nebulization, Q4HRS PRN  •  ascorbic acid, 500 mg, Oral, DAILY  •  Biotin, Take  by mouth.  •  Flax Seed Oil, Take  by  mouth 2 Times a Day.  •  Vitamin D3, Take  by mouth.      Plan:     • Continue the same warfarin dose, as noted above.       Follow-up:     • Our protocol suggests we test in 2 weeks.        Additional information discussed with patient:     • Asked patient to please call the anticoagulation clinic if they have any signs/symptoms of bleeding and/or thrombosis or any changes to diet or medications.      National recommendations regarding anticoagulation therapy:     The CHEST guidelines recommends frequent INR monitoring at regular intervals (a few days up to a max of 12 weeks) to ensure patients are on the proper dose of warfarin, and patients are not having any complications from therapy.  INRs can dramatically change over a short time period due to diet, medications, and medical conditions.       Brian Ribera, PharmD, MS, BCACP, [facility]  Phone [phone] fax [phone]    This note was created using voice recognition software (Dragon). The accuracy of the dictation is limited by the abilities of the software. I have reviewed the note prior to signing, however some errors in grammar and context are still possible. If you have any questions related to this note please do not hesitate to contact our office.

## 2021-01-14 ENCOUNTER — PATIENT MESSAGE (OUTPATIENT)
Dept: SLEEP MEDICINE | Facility: MEDICAL CENTER | Age: 66
End: 2021-01-14

## 2021-01-14 DIAGNOSIS — J47.1 BRONCHIECTASIS WITH (ACUTE) EXACERBATION (HCC): ICD-10-CM

## 2021-01-14 DIAGNOSIS — J47.9 BRONCHIECTASIS WITHOUT COMPLICATION (HCC): ICD-10-CM

## 2021-01-15 RX ORDER — GUAIFENESIN 200 MG/10ML
10 LIQUID ORAL EVERY 6 HOURS PRN
Qty: 180 ML | Refills: 2 | Status: SHIPPED | OUTPATIENT
Start: 2021-01-15 | End: 2021-06-02

## 2021-01-15 RX ORDER — METHYLPREDNISOLONE 4 MG/1
TABLET ORAL
Qty: 21 TAB | Refills: 0 | Status: SHIPPED | OUTPATIENT
Start: 2021-01-15 | End: 2021-06-02

## 2021-01-15 RX ORDER — DOXYCYCLINE HYCLATE 100 MG/1
100 CAPSULE ORAL 2 TIMES DAILY
Qty: 20 CAP | Refills: 0 | Status: SHIPPED | OUTPATIENT
Start: 2021-01-15 | End: 2021-06-02

## 2021-01-15 NOTE — TELEPHONE ENCOUNTER
From: Marline Perry  To: Kassandra Miguel P.A.-C.  Sent: 1/14/2021 3:54 PM PST  Subject: Prescription Question    Ms. Miguel:  Have increased sputum last couple days it's ugly grayish green color - like with past infections that I've had. - at least it's been a couple years I think since this happened. Also more rattling/raspy when I cough. Shall I increase antibiotic? And/or do you want to prescribe a course of something else, or more powerful to clear?  Thank you, Sabine

## 2021-01-16 NOTE — PROGRESS NOTES
Patient contacted, reports increase in productive cough, baseline level of fatigue, denies exposures, fever, change in smell or taste, sore throat, headache.  Provided with antibiotic course, mild steroids, guaifenesin.  Advised to seek medical care if experiences worsening of current symptoms or development of fever, HA, sore throat, chest discomfort.

## 2021-01-22 ENCOUNTER — ANTICOAGULATION MONITORING (OUTPATIENT)
Dept: VASCULAR LAB | Facility: MEDICAL CENTER | Age: 66
End: 2021-01-22

## 2021-01-22 DIAGNOSIS — Z95.2 S/P AVR (AORTIC VALVE REPLACEMENT): ICD-10-CM

## 2021-01-22 LAB — INR PPP: 3.3 (ref 2–3.5)

## 2021-01-22 NOTE — PROGRESS NOTES
Anticoagulation Summary  As of 1/22/2021    INR goal:  2.0-3.0   TTR:  49.5 % (3 y)   INR used for dosing:  3.30 (1/22/2021)   Warfarin maintenance plan:  2.5 mg (5 mg x 0.5) every Mon, Wed, Fri; 5 mg (5 mg x 1) all other days   Weekly warfarin total:  27.5 mg   Plan last modified:  Anay Patel (11/6/2020)   Next INR check:  1/26/2021   Target end date:  Indefinite    Indications    S/P AVR (aortic valve replacement) [Z95.2]             Anticoagulation Episode Summary     INR check location:  Home Draw    Preferred lab:      Send INR reminders to:      Comments:  Denny CELESTIN      Anticoagulation Care Providers     Provider Role Specialty Phone number    Chinyere Marcus M.D. Referring Cardiology [phone]    Yossi Khalil, PharmD Responsible          Anticoagulation Patient Findings  Patient Findings     Positives:  Change in medications (10 day course of doxycycline)    Negatives:  Signs/symptoms of thrombosis, Signs/symptoms of bleeding, Laboratory test error suspected, Change in health, Change in alcohol use, Change in activity, Upcoming invasive procedure, Emergency department visit, Upcoming dental procedure, Missed doses, Extra doses, Change in diet/appetite, Hospital admission, Bruising, Other complaints         Spoke with patient today regarding supratherapeutic INR of 3.3.  Patient denies any signs/symptoms of bruising or bleeding or any changes in diet.  Instructed patient to call clinic with any questions or concerns.  She is seven days into 10 day course of doxycycline.  Also prescribed medrol dose pack, but has not started this.  Instructed her to HOLD X 1, then resume current warfarin regimen.  Follow up in 3 days, to reduce risk of adverse events related to this high risk medication,  Warfarin.    Tulio Sotelo, SantoD, BCACP

## 2021-02-10 ENCOUNTER — ANTICOAGULATION MONITORING (OUTPATIENT)
Dept: VASCULAR LAB | Facility: MEDICAL CENTER | Age: 66
End: 2021-02-10

## 2021-02-10 DIAGNOSIS — Z95.2 S/P AVR (AORTIC VALVE REPLACEMENT): ICD-10-CM

## 2021-02-10 LAB — INR PPP: 3.1 (ref 2–3.5)

## 2021-02-10 NOTE — PROGRESS NOTES
Anticoagulation Summary  As of 2/10/2021    INR goal:  2.0-3.0   TTR:  48.7 % (3 y)   INR used for dosing:  3.10 (2/10/2021)   Warfarin maintenance plan:  2.5 mg (5 mg x 0.5) every Mon, Wed, Fri; 5 mg (5 mg x 1) all other days   Weekly warfarin total:  27.5 mg   Plan last modified:  Anay Patel (11/6/2020)   Next INR check:  2/24/2021   Target end date:  Indefinite    Indications    S/P AVR (aortic valve replacement) [Z95.2]             Anticoagulation Episode Summary     INR check location:  Home Draw    Preferred lab:      Send INR reminders to:      Comments:  Denny CELESTIN      Anticoagulation Care Providers     Provider Role Specialty Phone number    Chinyere Marcus M.D. Referring Cardiology [phone]    Yossi Khalil, PharmD Responsible          Anticoagulation Patient Findings  Patient Findings     Negatives:  Signs/symptoms of thrombosis, Signs/symptoms of bleeding, Laboratory test error suspected, Change in health, Change in alcohol use, Change in activity, Upcoming invasive procedure, Emergency department visit, Upcoming dental procedure, Missed doses, Extra doses, Change in medications, Change in diet/appetite, Hospital admission, Bruising, Other complaints        Spoke with patient today regarding supratherapeutic INR of 3.1.  Patient denies any signs/symptoms of bruising or bleeding or any changes in diet and medications.  Instructed patient to call clinic with any questions or concerns.  Instructed patient to HOLD X 1, then resume current warfarin regimen.  Follow up in 2 weeks, to reduce risk of adverse events related to this high risk medication,  Warfarin.    Tulio Sotelo, SantoD, BCACP

## 2021-03-01 ENCOUNTER — ANTICOAGULATION MONITORING (OUTPATIENT)
Dept: MEDICAL GROUP | Facility: MEDICAL CENTER | Age: 66
End: 2021-03-01

## 2021-03-01 DIAGNOSIS — Z95.2 S/P AVR (AORTIC VALVE REPLACEMENT): ICD-10-CM

## 2021-03-01 LAB — INR PPP: 3 (ref 2–3.5)

## 2021-03-02 NOTE — PROGRESS NOTES
OP   Telephone Anticoagulation Service Note      Anticoagulation Summary  As of 3/1/2021    INR goal:  2.0-3.0   TTR:  47.8 % (3.1 y)   INR used for dosing:  3.00 (3/1/2021)   Warfarin maintenance plan:  2.5 mg (5 mg x 0.5) every Mon, Wed, Fri; 5 mg (5 mg x 1) all other days   Weekly warfarin total:  27.5 mg   No change documented:  Anay Patel   Plan last modified:  Anay Patel (11/6/2020)   Next INR check:  3/15/2021   Target end date:  Indefinite    Indications    S/P AVR (aortic valve replacement) [Z95.2]             Anticoagulation Episode Summary     INR check location:  Home Draw    Preferred lab:      Send INR reminders to:      Comments:  Denny CELESTIN      Anticoagulation Care Providers     Provider Role Specialty Phone number    Chinyere Marcus M.D. Referring Cardiology [phone]    Yossi Khalil, PharmD Responsible          Anticoagulation Patient Findings  Patient Findings     Negatives:  Signs/symptoms of thrombosis, Signs/symptoms of bleeding, Laboratory test error suspected, Change in health, Change in alcohol use, Change in activity, Upcoming invasive procedure, Emergency department visit, Upcoming dental procedure, Missed doses, Extra doses, Change in medications, Change in diet/appetite, Hospital admission, Bruising, Other complaints         Spoke with the patient on the phone today, reporting a therapeutic INR of 3.0.  Confirmed the current warfarin dosing regimen and patient compliance.  Patient denies any interval changes to diet and/or medications. Patient denies any signs/symptoms of bleeding or clotting.  Patient instructed to continue with the current warfarin dosing regimen, and asked to follow up again in 2 weeks.     Pollo Patel  PharmD

## 2021-03-10 ENCOUNTER — HOSPITAL ENCOUNTER (OUTPATIENT)
Dept: LAB | Facility: MEDICAL CENTER | Age: 66
End: 2021-03-10
Attending: INTERNAL MEDICINE
Payer: MEDICARE

## 2021-03-10 DIAGNOSIS — M35.00 SJOGREN'S SYNDROME WITHOUT EXTRAGLANDULAR INVOLVEMENT (HCC): ICD-10-CM

## 2021-03-10 LAB
ALBUMIN SERPL BCP-MCNC: 4.4 G/DL (ref 3.2–4.9)
ALBUMIN/GLOB SERPL: 1.2 G/DL
ALP SERPL-CCNC: 104 U/L (ref 30–99)
ALT SERPL-CCNC: 63 U/L (ref 2–50)
ANION GAP SERPL CALC-SCNC: 10 MMOL/L (ref 7–16)
APPEARANCE UR: CLEAR
AST SERPL-CCNC: 45 U/L (ref 12–45)
BACTERIA #/AREA URNS HPF: NEGATIVE /HPF
BASOPHILS # BLD AUTO: 0.9 % (ref 0–1.8)
BASOPHILS # BLD: 0.07 K/UL (ref 0–0.12)
BILIRUB SERPL-MCNC: 0.4 MG/DL (ref 0.1–1.5)
BILIRUB UR QL STRIP.AUTO: NEGATIVE
BUN SERPL-MCNC: 15 MG/DL (ref 8–22)
C3 SERPL-MCNC: 162.5 MG/DL (ref 87–200)
C4 SERPL-MCNC: 38.3 MG/DL (ref 19–52)
CALCIUM SERPL-MCNC: 10 MG/DL (ref 8.5–10.5)
CHLORIDE SERPL-SCNC: 101 MMOL/L (ref 96–112)
CO2 SERPL-SCNC: 26 MMOL/L (ref 20–33)
COLOR UR: YELLOW
CREAT SERPL-MCNC: 0.85 MG/DL (ref 0.5–1.4)
CRP SERPL HS-MCNC: 0.25 MG/DL (ref 0–0.75)
EOSINOPHIL # BLD AUTO: 0.1 K/UL (ref 0–0.51)
EOSINOPHIL NFR BLD: 1.3 % (ref 0–6.9)
EPI CELLS #/AREA URNS HPF: ABNORMAL /HPF
ERYTHROCYTE [DISTWIDTH] IN BLOOD BY AUTOMATED COUNT: 47.2 FL (ref 35.9–50)
ERYTHROCYTE [SEDIMENTATION RATE] IN BLOOD BY WESTERGREN METHOD: 2 MM/HOUR (ref 0–30)
GLOBULIN SER CALC-MCNC: 3.6 G/DL (ref 1.9–3.5)
GLUCOSE SERPL-MCNC: 76 MG/DL (ref 65–99)
GLUCOSE UR STRIP.AUTO-MCNC: NEGATIVE MG/DL
HCT VFR BLD AUTO: 47.4 % (ref 37–47)
HGB BLD-MCNC: 15.5 G/DL (ref 12–16)
IMM GRANULOCYTES # BLD AUTO: 0.01 K/UL (ref 0–0.11)
IMM GRANULOCYTES NFR BLD AUTO: 0.1 % (ref 0–0.9)
KETONES UR STRIP.AUTO-MCNC: NEGATIVE MG/DL
LEUKOCYTE ESTERASE UR QL STRIP.AUTO: ABNORMAL
LYMPHOCYTES # BLD AUTO: 1.85 K/UL (ref 1–4.8)
LYMPHOCYTES NFR BLD: 24.3 % (ref 22–41)
MCH RBC QN AUTO: 30.2 PG (ref 27–33)
MCHC RBC AUTO-ENTMCNC: 32.7 G/DL (ref 33.6–35)
MCV RBC AUTO: 92.4 FL (ref 81.4–97.8)
MICRO URNS: ABNORMAL
MONOCYTES # BLD AUTO: 0.66 K/UL (ref 0–0.85)
MONOCYTES NFR BLD AUTO: 8.7 % (ref 0–13.4)
NEUTROPHILS # BLD AUTO: 4.91 K/UL (ref 2–7.15)
NEUTROPHILS NFR BLD: 64.7 % (ref 44–72)
NITRITE UR QL STRIP.AUTO: NEGATIVE
NRBC # BLD AUTO: 0 K/UL
NRBC BLD-RTO: 0 /100 WBC
PH UR STRIP.AUTO: 6 [PH] (ref 5–8)
PLATELET # BLD AUTO: 222 K/UL (ref 164–446)
PMV BLD AUTO: 9.8 FL (ref 9–12.9)
POTASSIUM SERPL-SCNC: 4.8 MMOL/L (ref 3.6–5.5)
PROT SERPL-MCNC: 8 G/DL (ref 6–8.2)
PROT UR QL STRIP: NEGATIVE MG/DL
RBC # BLD AUTO: 5.13 M/UL (ref 4.2–5.4)
RBC # URNS HPF: ABNORMAL /HPF
RBC UR QL AUTO: ABNORMAL
SODIUM SERPL-SCNC: 137 MMOL/L (ref 135–145)
SP GR UR STRIP.AUTO: >=1.03
UROBILINOGEN UR STRIP.AUTO-MCNC: 0.2 MG/DL
WBC # BLD AUTO: 7.6 K/UL (ref 4.8–10.8)
WBC #/AREA URNS HPF: ABNORMAL /HPF

## 2021-03-10 PROCEDURE — 81001 URINALYSIS AUTO W/SCOPE: CPT

## 2021-03-10 PROCEDURE — 80053 COMPREHEN METABOLIC PANEL: CPT

## 2021-03-10 PROCEDURE — 86160 COMPLEMENT ANTIGEN: CPT

## 2021-03-10 PROCEDURE — 86140 C-REACTIVE PROTEIN: CPT

## 2021-03-10 PROCEDURE — 36415 COLL VENOUS BLD VENIPUNCTURE: CPT

## 2021-03-10 PROCEDURE — 85652 RBC SED RATE AUTOMATED: CPT

## 2021-03-10 PROCEDURE — 85025 COMPLETE CBC W/AUTO DIFF WBC: CPT

## 2021-03-18 LAB — INR PPP: 3.1 (ref 2–3.5)

## 2021-03-19 ENCOUNTER — TELEPHONE (OUTPATIENT)
Dept: CARDIOLOGY | Facility: MEDICAL CENTER | Age: 66
End: 2021-03-19

## 2021-03-19 NOTE — TELEPHONE ENCOUNTER
RO    Pt called stating she changed insurance from [employer] to Medicare and was told she needs to get a prescription for a new coumadin testing machine because she is no longer able to order the testing strips. If any questions please call Pt back at [phone].    Thank you

## 2021-03-22 ENCOUNTER — ANTICOAGULATION MONITORING (OUTPATIENT)
Dept: MEDICAL GROUP | Facility: PHYSICIAN GROUP | Age: 66
End: 2021-03-22

## 2021-03-22 DIAGNOSIS — Z95.2 S/P AVR (AORTIC VALVE REPLACEMENT): ICD-10-CM

## 2021-03-22 NOTE — PROGRESS NOTES
Anticoagulation Summary  As of 3/22/2021    INR goal:  2.0-3.0   TTR:  47.1 % (3.1 y)   INR used for dosing:  3.10 (3/18/2021)   Warfarin maintenance plan:  2.5 mg (5 mg x 0.5) every Mon, Wed, Fri; 5 mg (5 mg x 1) all other days   Weekly warfarin total:  27.5 mg   Plan last modified:  Anay Patel (11/6/2020)   Next INR check:  4/5/2021   Target end date:  Indefinite    Indications    S/P AVR (aortic valve replacement) [Z95.2]             Anticoagulation Episode Summary     INR check location:  Home Draw    Preferred lab:      Send INR reminders to:      Comments:  Denny       Anticoagulation Care Providers     Provider Role Specialty Phone number    Chinyere Marcus M.D. Referring Cardiology [phone]    Santo EncisoD Responsible          Anticoagulation Patient Findings      Spoke with pt.  INR is slight supratherapeutic.   Pt denies any unusual s/s of bleeding, bruising, clotting or any changes to medications. Denies any etoh, cranberries, supplements, or illness.   Pt reports decreased vit K intake  Pt verifies warfarin weekly dosing.     Will have pt continue regimen and increase vit K    Repeat INR in 2 week(s).     Diann Gonzalez, PharmD

## 2021-03-24 ENCOUNTER — TELEPHONE (OUTPATIENT)
Dept: VASCULAR LAB | Facility: MEDICAL CENTER | Age: 66
End: 2021-03-24

## 2021-03-24 DIAGNOSIS — Z79.01 CHRONIC ANTICOAGULATION: ICD-10-CM

## 2021-03-24 NOTE — TELEPHONE ENCOUNTER
[facility]    Pt recently enrolled in Medicare. She currently has HM with [employer] and was getting test strips from [facility]. However, [facility] is not a Medicare provider. Pt is needing a new prescription for test strips for another vendor that is contracted with Medicare.     Will fwd to Libby to help coordinate.    Diann Gonzalez, SantoD

## 2021-03-26 RX ORDER — WARFARIN SODIUM 5 MG/1
TABLET ORAL
Qty: 90 TABLET | Refills: 1 | Status: SHIPPED | OUTPATIENT
Start: 2021-03-26 | End: 2021-09-01

## 2021-04-05 ENCOUNTER — ANTICOAGULATION MONITORING (OUTPATIENT)
Dept: VASCULAR LAB | Facility: MEDICAL CENTER | Age: 66
End: 2021-04-05

## 2021-04-05 DIAGNOSIS — Z95.2 S/P AVR (AORTIC VALVE REPLACEMENT): ICD-10-CM

## 2021-04-05 LAB — INR PPP: 2.1 (ref 2–3.5)

## 2021-04-05 NOTE — PROGRESS NOTES
Anticoagulation Summary  As of 2021    INR goal:  2.0-3.0   TTR:  47.8 % (3.2 y)   INR used for dosin.10 (2021)   Warfarin maintenance plan:  2.5 mg (5 mg x 0.5) every Mon, Wed, Fri; 5 mg (5 mg x 1) all other days   Weekly warfarin total:  27.5 mg   Plan last modified:  Anay Patel (2020)   Next INR check:  5/3/2021   Target end date:  Indefinite    Indications    S/P AVR (aortic valve replacement) [Z95.2]             Anticoagulation Episode Summary     INR check location:  Home Draw    Preferred lab:      Send INR reminders to:      Comments:  Denny CELESTIN      Anticoagulation Care Providers     Provider Role Specialty Phone number    Chinyere Marcus M.D. Referring Cardiology [phone]    Yossi Khalil, PharmD Responsible          Anticoagulation Patient Findings          Spoke with patient today regarding therapeutic INR of 2.10.  Patient denies any signs/symptoms of bruising or bleeding or any changes in diet and medications.  Instructed patient to call clinic with any questions or concerns.  Follow up in 2 weeks, to reduce risk of adverse events related to this high risk medication,  Warfarin.    Luiza Grewal - Student Pharmacist

## 2021-04-09 DIAGNOSIS — E78.5 HYPERLIPIDEMIA, UNSPECIFIED HYPERLIPIDEMIA TYPE: ICD-10-CM

## 2021-04-12 ENCOUNTER — OFFICE VISIT (OUTPATIENT)
Dept: SLEEP MEDICINE | Facility: MEDICAL CENTER | Age: 66
End: 2021-04-12
Payer: MEDICARE

## 2021-04-12 VITALS
WEIGHT: 130 LBS | DIASTOLIC BLOOD PRESSURE: 80 MMHG | HEIGHT: 61 IN | RESPIRATION RATE: 16 BRPM | HEART RATE: 88 BPM | SYSTOLIC BLOOD PRESSURE: 130 MMHG | OXYGEN SATURATION: 93 % | BODY MASS INDEX: 24.55 KG/M2

## 2021-04-12 DIAGNOSIS — J30.2 SEASONAL ALLERGIC RHINITIS, UNSPECIFIED TRIGGER: ICD-10-CM

## 2021-04-12 DIAGNOSIS — J47.9 BRONCHIECTASIS WITHOUT COMPLICATION (HCC): ICD-10-CM

## 2021-04-12 DIAGNOSIS — M35.00 SJOGREN'S SYNDROME, WITH UNSPECIFIED ORGAN INVOLVEMENT (HCC): ICD-10-CM

## 2021-04-12 DIAGNOSIS — Z95.2 S/P AVR (AORTIC VALVE REPLACEMENT): ICD-10-CM

## 2021-04-12 PROCEDURE — 99213 OFFICE O/P EST LOW 20 MIN: CPT | Performed by: PHYSICIAN ASSISTANT

## 2021-04-12 RX ORDER — ROSUVASTATIN CALCIUM 20 MG/1
TABLET, COATED ORAL
Qty: 90 TABLET | Refills: 2 | Status: SHIPPED | OUTPATIENT
Start: 2021-04-12 | End: 2021-11-22 | Stop reason: SDUPTHER

## 2021-04-12 RX ORDER — TIOTROPIUM BROMIDE INHALATION SPRAY 3.12 UG/1
SPRAY, METERED RESPIRATORY (INHALATION)
Qty: 1 EACH | Refills: 5 | Status: SHIPPED | OUTPATIENT
Start: 2021-04-12 | End: 2021-09-21

## 2021-04-12 ASSESSMENT — ENCOUNTER SYMPTOMS
TREMORS: 0
DIZZINESS: 0
SORE THROAT: 0
SPUTUM PRODUCTION: 1
WEIGHT LOSS: 0
FEVER: 0
PALPITATIONS: 0
COUGH: 1
SINUS PAIN: 0
HEADACHES: 1
HEARTBURN: 0
CHILLS: 0
WHEEZING: 1
SHORTNESS OF BREATH: 1

## 2021-04-12 ASSESSMENT — FIBROSIS 4 INDEX: FIB4 SCORE: 1.66

## 2021-04-12 NOTE — PATIENT INSTRUCTIONS
1-continue BREO and Spiriva  2-trial flonase nasal spray, assess for benefit  3-consider substituting if needed antihistamine such as zyrtec or ceterizine, claritin or loratidine, allegra or fexofenadine, xyzal or levoceterizine-okay to use pediatric dose  4-update CT imaging  5-follow up in 3 months with PFT

## 2021-04-12 NOTE — PROGRESS NOTES
CC: Cough with deep inspiration    HPI:  Marline Perry is a 65 y.o. year old female here today for follow-up on bronchiectasis.  Last seen in clinic 9/21/2020.  She is a never smoker.    Patient relocated here from [place] where she was diagnosed with bronchiectasis in 2008.  She has had bronchoscopy in the past. Pertinent medical history includes AVR on anticoagulation, diffuse connective tissue disease, Sjogren's, followed by rheumatology, fibromyalgia, costochondritis, decreased GFR.    Reviewed in clinic vitals /80, HR 88, 93%, BMI 24.56 lg/m2.    Reviewed home medication regimen includes azithromycin Monday Wednesday Friday regimen, Breo, albuterol nebulized, Spiriva, guaifenesin, warfarin.    Reviewed most recent imaging including echocardiogram obtained 9/22/2020 demonstrating known mechanical aortic valve functioning normally, normal left ventricular size, wall thickness and systolic function.  LVEF estimated 60%.  Normal diastolic function.  Normal right ventricular size and systolic function.  Mildly dilated left atrium.  Unable to estimate pulmonary artery pressures due to inadequate tricuspid regurgitant jet.    High resolution chest CT obtained April 26, 2018 demonstrated hyperexpanded lungs, lateral right lower lobe subsegmental atelectasis and mild bronchiectasis, no evidence of interstitial lung disease.  Has had an aortic valve replacement.    Most recent pulmonary function testing was obtained 5/7/2019 demonstrating FEV1 of 1.78 L or 85% predicted, FVC of 2.24 L or 81% predicted, FEV1/FVC ratio of 88.  Residual volume 129% predicted, % predicted, DLCO 87% predicted. Per pulmonology interpretation normal pulmonary function and gas transfer.    Review of Systems   Constitutional: Positive for malaise/fatigue. Negative for chills, fever and weight loss.   HENT: Positive for congestion. Negative for hearing loss, nosebleeds, sinus pain, sore throat and tinnitus.    Eyes:         Presc glasses   Respiratory: Positive for cough, sputum production (yellow to tan ), shortness of breath (with exertion ) and wheezing.    Cardiovascular: Positive for chest pain (tightness with exertion ). Negative for palpitations.   Gastrointestinal: Negative for heartburn.        No dentures, no difficulty swallowing at present   Neurological: Positive for headaches. Negative for dizziness and tremors.   Psychiatric/Behavioral: The patient has insomnia (occasional, staying asleep, reports dozing off during the day, no morning headache.).        Past Medical History:   Diagnosis Date   • Bronchiectasis (HCC)    • Bronchitis    • Chickenpox    • COPD (chronic obstructive pulmonary disease) (HCC)    • Hyperlipidemia    • Influenza    • Migraines    • Mumps    • Nasal drainage    • Sjogren's disease (HCC)        Past Surgical History:   Procedure Laterality Date   • AORTIC VALVE REPLACEMENT     • BRONCHOSCOPY     • HYSTERECTOMY LAPAROSCOPY     • SINUSCOPE         Family History   Problem Relation Age of Onset   • Arthritis Mother    • Hypertension Father    • Kidney Disease Father    • Arthritis Sister    • No Known Problems Brother    • No Known Problems Brother    • No Known Problems Son    • Heart Attack Maternal Grandmother    • Stroke Maternal Grandfather    • Heart Disease Paternal Grandmother    • No Known Problems Paternal Grandfather        Social History     Socioeconomic History   • Marital status:      Spouse name: Not on file   • Number of children: Not on file   • Years of education: Not on file   • Highest education level: Not on file   Occupational History   • Not on file   Tobacco Use   • Smoking status: Never Smoker   • Smokeless tobacco: Never Used   Substance and Sexual Activity   • Alcohol use: Yes     Alcohol/week: 2.4 oz     Types: 4 Shots of liquor per week   • Drug use: No   • Sexual activity: Not on file   Other Topics Concern   • Not on file   Social History Narrative   • Not on  "file     Social Determinants of Health     Financial Resource Strain:    • Difficulty of Paying Living Expenses:    Food Insecurity:    • Worried About Running Out of Food in the Last Year:    • Ran Out of Food in the Last Year:    Transportation Needs:    • Lack of Transportation (Medical):    • Lack of Transportation (Non-Medical):    Physical Activity:    • Days of Exercise per Week:    • Minutes of Exercise per Session:    Stress:    • Feeling of Stress :    Social Connections:    • Frequency of Communication with Friends and Family:    • Frequency of Social Gatherings with Friends and Family:    • Attends Anabaptist Services:    • Active Member of Clubs or Organizations:    • Attends Club or Organization Meetings:    • Marital Status:    Intimate Partner Violence:    • Fear of Current or Ex-Partner:    • Emotionally Abused:    • Physically Abused:    • Sexually Abused:        Allergies as of 04/12/2021 - Reviewed 04/12/2021   Allergen Reaction Noted   • Spironolactone Rash 06/11/2008   • Neomycin Rash 06/11/2008   • Tape  06/11/2008        @Vital signs for this encounter:  Vitals:    04/12/21 0847   Height: 1.549 m (5' 1\")   Weight: 59 kg (130 lb)   Weight % change since last entry.: 0 %   BP: 130/80   Pulse: 88   BMI (Calculated): 24.56   Resp: 16       Current medications as of today   Current Outpatient Medications   Medication Sig Dispense Refill   • warfarin (COUMADIN) 5 MG Tab TAKE ONE-HALF (1/2) TO ONE TABLET DAILY OR AS DIRECTED BY ANTICOAGULATION CLINIC 90 tablet 1   • doxycycline (VIBRAMYCIN) 100 MG Cap Take 1 Cap by mouth 2 times a day. Take as prescribed until gone. 20 Cap 0   • guaiFENesin (ROBITUSSIN) 100 MG/5ML liquid Take 10 mL by mouth every 6 hours as needed. 180 mL 2   • methylPREDNISolone (MEDROL) 4 MG Tablet Therapy Pack Follow schedule on package instructions. 21 Tab 0   • azithromycin (ZITHROMAX) 250 MG Tab Take 1 Tab by mouth every Monday, Wednesday, and Friday. 40 Tab 3   • gabapentin " (NEURONTIN) 300 MG Cap Take 1 Cap by mouth every bedtime. 90 Cap 3   • SPIRIVA RESPIMAT 2.5 MCG/ACT Aero Soln INHALE 2 INHALATIONS BY MOUTH EVERY DAY . ASSEMBLE AND PRIME 1 Each 5   • BREO ELLIPTA 100-25 MCG/INH AEROSOL POWDER, BREATH ACTIVATED USE 1 INHALATION DAILY 180 Each 3   • sertraline (ZOLOFT) 25 MG tablet TAKE 1 TABLET DAILY 90 Tab 3   • amitriptyline (ELAVIL) 10 MG Tab TAKE 1 TABLET EVERY EVENING 90 Tab 3   • rosuvastatin (CRESTOR) 20 MG Tab TAKE 1 TABLET EVERY EVENING 90 Tab 3   • cyclosporin (RESTASIS) 0.05 % ophthalmic emulsion Place 1 Drop in both eyes 2 times a day.     • SUMAtriptan (IMITREX) 50 MG Tab      • albuterol (PROVENTIL) 2.5mg/3ml Nebu Soln solution for nebulization 3 mL by Nebulization route every four hours as needed for Shortness of Breath. 360 mL 3   • ascorbic acid (ASCORBIC ACID) 500 MG Tab Take 500 mg by mouth every day.     • Biotin 77262 MCG Tab Take  by mouth.     • Flaxseed, Linseed, (FLAX SEED OIL) 1000 MG Cap Take  by mouth 2 Times a Day.     • Cholecalciferol (VITAMIN D3) 2000 units Tab Take  by mouth.       No current facility-administered medications for this visit.         Physical Exam:   Gen:           Alert and oriented, No apparent distress. Mood and affect appropriate, normal interaction with provider.  Eyes:          sclere white, conjunctive moist.  Hearing:     Grossly intact.  Dentition:    Good dentition.  Oropharynx:   Tongue normal, posterior pharynx without erythema or exudate.  Neck:        Supple, trachea midline, no masses.  Respiratory Effort: No intercostal retractions or use of accessory muscles.   Lung Auscultation:      Decreased bases otherwise clear to auscultation; no rales, rhonchi or wheezing.  CV:            Regular rate and rhythm. No edema. No murmurs, rubs or gallops.  Digits, Nails, Ext: No clubbing, cyanosis, petechiae, or nodes.   Skin:        No rashes, lesions or ulcers noted on exposed skin surfaces.                     Assessment:  1.  Bronchiectasis without complication (HCC)  tiotropium (SPIRIVA RESPIMAT) 2.5 mcg/Act Aero Soln    PULMONARY FUNCTION TESTS -Test requested: Complete Pulmonary Function Test    CT-CHEST (THORAX) W/O   2. Sjogren's syndrome, with unspecified organ involvement (HCC)     3. S/P AVR (aortic valve replacement)     4. Seasonal allergic rhinitis, unspecified trigger         Immunizations:    Flu: 9/21/20  Pneumovax 23: 10/18/2018  Prevnar 13: Deferred    Plan:    65 y.o. year old female here today for follow-up on bronchiectasis.  Last seen in clinic 9/21/2020.  She is a never smoker.    Patient relocated here from [place] where she was diagnosed with bronchiectasis in 2008.  She has had bronchoscopy in the past. Pertinent medical history includes AVR on anticoagulation, diffuse connective tissue disease, Sjogren's, followed by rheumatology, fibromyalgia, costochondritis, decreased GFR.    Reviewed home medication regimen includes azithromycin Monday Wednesday Friday regimen, Breo, albuterol nebulized, Spiriva, guaifenesin, warfarin.  Reports not requiring rescue inhaler and nebulizer.    Bronchiectasis: No exacerbations since prior visit.  Denies difficulty clearing secretions. Does cough with deep breath.   Updated imaging and pulmonary function testing.    Seasonal allergic rhinitis: Trial Flonase nasal spray assess for benefit.  Consider alternative antihistamine if insufficient relief.  Okay to trial pediatric dosing.    Had Covid vaccine.  Continue COVID-19 precautions including wearing mask in public, social distancing, frequent handwashing and annual flu shot.  Follow up in 3 months with PFT and CT results.       This dictation was created using voice recognition software. The accuracy of the dictation is limited to the abilities of the software. I expect there may be some errors of grammar and possibly content.

## 2021-04-13 ASSESSMENT — ENCOUNTER SYMPTOMS: INSOMNIA: 1

## 2021-04-21 ENCOUNTER — ANTICOAGULATION MONITORING (OUTPATIENT)
Dept: VASCULAR LAB | Facility: MEDICAL CENTER | Age: 66
End: 2021-04-21

## 2021-04-21 DIAGNOSIS — Z95.2 S/P AVR (AORTIC VALVE REPLACEMENT): ICD-10-CM

## 2021-04-21 LAB — INR PPP: 2 (ref 2–3.5)

## 2021-04-22 NOTE — PROGRESS NOTES
Anticoagulation Summary  As of 2021    INR goal:  2.0-3.0   TTR:  48.5 % (3.2 y)   INR used for dosin.00 (2021)   Warfarin maintenance plan:  2.5 mg (5 mg x 0.5) every Mon, Wed, Fri; 5 mg (5 mg x 1) all other days   Weekly warfarin total:  27.5 mg   Plan last modified:  Anay Patel (2020)   Next INR check:  2021   Target end date:  Indefinite    Indications    S/P AVR (aortic valve replacement) [Z95.2]             Anticoagulation Episode Summary     INR check location:  Home Draw    Preferred lab:      Send INR reminders to:      Comments:  Denny CELESTIN      Anticoagulation Care Providers     Provider Role Specialty Phone number    Chinyere Marcus M.D. Referring Cardiology [phone]    Yossi Khalil, PharmD Responsible          Anticoagulation Patient Findings    Spoke with patient today regarding therapeutic INR of 2.00.  Patient denies any signs/symptoms of bruising or bleeding or any changes in diet and medications.  Instructed patient to call clinic with any questions or concerns.  Follow up in 2 weeks, to reduce risk of adverse events related to this high risk medication,  Warfarin.    Luiza Grewal - Student Pharmacist

## 2021-05-04 RX ORDER — AMITRIPTYLINE HYDROCHLORIDE 10 MG/1
TABLET, FILM COATED ORAL
Qty: 90 TABLET | Refills: 1 | Status: SHIPPED | OUTPATIENT
Start: 2021-05-04 | End: 2022-03-31

## 2021-05-05 ENCOUNTER — ANTICOAGULATION MONITORING (OUTPATIENT)
Dept: VASCULAR LAB | Facility: MEDICAL CENTER | Age: 66
End: 2021-05-05

## 2021-05-05 DIAGNOSIS — Z95.2 S/P AVR (AORTIC VALVE REPLACEMENT): ICD-10-CM

## 2021-05-05 LAB — INR PPP: 3.1 (ref 2–3.5)

## 2021-05-05 NOTE — PROGRESS NOTES
Anticoagulation Summary  As of 5/5/2021    INR goal:  2.0-3.0   TTR:  49.0 % (3.3 y)   INR used for dosing:  3.10 (5/5/2021)   Warfarin maintenance plan:  2.5 mg (5 mg x 0.5) every Mon, Wed, Fri; 5 mg (5 mg x 1) all other days   Weekly warfarin total:  27.5 mg   Plan last modified:  Anay Patel (11/6/2020)   Next INR check:  5/19/2021   Target end date:  Indefinite    Indications    S/P AVR (aortic valve replacement) [Z95.2]             Anticoagulation Episode Summary     INR check location:  Home Draw    Preferred lab:      Send INR reminders to:      Comments:  Denny CELESTIN      Anticoagulation Care Providers     Provider Role Specialty Phone number    Chinyere Marcus M.D. Referring Cardiology [phone]    Yossi Khalil, PharmD Responsible          Anticoagulation Patient Findings      Left voicemail message to report a slightlytherapeutic INR of 3.1    Pt to continue with current warfarin dosing regimen and increase vit K intake as INR is 0.1 out of range and pt is historically therapeutic.     Requested pt contact the clinic for any s/s of unusual bleeding, bruising, clotting or any changes to diet or medication.    FU INR in 2 week(s).    Diann Gonzalez, PharmD

## 2021-05-13 RX ORDER — SERTRALINE HYDROCHLORIDE 25 MG/1
TABLET, FILM COATED ORAL
Qty: 90 TABLET | Refills: 0 | Status: SHIPPED | OUTPATIENT
Start: 2021-05-13 | End: 2022-03-17

## 2021-05-22 LAB — INR PPP: 3 (ref 2–3.5)

## 2021-05-24 ENCOUNTER — ANTICOAGULATION MONITORING (OUTPATIENT)
Dept: VASCULAR LAB | Facility: MEDICAL CENTER | Age: 66
End: 2021-05-24

## 2021-05-24 DIAGNOSIS — Z95.2 S/P AVR (AORTIC VALVE REPLACEMENT): ICD-10-CM

## 2021-05-24 NOTE — PROGRESS NOTES
Anticoagulation Summary  As of 5/24/2021    INR goal:  2.0-3.0   TTR:  48.3 % (3.3 y)   INR used for dosing:  3.00 (5/22/2021)   Warfarin maintenance plan:  2.5 mg (5 mg x 0.5) every Mon, Wed, Fri; 5 mg (5 mg x 1) all other days   Weekly warfarin total:  27.5 mg   Plan last modified:  Anay Patel (11/6/2020)   Next INR check:  6/7/2021   Target end date:  Indefinite    Indications    S/P AVR (aortic valve replacement) [Z95.2]             Anticoagulation Episode Summary     INR check location:  Home Draw    Preferred lab:      Send INR reminders to:      Comments:  Denny CELESTIN      Anticoagulation Care Providers     Provider Role Specialty Phone number    Chinyere Marcus M.D. Referring Cardiology [phone]    Yossi Khalil, PharmD Responsible          Anticoagulation Patient Findings  Patient Findings     Negatives:  Signs/symptoms of thrombosis, Signs/symptoms of bleeding, Laboratory test error suspected, Change in health, Change in alcohol use, Change in activity, Upcoming invasive procedure, Emergency department visit, Upcoming dental procedure, Missed doses, Extra doses, Change in medications, Change in diet/appetite, Hospital admission, Bruising, Other complaints              Spoke with patient today regarding therapeutic INR of 3.00.  Patient denies any signs/symptoms of bruising or bleeding or any changes in diet and medications.  Instructed patient to call clinic with any questions or concerns.    Pt is to continue with current warfarin dosing regimen.    Follow up in 2 weeks, to reduce risk of adverse events related to this high risk medication,  Warfarin.    Wanda Bañuelos, Pharmacy Intern

## 2021-06-02 ENCOUNTER — OFFICE VISIT (OUTPATIENT)
Dept: MEDICAL GROUP | Facility: PHYSICIAN GROUP | Age: 66
End: 2021-06-02
Payer: MEDICARE

## 2021-06-02 VITALS
TEMPERATURE: 97.1 F | WEIGHT: 125 LBS | SYSTOLIC BLOOD PRESSURE: 116 MMHG | DIASTOLIC BLOOD PRESSURE: 64 MMHG | HEIGHT: 61 IN | HEART RATE: 87 BPM | OXYGEN SATURATION: 93 % | BODY MASS INDEX: 23.6 KG/M2

## 2021-06-02 DIAGNOSIS — Z78.0 POSTMENOPAUSAL: ICD-10-CM

## 2021-06-02 DIAGNOSIS — M79.7 FIBROMYALGIA: ICD-10-CM

## 2021-06-02 DIAGNOSIS — Z12.31 ENCOUNTER FOR SCREENING MAMMOGRAM FOR BREAST CANCER: ICD-10-CM

## 2021-06-02 DIAGNOSIS — Z12.12 SCREENING FOR COLORECTAL CANCER: ICD-10-CM

## 2021-06-02 DIAGNOSIS — Z12.11 SCREENING FOR COLORECTAL CANCER: ICD-10-CM

## 2021-06-02 DIAGNOSIS — R74.01 TRANSAMINITIS: ICD-10-CM

## 2021-06-02 PROBLEM — G89.29 OTHER CHRONIC PAIN: Status: RESOLVED | Noted: 2019-02-25 | Resolved: 2021-06-02

## 2021-06-02 PROCEDURE — 99214 OFFICE O/P EST MOD 30 MIN: CPT | Performed by: FAMILY MEDICINE

## 2021-06-02 ASSESSMENT — FIBROSIS 4 INDEX: FIB4 SCORE: 1.66

## 2021-06-02 ASSESSMENT — PATIENT HEALTH QUESTIONNAIRE - PHQ9: CLINICAL INTERPRETATION OF PHQ2 SCORE: 0

## 2021-06-02 NOTE — PROGRESS NOTES
CC: Fibromyalgia    HISTORY OF THE PRESENT ILLNESS: Patient is a 65 y.o. female.     Patient is here today with primary concern for possibly getting off of her sertraline and amitriptyline.  She has been on these for quite some time now for fibromyalgia.  She notes that her weight is higher than what it typically is, and she is concerned that these medications may be contributing to weight gain.  She notes that she is not had any flareups of joint pain or fibromyalgia in recent history.    Since her last visit, she has followed up with pulmonology for her bronchiectasis.  Recently needed antibiotic.  She is also followed up with rheumatology and has not had any changes to her management.  She continues on gabapentin as well for her fibromyalgia/chronic pain.    Allergies: Spironolactone, Neomycin, and Tape    Current Outpatient Medications Ordered in Epic   Medication Sig Dispense Refill   • sertraline (ZOLOFT) 25 MG tablet TAKE 1 TABLET DAILY 90 tablet 0   • amitriptyline (ELAVIL) 10 MG Tab TAKE 1 TABLET EVERY EVENING 90 tablet 1   • rosuvastatin (CRESTOR) 20 MG Tab TAKE 1 TABLET EVERY EVENING 90 tablet 2   • tiotropium (SPIRIVA RESPIMAT) 2.5 mcg/Act Aero Soln INHALE 2 INHALATIONS BY MOUTH EVERY DAY . ASSEMBLE AND PRIME 1 Each 5   • warfarin (COUMADIN) 5 MG Tab TAKE ONE-HALF (1/2) TO ONE TABLET DAILY OR AS DIRECTED BY ANTICOAGULATION CLINIC 90 tablet 1   • azithromycin (ZITHROMAX) 250 MG Tab Take 1 Tab by mouth every Monday, Wednesday, and Friday. 40 Tab 3   • gabapentin (NEURONTIN) 300 MG Cap Take 1 Cap by mouth every bedtime. 90 Cap 3   • BREO ELLIPTA 100-25 MCG/INH AEROSOL POWDER, BREATH ACTIVATED USE 1 INHALATION DAILY 180 Each 3   • cyclosporin (RESTASIS) 0.05 % ophthalmic emulsion Place 1 Drop in both eyes 2 times a day.     • SUMAtriptan (IMITREX) 50 MG Tab      • ascorbic acid (ASCORBIC ACID) 500 MG Tab Take 500 mg by mouth every day.     • Biotin 34938 MCG Tab Take  by mouth.     • Flaxseed, Linseed, (FLAX  "SEED OIL) 1000 MG Cap Take  by mouth 2 Times a Day.     • Cholecalciferol (VITAMIN D3) 2000 units Tab Take  by mouth.       No current Epic-ordered facility-administered medications on file.       Past Medical History:   Diagnosis Date   • Bronchiectasis (HCC)    • Bronchitis    • Chickenpox    • COPD (chronic obstructive pulmonary disease) (HCC)    • Hyperlipidemia    • Influenza    • Migraines    • Mumps    • Nasal drainage    • Sjogren's disease (HCC)        Past Surgical History:   Procedure Laterality Date   • AORTIC VALVE REPLACEMENT     • BRONCHOSCOPY     • HYSTERECTOMY LAPAROSCOPY     • SINUSCOPE         Social History     Tobacco Use   • Smoking status: Never Smoker   • Smokeless tobacco: Never Used   Vaping Use   • Vaping Use: Never used   Substance Use Topics   • Alcohol use: Yes     Alcohol/week: 2.4 oz     Types: 4 Shots of liquor per week   • Drug use: No       Social History     Social History Narrative   • Not on file       Family History   Problem Relation Age of Onset   • Arthritis Mother    • Hypertension Father    • Kidney Disease Father    • Arthritis Sister    • No Known Problems Brother    • No Known Problems Brother    • No Known Problems Son    • Heart Attack Maternal Grandmother    • Stroke Maternal Grandfather    • Heart Disease Paternal Grandmother    • No Known Problems Paternal Grandfather        ROS:   Denies fever.  Positive for intermittent coughing due to bronchiectasis.      Labs: Labs reviewed from March 10, 2021.    Exam: /64 (BP Location: Left arm, Patient Position: Sitting, BP Cuff Size: Adult)   Pulse 87   Temp 36.2 °C (97.1 °F) (Temporal)   Ht 1.549 m (5' 1\")   Wt 56.7 kg (125 lb)   SpO2 93%  Body mass index is 23.62 kg/m².    General: Well appearing, NAD  Pulmonary: Clear to ausculation.  Normal effort. No rales, ronchi, or wheezing.  Cardiovascular: Regular rate and rhythm without murmur, rubs or gallop.   Skin: Warm and dry.  No obvious " lesions.  Musculoskeletal:  No extremity cyanosis, clubbing, or edema.  Psych: Normal mood and affect. Alert and oriented. Judgment and insight is normal.    Please note that this dictation was created using voice recognition software. I have made every reasonable attempt to correct obvious errors, but I expect that there are errors of grammar and possibly content that I did not discover before finalizing the note.      Assessment/Plan  Marline was seen today for annual exam and patient question.    Diagnoses and all orders for this visit:    Fibromyalgia  Chronic issue, notes that it is well controlled at this time.  Discussed with patient that if anything it would be the amitriptyline that could cause weight gain, not typically the Zoloft.  She desires to trial off both of them and see how she does.  Recommend breaking amitriptyline in half for 1 to 2 weeks then discontinuing followed by breaking Zoloft in half 1 to 2 weeks then discontinuing.  If fibromyalgia her symptoms recur, she can contact me and we can restart the medications.    Transaminitis  Intermittent over the past couple of years, noted on lab work.  Recommend right upper quadrant ultrasound for evaluation of liver.  -     US-RUQ; Future    Encounter for screening mammogram for breast cancer  -     MA-SCREENING MAMMO BILAT W/TOMOSYNTHESIS W/CAD; Future    Screening for colorectal cancer  -     OCCULT BLOOD FECES IMMUNOASSAY (FIT); Future    Postmenopausal  -     DS-BONE DENSITY STUDY (DEXA); Future      Follow-up in about 6 months or sooner if needed.    Lynne Shaw DO  [facility]

## 2021-06-07 ENCOUNTER — ANTICOAGULATION MONITORING (OUTPATIENT)
Dept: VASCULAR LAB | Facility: MEDICAL CENTER | Age: 66
End: 2021-06-07

## 2021-06-07 DIAGNOSIS — Z95.2 S/P AVR (AORTIC VALVE REPLACEMENT): ICD-10-CM

## 2021-06-07 LAB — INR PPP: 2 (ref 2–3.5)

## 2021-06-07 NOTE — PROGRESS NOTES
Anticoagulation Summary  As of 2021    INR goal:  2.0-3.0   TTR:  49.0 % (3.3 y)   INR used for dosin.00 (2021)   Warfarin maintenance plan:  2.5 mg (5 mg x 0.5) every Mon, Wed, Fri; 5 mg (5 mg x 1) all other days   Weekly warfarin total:  27.5 mg   Plan last modified:  Anay Patel (2020)   Next INR check:  2021   Target end date:  Indefinite    Indications    S/P AVR (aortic valve replacement) [Z95.2]             Anticoagulation Episode Summary     INR check location:  Home Draw    Preferred lab:      Send INR reminders to:      Comments:  Denny CELESTIN      Anticoagulation Care Providers     Provider Role Specialty Phone number    Chinyere Marcus M.D. Referring Cardiology [phone]    Yossi Khalil, PharmD Responsible          Anticoagulation Patient Findings    HPI:  Marline Perry, on anticoagulation therapy with warfarin for aortic valve replacement.  Changes to current medical/health status since last appt: None  Denies signs/symptoms of bleeding and/or thrombosis since the last appt.    Denies any interval changes to diet  Denies any interval changes to medications since last appt.   Denies any complications or cost restrictions with current therapy.     A/P   INR  -therapeutic.   Pt will continue current Warfarin dosing.     Next INR in 2 week(s).    Henry Garces, Med Ass't     I have reviewed and agree with the recommendations provided by the MA.    Huseyin Conrad PharmD

## 2021-06-21 ENCOUNTER — ANTICOAGULATION MONITORING (OUTPATIENT)
Dept: MEDICAL GROUP | Facility: PHYSICIAN GROUP | Age: 66
End: 2021-06-21

## 2021-06-21 DIAGNOSIS — Z95.2 S/P AVR (AORTIC VALVE REPLACEMENT): ICD-10-CM

## 2021-06-21 LAB — INR PPP: 3.1 (ref 2–3.5)

## 2021-06-21 NOTE — PROGRESS NOTES
Anticoagulation Summary  As of 6/21/2021    INR goal:  2.0-3.0   TTR:  49.5 % (3.4 y)   INR used for dosing:  3.10 (6/21/2021)   Warfarin maintenance plan:  2.5 mg (5 mg x 0.5) every Mon, Wed, Fri; 5 mg (5 mg x 1) all other days   Weekly warfarin total:  27.5 mg   Plan last modified:  Anay Patel (11/6/2020)   Next INR check:  7/5/2021   Target end date:  Indefinite    Indications    S/P AVR (aortic valve replacement) [Z95.2]             Anticoagulation Episode Summary     INR check location:  Home Draw    Preferred lab:      Send INR reminders to:      Comments:  Denny CELESTIN      Anticoagulation Care Providers     Provider Role Specialty Phone number    Chinyere Marcus M.D. Referring Cardiology [phone]    Santo EncisoD Responsible          Anticoagulation Patient Findings      Spoke with pt.  INR is supratherapeutic.   Pt denies any unusual s/s of bleeding, bruising, clotting or any changes to diet or medications. Denies any etoh, cranberries, supplements, or illness.   Pt verifies warfarin weekly dosing.     Will have pt continue regimen as INR is 0.1 out of range and pt is historically therapeutic    Repeat INR in 2 week(s).     Diann Gonzalez, PharmD

## 2021-07-03 ENCOUNTER — PATIENT MESSAGE (OUTPATIENT)
Dept: SLEEP MEDICINE | Facility: MEDICAL CENTER | Age: 66
End: 2021-07-03

## 2021-07-03 DIAGNOSIS — J47.1 BRONCHIECTASIS WITH ACUTE EXACERBATION (HCC): ICD-10-CM

## 2021-07-06 NOTE — TELEPHONE ENCOUNTER
"From: Marline Perry  To: Physician Assistant Kassandra Miguel  Sent: 7/3/2021 3:14 PM PDT  Subject: Prescription Question    Allison Miguel, Pulmonogy:  Allison, I am again coughing up thick \"dirty, meka green-colored\" flem which started on July 2.  Can you order a different antibiotic for clearing it up asap?  Note: We are going out of town on Wednesday July 7 so Desperately need something before then!  Won't be back in [place] until July 20.  Thank you,  Marline Perry  "

## 2021-07-07 ENCOUNTER — ANTICOAGULATION MONITORING (OUTPATIENT)
Dept: VASCULAR LAB | Facility: MEDICAL CENTER | Age: 66
End: 2021-07-07

## 2021-07-07 DIAGNOSIS — Z95.2 S/P AVR (AORTIC VALVE REPLACEMENT): ICD-10-CM

## 2021-07-07 LAB — INR PPP: 3 (ref 2–3.5)

## 2021-07-07 RX ORDER — DOXYCYCLINE HYCLATE 100 MG/1
100 CAPSULE ORAL 2 TIMES DAILY
Qty: 20 CAPSULE | Refills: 0 | Status: SHIPPED | OUTPATIENT
Start: 2021-07-07 | End: 2021-08-10

## 2021-07-07 NOTE — PROGRESS NOTES
Anticoagulation Summary  As of 7/7/2021    INR goal:  2.0-3.0   TTR:  48.8 % (3.4 y)   INR used for dosing:  3.00 (7/7/2021)   Warfarin maintenance plan:  2.5 mg (5 mg x 0.5) every Mon, Wed, Fri; 5 mg (5 mg x 1) all other days   Weekly warfarin total:  27.5 mg   Plan last modified:  Anay Patel (11/6/2020)   Next INR check:  7/21/2021   Target end date:  Indefinite    Indications    S/P AVR (aortic valve replacement) [Z95.2]             Anticoagulation Episode Summary     INR check location:  Home Draw    Preferred lab:      Send INR reminders to:      Comments:  Denny CELESTIN      Anticoagulation Care Providers     Provider Role Specialty Phone number    Chinyere Marcus M.D. Referring Cardiology [phone]    Yossi Khalil, PharmD Responsible          Anticoagulation Patient Findings      Spoke with patient to report a therapeutic INR.      Pt denies any s/s of bleeding, bruising, clotting or any changes to diet or medication.      Pt instructed to continue with current warfarin dosing regimen, confirms dosing.   Will follow up in 2 week(s).     Zachary Gundersen, Pharmacy Intern

## 2021-07-08 NOTE — PROGRESS NOTES
Sent prescription to [facility] in [place], [place] per patient request. Seek medical care if condition worsens.

## 2021-07-23 ENCOUNTER — ANTICOAGULATION MONITORING (OUTPATIENT)
Dept: MEDICAL GROUP | Facility: PHYSICIAN GROUP | Age: 66
End: 2021-07-23

## 2021-07-23 DIAGNOSIS — Z95.2 S/P AVR (AORTIC VALVE REPLACEMENT): ICD-10-CM

## 2021-07-23 LAB — INR PPP: 2.7 (ref 2–3.5)

## 2021-07-23 NOTE — PROGRESS NOTES
Anticoagulation Summary  As of 2021    INR goal:  2.0-3.0   TTR:  49.5 % (3.5 y)   INR used for dosin.70 (2021)   Warfarin maintenance plan:  2.5 mg (5 mg x 0.5) every Mon, Wed, Fri; 5 mg (5 mg x 1) all other days   Weekly warfarin total:  27.5 mg   Plan last modified:  Anay Patel (2020)   Next INR check:  2021   Target end date:  Indefinite    Indications    S/P AVR (aortic valve replacement) [Z95.2]             Anticoagulation Episode Summary     INR check location:  Home Draw    Preferred lab:      Send INR reminders to:      Comments:  Denny       Anticoagulation Care Providers     Provider Role Specialty Phone number    Chinyere Marcus M.D. Referring Cardiology [phone]    Yossi Khalil, PharmD Responsible          Anticoagulation Patient Findings  Patient Findings     Negatives:  Signs/symptoms of thrombosis, Signs/symptoms of bleeding, Laboratory test error suspected, Change in health, Change in alcohol use, Change in activity, Upcoming invasive procedure, Emergency department visit, Upcoming dental procedure, Missed doses, Extra doses, Change in medications, Change in diet/appetite, Hospital admission, Bruising, Other complaints         Spoke with patient today regarding therapeutic INR of 2.7.  Patient denies any signs/symptoms of bruising or bleeding or any changes in diet and medications.  Instructed patient to call clinic with any questions or concerns.    Pt is to continue with current warfarin dosing regimen.    Follow up in 2 weeks, to reduce risk of adverse events related to this high risk medication,  Warfarin.    Haleigh Darnell, Pharmacy Intern

## 2021-07-27 ENCOUNTER — APPOINTMENT (OUTPATIENT)
Dept: RADIOLOGY | Facility: MEDICAL CENTER | Age: 66
End: 2021-07-27
Attending: PHYSICIAN ASSISTANT
Payer: MEDICARE

## 2021-07-29 ENCOUNTER — NON-PROVIDER VISIT (OUTPATIENT)
Dept: SLEEP MEDICINE | Facility: MEDICAL CENTER | Age: 66
End: 2021-07-29
Attending: PHYSICIAN ASSISTANT
Payer: MEDICARE

## 2021-07-29 VITALS — HEIGHT: 60 IN | BODY MASS INDEX: 24.54 KG/M2 | WEIGHT: 125 LBS

## 2021-07-29 DIAGNOSIS — J47.9 BRONCHIECTASIS WITHOUT COMPLICATION (HCC): ICD-10-CM

## 2021-07-29 PROCEDURE — 94010 BREATHING CAPACITY TEST: CPT | Performed by: INTERNAL MEDICINE

## 2021-07-29 PROCEDURE — 94726 PLETHYSMOGRAPHY LUNG VOLUMES: CPT | Performed by: INTERNAL MEDICINE

## 2021-07-29 PROCEDURE — 94729 DIFFUSING CAPACITY: CPT | Performed by: INTERNAL MEDICINE

## 2021-07-29 ASSESSMENT — PULMONARY FUNCTION TESTS
FVC_LLN: 2.17
FEV1_LLN: 1.71
FEV1_PREDICTED: 2.05
FVC_PREDICTED: 2.6
FEV1/FVC_PERCENT_LLN: 66
FEV1/FVC: 79
FEV1: 1.84
FEV1/FVC_PERCENT_PREDICTED: 79
FEV1/FVC_PERCENT_PREDICTED: 99
FEV1_PERCENT_PREDICTED: 89
FEV1/FVC_PREDICTED: 79
FEV1/FVC: 79
FVC_PERCENT_PREDICTED: 89
FEV1/FVC_PERCENT_PREDICTED: 100
FVC: 2.33

## 2021-07-29 ASSESSMENT — FIBROSIS 4 INDEX: FIB4 SCORE: 1.66

## 2021-07-29 NOTE — PROCEDURES
Technician: Hedy Fuentes RRT, CPFT  Good patient effort & cooperation. DUE TO NO SPACERS, NO POST FVC. The results of this test meet the ATS/ERS standards for acceptability & reproducibility.  Test was performed on the ADENTS HTI Body Plethysmograph-Elite DX system.  Predicted equations for Spirometry are GLI-2012, ITS for lung volumes, and GLI-2017 for DLCO.  The DLCO was uncorrected for Hgb.     Interpretation;     Acceptable and reproducible  FEV1 1.84 L [89%], FVC 2.33 L [89%], ratio 79%  Flow volume loops normal-appearing  TLC 4.22 L [94%]  DLCO 14.89 mL/min millimeter mercury [85%]  Impression  Normal pulmonary function test

## 2021-08-04 LAB — INR PPP: 3 (ref 2–3.5)

## 2021-08-06 ENCOUNTER — SLEEP CENTER VISIT (OUTPATIENT)
Dept: SLEEP MEDICINE | Facility: MEDICAL CENTER | Age: 66
End: 2021-08-06
Payer: MEDICARE

## 2021-08-06 VITALS
HEIGHT: 60 IN | WEIGHT: 127 LBS | DIASTOLIC BLOOD PRESSURE: 67 MMHG | BODY MASS INDEX: 24.94 KG/M2 | RESPIRATION RATE: 16 BRPM | OXYGEN SATURATION: 95 % | SYSTOLIC BLOOD PRESSURE: 106 MMHG | HEART RATE: 84 BPM

## 2021-08-06 DIAGNOSIS — J47.9 BRONCHIECTASIS WITHOUT COMPLICATION (HCC): ICD-10-CM

## 2021-08-06 DIAGNOSIS — M35.00 SJOGREN'S SYNDROME, WITH UNSPECIFIED ORGAN INVOLVEMENT (HCC): ICD-10-CM

## 2021-08-06 DIAGNOSIS — Z95.2 S/P AVR (AORTIC VALVE REPLACEMENT): ICD-10-CM

## 2021-08-06 DIAGNOSIS — J30.2 SEASONAL ALLERGIC RHINITIS, UNSPECIFIED TRIGGER: ICD-10-CM

## 2021-08-06 PROCEDURE — 99214 OFFICE O/P EST MOD 30 MIN: CPT | Performed by: PHYSICIAN ASSISTANT

## 2021-08-06 ASSESSMENT — ENCOUNTER SYMPTOMS
CHILLS: 0
HEADACHES: 1
FALLS: 1
TREMORS: 0
FEVER: 0
SORE THROAT: 0
INSOMNIA: 1
WHEEZING: 0
WEIGHT LOSS: 0
SPUTUM PRODUCTION: 1
DIZZINESS: 0
SINUS PAIN: 1
HEARTBURN: 0
ORTHOPNEA: 1
COUGH: 1
SHORTNESS OF BREATH: 1
PALPITATIONS: 0

## 2021-08-06 ASSESSMENT — FIBROSIS 4 INDEX: FIB4 SCORE: 1.66

## 2021-08-06 NOTE — PATIENT INSTRUCTIONS
1-reviewed PFT  2-consider flutter valve, patient will order online or contact us for order from local DME  3-may benefit vest therapy  4-recent flare, did not tolerate doxycycline well due to GI upset  5-follow up in 4 months

## 2021-08-06 NOTE — PROGRESS NOTES
CC: Follow-up    HPI:  Marline Perry is a 65 y.o. year old female here today for follow-up on bronchiectasis. Last seen in clinic 4/12/2021.  She is a never smoker.    Patient relocated here from [place] where she was diagnosed with bronchiectasis in 2008, she did have bronchoscopy.  Pertinent past medical history includes diffuse connective tissue disease, Sjogren's, she is followed by rheumatology, history of fibromyalgia and posterochondritis, decreased GFR, nonrheumatic mitral valve regurgitation, nonrheumatic aortic valve insufficiency with aortic valve replacement on anticoagulation.    Reviewed in clinic vitals including /67, HR 84, O2 sat 95% and BMI of 24.8 kg/m².    Reviewed home medication regimen including Breo, Spiriva Respimat, has albuterol via nebulizer which she does not require unless sick.    Reviewed most recent imaging including echocardiogram obtained 9/22/2020 demonstrating mechanical aortic valve functioning normally, normal left ventricular chamber size, wall thickness and systolic function, LVEF estimated 60%, normal diastolic function, normal right ventricular size and systolic function, mildly dilated left atrium, mild to moderate mitral regurgitation, unable to estimate pulmonary artery pressures due to inadequate tricuspid regurgitant jet, mild pulmonic insufficiency.    High resolution chest CT obtained 4/26/2018 demonstrated hyperexpanded lungs, right lower lobe subsegmental atelectasis and mild bronchiectasis, no significant reticular opacities, no groundglass opacities, no honeycombing, left basilar atelectasis.    Reviewed recent pulmonary function testing obtained 7/29/2021 demonstrating an FEV1 of 1.84 L or 89% predicted, FVC of 2.33 L or 89% predicted, FEV1/FVC ratio of 79, residual volume of 93% predicted, TLC 94% predicted, DLCO 85% predicted.  Unable to obtain postbronchodilator testing at this time due to lack of spacers.  Per pulmonologist interpretation  flow volume loops appear normal, normal pulmonary function testing, does not rule out reactive airway disease.  Testing improved from 2019.    Review of Systems   Constitutional: Positive for malaise/fatigue (mild ). Negative for chills, fever and weight loss.   HENT: Positive for congestion and sinus pain (occasional, tylenol). Negative for hearing loss, nosebleeds, sore throat and tinnitus.    Eyes:        Presc glasses   Respiratory: Positive for cough, sputum production (yellow green but better ) and shortness of breath (with exertion ). Negative for wheezing.    Cardiovascular: Positive for orthopnea (2 pillows to minimize cough ). Negative for chest pain, palpitations and leg swelling.   Gastrointestinal: Negative for heartburn.        No dentures, occasional difficulty swallowing not recently    Musculoskeletal: Positive for falls (tripped over dog).   Neurological: Positive for headaches (sometimes). Negative for dizziness and tremors.   Psychiatric/Behavioral: The patient has insomnia (staying ).        Past Medical History:   Diagnosis Date   • Bronchiectasis (HCC)    • Bronchitis    • Chickenpox    • COPD (chronic obstructive pulmonary disease) ([facility])    • Hyperlipidemia    • Influenza    • Migraines    • Mumps    • Nasal drainage    • Sjogren's disease (HCC)        Past Surgical History:   Procedure Laterality Date   • AORTIC VALVE REPLACEMENT     • BRONCHOSCOPY     • HYSTERECTOMY LAPAROSCOPY     • SINUSCOPE         Family History   Problem Relation Age of Onset   • Arthritis Mother    • Hypertension Father    • Kidney Disease Father    • Arthritis Sister    • No Known Problems Brother    • No Known Problems Brother    • No Known Problems Son    • Heart Attack Maternal Grandmother    • Stroke Maternal Grandfather    • Heart Disease Paternal Grandmother    • No Known Problems Paternal Grandfather        Social History     Socioeconomic History   • Marital status:      Spouse name: Not on file   •  Number of children: Not on file   • Years of education: Not on file   • Highest education level: Not on file   Occupational History   • Not on file   Tobacco Use   • Smoking status: Never Smoker   • Smokeless tobacco: Never Used   Vaping Use   • Vaping Use: Never used   Substance and Sexual Activity   • Alcohol use: Yes     Alcohol/week: 2.4 oz     Types: 4 Shots of liquor per week   • Drug use: No   • Sexual activity: Not on file   Other Topics Concern   • Not on file   Social History Narrative   • Not on file     Social Determinants of Health     Financial Resource Strain:    • Difficulty of Paying Living Expenses:    Food Insecurity:    • Worried About Running Out of Food in the Last Year:    • Ran Out of Food in the Last Year:    Transportation Needs:    • Lack of Transportation (Medical):    • Lack of Transportation (Non-Medical):    Physical Activity:    • Days of Exercise per Week:    • Minutes of Exercise per Session:    Stress:    • Feeling of Stress :    Social Connections:    • Frequency of Communication with Friends and Family:    • Frequency of Social Gatherings with Friends and Family:    • Attends Church Services:    • Active Member of Clubs or Organizations:    • Attends Club or Organization Meetings:    • Marital Status:    Intimate Partner Violence:    • Fear of Current or Ex-Partner:    • Emotionally Abused:    • Physically Abused:    • Sexually Abused:        Allergies as of 08/06/2021 - Reviewed 08/06/2021   Allergen Reaction Noted   • Spironolactone Rash 06/11/2008   • Neomycin Rash 06/11/2008   • Tape  06/11/2008        @Vital signs for this encounter:  Vitals:    08/06/21 0958   Height: 1.524 m (5')   Weight: 57.6 kg (127 lb)   Weight % change since last entry.: 0 %   BP: 106/67   Pulse: 84   BMI (Calculated): 24.8   Resp: 16       Current medications as of today   Current Outpatient Medications   Medication Sig Dispense Refill   • doxycycline (VIBRAMYCIN) 100 MG Cap Take 1 capsule by  mouth 2 times a day. Take as prescribed until gone. 20 capsule 0   • sertraline (ZOLOFT) 25 MG tablet TAKE 1 TABLET DAILY 90 tablet 0   • amitriptyline (ELAVIL) 10 MG Tab TAKE 1 TABLET EVERY EVENING 90 tablet 1   • rosuvastatin (CRESTOR) 20 MG Tab TAKE 1 TABLET EVERY EVENING 90 tablet 2   • tiotropium (SPIRIVA RESPIMAT) 2.5 mcg/Act Aero Soln INHALE 2 INHALATIONS BY MOUTH EVERY DAY . ASSEMBLE AND PRIME 1 Each 5   • warfarin (COUMADIN) 5 MG Tab TAKE ONE-HALF (1/2) TO ONE TABLET DAILY OR AS DIRECTED BY ANTICOAGULATION CLINIC 90 tablet 1   • azithromycin (ZITHROMAX) 250 MG Tab Take 1 Tab by mouth every Monday, Wednesday, and Friday. 40 Tab 3   • gabapentin (NEURONTIN) 300 MG Cap Take 1 Cap by mouth every bedtime. 90 Cap 3   • BREO ELLIPTA 100-25 MCG/INH AEROSOL POWDER, BREATH ACTIVATED USE 1 INHALATION DAILY 180 Each 3   • cyclosporin (RESTASIS) 0.05 % ophthalmic emulsion Place 1 Drop in both eyes 2 times a day.     • SUMAtriptan (IMITREX) 50 MG Tab      • ascorbic acid (ASCORBIC ACID) 500 MG Tab Take 500 mg by mouth every day.     • Biotin 54292 MCG Tab Take  by mouth.     • Flaxseed, Linseed, (FLAX SEED OIL) 1000 MG Cap Take  by mouth 2 Times a Day.     • Cholecalciferol (VITAMIN D3) 2000 units Tab Take  by mouth.       No current facility-administered medications for this visit.         Physical Exam:   Gen:           Alert and oriented, No apparent distress. Mood and affect     appropriate, normal interaction with provider.  Eyes:          sclere white, conjunctive moist.  Hearing:     Grossly intact.  Dentition:    Fair dentition.  Oropharynx:   Tongue normal, posterior pharynx without erythema or exudate.  Neck:        Supple, trachea midline, no masses.  Respiratory Effort: No intercostal retractions or use of accessory muscles.   Lung Auscultation:      Decreased bases right greater than left; no rales, rhonchi or wheezing.  CV:            Regular rate and rhythm. No edema. No murmurs, rubs or gallops.  Digits,  Nails, Ext: No clubbing, cyanosis, petechiae, or nodes.   Skin:        No rashes, lesions or ulcers noted on exposed skin surfaces.                     Assessment:  1. Bronchiectasis without complication (HCC)     2. Sjogren's syndrome, with unspecified organ involvement (HCC)     3. S/P AVR (aortic valve replacement)     4. Seasonal allergic rhinitis, unspecified trigger         Immunizations:    Flu: 9/21/2020  Pneumovax 23: 10/18/2018  Prevnar 13: Deferred  Pfizer SARS-CoV-2 vaccine: 4/1/2021, 3/11/2021    Plan:    65 y.o. year old female here today for follow-up on bronchiectasis. Last seen in clinic 4/12/2021.  She is a never smoker.    Patient relocated here from [place] where she was diagnosed with bronchiectasis in 2008, she did have bronchoscopy.  Pertinent past medical history includes diffuse connective tissue disease, Sjogren's, she is followed by rheumatology, history of fibromyalgia and posterochondritis, decreased GFR, nonrheumatic mitral valve regurgitation, nonrheumatic aortic valve insufficiency with aortic valve replacement on anticoagulation.    Reviewed in clinic vitals including /67, HR 84, O2 sat 95% and BMI of 24.8 kg/m².    Reviewed home medication regimen including Breo, Spiriva Respimat, has albuterol via nebulizer which she does not require unless sick.    Bronchiectasis: Reports productive cough with yellow-green secretions. She did have a recent flare and was treated with doxycycline which she did not tolerate well due to GI upset.  Consider alternative antibiotic if subsequent flare, she is on maintenance antibiotic of azithromycin Monday Wednesday and Friday.    We discussed options regarding assistance with secretion clearance.  Patient believes she likely would have difficulty tolerating vest therapy due to her fibromyalgia and costochondritis.  She does express interest in a flutter valve.  We reviewed options.  Patient will order online or contact us for order from local  DME.      Had Covid vaccine x2.  Follow-up in 4 months.    This dictation was created using voice recognition software. The accuracy of the dictation is limited to the abilities of the software. I expect there may be some errors of grammar and possibly content.

## 2021-08-09 ENCOUNTER — ANTICOAGULATION MONITORING (OUTPATIENT)
Dept: MEDICAL GROUP | Facility: MEDICAL CENTER | Age: 66
End: 2021-08-09

## 2021-08-09 ENCOUNTER — ANTICOAGULATION MONITORING (OUTPATIENT)
Dept: CARDIOLOGY | Facility: MEDICAL CENTER | Age: 66
End: 2021-08-09

## 2021-08-09 DIAGNOSIS — Z95.2 S/P AVR (AORTIC VALVE REPLACEMENT): ICD-10-CM

## 2021-08-09 LAB — INR PPP: 3 (ref 2–3.5)

## 2021-08-10 ENCOUNTER — OFFICE VISIT (OUTPATIENT)
Dept: MEDICAL GROUP | Facility: PHYSICIAN GROUP | Age: 66
End: 2021-08-10
Payer: MEDICARE

## 2021-08-10 VITALS
WEIGHT: 126.1 LBS | TEMPERATURE: 98.1 F | HEIGHT: 60 IN | HEART RATE: 89 BPM | BODY MASS INDEX: 24.76 KG/M2 | SYSTOLIC BLOOD PRESSURE: 108 MMHG | DIASTOLIC BLOOD PRESSURE: 60 MMHG | OXYGEN SATURATION: 95 %

## 2021-08-10 DIAGNOSIS — R21 SKIN RASH: ICD-10-CM

## 2021-08-10 PROCEDURE — 99213 OFFICE O/P EST LOW 20 MIN: CPT | Performed by: INTERNAL MEDICINE

## 2021-08-10 RX ORDER — METHYLPREDNISOLONE 4 MG/1
TABLET ORAL
Qty: 21 TABLET | Refills: 0 | Status: SHIPPED | OUTPATIENT
Start: 2021-08-10 | End: 2021-08-19

## 2021-08-10 ASSESSMENT — FIBROSIS 4 INDEX: FIB4 SCORE: 1.66

## 2021-08-10 NOTE — PROGRESS NOTES
Anticoagulation Summary  As of 8/9/2021    INR goal:  2.0-3.0   TTR:  50.2 % (3.5 y)   INR used for dosing:  3.00 (8/9/2021)   Warfarin maintenance plan:  2.5 mg (5 mg x 0.5) every Mon, Wed, Fri; 5 mg (5 mg x 1) all other days   Weekly warfarin total:  27.5 mg   Plan last modified:  Anay Patel (11/6/2020)   Next INR check:  8/23/2021   Target end date:  Indefinite    Indications    S/P AVR (aortic valve replacement) [Z95.2]             Anticoagulation Episode Summary     INR check location:  Home Draw    Preferred lab:      Send INR reminders to:      Comments:  Denny       Anticoagulation Care Providers     Provider Role Specialty Phone number    Chinyere Marcus M.D. Referring Cardiology [phone]    Yossi Khalil, PharmD Responsible          Anticoagulation Patient Findings  Patient Findings     Negatives:  Signs/symptoms of thrombosis, Signs/symptoms of bleeding, Laboratory test error suspected, Change in health, Change in alcohol use, Change in activity, Upcoming invasive procedure, Emergency department visit, Upcoming dental procedure, Missed doses, Extra doses, Change in medications, Change in diet/appetite, Hospital admission, Bruising, Other complaints        Spoke with patient today regarding therapeutic INR of 3.0.  Patient denies any signs/symptoms of bruising or bleeding or any changes in diet and medications.  Instructed patient to call clinic with any questions or concerns.  Pt is to continue with current warfarin dosing regimen.  Follow up in 2 weeks, to reduce risk of adverse events related to this high risk medication,  Warfarin.    Tulio Sotelo, SantoD, BCACP

## 2021-08-10 NOTE — PROGRESS NOTES
Established Patient    Chief Complaint   Patient presents with   • Itching     bilateral hands and forearms, bilateral feet       Subjective:     HPI:   Marline presents today with the following.    1. Skin rash  -Reported loss that yesterday, suddenly, start of the hand then  to the upper extremity, back, abdomen, even groin and lower extremity all the way to the feet, reported the lesion are fading, reported macular rash, itchy, denied having skin ulceration, discharge, denied having systemic symptoms  -Patient denied having sick contact, contact with animals, insect bite bites, bug bite, denied having new introduction of medication, supplement, denied food allergy, denied having other family member with the same lesions    Patient Active Problem List    Diagnosis Date Noted   • Skin rash 08/10/2021   • Vitamin B12 deficiency 05/05/2020   • Transaminitis 02/24/2020   • Decreased GFR 02/24/2020   • Chronic pain of right knee 01/15/2020   • Hypercholesteremia 10/14/2019   • Migraine with aura and without status migrainosus, not intractable 10/14/2019   • Fibromyalgia 08/27/2019   • Bronchiectasis without complication ([facility]) 09/18/2018   • Non-rheumatic mitral regurgitation 01/22/2018   • Nonrheumatic aortic valve insufficiency 01/22/2018   • S/P AVR (aortic valve replacement) 01/22/2018   • Sjoegren syndrome 01/22/2018   • Warfarin anticoagulation 01/22/2018   • Thoracic aortic aneurysm without rupture (H[facility]) 01/22/2018   • Diffuse connective tissue disease ([facility]) 12/15/2008       Current Outpatient Medications on File Prior to Visit   Medication Sig Dispense Refill   • sertraline (ZOLOFT) 25 MG tablet TAKE 1 TABLET DAILY 90 tablet 0   • amitriptyline (ELAVIL) 10 MG Tab TAKE 1 TABLET EVERY EVENING 90 tablet 1   • rosuvastatin (CRESTOR) 20 MG Tab TAKE 1 TABLET EVERY EVENING 90 tablet 2   • tiotropium (SPIRIVA RESPIMAT) 2.5 mcg/Act Aero Soln INHALE 2 INHALATIONS BY MOUTH EVERY DAY . ASSEMBLE AND PRIME 1 Each 5   •  warfarin (COUMADIN) 5 MG Tab TAKE ONE-HALF (1/2) TO ONE TABLET DAILY OR AS DIRECTED BY ANTICOAGULATION CLINIC 90 tablet 1   • azithromycin (ZITHROMAX) 250 MG Tab Take 1 Tab by mouth every Monday, Wednesday, and Friday. 40 Tab 3   • gabapentin (NEURONTIN) 300 MG Cap Take 1 Cap by mouth every bedtime. 90 Cap 3   • BREO ELLIPTA 100-25 MCG/INH AEROSOL POWDER, BREATH ACTIVATED USE 1 INHALATION DAILY 180 Each 3   • cyclosporin (RESTASIS) 0.05 % ophthalmic emulsion Place 1 Drop in both eyes 2 times a day.     • SUMAtriptan (IMITREX) 50 MG Tab      • ascorbic acid (ASCORBIC ACID) 500 MG Tab Take 500 mg by mouth every day.     • Biotin 43887 MCG Tab Take  by mouth.     • Flaxseed, Linseed, (FLAX SEED OIL) 1000 MG Cap Take  by mouth 2 Times a Day.     • Cholecalciferol (VITAMIN D3) 2000 units Tab Take  by mouth.       No current facility-administered medications on file prior to visit.       Allergies, past medical history, past surgical history, family history, social history reviewed and updated    ROS:  All other systems reviewed and are negative except as stated in the HPI     Physical Exam:     /60 (BP Location: Right arm, Patient Position: Sitting, BP Cuff Size: Adult)   Pulse 89   Temp 36.7 °C (98.1 °F) (Temporal)   Ht 1.524 m (5')   Wt 57.2 kg (126 lb 1.6 oz)   SpO2 95%   BMI 24.63 kg/m²   General: Normal appearing. No distress.  ENT: oropharynx without exudates.    Eyes: conjunctiva clear lids without ptosis  Pulmonary: Clear to ausculation.  Normal effort.   Cardiovascular: Regular rate and rhythm  Abdomen: Soft, nontender,  Lymph: No cervical or supraclavicular palpable lymph nodes  Psych: Normal mood and affect.   Skin: Fading macular rash, tiny, both upper extremity, few of them on the abdomen, as well as feet, blanching, no ulceration, oozing, skin intact, no bullae  I have reviewed pertinent labs and diagnostic tests associated with this visit with specific comments listed under the assessment and  plan below      Assessment and Plan:     65 y.o. female with the following issues.    1. Skin rash  Likely allergy, unlikely to be viral/bacterial or fungal process, other differential which is less likely is scabies  - methylPREDNISolone (MEDROL DOSEPAK) 4 MG Tablet Therapy Pack; As directed on the packaging label.  Dispense: 21 tablet; Refill: 0  -Okay to take Benadryl as needed for itching, educated regarding side effect and complication of medication.  Patient understands  -Advised to follow-up for worsening    Follow Up:   Primary care    Please note that this dictation was created using voice recognition software. I have made every reasonable attempt to correct obvious errors, but I expect that there are errors of grammar and possibly content that I did not discover before finalizing the note.    Signed by: Lorena Carrillo M.D.

## 2021-08-19 ENCOUNTER — ANTICOAGULATION MONITORING (OUTPATIENT)
Dept: VASCULAR LAB | Facility: MEDICAL CENTER | Age: 66
End: 2021-08-19

## 2021-08-19 DIAGNOSIS — Z95.2 S/P AVR (AORTIC VALVE REPLACEMENT): ICD-10-CM

## 2021-08-19 LAB — INR PPP: 2 (ref 2–3.5)

## 2021-08-19 NOTE — PROGRESS NOTES
OP Telephone Anticoagulation Service Note    Date: 2021      Anticoagulation Summary  As of 2021    INR goal:  2.0-3.0   TTR:  50.5 % (3.5 y)   INR used for dosin.00 (2021)   Warfarin maintenance plan:  2.5 mg (5 mg x 0.5) every Mon, Wed, Fri; 5 mg (5 mg x 1) all other days   Weekly warfarin total:  27.5 mg   Plan last modified:  Anay Patel (2020)   Next INR check:  2021   Target end date:  Indefinite    Indications    S/P AVR (aortic valve replacement) [Z95.2]             Anticoagulation Episode Summary     INR check location:  Home Draw    Preferred lab:      Send INR reminders to:      Comments:  Denny CELESTIN      Anticoagulation Care Providers     Provider Role Specialty Phone number    Chinyere Marcus M.D. Referring Cardiology [phone]    Yossi Khalil, PharmD Responsible          Anticoagulation Patient Findings  Patient Findings     Positives:  Change in medications (pt took a medrol dose pack, finished a few days ago)    Negatives:  Signs/symptoms of thrombosis, Signs/symptoms of bleeding, Laboratory test error suspected, Change in health, Change in alcohol use, Change in activity, Upcoming invasive procedure, Emergency department visit, Upcoming dental procedure, Missed doses, Extra doses, Change in diet/appetite, Hospital admission, Bruising, Other complaints            Plan: Spoke with patient on the phone.   Patient is  therapeutic today.   Confirmed dosing.   Pt is not on antiplatelet agent  Advised pt to call our office with medication changes and discussed impact of steroids on INR  Instructed patient to call clinic if any unusual bleeding or bruising occurs.   Will continue dosing as outlined.   Will follow-up with patient in 2 week(s).            Cindi Luevano, PharmD

## 2021-09-04 LAB — INR PPP: 3 (ref 2–3.5)

## 2021-09-07 ENCOUNTER — ANTICOAGULATION MONITORING (OUTPATIENT)
Dept: VASCULAR LAB | Facility: MEDICAL CENTER | Age: 66
End: 2021-09-07

## 2021-09-07 DIAGNOSIS — Z95.2 S/P AVR (AORTIC VALVE REPLACEMENT): ICD-10-CM

## 2021-09-07 NOTE — PROGRESS NOTES
Anticoagulation Summary  As of 9/7/2021    INR goal:  2.0-3.0   TTR:  51.1 % (3.6 y)   INR used for dosing:  3.00 (9/4/2021)   Warfarin maintenance plan:  2.5 mg (5 mg x 0.5) every Mon, Wed, Fri; 5 mg (5 mg x 1) all other days   Weekly warfarin total:  27.5 mg   Plan last modified:  Anay Patel (11/6/2020)   Next INR check:  9/21/2021   Target end date:  Indefinite    Indications    S/P AVR (aortic valve replacement) [Z95.2]             Anticoagulation Episode Summary     INR check location:  Home Draw    Preferred lab:      Send INR reminders to:      Comments:  Denny CELESTIN      Anticoagulation Care Providers     Provider Role Specialty Phone number    Chinyere Marcus M.D. Referring Cardiovascular Disease (Cardiology) [phone]    Yossi Khalil, PharmD Responsible          Anticoagulation Patient Findings  Patient Findings     Negatives:  Signs/symptoms of thrombosis, Signs/symptoms of bleeding, Laboratory test error suspected, Change in health, Change in alcohol use, Change in activity, Upcoming invasive procedure, Emergency department visit, Upcoming dental procedure, Missed doses, Extra doses, Change in medications, Change in diet/appetite, Hospital admission, Bruising, Other complaints         Spoke with patient today regarding a therapeutic INR of 3.0.  Patient denies any signs/symptoms of bruising or bleeding or any changes in diet and medications.  Instructed patient to call clinic with any questions or concerns.    Pt is to continue with current warfarin dosing regimen.    Follow up in 2 weeks, to reduce risk of adverse events related to this high risk medication,  Warfarin.    Joaquin Ortiz, Pharmacy Intern

## 2021-09-20 DIAGNOSIS — J47.9 BRONCHIECTASIS WITHOUT COMPLICATION (HCC): ICD-10-CM

## 2021-09-21 RX ORDER — TIOTROPIUM BROMIDE INHALATION SPRAY 3.12 UG/1
SPRAY, METERED RESPIRATORY (INHALATION)
Qty: 4 G | Refills: 11 | Status: SHIPPED | OUTPATIENT
Start: 2021-09-21 | End: 2022-05-31 | Stop reason: SDUPTHER

## 2021-09-21 NOTE — TELEPHONE ENCOUNTER
Have we ever prescribed this med? Yes.  If yes, what date? 04/12/2021    Last OV: 08/06/2021 - Kassandra Miguel    Next OV: 12/09/2021 - Kassandra Miguel    DX: Bronchiectasis    Medications: Spiriva

## 2021-09-25 LAB — INR PPP: 2.9 (ref 2–3.5)

## 2021-09-27 ENCOUNTER — ANTICOAGULATION MONITORING (OUTPATIENT)
Dept: VASCULAR LAB | Facility: MEDICAL CENTER | Age: 66
End: 2021-09-27

## 2021-09-27 DIAGNOSIS — Z95.2 S/P AVR (AORTIC VALVE REPLACEMENT): ICD-10-CM

## 2021-09-28 NOTE — PROGRESS NOTES
OP Telephone Anticoagulation Service Note    Date: 2021      Anticoagulation Summary  As of 2021    INR goal:  2.0-3.0   TTR:  51.9 % (3.6 y)   INR used for dosin.90 (2021)   Warfarin maintenance plan:  2.5 mg (5 mg x 0.5) every Mon, Wed, Fri; 5 mg (5 mg x 1) all other days   Weekly warfarin total:  27.5 mg   Plan last modified:  Anay Patel (2020)   Next INR check:  10/9/2021   Target end date:  Indefinite    Indications    S/P AVR (aortic valve replacement) [Z95.2]             Anticoagulation Episode Summary     INR check location:  Home Draw    Preferred lab:      Send INR reminders to:      Comments:  Denny CELESTIN      Anticoagulation Care Providers     Provider Role Specialty Phone number    Chinyere Marcus M.D. Referring Cardiovascular Disease (Cardiology) [phone]    Yossi Khalil, PharmD Responsible          Anticoagulation Patient Findings      INR therapeutic at 2.9.  Spoke w/ pt on phone.  Verified regimen w/ pt.  Instructed pt to continue on with current regimen.  NO s/s bleeding reported per pt.  NO changes in diet reported per pt.  NO changes in medications reported per pt.  Check INR in 2 week(s).  Instructed pt to call clinic at [phone] if there are any questions.  Pt stated understanding.    Huseyin Conrad, PharmD

## 2021-10-04 ENCOUNTER — HOSPITAL ENCOUNTER (OUTPATIENT)
Dept: RADIOLOGY | Facility: MEDICAL CENTER | Age: 66
End: 2021-10-04
Attending: FAMILY MEDICINE
Payer: MEDICARE

## 2021-10-04 DIAGNOSIS — R74.01 TRANSAMINITIS: ICD-10-CM

## 2021-10-04 PROCEDURE — 76705 ECHO EXAM OF ABDOMEN: CPT

## 2021-10-08 ENCOUNTER — ANTICOAGULATION MONITORING (OUTPATIENT)
Dept: VASCULAR LAB | Facility: MEDICAL CENTER | Age: 66
End: 2021-10-08

## 2021-10-08 DIAGNOSIS — Z95.2 S/P AVR (AORTIC VALVE REPLACEMENT): ICD-10-CM

## 2021-10-08 LAB — INR PPP: 2 (ref 2–3.5)

## 2021-10-08 NOTE — PROGRESS NOTES
OP Anticoagulation Service Note    Date: 10/8/2021    Anticoagulation Summary  As of 10/8/2021    INR goal:  2.0-3.0   TTR:  52.4 % (3.7 y)   INR used for dosin.00 (10/8/2021)   Warfarin maintenance plan:  2.5 mg (5 mg x 0.5) every Mon, Wed, Fri; 5 mg (5 mg x 1) all other days   Weekly warfarin total:  27.5 mg   Plan last modified:  Anay Patel (2020)   Next INR check:     Target end date:  Indefinite    Indications    S/P AVR (aortic valve replacement) [Z95.2]             Anticoagulation Episode Summary     INR check location:  Home Draw    Preferred lab:      Send INR reminders to:      Comments:  Denny CELESTIN      Anticoagulation Care Providers     Provider Role Specialty Phone number    Chinyere Marcus M.D. Referring Cardiovascular Disease (Cardiology) [phone]    Yossi Khalil, PharmD Responsible          Anticoagulation Patient Findings        HPI:   The reason for today's call is to prevent morbidity and mortality from a blood clot and/or stroke and to reduce the risk of bleeding while on a anticoagulant.     PCP:  Lynne Shaw D.O.  [facility] [address] [address]    Assessment:     • INR  therapeutic.       Current Outpatient Medications:   •  Spiriva Respimat, USE 2 INHALATIONS DAILY. ASSEMBLE AND PRIME  •  warfarin, TAKE ONE-HALF (1/2) TO ONE TABLET DAILY OR AS DIRECTED BY ANTICOAGULATION CLINIC  •  sertraline, TAKE 1 TABLET DAILY  •  amitriptyline, TAKE 1 TABLET EVERY EVENING  •  rosuvastatin, TAKE 1 TABLET EVERY EVENING  •  azithromycin, 250 mg, Oral, MO, WE + FR  •  gabapentin, 300 mg, Oral, QHS  •  Breo Ellipta, USE 1 INHALATION DAILY  •  cyclosporin, 1 Drop, Both Eyes, BID  •  SUMAtriptan,   •  ascorbic acid, 500 mg, Oral, DAILY  •  Biotin, Take  by mouth.  •  Flax Seed Oil, Take  by mouth 2 Times a Day.  •  Vitamin D3, Take  by mouth.      Plan:     • Continue the same warfarin dose, as noted above.       Follow-up:     • Our protocol suggests  we test in 2-4 weeks.        Additional information discussed with patient:     • Asked patient to please call the anticoagulation clinic if they have any signs/symptoms of bleeding and/or thrombosis or any changes to diet or medications.      National recommendations regarding anticoagulation therapy:     The CHEST guidelines recommends frequent INR monitoring at regular intervals (a few days up to a max of 12 weeks) to ensure patients are on the proper dose of warfarin, and patients are not having any complications from therapy.  INRs can dramatically change over a short time period due to diet, medications, and medical conditions.     [facility]  Phone [phone] fax [phone]    This note was created using voice recognition software (Dragon). The accuracy of the dictation is limited by the abilities of the software. I have reviewed the note prior to signing, however some errors in grammar and context are still possible. If you have any questions related to this note please do not hesitate to contact our office.

## 2021-10-27 LAB — INR PPP: 3 (ref 2–3.5)

## 2021-11-01 ENCOUNTER — ANTICOAGULATION MONITORING (OUTPATIENT)
Dept: VASCULAR LAB | Facility: MEDICAL CENTER | Age: 66
End: 2021-11-01

## 2021-11-01 DIAGNOSIS — Z95.2 S/P AVR (AORTIC VALVE REPLACEMENT): ICD-10-CM

## 2021-11-01 NOTE — PROGRESS NOTES
Anticoagulation Summary  As of 11/1/2021    INR goal:  2.0-3.0   TTR:  53.0 % (3.7 y)   INR used for dosing:  3.00 (10/27/2021)   Warfarin maintenance plan:  2.5 mg (5 mg x 0.5) every Mon, Wed, Fri; 5 mg (5 mg x 1) all other days   Weekly warfarin total:  27.5 mg   Plan last modified:  Anay Patel (11/6/2020)   Next INR check:  11/17/2021   Target end date:  Indefinite    Indications    S/P AVR (aortic valve replacement) [Z95.2]             Anticoagulation Episode Summary     INR check location:  Home Draw    Preferred lab:      Send INR reminders to:      Comments:  Denny CELESTIN      Anticoagulation Care Providers     Provider Role Specialty Phone number    Chinyere Marcus M.D. Referring Cardiovascular Disease (Cardiology) [phone]    Yossi Khalil, PharmD Responsible            Refer to Anticoagulation Patient Findings for HPI  Patient Findings     Negatives:  Signs/symptoms of thrombosis, Signs/symptoms of bleeding, Laboratory test error suspected, Change in health, Change in alcohol use, Change in activity, Upcoming invasive procedure, Emergency department visit, Upcoming dental procedure, Missed doses, Extra doses, Change in medications, Change in diet/appetite, Hospital admission, Bruising, Other complaints        Spoke with patient Marline to report a therapeutic INR.      Pt is NOT on antiplatelet therapy.    Pt instructed to continue with current warfarin dosing regimen, confirms dosing.   Will follow up in three week(s).     Earnest Kate, SantoD

## 2021-11-15 DIAGNOSIS — J47.9 BRONCHIECTASIS WITHOUT COMPLICATION (HCC): ICD-10-CM

## 2021-11-17 ENCOUNTER — HOSPITAL ENCOUNTER (OUTPATIENT)
Dept: RADIOLOGY | Facility: MEDICAL CENTER | Age: 66
End: 2021-11-17
Attending: FAMILY MEDICINE
Payer: MEDICARE

## 2021-11-17 DIAGNOSIS — Z78.0 POSTMENOPAUSAL: ICD-10-CM

## 2021-11-17 DIAGNOSIS — Z12.31 ENCOUNTER FOR SCREENING MAMMOGRAM FOR BREAST CANCER: ICD-10-CM

## 2021-11-17 PROBLEM — M85.89 OSTEOPENIA OF MULTIPLE SITES: Status: ACTIVE | Noted: 2021-11-17

## 2021-11-17 PROCEDURE — 77063 BREAST TOMOSYNTHESIS BI: CPT

## 2021-11-17 PROCEDURE — 77080 DXA BONE DENSITY AXIAL: CPT

## 2021-11-17 NOTE — TELEPHONE ENCOUNTER
Have we ever prescribed this med? Yes.  If yes, what date? 06/15/2020    Last OV: 08/06/2021 - Kassandra Miguel    Next OV: 12/9/2021 - Kassandra Miguel     DX: Bronchiectasis     Medications: Breo

## 2021-11-22 ENCOUNTER — OFFICE VISIT (OUTPATIENT)
Dept: CARDIOLOGY | Facility: MEDICAL CENTER | Age: 66
End: 2021-11-22
Payer: MEDICARE

## 2021-11-22 VITALS
HEIGHT: 61 IN | DIASTOLIC BLOOD PRESSURE: 72 MMHG | HEART RATE: 90 BPM | OXYGEN SATURATION: 92 % | WEIGHT: 131.2 LBS | BODY MASS INDEX: 24.77 KG/M2 | RESPIRATION RATE: 16 BRPM | SYSTOLIC BLOOD PRESSURE: 110 MMHG

## 2021-11-22 DIAGNOSIS — I35.1 NONRHEUMATIC AORTIC VALVE INSUFFICIENCY: ICD-10-CM

## 2021-11-22 DIAGNOSIS — J47.9 BRONCHIECTASIS WITHOUT COMPLICATION (HCC): ICD-10-CM

## 2021-11-22 DIAGNOSIS — E78.5 HYPERLIPIDEMIA, UNSPECIFIED HYPERLIPIDEMIA TYPE: ICD-10-CM

## 2021-11-22 DIAGNOSIS — Z79.01 WARFARIN ANTICOAGULATION: ICD-10-CM

## 2021-11-22 DIAGNOSIS — Z95.2 S/P AVR (AORTIC VALVE REPLACEMENT): ICD-10-CM

## 2021-11-22 DIAGNOSIS — I34.0 NON-RHEUMATIC MITRAL REGURGITATION: ICD-10-CM

## 2021-11-22 DIAGNOSIS — Z79.899 HIGH RISK MEDICATION USE: ICD-10-CM

## 2021-11-22 DIAGNOSIS — E78.00 HYPERCHOLESTEREMIA: ICD-10-CM

## 2021-11-22 DIAGNOSIS — I71.20 THORACIC AORTIC ANEURYSM WITHOUT RUPTURE (HCC): ICD-10-CM

## 2021-11-22 PROCEDURE — 99214 OFFICE O/P EST MOD 30 MIN: CPT | Performed by: NURSE PRACTITIONER

## 2021-11-22 RX ORDER — ROSUVASTATIN CALCIUM 20 MG/1
TABLET, COATED ORAL
Qty: 90 TABLET | Refills: 3 | Status: SHIPPED | OUTPATIENT
Start: 2021-11-22 | End: 2022-10-11 | Stop reason: SDUPTHER

## 2021-11-22 ASSESSMENT — ENCOUNTER SYMPTOMS
MYALGIAS: 0
SHORTNESS OF BREATH: 0
CLAUDICATION: 0
ORTHOPNEA: 0
PND: 0
FALLS: 0
TINGLING: 0
BRUISES/BLEEDS EASILY: 0
ABDOMINAL PAIN: 0
VOMITING: 0
NAUSEA: 0
BLURRED VISION: 0
COUGH: 0
BLOOD IN STOOL: 0
LOSS OF CONSCIOUSNESS: 0
DIZZINESS: 0
FEVER: 0
PALPITATIONS: 0
WEIGHT LOSS: 0

## 2021-11-22 ASSESSMENT — FIBROSIS 4 INDEX: FIB4 SCORE: 1.66

## 2021-11-22 NOTE — PROGRESS NOTES
Chief Complaint   Patient presents with   • Follow-Up     S/P AVR (aortic valve replacement)       Subjective     Marline Perry is a 65 y.o. female who presents today s/p SAVR, aortic valve regurgitation, hypertension, hyperlipidemia, chronic anticoagulation follow-up.  Patient was last seen by Dr. Lamb on 3/11/2020.    In addition to above patient has past medical history of bronchiectasis/COPD    Today, patient reports fatigue and weight gain due to Covid precautions. Patient denies chest pain, shortness of breath, palpitations, dizziness/lightheadedness, orthopnea, PND or Edema.  Patient reports her blood pressure at home is well controlled without medication. Based on physical examination findings, patient is euvolemic. No JVD, lungs are clear to auscultation, no pitting edema in bilateral lower extremities, no ascites.  Patient's resting heart rate is slightly elevated at 90.  We reviewed vital signs over last 2 years her heart rate has ranged in the 90s since 2019.    We reviewed her echocardiogram results from 9/2020 per below.    Patient agreeable to complete annual echocardiogram.  We discussed his any decompensation is noted recommendation for beta-blocker initiation.  Patient agreeable.    We will refill her simvastatin and check her lipids and CMP prior to next appointment.    Past Medical History:   Diagnosis Date   • Bronchiectasis (HCC)    • Bronchitis    • Chickenpox    • COPD (chronic obstructive pulmonary disease) (HCC)    • Hyperlipidemia    • Influenza    • Migraines    • Mumps    • Nasal drainage    • Sjogren's disease (HCC)      Past Surgical History:   Procedure Laterality Date   • AORTIC VALVE REPLACEMENT     • BRONCHOSCOPY     • HYSTERECTOMY LAPAROSCOPY     • SINUSCOPE       Family History   Problem Relation Age of Onset   • Arthritis Mother    • Hypertension Father    • Kidney Disease Father    • Arthritis Sister    • No Known Problems Brother    • No Known Problems Brother    •  No Known Problems Son    • Heart Attack Maternal Grandmother    • Stroke Maternal Grandfather    • Heart Disease Paternal Grandmother    • No Known Problems Paternal Grandfather      Social History     Socioeconomic History   • Marital status:      Spouse name: Not on file   • Number of children: Not on file   • Years of education: Not on file   • Highest education level: Not on file   Occupational History   • Not on file   Tobacco Use   • Smoking status: Never Smoker   • Smokeless tobacco: Never Used   Vaping Use   • Vaping Use: Never used   Substance and Sexual Activity   • Alcohol use: Yes     Alcohol/week: 2.4 oz     Types: 4 Shots of liquor per week   • Drug use: No   • Sexual activity: Not on file   Other Topics Concern   • Not on file   Social History Narrative   • Not on file     Social Determinants of Health     Financial Resource Strain:    • Difficulty of Paying Living Expenses: Not on file   Food Insecurity:    • Worried About Running Out of Food in the Last Year: Not on file   • Ran Out of Food in the Last Year: Not on file   Transportation Needs:    • Lack of Transportation (Medical): Not on file   • Lack of Transportation (Non-Medical): Not on file   Physical Activity:    • Days of Exercise per Week: Not on file   • Minutes of Exercise per Session: Not on file   Stress:    • Feeling of Stress : Not on file   Social Connections:    • Frequency of Communication with Friends and Family: Not on file   • Frequency of Social Gatherings with Friends and Family: Not on file   • Attends [employer] Services: Not on file   • Active Member of Clubs or Organizations: Not on file   • Attends Club or Organization Meetings: Not on file   • Marital Status: Not on file   Intimate Partner Violence:    • Fear of Current or Ex-Partner: Not on file   • Emotionally Abused: Not on file   • Physically Abused: Not on file   • Sexually Abused: Not on file   Housing Stability:    • Unable to Pay for Housing in the Last  Year: Not on file   • Number of Places Lived in the Last Year: Not on file   • Unstable Housing in the Last Year: Not on file     Allergies   Allergen Reactions   • Spironolactone Rash     ALL OVER BODY    • Neomycin Rash     CONTACT DERMATITIS    • Tape      Adhesives-RASH      Outpatient Encounter Medications as of 11/22/2021   Medication Sig Dispense Refill   • rosuvastatin (CRESTOR) 20 MG Tab TAKE 1 TABLET EVERY EVENING 90 Tablet 3   • BREO ELLIPTA 100-25 MCG/INH AEROSOL POWDER, BREATH ACTIVATED USE 1 INHALATION DAILY 3 Each 3   • tiotropium (SPIRIVA RESPIMAT) 2.5 mcg/Act Aero Soln USE 2 INHALATIONS DAILY. ASSEMBLE AND PRIME 4 g 11   • warfarin (COUMADIN) 5 MG Tab TAKE ONE-HALF (1/2) TO ONE TABLET DAILY OR AS DIRECTED BY ANTICOAGULATION CLINIC 90 Tablet 3   • sertraline (ZOLOFT) 25 MG tablet TAKE 1 TABLET DAILY 90 tablet 0   • amitriptyline (ELAVIL) 10 MG Tab TAKE 1 TABLET EVERY EVENING 90 tablet 1   • azithromycin (ZITHROMAX) 250 MG Tab Take 1 Tab by mouth every Monday, Wednesday, and Friday. 40 Tab 3   • gabapentin (NEURONTIN) 300 MG Cap Take 1 Cap by mouth every bedtime. 90 Cap 3   • cyclosporin (RESTASIS) 0.05 % ophthalmic emulsion Place 1 Drop in both eyes 2 times a day.     • SUMAtriptan (IMITREX) 50 MG Tab      • ascorbic acid (ASCORBIC ACID) 500 MG Tab Take 500 mg by mouth every day.     • Biotin 16421 MCG Tab Take  by mouth.     • Flaxseed, Linseed, (FLAX SEED OIL) 1000 MG Cap Take  by mouth 2 Times a Day.     • Cholecalciferol (VITAMIN D3) 2000 units Tab Take  by mouth.     • [DISCONTINUED] rosuvastatin (CRESTOR) 20 MG Tab TAKE 1 TABLET EVERY EVENING 90 tablet 2     No facility-administered encounter medications on file as of 11/22/2021.     Review of Systems   Constitutional: Positive for malaise/fatigue. Negative for fever and weight loss.   Eyes: Negative for blurred vision.   Respiratory: Negative for cough and shortness of breath.    Cardiovascular: Negative for chest pain, palpitations,  "orthopnea, claudication, leg swelling and PND.   Gastrointestinal: Negative for abdominal pain, blood in stool, nausea and vomiting.   Genitourinary: Negative for dysuria, frequency and hematuria.   Musculoskeletal: Negative for falls and myalgias.   Neurological: Negative for dizziness, tingling and loss of consciousness.   Endo/Heme/Allergies: Does not bruise/bleed easily.            Objective     /72 (BP Location: Left arm, Patient Position: Sitting, BP Cuff Size: Adult)   Pulse 90   Resp 16   Ht 1.549 m (5' 1\")   Wt 59.5 kg (131 lb 3.2 oz)   SpO2 92%   BMI 24.79 kg/m²     Physical Exam  Vitals reviewed.   Constitutional:       Appearance: She is well-developed.   HENT:      Head: Normocephalic and atraumatic.   Eyes:      Pupils: Pupils are equal, round, and reactive to light.   Neck:      Vascular: No JVD.   Cardiovascular:      Rate and Rhythm: Normal rate and regular rhythm.      Heart sounds: Murmur heard.   No friction rub. No gallop.    Pulmonary:      Effort: Pulmonary effort is normal. No respiratory distress.      Breath sounds: Normal breath sounds.   Abdominal:      General: Bowel sounds are normal. There is no distension.      Palpations: Abdomen is soft.   Musculoskeletal:      Right lower leg: No edema.      Left lower leg: No edema.   Skin:     General: Skin is warm and dry.      Findings: No erythema.   Neurological:      General: No focal deficit present.      Mental Status: She is alert and oriented to person, place, and time. Mental status is at baseline.   Psychiatric:         Behavior: Behavior normal.            Lab Results   Component Value Date/Time    CHOLSTRLTOT 152 10/01/2020 09:10 AM    LDL 75 10/01/2020 09:10 AM    HDL 58 10/01/2020 09:10 AM    TRIGLYCERIDE 95 10/01/2020 09:10 AM       Lab Results   Component Value Date/Time    SODIUM 137 03/10/2021 11:43 AM    POTASSIUM 4.8 03/10/2021 11:43 AM    CHLORIDE 101 03/10/2021 11:43 AM    CO2 26 03/10/2021 11:43 AM    GLUCOSE " 76 03/10/2021 11:43 AM    BUN 15 03/10/2021 11:43 AM    CREATININE 0.85 03/10/2021 11:43 AM     Lab Results   Component Value Date/Time    ALKPHOSPHAT 104 (H) 03/10/2021 11:43 AM    ASTSGOT 45 03/10/2021 11:43 AM    ALTSGPT 63 (H) 03/10/2021 11:43 AM    TBILIRUBIN 0.4 03/10/2021 11:43 AM      Transthoracic echocardiogram (9/22/2020): CONCLUSIONS  Known mechanical aortic valve that is functioning normally with normal   transvalvular gradients.  Left ventricular ejection fraction is visually estimated to be 60%.  Unable to estimate pulmonary artery pressure due to an inadequate   tricuspid regurgitant jet.    Assessment & Plan     1. S/P AVR (aortic valve replacement)  EC-ECHOCARDIOGRAM COMPLETE W/O CONT    Lipid Profile    Comp Metabolic Panel   2. Hyperlipidemia, unspecified hyperlipidemia type  EC-ECHOCARDIOGRAM COMPLETE W/O CONT    Lipid Profile    Comp Metabolic Panel    rosuvastatin (CRESTOR) 20 MG Tab   3. Hypercholesteremia  EC-ECHOCARDIOGRAM COMPLETE W/O CONT    Lipid Profile    Comp Metabolic Panel   4. Nonrheumatic aortic valve insufficiency  EC-ECHOCARDIOGRAM COMPLETE W/O CONT    Lipid Profile    Comp Metabolic Panel   5. Non-rheumatic mitral regurgitation  EC-ECHOCARDIOGRAM COMPLETE W/O CONT    Lipid Profile    Comp Metabolic Panel   6. Thoracic aortic aneurysm without rupture (HCC)  EC-ECHOCARDIOGRAM COMPLETE W/O CONT    Lipid Profile    Comp Metabolic Panel   7. Warfarin anticoagulation  EC-ECHOCARDIOGRAM COMPLETE W/O CONT    Lipid Profile    Comp Metabolic Panel   8. Bronchiectasis without complication (HCC)  EC-ECHOCARDIOGRAM COMPLETE W/O CONT    Lipid Profile    Comp Metabolic Panel   9. High risk medication use         Medical Decision Making: Today's Assessment/Status/Plan:        Mechanical AVR  -annual echo ordered     MR  -echo ordered    HLD  -Cont rosuvastatin 20 mg daily  -Check annual lipid and CMP     Thoracic Aortic Aneursym  -Denies symptoms  - CT in 2018 was normal    High risk  medication use; chronic OAC use  -This includes rosuvastatin and coumadin  -Patient denies signs or symptoms of bleeding  -Will continue to closely monitor for side effects of patient's high risk medication(s) including liver, renal function and electrolytes  -Close monitoring discussed with patient.  Lab work ordered.    Bronchiectasis  -Followed by pulmonary    Medications refilled. FU in clinic in 6 months with Dr. Lamb to discuss echo results. Sooner if needed.    Patient verbalizes understanding and agrees with the plan of care.     Collaborating MD: Dr. Mo MD    PLEASE NOTE: This Note was created using voice recognition Software. I have made every reasonable attempt to correct obvious errors, but I expect that there are errors of grammar and possibly content that I did not discover before finalizing the note

## 2021-11-30 ENCOUNTER — NON-PROVIDER VISIT (OUTPATIENT)
Dept: MEDICAL GROUP | Facility: PHYSICIAN GROUP | Age: 66
End: 2021-11-30
Payer: MEDICARE

## 2021-11-30 DIAGNOSIS — Z23 NEED FOR VACCINATION: ICD-10-CM

## 2021-11-30 PROCEDURE — G0008 ADMIN INFLUENZA VIRUS VAC: HCPCS | Performed by: FAMILY MEDICINE

## 2021-11-30 PROCEDURE — 90662 IIV NO PRSV INCREASED AG IM: CPT | Performed by: FAMILY MEDICINE

## 2021-11-30 NOTE — PROGRESS NOTES
"Marline Perry is a 66 y.o. female here for a non-provider visit for:   FLU    Reason for immunization: Overdue/Provider Recommended  Immunization records indicate need for vaccine: Yes, confirmed with Epic  Minimum interval has been met for this vaccine: Yes  ABN completed: No    VIS Dated  08/06/21 was given to patient: Yes  All IAC Questionnaire questions were answered \"No.\"    Patient tolerated injection and no adverse effects were observed or reported: Yes    Pt scheduled for next dose in series: No  "

## 2021-12-06 ENCOUNTER — ANTICOAGULATION MONITORING (OUTPATIENT)
Dept: VASCULAR LAB | Facility: MEDICAL CENTER | Age: 66
End: 2021-12-06

## 2021-12-06 DIAGNOSIS — Z95.2 S/P AVR (AORTIC VALVE REPLACEMENT): ICD-10-CM

## 2021-12-06 LAB — INR PPP: 2 (ref 2–3.5)

## 2021-12-07 NOTE — PROGRESS NOTES
OP Anticoagulation Service Note    Date: 2021    Anticoagulation Summary  As of 2021    INR goal:  2.0-3.0   TTR:  54.4 % (3.8 y)   INR used for dosin.00 (2021)   Warfarin maintenance plan:  2.5 mg (5 mg x 0.5) every Mon, Wed, Fri; 5 mg (5 mg x 1) all other days   Weekly warfarin total:  27.5 mg   Plan last modified:  Anay Patel (2020)   Next INR check:  1/3/2022   Target end date:  Indefinite    Indications    S/P AVR (aortic valve replacement) [Z95.2]             Anticoagulation Episode Summary     INR check location:  Home Draw    Preferred lab:      Send INR reminders to:      Comments:  Denny CELESTIN  only call if out of range      Anticoagulation Care Providers     Provider Role Specialty Phone number    Chinyere Marcus M.D. Referring Cardiovascular Disease (Cardiology) [phone]    Yossi Khalil, PharmD Responsible          Anticoagulation Patient Findings          HPI:   The reason for today's call is to prevent morbidity and mortality from a blood clot and/or stroke and to reduce the risk of bleeding while on a anticoagulant.     PCP:  Lynne Shaw D.O.  [address] [id number]    Assessment:     • INR  therapeutic.       Current Outpatient Medications:   •  rosuvastatin, TAKE 1 TABLET EVERY EVENING  •  Breo Ellipta, USE 1 INHALATION DAILY  •  Spiriva Respimat, USE 2 INHALATIONS DAILY. ASSEMBLE AND PRIME  •  warfarin, TAKE ONE-HALF (1/2) TO ONE TABLET DAILY OR AS DIRECTED BY ANTICOAGULATION CLINIC  •  sertraline, TAKE 1 TABLET DAILY  •  amitriptyline, TAKE 1 TABLET EVERY EVENING  •  azithromycin, 250 mg, Oral, MO, WE + FR  •  gabapentin, 300 mg, Oral, QHS  •  cyclosporin, 1 Drop, Both Eyes, BID  •  SUMAtriptan,   •  ascorbic acid, 500 mg, Oral, DAILY  •  Biotin, Take  by mouth.  •  Flax Seed Oil, Take  by mouth 2 Times a Day.  •  Vitamin D3, Take  by mouth.      Plan:     • Continue the same warfarin dose, as noted above.        Follow-up:     • Our protocol suggests we test in 4 weeks.        Additional information discussed with patient:     • Asked patient to please call the anticoagulation clinic if they have any signs/symptoms of bleeding and/or thrombosis or any changes to diet or medications.      National recommendations regarding anticoagulation therapy:     The CHEST guidelines recommends frequent INR monitoring at regular intervals (a few days up to a max of 12 weeks) to ensure patients are on the proper dose of warfarin, and patients are not having any complications from therapy.  INRs can dramatically change over a short time period due to diet, medications, and medical conditions.     [facility]  Phone [phone] fax [phone]    This note was created using voice recognition software (Dragon). The accuracy of the dictation is limited by the abilities of the software. I have reviewed the note prior to signing, however some errors in grammar and context are still possible. If you have any questions related to this note please do not hesitate to contact our office.

## 2021-12-09 ENCOUNTER — OFFICE VISIT (OUTPATIENT)
Dept: SLEEP MEDICINE | Facility: MEDICAL CENTER | Age: 66
End: 2021-12-09
Payer: MEDICARE

## 2021-12-09 VITALS
WEIGHT: 130 LBS | HEART RATE: 80 BPM | DIASTOLIC BLOOD PRESSURE: 70 MMHG | OXYGEN SATURATION: 95 % | SYSTOLIC BLOOD PRESSURE: 110 MMHG | RESPIRATION RATE: 16 BRPM | HEIGHT: 61 IN | BODY MASS INDEX: 24.55 KG/M2

## 2021-12-09 DIAGNOSIS — J47.9 BRONCHIECTASIS WITHOUT COMPLICATION (HCC): ICD-10-CM

## 2021-12-09 DIAGNOSIS — J30.2 SEASONAL ALLERGIC RHINITIS, UNSPECIFIED TRIGGER: ICD-10-CM

## 2021-12-09 DIAGNOSIS — M35.00 SJOGREN'S SYNDROME, WITH UNSPECIFIED ORGAN INVOLVEMENT (HCC): ICD-10-CM

## 2021-12-09 PROCEDURE — 99213 OFFICE O/P EST LOW 20 MIN: CPT | Performed by: PHYSICIAN ASSISTANT

## 2021-12-09 ASSESSMENT — ENCOUNTER SYMPTOMS
SPUTUM PRODUCTION: 1
HEADACHES: 1
INSOMNIA: 1
WHEEZING: 1
SORE THROAT: 0
TREMORS: 0
CHILLS: 0
HEARTBURN: 0
COUGH: 1
PALPITATIONS: 0
DIZZINESS: 0
FEVER: 0
SINUS PAIN: 0
SHORTNESS OF BREATH: 1
WEIGHT LOSS: 0
ORTHOPNEA: 0

## 2021-12-09 ASSESSMENT — FIBROSIS 4 INDEX: FIB4 SCORE: 1.69

## 2021-12-09 NOTE — PROGRESS NOTES
CC: Follow-up    HPI:  Marline Perry is a 66 y.o. year old female here today for follow-up on bronchiectasis diagnosed in 2008.  She is a never smoker. Last seen in clinic 8/6/21.    Pertinent past medical history includes diffuse connective tissue disease, Sjogren's followed by rheumatology, fibromyalgia, thoracic aortic aneurysm, AVR, decreased GFR, osteopenia of multiple sites.     Reviewed in clinic vitals including /70, HR 80, O2 sat 95% on room air and BMI of 24.56 kg/m².    Reviewed home medication regimen including Breo, Spiriva Respimat, albuterol nebulizer.    Reviewed most recent imaging including echocardiogram obtained 9/22/2020 demonstrating mechanical aortic valve functioning normally, normal left ventricular chamber size, wall thickness and systolic function, LVEF estimated 60%, normal diastolic function, normal right ventricular size and systolic function, mildly dilated left atrium, mild to moderate mitral regurgitation, unable to estimate pulmonary artery pressures due to inadequate tricuspid regurgitant jet, mild pulmonic insufficiency.    Echocardiogram obtained 9/22/2020 demonstrating normal left ventricular chamber size, wall thickness, systolic and diastolic function.  LVEF estimated 60%.  Normal right ventricular size and function.  Mildly dilated left atrium, mild to moderate mitral regurgitation, known mechanical aortic valve functioning normally, unable to estimate pulmonary artery pressure due to inadequate tricuspid regurgitant jet, mild pulmonary disease insufficiency.    High resolution chest CT obtained 4/26/2018 demonstrated hyperexpanded lungs, right lower lobe subsegmental atelectasis and mild bronchiectasis, no significant reticular opacities, no groundglass opacities, no honeycombing, left basilar atelectasis.    Reviewed pulmonary function testing obtained 7/29/2021 demonstrating FEV1 of 1.84 L or 89% predicted, FVC of 2.33 L or 89% predicted, FEV1/FVC ratio 79,  residual volume 93% predicted, TLC 94% predicted, DLCO 85% predicted.  Per pulmonologist interpretation normal appearing flow volume loops, normal pulmonary function test.    Review of Systems   Constitutional: Negative for chills, fever, malaise/fatigue and weight loss.   HENT: Positive for congestion and hearing loss (mild ). Negative for nosebleeds, sinus pain, sore throat and tinnitus.    Eyes:        Presc glasses   Respiratory: Positive for cough (occasional ), sputum production (pale to dark tan ), shortness of breath (some with exertion ) and wheezing (occasional with exertion ).    Cardiovascular: Negative for chest pain, palpitations, orthopnea and leg swelling.   Gastrointestinal: Negative for heartburn.        No dentures, rare difficulty swallowing dry food    Neurological: Positive for headaches (sinus related ). Negative for dizziness and tremors.   Psychiatric/Behavioral: The patient has insomnia (occasional ).        Past Medical History:   Diagnosis Date   • Bronchiectasis (HCC)    • Bronchitis    • Chickenpox    • COPD (chronic obstructive pulmonary disease) (HCC)    • Hyperlipidemia    • Influenza    • Migraines    • Mumps    • Nasal drainage    • Sjogren's disease (HCC)        Past Surgical History:   Procedure Laterality Date   • AORTIC VALVE REPLACEMENT     • BRONCHOSCOPY     • HYSTERECTOMY LAPAROSCOPY     • SINUSCOPE         Family History   Problem Relation Age of Onset   • Arthritis Mother    • Hypertension Father    • Kidney Disease Father    • Arthritis Sister    • No Known Problems Brother    • No Known Problems Brother    • No Known Problems Son    • Heart Attack Maternal Grandmother    • Stroke Maternal Grandfather    • Heart Disease Paternal Grandmother    • No Known Problems Paternal Grandfather        Social History     Socioeconomic History   • Marital status:      Spouse name: Not on file   • Number of children: Not on file   • Years of education: Not on file   • Highest  education level: Not on file   Occupational History   • Not on file   Tobacco Use   • Smoking status: Never Smoker   • Smokeless tobacco: Never Used   Vaping Use   • Vaping Use: Never used   Substance and Sexual Activity   • Alcohol use: Yes     Alcohol/week: 2.4 oz     Types: 4 Shots of liquor per week   • Drug use: No   • Sexual activity: Not on file   Other Topics Concern   • Not on file   Social History Narrative   • Not on file     Social Determinants of Health     Financial Resource Strain:    • Difficulty of Paying Living Expenses: Not on file   Food Insecurity:    • Worried About Running Out of Food in the Last Year: Not on file   • Ran Out of Food in the Last Year: Not on file   Transportation Needs:    • Lack of Transportation (Medical): Not on file   • Lack of Transportation (Non-Medical): Not on file   Physical Activity:    • Days of Exercise per Week: Not on file   • Minutes of Exercise per Session: Not on file   Stress:    • Feeling of Stress : Not on file   Social Connections:    • Frequency of Communication with Friends and Family: Not on file   • Frequency of Social Gatherings with Friends and Family: Not on file   • Attends [employer] Services: Not on file   • Active Member of Clubs or Organizations: Not on file   • Attends Club or Organization Meetings: Not on file   • Marital Status: Not on file   Intimate Partner Violence:    • Fear of Current or Ex-Partner: Not on file   • Emotionally Abused: Not on file   • Physically Abused: Not on file   • Sexually Abused: Not on file   Housing Stability:    • Unable to Pay for Housing in the Last Year: Not on file   • Number of Places Lived in the Last Year: Not on file   • Unstable Housing in the Last Year: Not on file       Allergies as of 12/09/2021 - Reviewed 11/22/2021   Allergen Reaction Noted   • Spironolactone Rash 06/11/2008   • Neomycin Rash 06/11/2008   • Tape  06/11/2008        @Vital signs for this encounter:  /70   Pulse 80   Resp 16    "Ht 1.549 m (5' 1\")   Wt 59 kg (130 lb)   SpO2 95%     Current medications as of today   Current Outpatient Medications   Medication Sig Dispense Refill   • rosuvastatin (CRESTOR) 20 MG Tab TAKE 1 TABLET EVERY EVENING 90 Tablet 3   • BREO ELLIPTA 100-25 MCG/INH AEROSOL POWDER, BREATH ACTIVATED USE 1 INHALATION DAILY 3 Each 3   • tiotropium (SPIRIVA RESPIMAT) 2.5 mcg/Act Aero Soln USE 2 INHALATIONS DAILY. ASSEMBLE AND PRIME 4 g 11   • warfarin (COUMADIN) 5 MG Tab TAKE ONE-HALF (1/2) TO ONE TABLET DAILY OR AS DIRECTED BY ANTICOAGULATION CLINIC 90 Tablet 3   • sertraline (ZOLOFT) 25 MG tablet TAKE 1 TABLET DAILY 90 tablet 0   • amitriptyline (ELAVIL) 10 MG Tab TAKE 1 TABLET EVERY EVENING 90 tablet 1   • azithromycin (ZITHROMAX) 250 MG Tab Take 1 Tab by mouth every Monday, Wednesday, and Friday. 40 Tab 3   • gabapentin (NEURONTIN) 300 MG Cap Take 1 Cap by mouth every bedtime. 90 Cap 3   • cyclosporin (RESTASIS) 0.05 % ophthalmic emulsion Place 1 Drop in both eyes 2 times a day.     • SUMAtriptan (IMITREX) 50 MG Tab      • ascorbic acid (ASCORBIC ACID) 500 MG Tab Take 500 mg by mouth every day.     • Biotin 39180 MCG Tab Take  by mouth.     • Flaxseed, Linseed, (FLAX SEED OIL) 1000 MG Cap Take  by mouth 2 Times a Day.     • Cholecalciferol (VITAMIN D3) 2000 units Tab Take  by mouth.       No current facility-administered medications for this visit.         Physical Exam:   Gen:           Alert and oriented, No apparent distress. Mood and affect appropriate, normal interaction with provider.  Eyes:          sclere white, conjunctive moist.  Hearing:     Grossly intact.  Dentition:    Fair dentition.  Oropharynx:   Tongue normal, posterior pharynx without erythema or exudate.  Neck:        Supple, trachea midline, no masses.  Respiratory Effort: No intercostal retractions or use of accessory muscles.   Lung Auscultation:     Decreased bases; no rales, rhonchi or wheezing.  CV:            Regular rate and rhythm. No edema. " No murmurs, rubs or gallops.  Digits, Nails, Ext: No clubbing, cyanosis, petechiae, or nodes.   Skin:        No rashes, lesions or ulcers noted on exposed skin surfaces.                     Assessment:  1. Bronchiectasis without complication (HCC)     2. Sjogren's syndrome, with unspecified organ involvement (HCC)     3. Seasonal allergic rhinitis, unspecified trigger         Immunizations:    Flu: 11/30/2021  Pneumovax 23: 10/18/2018  Prevnar 13: Deferred      Plan:   66 y.o. year old female here today for follow-up on bronchiectasis diagnosed in 2008.  She is a never smoker. Last seen in clinic 8/6/21.    Pertinent past medical history includes diffuse connective tissue disease, Sjogren's followed by rheumatology, fibromyalgia, thoracic aortic aneurysm, AVR, decreased GFR, osteopenia of multiple sites.     Reviewed in clinic vitals including /70, HR 80, O2 sat 95% on room air and BMI of 24.56 kg/m².    Reviewed home medication regimen including Breo, Spiriva Respimat, albuterol nebulizer.    Bronchiectasis: Patient reports using Breo and Spiriva at night with best results.  Check with [facility] regarding flutter valve.  Reviewed pulmonary function testing.    Had COVID vaccine, due for booster.  Follow-up in 6 months.      This dictation was created using voice recognition software. The accuracy of the dictation is limited to the abilities of the software. I expect there may be some errors of grammar and possibly content.

## 2022-01-09 LAB — INR PPP: 3.6 (ref 2–3.5)

## 2022-01-17 DIAGNOSIS — J47.9 BRONCHIECTASIS WITHOUT COMPLICATION (HCC): ICD-10-CM

## 2022-01-17 NOTE — TELEPHONE ENCOUNTER
Have we ever prescribed this med? Yes.  If yes, what date?     Last OV: 12/09/2021 - Kassandra Miguel    Next OV: due back 6/2022    DX: Bronchiectasis     Medications: Azithromycin

## 2022-01-18 RX ORDER — AZITHROMYCIN 250 MG/1
250 TABLET, FILM COATED ORAL
Qty: 40 TABLET | Refills: 3 | Status: SHIPPED | OUTPATIENT
Start: 2022-01-19 | End: 2023-02-03

## 2022-01-24 ENCOUNTER — ANTICOAGULATION MONITORING (OUTPATIENT)
Dept: VASCULAR LAB | Facility: MEDICAL CENTER | Age: 67
End: 2022-01-24

## 2022-01-24 DIAGNOSIS — Z95.2 S/P AVR (AORTIC VALVE REPLACEMENT): ICD-10-CM

## 2022-01-24 NOTE — PROGRESS NOTES
Called pt regarding overdue INR - she states that she tested on 1/9 and that her INR was 3.6. She confirms her warfarin dosing. Pt ate extra greens to manage.    We did not hear from pt or [employer] regarding result.    Request pt retest INR ASAP to re-assess. She states she will retest 1/26.    Huseyin Conrad, SantoD, BCACP

## 2022-01-25 ENCOUNTER — ANTICOAGULATION MONITORING (OUTPATIENT)
Dept: VASCULAR LAB | Facility: MEDICAL CENTER | Age: 67
End: 2022-01-25

## 2022-01-25 DIAGNOSIS — Z95.2 S/P AVR (AORTIC VALVE REPLACEMENT): ICD-10-CM

## 2022-01-25 LAB — INR PPP: 1.9 (ref 2–3.5)

## 2022-01-25 NOTE — PROGRESS NOTES
Anticoagulation Summary  As of 2022    INR goal:  2.0-3.0   TTR:  54.6 % (4 y)   INR used for dosin.90 (2022)   Warfarin maintenance plan:  2.5 mg (5 mg x 0.5) every Mon, Wed, Fri; 5 mg (5 mg x 1) all other days   Weekly warfarin total:  27.5 mg   Plan last modified:  Anay Patel (2020)   Next INR check:  2022   Target end date:  Indefinite    Indications    S/P AVR (aortic valve replacement) [Z95.2]             Anticoagulation Episode Summary     INR check location:  Home Draw    Preferred lab:      Send INR reminders to:      Comments:  Denny CELESTIN  only call if out of range      Anticoagulation Care Providers     Provider Role Specialty Phone number    Chinyere Marcus M.D. Referring Cardiovascular Disease (Cardiology) [phone]    Yossi Khalil, PharmD Responsible Pharmacy           Refer to Anticoagulation Patient Findings for HPI  Patient Findings     Positives:  Change in diet/appetite (increased vitamin K intake)    Negatives:  Signs/symptoms of thrombosis, Signs/symptoms of bleeding, Laboratory test error suspected, Change in health, Change in alcohol use, Change in activity, Upcoming invasive procedure, Emergency department visit, Upcoming dental procedure, Missed doses, Extra doses, Change in medications, Hospital admission, Bruising, Other complaints          Spoke with pt.  INR is subtherapeutic.     Pt verifies warfarin weekly dosing.     Will have pt bolus x 1 day then continue regimen    Repeat INR in 2 week(s).     Diann Warren, PharmD

## 2022-02-07 ENCOUNTER — ANTICOAGULATION MONITORING (OUTPATIENT)
Dept: MEDICAL GROUP | Facility: PHYSICIAN GROUP | Age: 67
End: 2022-02-07

## 2022-02-07 DIAGNOSIS — Z95.2 S/P AVR (AORTIC VALVE REPLACEMENT): ICD-10-CM

## 2022-02-07 LAB — INR PPP: 2 (ref 2–3.5)

## 2022-02-07 NOTE — PROGRESS NOTES
Anticoagulation Summary  As of 2022    INR goal:  2.0-3.0   TTR:  54.1 % (4 y)   INR used for dosin.00 (2022)   Warfarin maintenance plan:  2.5 mg (5 mg x 0.5) every Mon, Wed, Fri; 5 mg (5 mg x 1) all other days   Weekly warfarin total:  27.5 mg   Plan last modified:  Anay Patel (2020)   Next INR check:  2022   Target end date:  Indefinite    Indications    S/P AVR (aortic valve replacement) [Z95.2]             Anticoagulation Episode Summary     INR check location:  Home Draw    Preferred lab:      Send INR reminders to:      Comments:  Denny CELESTIN  only call if out of range      Anticoagulation Care Providers     Provider Role Specialty Phone number    Chinyere Marcus M.D. Referring Cardiovascular Disease (Cardiology) [phone]    Yossi Khalil, PharmD Responsible Pharmacy         Anticoagulation Patient Findings  Patient Findings     Negatives:  Signs/symptoms of thrombosis, Signs/symptoms of bleeding, Laboratory test error suspected, Change in health, Change in alcohol use, Change in activity, Upcoming invasive procedure, Emergency department visit, Upcoming dental procedure, Missed doses, Extra doses, Change in medications, Change in diet/appetite, Hospital admission, Bruising, Other complaints        Spoke with patient today regarding therapeutic INR of 2.0.  Patient denies any signs/symptoms of bruising or bleeding or any changes in diet and medications.  Instructed patient to call clinic with any questions or concerns.    Pt is not on antiplatelet therapy    Pt is to continue with current warfarin dosing regimen.  Follow up in 2 weeks, to reduce risk of adverse events related to this high risk medication,  Warfarin.    Tulio Sotelo, PharmD, BCACP

## 2022-02-26 LAB — INR PPP: 3.5 (ref 2–3.5)

## 2022-02-28 ENCOUNTER — ANTICOAGULATION MONITORING (OUTPATIENT)
Dept: MEDICAL GROUP | Facility: PHYSICIAN GROUP | Age: 67
End: 2022-02-28
Payer: MEDICARE

## 2022-02-28 DIAGNOSIS — Z95.2 S/P AVR (AORTIC VALVE REPLACEMENT): ICD-10-CM

## 2022-02-28 NOTE — PROGRESS NOTES
Anticoagulation Summary  As of 2/28/2022    INR goal:  2.0-3.0   TTR:  54.3 % (4.1 y)   INR used for dosing:  3.50 (2/26/2022)   Warfarin maintenance plan:  2.5 mg (5 mg x 0.5) every Mon, Wed, Fri; 5 mg (5 mg x 1) all other days   Weekly warfarin total:  27.5 mg   Plan last modified:  Anay Patel (11/6/2020)   Next INR check:  3/5/2022   Target end date:  Indefinite    Indications    S/P AVR (aortic valve replacement) [Z95.2]             Anticoagulation Episode Summary     INR check location:  Home Draw    Preferred lab:      Send INR reminders to:      Comments:  Denny CELESTIN  only call if out of range      Anticoagulation Care Providers     Provider Role Specialty Phone number    Chinyere Marcus M.D. Referring Cardiovascular Disease (Cardiology) [phone]    Santo EncisoD Responsible Pharmacy           Refer to Anticoagulation Patient Findings for HPI  Patient Findings     Positives:  Change in alcohol use (Had some aclohol recently (kahlua)), Change in diet/appetite (Less veggies recently.)    Negatives:  Signs/symptoms of thrombosis, Signs/symptoms of bleeding, Laboratory test error suspected, Change in health, Change in activity, Upcoming invasive procedure, Emergency department visit, Upcoming dental procedure, Missed doses, Extra doses, Change in medications, Hospital admission, Bruising, Other complaints          Spoke with patient to report a SUPRA-therapeutic INR of 3.5.      Pt instructed to HOLD warfarin for today and then continue with current warfarin dosing regimen, confirms dosing.     Will follow up in 1 week(s).     Ronny Madsen, Pharmacy Intern     Discussed with Analilia Tidwell

## 2022-03-07 ENCOUNTER — ANTICOAGULATION MONITORING (OUTPATIENT)
Dept: VASCULAR LAB | Facility: MEDICAL CENTER | Age: 67
End: 2022-03-07
Payer: MEDICARE

## 2022-03-07 DIAGNOSIS — Z95.2 S/P AVR (AORTIC VALVE REPLACEMENT): ICD-10-CM

## 2022-03-07 LAB — INR PPP: 2 (ref 2–3.5)

## 2022-03-07 NOTE — PROGRESS NOTES
Anticoagulation Summary  As of 3/7/2022    INR goal:  2.0-3.0   TTR:  54.4 % (4.1 y)   INR used for dosin (3/7/2022)   Warfarin maintenance plan:  2.5 mg (5 mg x 0.5) every Mon, Wed, Fri; 5 mg (5 mg x 1) all other days   Weekly warfarin total:  27.5 mg   Plan last modified:  Anay Patel (2020)   Next INR check:  3/21/2022   Target end date:  Indefinite    Indications    S/P AVR (aortic valve replacement) [Z95.2]             Anticoagulation Episode Summary     INR check location:  Home Draw    Preferred lab:      Send INR reminders to:      Comments:  Denny CELESTIN  only call if out of range      Anticoagulation Care Providers     Provider Role Specialty Phone number    Chinyere Marcus M.D. Referring Cardiovascular Disease (Cardiology) [phone]    Yossi Khalil, PharmD Responsible Pharmacy           Refer to Anticoagulation Patient Findings for HPI    INR therapeutic at 2.      Pt chart review, pt requests no call if INR in range.    Pt instructed to continue with current warfarin dosing regimen, confirms dosing.     Will follow up in 2 week(s).     Huseyin Conrad, PharmD, BCACP

## 2022-03-09 DIAGNOSIS — R74.01 TRANSAMINITIS: ICD-10-CM

## 2022-03-09 DIAGNOSIS — M85.89 OSTEOPENIA OF MULTIPLE SITES: ICD-10-CM

## 2022-03-09 DIAGNOSIS — R94.4 DECREASED GFR: ICD-10-CM

## 2022-03-09 DIAGNOSIS — E53.8 VITAMIN B12 DEFICIENCY: ICD-10-CM

## 2022-03-14 ENCOUNTER — HOSPITAL ENCOUNTER (OUTPATIENT)
Dept: CARDIOLOGY | Facility: MEDICAL CENTER | Age: 67
End: 2022-03-14
Attending: NURSE PRACTITIONER
Payer: MEDICARE

## 2022-03-14 DIAGNOSIS — J47.9 BRONCHIECTASIS WITHOUT COMPLICATION (HCC): ICD-10-CM

## 2022-03-14 DIAGNOSIS — Z95.2 S/P AVR (AORTIC VALVE REPLACEMENT): ICD-10-CM

## 2022-03-14 DIAGNOSIS — I35.1 NONRHEUMATIC AORTIC VALVE INSUFFICIENCY: ICD-10-CM

## 2022-03-14 DIAGNOSIS — Z79.01 WARFARIN ANTICOAGULATION: ICD-10-CM

## 2022-03-14 DIAGNOSIS — E78.5 HYPERLIPIDEMIA, UNSPECIFIED HYPERLIPIDEMIA TYPE: ICD-10-CM

## 2022-03-14 DIAGNOSIS — I71.20 THORACIC AORTIC ANEURYSM WITHOUT RUPTURE (HCC): ICD-10-CM

## 2022-03-14 DIAGNOSIS — E78.00 HYPERCHOLESTEREMIA: ICD-10-CM

## 2022-03-14 DIAGNOSIS — I34.0 NON-RHEUMATIC MITRAL REGURGITATION: ICD-10-CM

## 2022-03-14 LAB
LV EJECT FRACT  99904: 60
LV EJECT FRACT MOD 2C 99903: 76.31
LV EJECT FRACT MOD 4C 99902: 63.64
LV EJECT FRACT MOD BP 99901: 71.43

## 2022-03-14 PROCEDURE — 93306 TTE W/DOPPLER COMPLETE: CPT

## 2022-03-14 PROCEDURE — 93306 TTE W/DOPPLER COMPLETE: CPT | Mod: 26 | Performed by: INTERNAL MEDICINE

## 2022-03-17 ENCOUNTER — OFFICE VISIT (OUTPATIENT)
Dept: CARDIOLOGY | Facility: MEDICAL CENTER | Age: 67
End: 2022-03-17
Payer: MEDICARE

## 2022-03-17 VITALS
BODY MASS INDEX: 24.17 KG/M2 | SYSTOLIC BLOOD PRESSURE: 106 MMHG | HEIGHT: 61 IN | WEIGHT: 128 LBS | RESPIRATION RATE: 16 BRPM | OXYGEN SATURATION: 93 % | DIASTOLIC BLOOD PRESSURE: 64 MMHG | HEART RATE: 72 BPM

## 2022-03-17 DIAGNOSIS — I34.0 NON-RHEUMATIC MITRAL REGURGITATION: ICD-10-CM

## 2022-03-17 DIAGNOSIS — E78.00 HYPERCHOLESTEREMIA: ICD-10-CM

## 2022-03-17 DIAGNOSIS — I35.1 NONRHEUMATIC AORTIC VALVE INSUFFICIENCY: ICD-10-CM

## 2022-03-17 DIAGNOSIS — Z95.2 S/P AVR (AORTIC VALVE REPLACEMENT): ICD-10-CM

## 2022-03-17 DIAGNOSIS — Z79.01 WARFARIN ANTICOAGULATION: ICD-10-CM

## 2022-03-17 DIAGNOSIS — I71.20 THORACIC AORTIC ANEURYSM WITHOUT RUPTURE (HCC): ICD-10-CM

## 2022-03-17 DIAGNOSIS — M35.9 DIFFUSE CONNECTIVE TISSUE DISEASE (HCC): ICD-10-CM

## 2022-03-17 PROCEDURE — 99214 OFFICE O/P EST MOD 30 MIN: CPT | Performed by: INTERNAL MEDICINE

## 2022-03-17 ASSESSMENT — ENCOUNTER SYMPTOMS
WEAKNESS: 0
DIZZINESS: 0
NEUROLOGICAL NEGATIVE: 1
GASTROINTESTINAL NEGATIVE: 1
PND: 0
MUSCULOSKELETAL NEGATIVE: 1
CONSTITUTIONAL NEGATIVE: 1
CHILLS: 0
FEVER: 0
SPUTUM PRODUCTION: 0
BRUISES/BLEEDS EASILY: 0
WHEEZING: 0
EYES NEGATIVE: 1
CLAUDICATION: 0
SHORTNESS OF BREATH: 0
HEMOPTYSIS: 0
STRIDOR: 0
PALPITATIONS: 0
SORE THROAT: 0
LOSS OF CONSCIOUSNESS: 0
ORTHOPNEA: 0
COUGH: 1
CARDIOVASCULAR NEGATIVE: 1

## 2022-03-17 ASSESSMENT — FIBROSIS 4 INDEX: FIB4 SCORE: 1.69

## 2022-03-17 NOTE — PROGRESS NOTES
Chief Complaint   Patient presents with   • Hyperlipidemia       Subjective:   Marline Perry is a 66 y.o. female who presents today as a follow-up for her high risk medication usage mechanical aortic valve and mitral regurgitation with high cholesterol.      Since she was last seen she has been doing well with no changes to her status.  Repeat echocardiogram recently showed normal gradients through her mechanical aortic valve with a normal LV systolic function.  She has no functional imitations.  She does report shortness of breath.    Past Medical History:   Diagnosis Date   • Bronchiectasis (HCC)    • Bronchitis    • Chickenpox    • COPD (chronic obstructive pulmonary disease) (HCC)    • Hyperlipidemia    • Influenza    • Migraines    • Mumps    • Nasal drainage    • Sjogren's disease (HCC)      Past Surgical History:   Procedure Laterality Date   • AORTIC VALVE REPLACEMENT     • BRONCHOSCOPY     • HYSTERECTOMY LAPAROSCOPY     • SINUSCOPE       Family History   Problem Relation Age of Onset   • Arthritis Mother    • Hypertension Father    • Kidney Disease Father    • Arthritis Sister    • No Known Problems Brother    • No Known Problems Brother    • No Known Problems Son    • Heart Attack Maternal Grandmother    • Stroke Maternal Grandfather    • Heart Disease Paternal Grandmother    • No Known Problems Paternal Grandfather      Social History     Socioeconomic History   • Marital status:      Spouse name: Not on file   • Number of children: Not on file   • Years of education: Not on file   • Highest education level: Not on file   Occupational History   • Not on file   Tobacco Use   • Smoking status: Never Smoker   • Smokeless tobacco: Never Used   Vaping Use   • Vaping Use: Never used   Substance and Sexual Activity   • Alcohol use: Yes     Alcohol/week: 2.4 oz     Types: 4 Shots of liquor per week   • Drug use: No   • Sexual activity: Not on file   Other Topics Concern   • Not on file   Social  History Narrative   • Not on file     Social Determinants of Health     Financial Resource Strain: Not on file   Food Insecurity: Not on file   Transportation Needs: Not on file   Physical Activity: Not on file   Stress: Not on file   Social Connections: Not on file   Intimate Partner Violence: Not on file   Housing Stability: Not on file     Allergies   Allergen Reactions   • Spironolactone Rash     ALL OVER BODY    • Neomycin Rash     CONTACT DERMATITIS    • Tape      Adhesives-RASH      Outpatient Encounter Medications as of 3/17/2022   Medication Sig Dispense Refill   • azithromycin (ZITHROMAX) 250 MG Tab Take 1 Tablet by mouth every Monday, Wednesday, and Friday. 40 Tablet 3   • rosuvastatin (CRESTOR) 20 MG Tab TAKE 1 TABLET EVERY EVENING 90 Tablet 3   • BREO ELLIPTA 100-25 MCG/INH AEROSOL POWDER, BREATH ACTIVATED USE 1 INHALATION DAILY 3 Each 3   • tiotropium (SPIRIVA RESPIMAT) 2.5 mcg/Act Aero Soln USE 2 INHALATIONS DAILY. ASSEMBLE AND PRIME 4 g 11   • warfarin (COUMADIN) 5 MG Tab TAKE ONE-HALF (1/2) TO ONE TABLET DAILY OR AS DIRECTED BY ANTICOAGULATION CLINIC 90 Tablet 3   • cyclosporin (RESTASIS) 0.05 % ophthalmic emulsion Place 1 Drop in both eyes 2 times a day.     • SUMAtriptan (IMITREX) 50 MG Tab      • ascorbic acid (ASCORBIC ACID) 500 MG Tab Take 500 mg by mouth every day.     • Biotin 54958 MCG Tab Take  by mouth.     • Flaxseed, Linseed, (FLAX SEED OIL) 1000 MG Cap Take  by mouth 2 Times a Day.     • Cholecalciferol (VITAMIN D3) 2000 units Tab Take  by mouth.     • [DISCONTINUED] sertraline (ZOLOFT) 25 MG tablet TAKE 1 TABLET DAILY 90 tablet 0   • amitriptyline (ELAVIL) 10 MG Tab TAKE 1 TABLET EVERY EVENING 90 tablet 1   • [DISCONTINUED] gabapentin (NEURONTIN) 300 MG Cap Take 1 Cap by mouth every bedtime. (Patient not taking: Reported on 3/17/2022) 90 Cap 3     No facility-administered encounter medications on file as of 3/17/2022.     Review of Systems   Constitutional: Negative.  Negative for  "chills, fever and malaise/fatigue.   HENT: Negative.  Negative for sore throat.    Eyes: Negative.    Respiratory: Positive for cough. Negative for hemoptysis, sputum production, shortness of breath, wheezing and stridor.    Cardiovascular: Negative.  Negative for chest pain, palpitations, orthopnea, claudication, leg swelling and PND.   Gastrointestinal: Negative.    Genitourinary: Negative.    Musculoskeletal: Negative.    Skin: Negative.    Neurological: Negative.  Negative for dizziness, loss of consciousness and weakness.   Endo/Heme/Allergies: Negative.  Does not bruise/bleed easily.   All other systems reviewed and are negative.       Objective:   /64 (BP Location: Left arm, Patient Position: Sitting, BP Cuff Size: Adult)   Pulse 72   Resp 16   Ht 1.549 m (5' 1\")   Wt 58.1 kg (128 lb)   SpO2 93%   BMI 24.19 kg/m²     Physical Exam  Vitals and nursing note reviewed.   Constitutional:       General: She is not in acute distress.     Appearance: She is well-developed. She is not diaphoretic.   HENT:      Head: Normocephalic and atraumatic.      Right Ear: External ear normal.      Left Ear: External ear normal.      Nose: Nose normal.      Mouth/Throat:      Pharynx: No oropharyngeal exudate.   Eyes:      General: No scleral icterus.        Right eye: No discharge.         Left eye: No discharge.      Conjunctiva/sclera: Conjunctivae normal.      Pupils: Pupils are equal, round, and reactive to light.   Neck:      Vascular: No JVD.   Cardiovascular:      Rate and Rhythm: Normal rate and regular rhythm.      Heart sounds: Murmur heard.     No friction rub. No gallop.      Comments: Mechanical aortic valve sounds with a preserved A2  Pulmonary:      Effort: Pulmonary effort is normal. No respiratory distress.      Breath sounds: No stridor. No wheezing or rales.   Chest:      Chest wall: No tenderness.   Abdominal:      General: There is no distension.      Palpations: Abdomen is soft.      Tenderness: " There is no guarding.   Musculoskeletal:         General: No tenderness or deformity. Normal range of motion.      Cervical back: Neck supple.   Skin:     General: Skin is warm and dry.      Coloration: Skin is not pale.      Findings: No erythema or rash.   Neurological:      Mental Status: She is alert.      Cranial Nerves: No cranial nerve deficit.      Motor: No abnormal muscle tone.      Coordination: Coordination normal.      Deep Tendon Reflexes: Reflexes are normal and symmetric. Reflexes normal.   Psychiatric:         Behavior: Behavior normal.         Thought Content: Thought content normal.         Judgment: Judgment normal.       Echocardiogram: Dated 9/22/2020 personally under myself showing normal LV systolic function and aortic valve which is functioning well and mild to moderate MR.    Echocardiogram: Dated 8/30/2019 personally interpreted myself showing normal LV systolic function mild to moderate MR and a normal aortic valve.    Echocardiogram: Dated 3/14/2020 personally reviewed by myself showing normal LV systolic function with a normal gradient through mechanical aortic valve.    Lab Results   Component Value Date/Time    CHOLSTRLTOT 152 10/01/2020 09:10 AM    LDL 75 10/01/2020 09:10 AM    HDL 58 10/01/2020 09:10 AM    TRIGLYCERIDE 95 10/01/2020 09:10 AM       Lab Results   Component Value Date/Time    SODIUM 137 03/10/2021 11:43 AM    POTASSIUM 4.8 03/10/2021 11:43 AM    CHLORIDE 101 03/10/2021 11:43 AM    CO2 26 03/10/2021 11:43 AM    GLUCOSE 76 03/10/2021 11:43 AM    BUN 15 03/10/2021 11:43 AM    CREATININE 0.85 03/10/2021 11:43 AM     Lab Results   Component Value Date/Time    ALKPHOSPHAT 104 (H) 03/10/2021 11:43 AM    ASTSGOT 45 03/10/2021 11:43 AM    ALTSGPT 63 (H) 03/10/2021 11:43 AM    TBILIRUBIN 0.4 03/10/2021 11:43 AM        Assessment:     1. Diffuse connective tissue disease (HCC)     2. Hypercholesteremia     3. Thoracic aortic aneurysm without rupture (HCC)     4. Warfarin  anticoagulation     5. S/P AVR (aortic valve replacement)     6. Nonrheumatic aortic valve insufficiency     7. Non-rheumatic mitral regurgitation         Medical Decision Making:  Today's Assessment / Status / Plan:     64-year-old female with mechanical aortic valve replaced in 2004 with hypertension hyperlipidemia thoracic aortic aneurysm and MR. I will again see her back in 1 year.  I refilled her rosuvastatin.  She is doing well.  I would not recommend any further testing except for an annual echo.    1. Mechanical AVR    - annual echo     2. MR    - clinical follow up     3. HLD     - cont rosuva 20     4. Thoracic Aortic Aneursym    - CT in 2018 was normal

## 2022-03-23 ENCOUNTER — HOSPITAL ENCOUNTER (OUTPATIENT)
Dept: LAB | Facility: MEDICAL CENTER | Age: 67
End: 2022-03-23
Attending: NURSE PRACTITIONER
Payer: MEDICARE

## 2022-03-23 ENCOUNTER — HOSPITAL ENCOUNTER (OUTPATIENT)
Dept: LAB | Facility: MEDICAL CENTER | Age: 67
End: 2022-03-23
Attending: STUDENT IN AN ORGANIZED HEALTH CARE EDUCATION/TRAINING PROGRAM
Payer: MEDICARE

## 2022-03-23 LAB
25(OH)D3 SERPL-MCNC: 44 NG/ML (ref 30–100)
ALBUMIN SERPL BCP-MCNC: 4.4 G/DL (ref 3.2–4.9)
ALBUMIN SERPL BCP-MCNC: 4.4 G/DL (ref 3.2–4.9)
ALBUMIN/GLOB SERPL: 1.7 G/DL
ALBUMIN/GLOB SERPL: 1.8 G/DL
ALP SERPL-CCNC: 97 U/L (ref 30–99)
ALP SERPL-CCNC: 97 U/L (ref 30–99)
ALT SERPL-CCNC: 39 U/L (ref 2–50)
ALT SERPL-CCNC: 39 U/L (ref 2–50)
ANION GAP SERPL CALC-SCNC: 12 MMOL/L (ref 7–16)
ANION GAP SERPL CALC-SCNC: 12 MMOL/L (ref 7–16)
AST SERPL-CCNC: 37 U/L (ref 12–45)
AST SERPL-CCNC: 39 U/L (ref 12–45)
BILIRUB SERPL-MCNC: 0.4 MG/DL (ref 0.1–1.5)
BILIRUB SERPL-MCNC: 0.5 MG/DL (ref 0.1–1.5)
BUN SERPL-MCNC: 16 MG/DL (ref 8–22)
BUN SERPL-MCNC: 16 MG/DL (ref 8–22)
CALCIUM SERPL-MCNC: 9.3 MG/DL (ref 8.5–10.5)
CALCIUM SERPL-MCNC: 9.5 MG/DL (ref 8.5–10.5)
CHLORIDE SERPL-SCNC: 106 MMOL/L (ref 96–112)
CHLORIDE SERPL-SCNC: 106 MMOL/L (ref 96–112)
CHOLEST SERPL-MCNC: 147 MG/DL (ref 100–199)
CHOLEST SERPL-MCNC: 148 MG/DL (ref 100–199)
CO2 SERPL-SCNC: 23 MMOL/L (ref 20–33)
CO2 SERPL-SCNC: 24 MMOL/L (ref 20–33)
CREAT SERPL-MCNC: 0.8 MG/DL (ref 0.5–1.4)
CREAT SERPL-MCNC: 0.82 MG/DL (ref 0.5–1.4)
ERYTHROCYTE [DISTWIDTH] IN BLOOD BY AUTOMATED COUNT: 44.6 FL (ref 35.9–50)
FASTING STATUS PATIENT QL REPORTED: NORMAL
FASTING STATUS PATIENT QL REPORTED: NORMAL
GFR SERPLBLD CREATININE-BSD FMLA CKD-EPI: 79 ML/MIN/1.73 M 2
GFR SERPLBLD CREATININE-BSD FMLA CKD-EPI: 81 ML/MIN/1.73 M 2
GLOBULIN SER CALC-MCNC: 2.5 G/DL (ref 1.9–3.5)
GLOBULIN SER CALC-MCNC: 2.6 G/DL (ref 1.9–3.5)
GLUCOSE SERPL-MCNC: 87 MG/DL (ref 65–99)
GLUCOSE SERPL-MCNC: 90 MG/DL (ref 65–99)
HCT VFR BLD AUTO: 42.9 % (ref 37–47)
HDLC SERPL-MCNC: 50 MG/DL
HDLC SERPL-MCNC: 51 MG/DL
HGB BLD-MCNC: 14.2 G/DL (ref 12–16)
LDLC SERPL CALC-MCNC: 84 MG/DL
LDLC SERPL CALC-MCNC: 84 MG/DL
MCH RBC QN AUTO: 29.9 PG (ref 27–33)
MCHC RBC AUTO-ENTMCNC: 33.1 G/DL (ref 33.6–35)
MCV RBC AUTO: 90.3 FL (ref 81.4–97.8)
PLATELET # BLD AUTO: 257 K/UL (ref 164–446)
PMV BLD AUTO: 9.5 FL (ref 9–12.9)
POTASSIUM SERPL-SCNC: 4.3 MMOL/L (ref 3.6–5.5)
POTASSIUM SERPL-SCNC: 4.4 MMOL/L (ref 3.6–5.5)
PROT SERPL-MCNC: 6.9 G/DL (ref 6–8.2)
PROT SERPL-MCNC: 7 G/DL (ref 6–8.2)
RBC # BLD AUTO: 4.75 M/UL (ref 4.2–5.4)
SODIUM SERPL-SCNC: 141 MMOL/L (ref 135–145)
SODIUM SERPL-SCNC: 142 MMOL/L (ref 135–145)
TRIGL SERPL-MCNC: 64 MG/DL (ref 0–149)
TRIGL SERPL-MCNC: 65 MG/DL (ref 0–149)
VIT B12 SERPL-MCNC: 1622 PG/ML (ref 211–911)
WBC # BLD AUTO: 5.7 K/UL (ref 4.8–10.8)

## 2022-03-23 PROCEDURE — 80053 COMPREHEN METABOLIC PANEL: CPT | Mod: 91

## 2022-03-23 PROCEDURE — 82607 VITAMIN B-12: CPT

## 2022-03-23 PROCEDURE — 82306 VITAMIN D 25 HYDROXY: CPT | Mod: GA

## 2022-03-23 PROCEDURE — 80061 LIPID PANEL: CPT

## 2022-03-23 PROCEDURE — 80053 COMPREHEN METABOLIC PANEL: CPT

## 2022-03-23 PROCEDURE — 80061 LIPID PANEL: CPT | Mod: 91

## 2022-03-23 PROCEDURE — 36415 COLL VENOUS BLD VENIPUNCTURE: CPT

## 2022-03-23 PROCEDURE — 85027 COMPLETE CBC AUTOMATED: CPT

## 2022-03-24 NOTE — RESULT ENCOUNTER NOTE
Your diagnostic results have been reviewed. No acute changes noted. Please follow up as scheduled. Thank you.

## 2022-03-25 ENCOUNTER — ANTICOAGULATION MONITORING (OUTPATIENT)
Dept: VASCULAR LAB | Facility: MEDICAL CENTER | Age: 67
End: 2022-03-25
Payer: MEDICARE

## 2022-03-25 DIAGNOSIS — Z95.2 S/P AVR (AORTIC VALVE REPLACEMENT): ICD-10-CM

## 2022-03-25 LAB — INR PPP: 3.4 (ref 2–3.5)

## 2022-03-25 NOTE — PROGRESS NOTES
Anticoagulation Summary  As of 3/25/2022    INR goal:  2.0-3.0   TTR:  54.6 % (4.1 y)   INR used for dosing:  3.40 (3/25/2022)   Warfarin maintenance plan:  2.5 mg (5 mg x 0.5) every Mon, Wed, Fri; 5 mg (5 mg x 1) all other days   Weekly warfarin total:  27.5 mg   Plan last modified:  Anay Patel (11/6/2020)   Next INR check:  4/8/2022   Target end date:  Indefinite    Indications    S/P AVR (aortic valve replacement) [Z95.2]             Anticoagulation Episode Summary     INR check location:  Home Draw    Preferred lab:      Send INR reminders to:      Comments:  Denny CELESTIN  only call if out of range      Anticoagulation Care Providers     Provider Role Specialty Phone number    Chinyere Marcus M.D. Referring Cardiovascular Disease (Cardiology) [phone]    Yossi Khalil, PharmD Responsible Pharmacy           Refer to Anticoagulation Patient Findings for HPI  Patient Findings     Positives:  Change in diet/appetite (Pt eating less greens lately. Will return to baseline)    Negatives:  Signs/symptoms of thrombosis, Signs/symptoms of bleeding, Laboratory test error suspected, Change in health, Change in alcohol use, Change in activity, Upcoming invasive procedure, Emergency department visit, Upcoming dental procedure, Missed doses, Extra doses, Change in medications, Hospital admission, Bruising, Other complaints          Spoke with pt.  INR is SUB therapeutic.     Pt verifies warfarin weekly dosing.     Will have pt HOLD x 1 dose today and then continue on w/ her current regimen.    Repeat INR in 2 week(s).     Huseyin Conrad, PharmD, BCACP

## 2022-03-31 ENCOUNTER — OFFICE VISIT (OUTPATIENT)
Dept: MEDICAL GROUP | Facility: MEDICAL CENTER | Age: 67
End: 2022-03-31
Payer: MEDICARE

## 2022-03-31 VITALS
WEIGHT: 125 LBS | HEART RATE: 79 BPM | DIASTOLIC BLOOD PRESSURE: 72 MMHG | HEIGHT: 60 IN | RESPIRATION RATE: 16 BRPM | SYSTOLIC BLOOD PRESSURE: 124 MMHG | OXYGEN SATURATION: 96 % | BODY MASS INDEX: 24.54 KG/M2 | TEMPERATURE: 97.3 F

## 2022-03-31 DIAGNOSIS — J47.9 BRONCHIECTASIS WITHOUT COMPLICATION (HCC): ICD-10-CM

## 2022-03-31 DIAGNOSIS — Z79.01 WARFARIN ANTICOAGULATION: ICD-10-CM

## 2022-03-31 DIAGNOSIS — E53.8 VITAMIN B12 DEFICIENCY: ICD-10-CM

## 2022-03-31 DIAGNOSIS — M85.89 OSTEOPENIA OF MULTIPLE SITES: ICD-10-CM

## 2022-03-31 DIAGNOSIS — M79.7 FIBROMYALGIA: ICD-10-CM

## 2022-03-31 DIAGNOSIS — I71.20 THORACIC AORTIC ANEURYSM WITHOUT RUPTURE (HCC): ICD-10-CM

## 2022-03-31 DIAGNOSIS — R74.01 TRANSAMINITIS: ICD-10-CM

## 2022-03-31 PROBLEM — R94.4 DECREASED GFR: Status: RESOLVED | Noted: 2020-02-24 | Resolved: 2022-03-31

## 2022-03-31 PROCEDURE — 99214 OFFICE O/P EST MOD 30 MIN: CPT | Performed by: STUDENT IN AN ORGANIZED HEALTH CARE EDUCATION/TRAINING PROGRAM

## 2022-03-31 ASSESSMENT — FIBROSIS 4 INDEX: FIB4 SCORE: 1.52

## 2022-03-31 ASSESSMENT — PATIENT HEALTH QUESTIONNAIRE - PHQ9: CLINICAL INTERPRETATION OF PHQ2 SCORE: 0

## 2022-03-31 NOTE — PROGRESS NOTES
Subjective:     CC: Establish care, hyperlipidemia, transaminitis follow-up    HPI:   Marline presents today to establish care.  Is a previous patient of Dr. Shaw.  Patient feels overall well today and has no new concerns.    Chemical aortic valve, mitral regurgitation  Patient following with cardiology.  Recent echo with no significant change.    Hyperlipidemia  Patient continues on rosuvastatin 20 mg.    Thoracic aortic aneurysm  CT in 2018 was normal.  Cardiology has not recommended further follow-up.    Bronchiectasis  Patient following with pulmonary.  Patient taking Breo and Spiriva.    Fibromyalgia  Patient previously on sertraline and amitriptyline.  Patient notes this has been controlled and that she only has pain in her right knee.  Patient notes pain is worse with activity.    Transaminitis  Liver enzymes within normal limits.      ROS:  Gen: no fevers/chills  Pulm: no sob, no cough  CV: no chest pain, no palpitations  GI: no nausea/vomiting, no diarrhea  Neuro: no headache      Objective:     Exam:  /72 (BP Location: Right arm, Patient Position: Sitting, BP Cuff Size: Adult)   Pulse 79   Temp 36.3 °C (97.3 °F) (Temporal)   Resp 16   Ht 1.524 m (5')   Wt 56.7 kg (125 lb)   SpO2 96%   BMI 24.41 kg/m²  Body mass index is 24.41 kg/m².    Gen: Alert and oriented, No apparent distress.  Neck: Neck is supple without lymphadenopathy.  Lungs: Normal effort, CTA bilaterally, no wheezes, rhonchi, or rales  CV: Regular rate and rhythm. No murmurs, rubs, or gallops.  Ext: No clubbing, cyanosis, edema.      Assessment & Plan:     66 y.o. female with the following -     1. Warfarin anticoagulation  Chronic, stable.  Patient continues to follow coagulation clinic.  Patient continues on warfarin 5 mg.    2. Transaminitis  Chronic, improved.    3. Vitamin B12 deficiency  Chronic, improved.  Patient continues on supplement.    4. Thoracic aortic aneurysm without rupture (HCC)  Chronic, repaired.  CT 2018  stable.    5. Osteopenia of multiple sites  Chronic, stable.  Patient continues on vitamin D supplement.    6. Bronchiectasis without complication (HCC)  Chronic, stable.  Patient continues to follow with pulmonary.    7. Fibromyalgia  Chronic, improved.  Patient not taking anything for fibromyalgia at this time.      Return in about 6 months (around 9/30/2022).    Please note that this dictation was created using voice recognition software. I have made every reasonable attempt to correct obvious errors, but I expect that there are errors of grammar and possibly content that I did not discover before finalizing the note.

## 2022-04-13 ENCOUNTER — ANTICOAGULATION MONITORING (OUTPATIENT)
Dept: VASCULAR LAB | Facility: MEDICAL CENTER | Age: 67
End: 2022-04-13
Payer: MEDICARE

## 2022-04-13 DIAGNOSIS — Z95.2 S/P AVR (AORTIC VALVE REPLACEMENT): ICD-10-CM

## 2022-04-13 LAB — INR PPP: 3.3 (ref 2–3.5)

## 2022-04-13 NOTE — PROGRESS NOTES
Anticoagulation Summary  As of 4/13/2022    INR goal:  2.0-3.0   TTR:  53.9 % (4.2 y)   INR used for dosing:  3.30 (4/13/2022)   Warfarin maintenance plan:  2.5 mg (5 mg x 0.5) every Mon, Wed, Fri; 5 mg (5 mg x 1) all other days   Weekly warfarin total:  27.5 mg   Plan last modified:  Anay Patel (11/6/2020)   Next INR check:  4/27/2022   Target end date:  Indefinite    Indications    S/P AVR (aortic valve replacement) [Z95.2]             Anticoagulation Episode Summary     INR check location:  Home Draw    Preferred lab:      Send INR reminders to:      Comments:  Denny CELESTIN  only call if out of range      Anticoagulation Care Providers     Provider Role Specialty Phone number    Chinyere Marcus M.D. Referring Cardiovascular Disease (Cardiology) [phone]    Yossi Khalil, PharmD Responsible Pharmacy           Refer to Anticoagulation Patient Findings for HPI  Patient Findings     Positives:  Change in diet/appetite (Pt's diet has been sporadic since she was on vacation - plans to return to baseline.)    Negatives:  Signs/symptoms of thrombosis, Signs/symptoms of bleeding, Laboratory test error suspected, Change in health, Change in alcohol use, Change in activity, Upcoming invasive procedure, Emergency department visit, Upcoming dental procedure, Missed doses, Extra doses, Change in medications, Hospital admission, Bruising, Other complaints          Spoke with pt.  INR is SUPRA therapeutic.     Pt verifies warfarin weekly dosing.     Will have pt HOLD x 1 dose and then continue on w/ her current regimen.    Repeat INR in 2 week(s).     Huseyin Conrad, PharmD, BCACP

## 2022-04-28 LAB — INR PPP: 3 (ref 2–3.5)

## 2022-04-29 ENCOUNTER — ANTICOAGULATION MONITORING (OUTPATIENT)
Dept: VASCULAR LAB | Facility: MEDICAL CENTER | Age: 67
End: 2022-04-29
Payer: MEDICARE

## 2022-04-29 DIAGNOSIS — Z95.2 S/P AVR (AORTIC VALVE REPLACEMENT): ICD-10-CM

## 2022-04-29 NOTE — PROGRESS NOTES
Anticoagulation Summary  As of 4/29/2022    INR goal:  2.0-3.0   TTR:  53.4 % (4.2 y)   INR used for dosing:  3 (4/28/2022)   Warfarin maintenance plan:  2.5 mg (5 mg x 0.5) every Mon, Wed, Fri; 5 mg (5 mg x 1) all other days   Weekly warfarin total:  27.5 mg   Plan last modified:  Anay Patel (11/6/2020)   Next INR check:  5/13/2022   Target end date:  Indefinite    Indications    S/P AVR (aortic valve replacement) [Z95.2]             Anticoagulation Episode Summary     INR check location:  Home Draw    Preferred lab:      Send INR reminders to:      Comments:  Denny CELESTIN  only call if out of range      Anticoagulation Care Providers     Provider Role Specialty Phone number    Chinyere Marcus M.D. Referring Cardiovascular Disease (Cardiology) [phone]    Yossi Khalil, PharmD Responsible Pharmacy           Refer to Anticoagulation Patient Findings for HPI    INR therapeutic at 3.      Per chart review pt prefers no phone call if INR in range.    Pt to continue with current warfarin dosing regimen.     Will follow up in 2 week(s).     Huseyin Conrad, PharmD, BCACP

## 2022-05-20 ENCOUNTER — ANTICOAGULATION MONITORING (OUTPATIENT)
Dept: MEDICAL GROUP | Facility: PHYSICIAN GROUP | Age: 67
End: 2022-05-20
Payer: MEDICARE

## 2022-05-20 DIAGNOSIS — Z95.2 S/P AVR (AORTIC VALVE REPLACEMENT): ICD-10-CM

## 2022-05-20 LAB — INR PPP: 3.1 (ref 2–3.5)

## 2022-05-20 NOTE — PROGRESS NOTES
Anticoagulation Summary  As of 5/20/2022    INR goal:  2.0-3.0   TTR:  52.7 % (4.3 y)   INR used for dosing:  3.10 (5/20/2022)   Warfarin maintenance plan:  2.5 mg (5 mg x 0.5) every Mon, Wed, Fri; 5 mg (5 mg x 1) all other days   Weekly warfarin total:  27.5 mg   Plan last modified:  Anay Patel (11/6/2020)   Next INR check:  6/3/2022   Target end date:  Indefinite    Indications    S/P AVR (aortic valve replacement) [Z95.2]             Anticoagulation Episode Summary     INR check location:  Home Draw    Preferred lab:      Send INR reminders to:      Comments:  Denny CELESTIN  only call if out of range      Anticoagulation Care Providers     Provider Role Specialty Phone number    Chinyere Marcus M.D. Referring Cardiovascular Disease (Cardiology) [phone]    Yossi Khalil, PharmD Responsible Pharmacy           Refer to Anticoagulation Patient Findings for HPI  Patient Findings     Positives:  Change in diet/appetite (Pt eating less greens d/t travel - will increase back to baseline.)    Negatives:  Signs/symptoms of thrombosis, Signs/symptoms of bleeding, Laboratory test error suspected, Change in health, Change in alcohol use, Change in activity, Upcoming invasive procedure, Emergency department visit, Upcoming dental procedure, Missed doses, Extra doses, Change in medications, Hospital admission, Bruising, Other complaints          Spoke with pt.  INR is SUPRA therapeutic.     Pt verifies warfarin weekly dosing.     Will have pt eat more greens tonight and then continue on w/ her current regimen.    Repeat INR in 2 week(s).     Huseyin Conrad, PharmD, BCACP

## 2022-06-09 ENCOUNTER — ANTICOAGULATION MONITORING (OUTPATIENT)
Dept: CARDIOLOGY | Facility: MEDICAL CENTER | Age: 67
End: 2022-06-09
Payer: MEDICARE

## 2022-06-09 DIAGNOSIS — Z95.2 S/P AVR (AORTIC VALVE REPLACEMENT): ICD-10-CM

## 2022-06-09 LAB — INR PPP: 2.4 (ref 2–3.5)

## 2022-06-09 NOTE — PROGRESS NOTES
Anticoagulation Summary  As of 2022    INR goal:  2.0-3.0   TTR:  53.1 % (4.3 y)   INR used for dosin.40 (2022)   Warfarin maintenance plan:  2.5 mg (5 mg x 0.5) every Mon, Wed, Fri; 5 mg (5 mg x 1) all other days   Weekly warfarin total:  27.5 mg   Plan last modified:  Anay Patel (2020)   Next INR check:  2022   Target end date:  Indefinite    Indications    S/P AVR (aortic valve replacement) [Z95.2]             Anticoagulation Episode Summary     INR check location:  Home Draw    Preferred lab:      Send INR reminders to:      Comments:  Denny CELESTIN  only call if out of range      Anticoagulation Care Providers     Provider Role Specialty Phone number    Chinyere Marcus M.D. Referring Cardiovascular Disease (Cardiology) [phone]    Yossi Khalil, PharmD Responsible Pharmacy         Anticoagulation Patient Findings      Pt reported a therapeutic INR  Per chart review, pt does not want to be contacted if INR is therapeutic   Pt to continue with current warfarin dosing regimen. Requested pt contact the clinic for any s/s of unusual bleeding, bruising, clotting or any changes to diet or medication.    FU INR in 2 week(s).    Diann Warren, PharmD

## 2022-06-14 ENCOUNTER — OFFICE VISIT (OUTPATIENT)
Dept: SLEEP MEDICINE | Facility: MEDICAL CENTER | Age: 67
End: 2022-06-14
Payer: MEDICARE

## 2022-06-14 VITALS
WEIGHT: 123 LBS | HEART RATE: 75 BPM | OXYGEN SATURATION: 96 % | BODY MASS INDEX: 24.15 KG/M2 | HEIGHT: 60 IN | RESPIRATION RATE: 16 BRPM | SYSTOLIC BLOOD PRESSURE: 110 MMHG | DIASTOLIC BLOOD PRESSURE: 64 MMHG

## 2022-06-14 DIAGNOSIS — J30.2 SEASONAL ALLERGIC RHINITIS, UNSPECIFIED TRIGGER: ICD-10-CM

## 2022-06-14 DIAGNOSIS — J47.9 BRONCHIECTASIS WITHOUT COMPLICATION (HCC): ICD-10-CM

## 2022-06-14 DIAGNOSIS — M35.00 SJOGREN'S SYNDROME, WITH UNSPECIFIED ORGAN INVOLVEMENT (HCC): ICD-10-CM

## 2022-06-14 PROCEDURE — 99213 OFFICE O/P EST LOW 20 MIN: CPT | Performed by: PHYSICIAN ASSISTANT

## 2022-06-14 ASSESSMENT — FIBROSIS 4 INDEX: FIB4 SCORE: 1.52

## 2022-06-14 ASSESSMENT — ENCOUNTER SYMPTOMS
TREMORS: 0
SPUTUM PRODUCTION: 1
SINUS PAIN: 1
HEADACHES: 1
INSOMNIA: 1
WHEEZING: 0
COUGH: 1
ORTHOPNEA: 0
SORE THROAT: 1
WEIGHT LOSS: 0
DIZZINESS: 1
HEARTBURN: 0
PALPITATIONS: 0
SHORTNESS OF BREATH: 1
FEVER: 0
CHILLS: 0

## 2022-06-14 NOTE — PROGRESS NOTES
CC: Cost of medication    HPI:  Marline Perry is a 66 y.o. year old female here today for follow-up on bronchiectasis diagnosed in 2008. Last seen in clinic 12/9/2021.  Patient is a never smoker.    Pertinent past medical history includes diffuse connective tissue disease, Sjogren's, fibromyalgia, thoracic aortic aneurysm, AVR, decreased GFR, osteopenia of multiple sites.  Patient is followed by rheumatology.    Reviewed in clinic vitals including /64, HR 75, O2 sat of 96% and BMI of 24.02 kg/m².    Reviewed home medication regimen including azithromycin which she takes Monday Wednesday and Friday, Breo which she has not been taking due to cost, Spiriva Respimat 2.5 mcg, warfarin 2.5 mg daily.  She does have an Acapella which she has been using occasionally.    Reviewed most recent imaging including echocardiogram obtained 3/14/2022 demonstrating no significant change since prior imaging in 2020, normal left ventricular chamber size, wall thickness, systolic and diastolic function with LVEF estimated 60%.  Normal right ventricular size and decreased right ventricular systolic function, mild tricuspid regurgitation, estimated RVSP of 27 mmHg.    No recent chest imaging, did have an HRCT 4/26/2018 demonstrating hyperexpanded lungs, lateral right lower lobe subsegmental atelectasis and mild bronchiectasis, left basilar atelectasis.  No evidence of ILD.  Evidence of AVR.    Reviewed pulmonary function testing obtained 7/29/2021 demonstrating FEV1 of 1.84 L or 89% predicted, FVC of 2.33 L or 89% predicted, FEV1/FVC ratio 79, residual volume 93% predicted, TLC 94% predicted, DLCO 85% predicted.  Per pulmonologist interpretation normal appearing flow volume loops, normal pulmonary function test.      Review of Systems   Constitutional: Positive for malaise/fatigue (mild). Negative for chills, fever and weight loss.   HENT: Positive for congestion, hearing loss (mild left ), sinus pain (occasional, resolves  with decongestant ) and sore throat (mild lately ). Negative for tinnitus.    Eyes:        Presc glasses    Respiratory: Positive for cough, sputum production (white to yellow ) and shortness of breath. Negative for wheezing.    Cardiovascular: Negative for chest pain, palpitations, orthopnea and leg swelling.   Gastrointestinal: Negative for heartburn.        No dentures, a couple bridges, occasional difficulty swallowing    Neurological: Positive for dizziness (occasional ) and headaches. Negative for tremors.   Psychiatric/Behavioral: The patient has insomnia (staying asleep ).        Past Medical History:   Diagnosis Date   • Bronchiectasis ([facility])    • Bronchitis    • Chickenpox    • COPD (chronic obstructive pulmonary disease) ([facility])    • Hyperlipidemia    • Influenza    • Migraines    • Mumps    • Nasal drainage    • Sjogren's disease ([facility])        Past Surgical History:   Procedure Laterality Date   • AORTIC VALVE REPLACEMENT     • BRONCHOSCOPY     • HYSTERECTOMY LAPAROSCOPY     • SINUSCOPE         Family History   Problem Relation Age of Onset   • Arthritis Mother    • Hypertension Father    • Kidney Disease Father    • Arthritis Sister    • No Known Problems Brother    • No Known Problems Brother    • No Known Problems Son    • Heart Attack Maternal Grandmother    • Stroke Maternal Grandfather    • Heart Disease Paternal Grandmother    • No Known Problems Paternal Grandfather        Social History     Socioeconomic History   • Marital status:      Spouse name: Not on file   • Number of children: Not on file   • Years of education: Not on file   • Highest education level: Not on file   Occupational History   • Not on file   Tobacco Use   • Smoking status: Never Smoker   • Smokeless tobacco: Never Used   Vaping Use   • Vaping Use: Never used   Substance and Sexual Activity   • Alcohol use: Yes     Alcohol/week: 2.4 oz     Types: 4 Shots of liquor per week   • Drug use: No   • Sexual activity: Yes      Partners: Male   Other Topics Concern   • Not on file   Social History Narrative   • Not on file     Social Determinants of Health     Financial Resource Strain: Not on file   Food Insecurity: Not on file   Transportation Needs: Not on file   Physical Activity: Not on file   Stress: Not on file   Social Connections: Not on file   Intimate Partner Violence: Not on file   Housing Stability: Not on file       Allergies as of 06/14/2022 - Reviewed 06/14/2022   Allergen Reaction Noted   • Spironolactone Rash 06/11/2008   • Neomycin Rash 06/11/2008   • Tape  06/11/2008        @Vital signs for this encounter:  /64   Pulse 75   Resp 16   Ht 1.524 m (5')   Wt 55.8 kg (123 lb)   SpO2 96%     Current medications as of today   Current Outpatient Medications   Medication Sig Dispense Refill   • tiotropium (SPIRIVA RESPIMAT) 2.5 mcg/Act Aero Soln USE 2 INHALATIONS DAILY. ASSEMBLE AND PRIME 3 Each 3   • azithromycin (ZITHROMAX) 250 MG Tab Take 1 Tablet by mouth every Monday, Wednesday, and Friday. 40 Tablet 3   • rosuvastatin (CRESTOR) 20 MG Tab TAKE 1 TABLET EVERY EVENING 90 Tablet 3   • BREO ELLIPTA 100-25 MCG/INH AEROSOL POWDER, BREATH ACTIVATED USE 1 INHALATION DAILY 3 Each 3   • warfarin (COUMADIN) 5 MG Tab TAKE ONE-HALF (1/2) TO ONE TABLET DAILY OR AS DIRECTED BY ANTICOAGULATION CLINIC 90 Tablet 3   • cyclosporin (RESTASIS) 0.05 % ophthalmic emulsion Place 1 Drop in both eyes 2 times a day.     • SUMAtriptan (IMITREX) 50 MG Tab      • ascorbic acid (ASCORBIC ACID) 500 MG Tab Take 500 mg by mouth every day.     • Biotin 14183 MCG Tab Take  by mouth.     • Flaxseed, Linseed, (FLAX SEED OIL) 1000 MG Cap Take  by mouth 2 Times a Day.     • Cholecalciferol (VITAMIN D3) 2000 units Tab Take  by mouth.       No current facility-administered medications for this visit.         Physical Exam:   Gen:           Alert and oriented, No apparent distress. Mood and affect appropriate, normal interaction with provider.  Eyes:           sclere white, conjunctive moist.  Hearing:     Grossly intact.  Dentition:    Fair dentition.  Oropharynx:   Tongue normal, posterior pharynx without erythema or exudate.  Neck:        Supple, trachea midline, no masses.  Respiratory Effort: No intercostal retractions or use of accessory muscles.   Lung Auscultation:      Decreased bases; no rales, rhonchi or wheezing.  CV:            Regular rate and rhythm. No edema. No murmurs, rubs or gallops.  Digits, Nails, Ext: No clubbing, cyanosis, petechiae, or nodes.   Skin:        No rashes, lesions or ulcers noted on exposed skin  surfaces.                     Assessment:  1. Bronchiectasis without complication (HCC)  Fluticasone-Umeclidin-Vilant (TRELEGY ELLIPTA) 100-62.5-25 MCG/INH AEROSOL POWDER, BREATH ACTIVATED inhalation   2. Sjogren's syndrome, with unspecified organ involvement (HCC)     3. Seasonal allergic rhinitis, unspecified trigger         Immunizations:    Flu: 11/30/2021  Pneumovax 23: 10/18/2018  Prevnar 13: Deferred  Pfizer SARS-CoV-2 vaccine: 4/1/2021, 3/11/2021    Plan:  66-year-old female here to follow-up on bronchiectasis, history of Sjogren's disease, diffuse connective tissue disorder.  She is followed by rheumatology.  She is a never smoker.    Bronchiectasis: Patient currently using Spiriva, states benefit with Breo but has been unable to obtain related to cough.  Trial of Trelegy as single inhaler for triple therapy.  Sample provided, assess for benefit and let clinic know.  We reviewed Acapella use and cleaning, patient to increase usage to 2-3 times per day.  Update pulmonary function testing 2023.  Patient will check with [facility] regarding cost of Breo or Trelegy.  Discussion regarding [facility] as alternative.  Declined financial assistance paperwork.  Has sputum cups if needed for exacerbations but is on maintenance antibiotics.    Seasonal allergic rhinitis: Experiencing occasional symptoms including nasal  congestion, sinus pain, sore throat.  Resolves with decongestant.    Follow-up in 6 months.    This dictation was created using voice recognition software. The accuracy of the dictation is limited to the abilities of the software. I expect there may be some errors of grammar and possibly content.

## 2022-06-14 NOTE — PATIENT INSTRUCTIONS
1-reviewed acapella use  2-sample of Trelegy   3-consider [facility]   4-increase acapella use to 2-3 x per day  5-obtain PFT in 2023  6-check with [facility] Breo/Trelegy  7-has sputum cups if needed/maintenance antibiotics  8-follow up in 6 months

## 2022-06-23 ENCOUNTER — ANTICOAGULATION MONITORING (OUTPATIENT)
Dept: VASCULAR LAB | Facility: MEDICAL CENTER | Age: 67
End: 2022-06-23
Payer: MEDICARE

## 2022-06-23 DIAGNOSIS — Z95.2 S/P AVR (AORTIC VALVE REPLACEMENT): ICD-10-CM

## 2022-06-23 LAB — INR PPP: 2 (ref 2–3.5)

## 2022-06-23 NOTE — PROGRESS NOTES
Anticoagulation Summary  As of 2022    INR goal:  2.0-3.0   TTR:  53.5 % (4.4 y)   INR used for dosin.00 (2022)   Warfarin maintenance plan:  2.5 mg (5 mg x 0.5) every Mon, Wed, Fri; 5 mg (5 mg x 1) all other days   Weekly warfarin total:  27.5 mg   Plan last modified:  Anay Patel (2020)   Next INR check:  2022   Target end date:  Indefinite    Indications    S/P AVR (aortic valve replacement) [Z95.2]             Anticoagulation Episode Summary     INR check location:  Home Draw    Preferred lab:      Send INR reminders to:      Comments:  Denny CELESTIN  only call if out of range      Anticoagulation Care Providers     Provider Role Specialty Phone number    Chinyere Marcus M.D. Referring Cardiovascular Disease (Cardiology) [phone]    Yossi Khalil, PharmD Responsible Pharmacy         Anticoagulation Patient Findings      Pt reported a therapeutic INR  Per chart review, pt does not want to be contacted if INR is therapeutic   Pt to continue with current warfarin dosing regimen. Requested pt contact the clinic for any s/s of unusual bleeding, bruising, clotting or any changes to diet or medication.    FU INR in 2 week(s).    Diann Warren, PharmD

## 2022-07-11 ENCOUNTER — ANTICOAGULATION MONITORING (OUTPATIENT)
Dept: MEDICAL GROUP | Facility: MEDICAL CENTER | Age: 67
End: 2022-07-11
Payer: MEDICARE

## 2022-07-11 DIAGNOSIS — Z95.2 S/P AVR (AORTIC VALVE REPLACEMENT): ICD-10-CM

## 2022-07-11 LAB — INR PPP: 3.2 (ref 2–3.5)

## 2022-07-11 NOTE — PROGRESS NOTES
Anticoagulation Summary  As of 7/11/2022    INR goal:  2.0-3.0   TTR:  53.8 % (4.4 y)   INR used for dosing:  3.20 (7/11/2022)   Warfarin maintenance plan:  2.5 mg (5 mg x 0.5) every Mon, Wed, Fri; 5 mg (5 mg x 1) all other days   Weekly warfarin total:  27.5 mg   Plan last modified:  Anay Patel (11/6/2020)   Next INR check:  7/25/2022   Target end date:  Indefinite    Indications    S/P AVR (aortic valve replacement) [Z95.2]             Anticoagulation Episode Summary     INR check location:  Home Draw    Preferred lab:      Send INR reminders to:      Comments:  Denny CELESTIN  only call if out of range      Anticoagulation Care Providers     Provider Role Specialty Phone number    Chinyere Marcus M.D. Referring Cardiovascular Disease (Cardiology) [phone]    Yossi Khalil, PharmD Responsible Pharmacy           Refer to Anticoagulation Patient Findings for HPI  Patient Findings     Positives:  Change in diet/appetite (Pt has not been eating as many greens lately - plans to return to baseline.)    Negatives:  Signs/symptoms of thrombosis, Signs/symptoms of bleeding, Laboratory test error suspected, Change in health, Change in alcohol use, Change in activity, Upcoming invasive procedure, Emergency department visit, Upcoming dental procedure, Missed doses, Extra doses, Change in medications, Hospital admission, Bruising, Other complaints          Spoke with pt.  INR is SUPRA therapeutic.     Pt verifies warfarin weekly dosing.     Will have pt HOLD x 1 dose and then continue on w/ her current regimen.    Repeat INR in 2 week(s).     Huseyin Conrad, PharmD, BCACP

## 2022-07-28 ENCOUNTER — ANTICOAGULATION MONITORING (OUTPATIENT)
Dept: MEDICAL GROUP | Facility: MEDICAL CENTER | Age: 67
End: 2022-07-28
Payer: MEDICARE

## 2022-07-28 DIAGNOSIS — Z95.2 S/P AVR (AORTIC VALVE REPLACEMENT): ICD-10-CM

## 2022-07-28 LAB — INR PPP: 3.3 (ref 2–3.5)

## 2022-07-28 NOTE — PROGRESS NOTES
Anticoagulation Summary  As of 7/28/2022    INR goal:  2.0-3.0   TTR:  53.3 % (4.5 y)   INR used for dosing:  3.30 (7/28/2022)   Warfarin maintenance plan:  5 mg (5 mg x 1) every Tue, Thu, Sat; 2.5 mg (5 mg x 0.5) all other days   Weekly warfarin total:  25 mg   Plan last modified:  Gabriela Shannon, Pharmacy Intern (7/28/2022)   Next INR check:  8/11/2022   Target end date:  Indefinite    Indications    S/P AVR (aortic valve replacement) [Z95.2]             Anticoagulation Episode Summary     INR check location:  Home Draw    Preferred lab:      Send INR reminders to:      Comments:  Denny CELESTIN  only call if out of range      Anticoagulation Care Providers     Provider Role Specialty Phone number    Chinyere Marcus M.D. Referring Cardiovascular Disease (Cardiology) [phone]    Yossi Khalil, PharmD Responsible Pharmacy         Anticoagulation Patient Findings          Left voicemail message to report a SUPRA-therapeutic INR of 3.3.  Requested pt contact the clinic for any s/s of unusual bleeding, bruising, clotting or any changes to diet or medication.   Pt is to take decreased dose of 2.5 mg x 1, then decrease warfarin dosing regimen as listed above.    Pt is not on antiplatelet therapy      FU 2 weeks.  Reviewed plan with Mu Shannon, Pharmacy Intern

## 2022-08-17 ENCOUNTER — ANTICOAGULATION MONITORING (OUTPATIENT)
Dept: VASCULAR LAB | Facility: MEDICAL CENTER | Age: 67
End: 2022-08-17
Payer: MEDICARE

## 2022-08-17 DIAGNOSIS — Z95.2 S/P AVR (AORTIC VALVE REPLACEMENT): ICD-10-CM

## 2022-08-17 LAB — INR PPP: 2 (ref 2–3.5)

## 2022-08-17 NOTE — PROGRESS NOTES
Anticoagulation Summary  As of 2022      INR goal:  2.0-3.0   TTR:  53.6 % (4.5 y)   INR used for dosin.00 (2022)   Warfarin maintenance plan:  5 mg (5 mg x 1) every Tue, Thu, Sat; 2.5 mg (5 mg x 0.5) all other days   Weekly warfarin total:  25 mg   Plan last modified:  Gabriela Shannon, Pharmacy Intern (2022)   Next INR check:  2022   Target end date:  Indefinite    Indications    S/P AVR (aortic valve replacement) [Z95.2]                 Anticoagulation Episode Summary       INR check location:  Home Draw    Preferred lab:      Send INR reminders to:      Comments:  Denny CELESTIN  only call if out of range          Anticoagulation Care Providers       Provider Role Specialty Phone number    Chinyere Marcus M.D. Referring Cardiovascular Disease (Cardiology) [phone]    Yossi Khalil, PharmD Responsible Pharmacy           Anticoagulation Patient Findings      Left voicemail to report a therapeutic INR of 2.00.    Will have pt continue with current warfarin dosing regimen. Requested pt contact the clinic for any s/s of unusual bleeding, bruising, clotting or any changes to diet or medication.    FU INR in 2 week(s).    Steven Mishra, ClaudiaT

## 2022-08-28 DIAGNOSIS — Z79.01 CHRONIC ANTICOAGULATION: ICD-10-CM

## 2022-08-30 RX ORDER — WARFARIN SODIUM 5 MG/1
TABLET ORAL
Qty: 90 TABLET | Refills: 3 | Status: SHIPPED | OUTPATIENT
Start: 2022-08-30 | End: 2023-08-22

## 2022-08-31 LAB — INR PPP: 2.1 (ref 2–3.5)

## 2022-09-02 ENCOUNTER — ANTICOAGULATION MONITORING (OUTPATIENT)
Dept: VASCULAR LAB | Facility: MEDICAL CENTER | Age: 67
End: 2022-09-02
Payer: MEDICARE

## 2022-09-02 DIAGNOSIS — Z95.2 S/P AVR (AORTIC VALVE REPLACEMENT): ICD-10-CM

## 2022-09-02 NOTE — PROGRESS NOTES
Anticoagulation Summary  As of 2022      INR goal:  2.0-3.0   TTR:  53.9 % (4.6 y)   INR used for dosin.10 (2022)   Warfarin maintenance plan:  5 mg (5 mg x 1) every Tue, Thu, Sat; 2.5 mg (5 mg x 0.5) all other days   Weekly warfarin total:  25 mg   Plan last modified:  Gabriela Shannon, Pharmacy Intern (2022)   Next INR check:  2022   Target end date:  Indefinite    Indications    S/P AVR (aortic valve replacement) [Z95.2]                 Anticoagulation Episode Summary       INR check location:  Home Draw    Preferred lab:      Send INR reminders to:      Comments:  Denny CELESTIN  only call if out of range          Anticoagulation Care Providers       Provider Role Specialty Phone number    Chinyere Marcus M.D. Referring Cardiovascular Disease (Cardiology) [phone]    Yossi Khalil, PharmD Responsible Pharmacy             Refer to Anticoagulation Patient Findings for HPI    INR therapeutic at 2.1.      Per chart review pt prefers no phone call if INR in range.    Pt to continue with current warfarin dosing regimen.     Will follow up in 2 week(s).     Huseyin Conrad, PharmD, BCACP

## 2022-09-13 ENCOUNTER — OFFICE VISIT (OUTPATIENT)
Dept: MEDICAL GROUP | Facility: MEDICAL CENTER | Age: 67
End: 2022-09-13
Payer: MEDICARE

## 2022-09-13 VITALS
HEART RATE: 85 BPM | DIASTOLIC BLOOD PRESSURE: 74 MMHG | OXYGEN SATURATION: 95 % | BODY MASS INDEX: 23.16 KG/M2 | WEIGHT: 118 LBS | RESPIRATION RATE: 16 BRPM | SYSTOLIC BLOOD PRESSURE: 122 MMHG | HEIGHT: 60 IN

## 2022-09-13 DIAGNOSIS — Z79.01 WARFARIN ANTICOAGULATION: ICD-10-CM

## 2022-09-13 DIAGNOSIS — M35.9 DIFFUSE CONNECTIVE TISSUE DISEASE (HCC): ICD-10-CM

## 2022-09-13 DIAGNOSIS — I35.1 NONRHEUMATIC AORTIC VALVE INSUFFICIENCY: ICD-10-CM

## 2022-09-13 DIAGNOSIS — J47.9 BRONCHIECTASIS WITHOUT COMPLICATION (HCC): ICD-10-CM

## 2022-09-13 DIAGNOSIS — Z12.11 COLON CANCER SCREENING: ICD-10-CM

## 2022-09-13 PROCEDURE — 99213 OFFICE O/P EST LOW 20 MIN: CPT | Performed by: STUDENT IN AN ORGANIZED HEALTH CARE EDUCATION/TRAINING PROGRAM

## 2022-09-13 ASSESSMENT — FIBROSIS 4 INDEX: FIB4 SCORE: 1.52

## 2022-09-13 NOTE — PROGRESS NOTES
Subjective:     Chief Complaint   Patient presents with    Follow-Up     6 month          HPI:   Marline presents today with     Bronchiectasis  Patient continues to follow with pulmonary.  Patient notes that she has been trying Trelegy but it feels that it is too harsh and prefers using Spiriva.    Sjogren's syndrome  Fibromyalgia  Patient previously following with rheumatology.  Patient notes that she has no longer following with rheumatology because patient did not receive relief from treatments.  Patient previously taking sertraline, amitriptyline and gabapentin but is no longer taking these medications.    Fatigue  Patient notes a general sense of fatigue but notes that she has not been sleeping well due to her dog aging and keeping her up at night.    Mechanical aortic valve  Patient continues to follow with cardiology.      Foot pain  Patient scheduled to see a podiatrist.  Patient notes that she has pain in her great toe.  Patient notes that this has been ongoing for several years and causes pain up her leg.    ROS:  Gen: no fevers/chills  Pulm: no sob, no cough  CV: no chest pain, no palpitations  GI: no nausea/vomiting, no diarrhea    Objective:     Exam:  /74 (BP Location: Left arm, Patient Position: Sitting, BP Cuff Size: Adult)   Pulse 85   Resp 16   Ht 1.524 m (5')   Wt 53.5 kg (118 lb)   SpO2 95%   BMI 23.05 kg/m²  Body mass index is 23.05 kg/m².    Gen: Alert and oriented, No apparent distress.  Neck: Neck is supple without lymphadenopathy.  Lungs: Normal effort, CTA bilaterally, no wheezes, rhonchi, or rales  CV: Regular rate and rhythm. No murmurs, rubs, or gallops.  Ext: No clubbing, cyanosis, edema.      Assessment & Plan:     66 y.o. female with the following -     1. Colon cancer screening  - [facility] (FIT DNA)    2. Bronchiectasis without complication (HCC)  Chronic, stable following with pulmonary.    3. Diffuse connective tissue disease (HCC)  Chronic, stable.  No longer following  with rheumatology.  Patient has no other concerns at this time.    4. Nonrheumatic aortic valve insufficiency  Chronic, stable.  Following with cardiology.    5. Warfarin anticoagulation  Chronic, stable.  Following with Coumadin clinic.      Return in about 1 year (around 9/13/2023), or if symptoms worsen or fail to improve.    Please note that this dictation was created using voice recognition software. I have made every reasonable attempt to correct obvious errors, but I expect that there are errors of grammar and possibly content that I did not discover before finalizing the note.

## 2022-09-14 ENCOUNTER — ANTICOAGULATION MONITORING (OUTPATIENT)
Dept: VASCULAR LAB | Facility: MEDICAL CENTER | Age: 67
End: 2022-09-14
Payer: MEDICARE

## 2022-09-14 DIAGNOSIS — Z95.2 S/P AVR (AORTIC VALVE REPLACEMENT): ICD-10-CM

## 2022-09-14 LAB — INR PPP: 3 (ref 2–3.5)

## 2022-09-14 NOTE — PROGRESS NOTES
Anticoagulation Summary  As of 9/14/2022      INR goal:  2.0-3.0   TTR:  54.3 % (4.6 y)   INR used for dosing:  3.00 (9/14/2022)   Warfarin maintenance plan:  5 mg (5 mg x 1) every Tue, Thu, Sat; 2.5 mg (5 mg x 0.5) all other days   Weekly warfarin total:  25 mg   Plan last modified:  Gabriela Shannon, Pharmacy Intern (7/28/2022)   Next INR check:  9/28/2022   Target end date:  Indefinite    Indications    S/P AVR (aortic valve replacement) [Z95.2]                 Anticoagulation Episode Summary       INR check location:  Home Draw    Preferred lab:      Send INR reminders to:      Comments:  Denny CELESTIN  only call if out of range          Anticoagulation Care Providers       Provider Role Specialty Phone number    Chinyere Marcus M.D. Referring Cardiovascular Disease (Cardiology) [phone]    Yossi Khalil, PharmD Responsible Pharmacy           Anticoagulation Patient Findings      Pt reported a therapeutic INR  Per chart review, pt does not want to be contacted if INR is therapeutic   Pt to continue with current warfarin dosing regimen. Requested pt contact the clinic for any s/s of unusual bleeding, bruising, clotting or any changes to diet or medication.    FU INR in 2 week(s).    Diann Warren, PharmD

## 2022-09-16 DIAGNOSIS — J47.9 BRONCHIECTASIS WITHOUT COMPLICATION (HCC): ICD-10-CM

## 2022-09-20 NOTE — TELEPHONE ENCOUNTER
Kassandra Miguel P.A.-C.   You 8 minutes ago (4:31 PM)      Per Lora Angeles's last note, patient can resume daily azithromycin and assess for benefit.         Lymphoma

## 2022-10-01 LAB — INR PPP: 2.8 (ref 2–3.5)

## 2022-10-03 ENCOUNTER — ANTICOAGULATION MONITORING (OUTPATIENT)
Dept: CARDIOLOGY | Facility: MEDICAL CENTER | Age: 67
End: 2022-10-03
Payer: MEDICARE

## 2022-10-03 DIAGNOSIS — Z95.2 S/P AVR (AORTIC VALVE REPLACEMENT): ICD-10-CM

## 2022-10-04 NOTE — PROGRESS NOTES
Anticoagulation Summary  As of 10/3/2022      INR goal:  2.0-3.0   TTR:  54.8 % (4.7 y)   INR used for dosin.80 (10/1/2022)   Warfarin maintenance plan:  5 mg (5 mg x 1) every Tue, Thu, Sat; 2.5 mg (5 mg x 0.5) all other days   Weekly warfarin total:  25 mg   Plan last modified:  Gabriela Shannon, Pharmacy Intern (2022)   Next INR check:  10/15/2022   Target end date:  Indefinite    Indications    S/P AVR (aortic valve replacement) [Z95.2]                 Anticoagulation Episode Summary       INR check location:  Home Draw    Preferred lab:      Send INR reminders to:      Comments:  Denny CELESTIN  only call if out of range          Anticoagulation Care Providers       Provider Role Specialty Phone number    Chinyere Marcus M.D. Referring Cardiovascular Disease (Cardiology) [phone]    Yossi Khalil, PharmD Responsible Pharmacy             Refer to Anticoagulation Patient Findings for HPI  Patient Findings       Negatives:  Signs/symptoms of thrombosis, Signs/symptoms of bleeding, Laboratory test error suspected, Change in health, Change in alcohol use, Change in activity, Upcoming invasive procedure, Emergency department visit, Upcoming dental procedure, Missed doses, Extra doses, Change in medications, Change in diet/appetite, Hospital admission, Bruising, Other complaints            Spoke with patient to report a therapeutic INR.    Pt instructed to continue with current warfarin dosing regimen, confirms dosing.   Will follow up in 2 week(s).     Huseyin Conrad, PharmD, BCACP

## 2022-10-11 ENCOUNTER — OFFICE VISIT (OUTPATIENT)
Dept: CARDIOLOGY | Facility: MEDICAL CENTER | Age: 67
End: 2022-10-11
Payer: MEDICARE

## 2022-10-11 VITALS
OXYGEN SATURATION: 93 % | HEIGHT: 60 IN | RESPIRATION RATE: 14 BRPM | HEART RATE: 79 BPM | WEIGHT: 121.3 LBS | BODY MASS INDEX: 23.81 KG/M2 | SYSTOLIC BLOOD PRESSURE: 110 MMHG | DIASTOLIC BLOOD PRESSURE: 60 MMHG

## 2022-10-11 DIAGNOSIS — I34.0 NON-RHEUMATIC MITRAL REGURGITATION: ICD-10-CM

## 2022-10-11 DIAGNOSIS — I35.1 NONRHEUMATIC AORTIC VALVE INSUFFICIENCY: ICD-10-CM

## 2022-10-11 DIAGNOSIS — E78.5 HYPERLIPIDEMIA, UNSPECIFIED HYPERLIPIDEMIA TYPE: ICD-10-CM

## 2022-10-11 DIAGNOSIS — Z95.2 S/P AVR (AORTIC VALVE REPLACEMENT): ICD-10-CM

## 2022-10-11 DIAGNOSIS — E78.00 HYPERCHOLESTEREMIA: ICD-10-CM

## 2022-10-11 DIAGNOSIS — Z79.01 WARFARIN ANTICOAGULATION: ICD-10-CM

## 2022-10-11 DIAGNOSIS — I71.21 ANEURYSM OF ASCENDING AORTA WITHOUT RUPTURE (HCC): ICD-10-CM

## 2022-10-11 PROCEDURE — 99214 OFFICE O/P EST MOD 30 MIN: CPT | Performed by: INTERNAL MEDICINE

## 2022-10-11 RX ORDER — ROSUVASTATIN CALCIUM 20 MG/1
TABLET, COATED ORAL
Qty: 90 TABLET | Refills: 3 | Status: SHIPPED | OUTPATIENT
Start: 2022-10-11 | End: 2023-11-10 | Stop reason: SDUPTHER

## 2022-10-11 ASSESSMENT — ENCOUNTER SYMPTOMS
CARDIOVASCULAR NEGATIVE: 1
ORTHOPNEA: 0
PND: 0
SORE THROAT: 0
COUGH: 0
LOSS OF CONSCIOUSNESS: 0
PALPITATIONS: 0
CHILLS: 0
STRIDOR: 0
CONSTITUTIONAL NEGATIVE: 1
SHORTNESS OF BREATH: 0
MUSCULOSKELETAL NEGATIVE: 1
BRUISES/BLEEDS EASILY: 0
WHEEZING: 0
HEMOPTYSIS: 0
NEUROLOGICAL NEGATIVE: 1
EYES NEGATIVE: 1
GASTROINTESTINAL NEGATIVE: 1
CLAUDICATION: 0
SPUTUM PRODUCTION: 0
DIZZINESS: 0
FEVER: 0
WEAKNESS: 0

## 2022-10-11 ASSESSMENT — FIBROSIS 4 INDEX: FIB4 SCORE: 1.52

## 2022-10-11 NOTE — PROGRESS NOTES
No chief complaint on file.      Subjective:   Marline Perry is a 66 y.o. female who presents today as a follow-up for her high risk medication usage mechanical aortic valve and mitral regurgitation with high cholesterol.      She her her aortic valve replaced in 2001 with mechanical valve.  Her last echocardiogram 6 months ago was normal. She continues on the rosuvastatin.  She is having paroxysms of low and high energy but is otherwise doing well.  Most recent labs were unremarkable.    Past Medical History:   Diagnosis Date    Bronchiectasis (HCC)     Bronchitis     Chickenpox     COPD (chronic obstructive pulmonary disease) (HCC)     Hyperlipidemia     Influenza     Migraines     Mumps     Nasal drainage     Sjogren's disease (HCC)      Past Surgical History:   Procedure Laterality Date    AORTIC VALVE REPLACEMENT      BRONCHOSCOPY      HYSTERECTOMY LAPAROSCOPY      SINUSCOPE       Family History   Problem Relation Age of Onset    Arthritis Mother     Hypertension Father     Kidney Disease Father     Arthritis Sister     No Known Problems Brother     No Known Problems Brother     No Known Problems Son     Heart Attack Maternal Grandmother     Stroke Maternal Grandfather     Heart Disease Paternal Grandmother     No Known Problems Paternal Grandfather      Social History     Socioeconomic History    Marital status:      Spouse name: Not on file    Number of children: Not on file    Years of education: Not on file    Highest education level: Not on file   Occupational History    Not on file   Tobacco Use    Smoking status: Never    Smokeless tobacco: Never   Vaping Use    Vaping Use: Never used   Substance and Sexual Activity    Alcohol use: Yes     Alcohol/week: 2.4 oz     Types: 4 Shots of liquor per week    Drug use: No    Sexual activity: Yes     Partners: Male   Other Topics Concern    Not on file   Social History Narrative    Not on file     Social Determinants of Health     Financial  Resource Strain: Not on file   Food Insecurity: Not on file   Transportation Needs: Not on file   Physical Activity: Not on file   Stress: Not on file   Social Connections: Not on file   Intimate Partner Violence: Not on file   Housing Stability: Not on file     Allergies   Allergen Reactions    Spironolactone Rash     ALL OVER BODY     Neomycin Rash     CONTACT DERMATITIS     Tape      Adhesives-RASH      Outpatient Encounter Medications as of 10/11/2022   Medication Sig Dispense Refill    rosuvastatin (CRESTOR) 20 MG Tab TAKE 1 TABLET EVERY EVENING 90 Tablet 3    warfarin (COUMADIN) 5 MG Tab TAKE ONE-HALF (1/2) TO ONE TABLET DAILY OR AS DIRECTED BY ANTICOAGULATION CLINIC 90 Tablet 3    tiotropium (SPIRIVA RESPIMAT) 2.5 mcg/Act Aero Soln USE 2 INHALATIONS DAILY. ASSEMBLE AND PRIME 3 Each 3    azithromycin (ZITHROMAX) 250 MG Tab Take 1 Tablet by mouth every Monday, Wednesday, and Friday. 40 Tablet 3    cyclosporin (RESTASIS) 0.05 % ophthalmic emulsion Place 1 Drop in both eyes 2 times a day.      SUMAtriptan (IMITREX) 50 MG Tab       ascorbic acid (ASCORBIC ACID) 500 MG Tab Take 500 mg by mouth every day.      Flaxseed, Linseed, (FLAX SEED OIL) 1000 MG Cap Take  by mouth 2 Times a Day.      Cholecalciferol (VITAMIN D3) 2000 units Tab Take  by mouth.      [DISCONTINUED] Fluticasone-Umeclidin-Vilant (TRELEGY ELLIPTA) 100-62.5-25 MCG/INH AEROSOL POWDER, BREATH ACTIVATED inhalation Inhale 1 Inhalation every day. Rinse mouth after use (Patient not taking: Reported on 10/11/2022) 1 Each 0    [DISCONTINUED] rosuvastatin (CRESTOR) 20 MG Tab TAKE 1 TABLET EVERY EVENING 90 Tablet 3    [DISCONTINUED] BREO ELLIPTA 100-25 MCG/INH AEROSOL POWDER, BREATH ACTIVATED USE 1 INHALATION DAILY (Patient not taking: No sig reported) 3 Each 3    [DISCONTINUED] Biotin 14530 MCG Tab Take  by mouth. (Patient not taking: Reported on 10/11/2022)       No facility-administered encounter medications on file as of 10/11/2022.     Review of Systems    Constitutional: Negative.  Negative for chills, fever and malaise/fatigue.   HENT: Negative.  Negative for sore throat.    Eyes: Negative.    Respiratory:  Negative for cough, hemoptysis, sputum production, shortness of breath, wheezing and stridor.    Cardiovascular: Negative.  Negative for chest pain, palpitations, orthopnea, claudication, leg swelling and PND.   Gastrointestinal: Negative.    Genitourinary: Negative.    Musculoskeletal: Negative.    Skin: Negative.    Neurological: Negative.  Negative for dizziness, loss of consciousness and weakness.   Endo/Heme/Allergies: Negative.  Does not bruise/bleed easily.   All other systems reviewed and are negative.     Objective:   /60 (BP Location: Left arm, Patient Position: Sitting, BP Cuff Size: Adult)   Pulse 79   Resp 14   Ht 1.524 m (5')   Wt 55 kg (121 lb 4.8 oz)   SpO2 93%   BMI 23.69 kg/m²     Physical Exam  Vitals and nursing note reviewed.   Constitutional:       General: She is not in acute distress.     Appearance: She is well-developed. She is not diaphoretic.   HENT:      Head: Normocephalic and atraumatic.      Right Ear: External ear normal.      Left Ear: External ear normal.      Nose: Nose normal.      Mouth/Throat:      Pharynx: No oropharyngeal exudate.   Eyes:      General: No scleral icterus.        Right eye: No discharge.         Left eye: No discharge.      Conjunctiva/sclera: Conjunctivae normal.      Pupils: Pupils are equal, round, and reactive to light.   Neck:      Vascular: No JVD.   Cardiovascular:      Rate and Rhythm: Normal rate and regular rhythm.      Heart sounds: Murmur heard.     No friction rub. No gallop.      Comments: Mechanical aortic valve sounds with a preserved A2  Pulmonary:      Effort: Pulmonary effort is normal. No respiratory distress.      Breath sounds: No stridor. No wheezing or rales.   Chest:      Chest wall: No tenderness.   Abdominal:      General: There is no distension.      Palpations:  Abdomen is soft.      Tenderness: There is no guarding.   Musculoskeletal:         General: No tenderness or deformity. Normal range of motion.      Cervical back: Neck supple.   Skin:     General: Skin is warm and dry.      Coloration: Skin is not pale.      Findings: No erythema or rash.   Neurological:      Mental Status: She is alert.      Cranial Nerves: No cranial nerve deficit.      Motor: No abnormal muscle tone.      Coordination: Coordination normal.      Deep Tendon Reflexes: Reflexes are normal and symmetric. Reflexes normal.   Psychiatric:         Behavior: Behavior normal.         Thought Content: Thought content normal.         Judgment: Judgment normal.     Echocardiogram: Dated 9/22/2020 personally under myself showing normal LV systolic function and aortic valve which is functioning well and mild to moderate MR.    Echocardiogram: Dated 8/30/2019 personally interpreted myself showing normal LV systolic function mild to moderate MR and a normal aortic valve.    Echocardiogram: Dated 3/14/2020 personally reviewed by myself showing normal LV systolic function with a normal gradient through mechanical aortic valve.    Lab Results   Component Value Date/Time    CHOLSTRLTOT 148 03/23/2022 08:25 AM    LDL 84 03/23/2022 08:25 AM    HDL 51 03/23/2022 08:25 AM    TRIGLYCERIDE 65 03/23/2022 08:25 AM       Lab Results   Component Value Date/Time    SODIUM 142 03/23/2022 08:25 AM    POTASSIUM 4.4 03/23/2022 08:25 AM    CHLORIDE 106 03/23/2022 08:25 AM    CO2 24 03/23/2022 08:25 AM    GLUCOSE 90 03/23/2022 08:25 AM    BUN 16 03/23/2022 08:25 AM    CREATININE 0.80 03/23/2022 08:25 AM     Lab Results   Component Value Date/Time    ALKPHOSPHAT 97 03/23/2022 08:25 AM    ASTSGOT 37 03/23/2022 08:25 AM    ALTSGPT 39 03/23/2022 08:25 AM    TBILIRUBIN 0.5 03/23/2022 08:25 AM        Assessment:     1. Non-rheumatic mitral regurgitation        2. Nonrheumatic aortic valve insufficiency        3. S/P AVR (aortic valve  replacement)  EC-ECHOCARDIOGRAM COMPLETE W/O CONT      4. Warfarin anticoagulation        5. Aneurysm of ascending aorta without rupture        6. Hypercholesteremia        7. Hyperlipidemia, unspecified hyperlipidemia type  rosuvastatin (CRESTOR) 20 MG Tab          Medical Decision Making:  Today's Assessment / Status / Plan:     64-year-old female with mechanical aortic valve replaced in 2001 with hypertension hyperlipidemia thoracic aortic aneurysm and MR. We will repeat the echocardiogram prior to seeing her back in 1 year.  I refilled her rosuvastatin.  We will see her back in 1 year.    1. Mechanical AVR    - annual echo     2. MR    - clinical follow up     3. HLD     - cont rosuva 20     4. Thoracic Aortic Aneursym    - CT in 2018 was normal

## 2022-10-18 ENCOUNTER — ANTICOAGULATION MONITORING (OUTPATIENT)
Dept: VASCULAR LAB | Facility: MEDICAL CENTER | Age: 67
End: 2022-10-18
Payer: MEDICARE

## 2022-10-18 DIAGNOSIS — Z95.2 S/P AVR (AORTIC VALVE REPLACEMENT): ICD-10-CM

## 2022-10-18 LAB — INR PPP: 3.3 (ref 2–3.5)

## 2022-10-18 NOTE — PROGRESS NOTES
Anticoagulation Summary  As of 10/18/2022      INR goal:  2.0-3.0   TTR:  54.6 % (4.7 y)   INR used for dosing:  3.30 (10/18/2022)   Warfarin maintenance plan:  5 mg (5 mg x 1) every Tue, Thu, Sat; 2.5 mg (5 mg x 0.5) all other days   Weekly warfarin total:  25 mg   Plan last modified:  Gabriela Shannon, Pharmacy Intern (7/28/2022)   Next INR check:  11/3/2022   Target end date:  Indefinite    Indications    S/P AVR (aortic valve replacement) [Z95.2]                 Anticoagulation Episode Summary       INR check location:  Home Draw    Preferred lab:      Send INR reminders to:      Comments:  Denny CELESTIN  only call if out of range          Anticoagulation Care Providers       Provider Role Specialty Phone number    Chinyere Marcus M.D. Referring Cardiovascular Disease (Cardiology) [phone]    Yossi Khalil, PharmD Responsible Pharmacy           Anticoagulation Patient Findings  Patient Findings       Positives:  Change in alcohol use (Pt erported consuming more alcoholic beverages over the last week.), Change in diet/appetite (Pt erported eating less salads and green veggies.)    Negatives:  Signs/symptoms of thrombosis, Signs/symptoms of bleeding, Laboratory test error suspected, Change in health, Change in activity, Upcoming invasive procedure, Emergency department visit, Upcoming dental procedure, Missed doses, Extra doses, Change in medications, Hospital admission, Bruising, Other complaints                Spoke with patient today regarding SUPRA therapeutic INR of 3.3.  Patient denies any signs/symptoms of bruising or bleeding or any changes in medications.  Instructed patient to call clinic with any questions or concerns.    Pt reported eating less greens and veggies, and consuming more alcoholic beverages.    Pt is not on antiplatelet therapy    Pt will decrease tonight's dose of warfarin to 0.5 tablets (2.5mg), and resume having their normal intake of green veggies and salads.    F/u in 2 weeks/  Tristin  FELICITY Keene, PhT

## 2022-10-21 ENCOUNTER — NON-PROVIDER VISIT (OUTPATIENT)
Dept: MEDICAL GROUP | Facility: MEDICAL CENTER | Age: 67
End: 2022-10-21
Payer: MEDICARE

## 2022-10-21 DIAGNOSIS — Z23 NEED FOR VACCINATION: ICD-10-CM

## 2022-10-21 PROCEDURE — G0008 ADMIN INFLUENZA VIRUS VAC: HCPCS | Performed by: STUDENT IN AN ORGANIZED HEALTH CARE EDUCATION/TRAINING PROGRAM

## 2022-10-21 PROCEDURE — 90662 IIV NO PRSV INCREASED AG IM: CPT | Performed by: STUDENT IN AN ORGANIZED HEALTH CARE EDUCATION/TRAINING PROGRAM

## 2022-10-21 NOTE — NON-PROVIDER
"Marline Perry is a 66 y.o. female here for a non-provider visit for:   FLU    Reason for immunization: Annual Flu Vaccine  Immunization records indicate need for vaccine: Yes, confirmed with [facility]  Minimum interval has been met for this vaccine: Yes  ABN completed: Yes    VISwas given to patient: Yes  All IAC Questionnaire questions were answered \"No.\"    Patient tolerated injection and no adverse effects were observed or reported: Yes    Pt scheduled for next dose in series: No    "

## 2022-10-26 ENCOUNTER — HOSPITAL ENCOUNTER (OUTPATIENT)
Dept: LAB | Facility: MEDICAL CENTER | Age: 67
End: 2022-10-26
Attending: STUDENT IN AN ORGANIZED HEALTH CARE EDUCATION/TRAINING PROGRAM
Payer: MEDICARE

## 2022-10-26 ENCOUNTER — OFFICE VISIT (OUTPATIENT)
Dept: MEDICAL GROUP | Facility: MEDICAL CENTER | Age: 67
End: 2022-10-26
Payer: MEDICARE

## 2022-10-26 VITALS
OXYGEN SATURATION: 93 % | WEIGHT: 118 LBS | RESPIRATION RATE: 16 BRPM | HEART RATE: 87 BPM | HEIGHT: 60 IN | BODY MASS INDEX: 23.16 KG/M2 | SYSTOLIC BLOOD PRESSURE: 116 MMHG | TEMPERATURE: 97.4 F | DIASTOLIC BLOOD PRESSURE: 74 MMHG

## 2022-10-26 DIAGNOSIS — L65.9 HAIR LOSS: ICD-10-CM

## 2022-10-26 LAB
ALBUMIN SERPL BCP-MCNC: 4.1 G/DL (ref 3.2–4.9)
ALBUMIN/GLOB SERPL: 1.3 G/DL
ALP SERPL-CCNC: 91 U/L (ref 30–99)
ALT SERPL-CCNC: 41 U/L (ref 2–50)
ANION GAP SERPL CALC-SCNC: 10 MMOL/L (ref 7–16)
AST SERPL-CCNC: 40 U/L (ref 12–45)
BILIRUB SERPL-MCNC: 0.4 MG/DL (ref 0.1–1.5)
BUN SERPL-MCNC: 15 MG/DL (ref 8–22)
CALCIUM SERPL-MCNC: 9.4 MG/DL (ref 8.5–10.5)
CHLORIDE SERPL-SCNC: 102 MMOL/L (ref 96–112)
CO2 SERPL-SCNC: 23 MMOL/L (ref 20–33)
CREAT SERPL-MCNC: 0.88 MG/DL (ref 0.5–1.4)
ERYTHROCYTE [DISTWIDTH] IN BLOOD BY AUTOMATED COUNT: 44.7 FL (ref 35.9–50)
GFR SERPLBLD CREATININE-BSD FMLA CKD-EPI: 72 ML/MIN/1.73 M 2
GLOBULIN SER CALC-MCNC: 3.1 G/DL (ref 1.9–3.5)
GLUCOSE SERPL-MCNC: 88 MG/DL (ref 65–99)
HCT VFR BLD AUTO: 41.4 % (ref 37–47)
HGB BLD-MCNC: 13.8 G/DL (ref 12–16)
MCH RBC QN AUTO: 30.2 PG (ref 27–33)
MCHC RBC AUTO-ENTMCNC: 33.3 G/DL (ref 33.6–35)
MCV RBC AUTO: 90.6 FL (ref 81.4–97.8)
PLATELET # BLD AUTO: 246 K/UL (ref 164–446)
PMV BLD AUTO: 9.8 FL (ref 9–12.9)
POTASSIUM SERPL-SCNC: 3.9 MMOL/L (ref 3.6–5.5)
PROT SERPL-MCNC: 7.2 G/DL (ref 6–8.2)
RBC # BLD AUTO: 4.57 M/UL (ref 4.2–5.4)
SODIUM SERPL-SCNC: 135 MMOL/L (ref 135–145)
T3 SERPL-MCNC: 146 NG/DL (ref 60–181)
T4 FREE SERPL-MCNC: 1.13 NG/DL (ref 0.93–1.7)
THYROPEROXIDASE AB SERPL-ACNC: 21 IU/ML (ref 0–9)
TSH SERPL DL<=0.005 MIU/L-ACNC: 1.5 UIU/ML (ref 0.38–5.33)
WBC # BLD AUTO: 6.6 K/UL (ref 4.8–10.8)

## 2022-10-26 PROCEDURE — 80053 COMPREHEN METABOLIC PANEL: CPT

## 2022-10-26 PROCEDURE — 84439 ASSAY OF FREE THYROXINE: CPT

## 2022-10-26 PROCEDURE — 36415 COLL VENOUS BLD VENIPUNCTURE: CPT

## 2022-10-26 PROCEDURE — 99214 OFFICE O/P EST MOD 30 MIN: CPT | Performed by: STUDENT IN AN ORGANIZED HEALTH CARE EDUCATION/TRAINING PROGRAM

## 2022-10-26 PROCEDURE — 84443 ASSAY THYROID STIM HORMONE: CPT

## 2022-10-26 PROCEDURE — 84480 ASSAY TRIIODOTHYRONINE (T3): CPT

## 2022-10-26 PROCEDURE — 86376 MICROSOMAL ANTIBODY EACH: CPT

## 2022-10-26 PROCEDURE — 85027 COMPLETE CBC AUTOMATED: CPT

## 2022-10-26 ASSESSMENT — FIBROSIS 4 INDEX: FIB4 SCORE: 1.52

## 2022-10-26 NOTE — PROGRESS NOTES
Subjective:     Chief Complaint   Patient presents with    Hair Loss         HPI:   Marline presents today with    Hair loss  Patient presents today for concern about hair loss.  Patient notes that when she went to her hairdresser recently they confirmed the significant hair loss.  Recent labs 7 months ago within normal limits including CMP, blood counts, lipids, vitamin D and B12.  Patient has not previously had thyroid checked, will check for thyroid.  Patient notes no medication changes.  Patient notes no change in soap or hair products.  Patient concerned that she does not eat enough salt/iodine, patient worried about her thyroid states history of thyroid problems but no previous treatment.  Review of chart shows that in 2020 patient did have abnormal T3 and T4.           ROS:  Gen: no fevers/chills  Pulm: no sob, no cough  CV: no chest pain, no palpitations  GI: no nausea/vomiting, no diarrhea        Objective:     Exam:  /74 (BP Location: Right arm, Patient Position: Sitting, BP Cuff Size: Adult)   Pulse 87   Temp 36.3 °C (97.4 °F) (Temporal)   Resp 16   Ht 1.524 m (5')   Wt 53.5 kg (118 lb)   SpO2 93%   BMI 23.05 kg/m²  Body mass index is 23.05 kg/m².    Gen: Alert and oriented, No apparent distress.  Neck: Neck is supple without lymphadenopathy.  Lungs: Normal effort, CTA bilaterally, no wheezes, rhonchi, or rales  CV: Regular rate and rhythm. No murmurs, rubs, or gallops.  Ext: No clubbing, cyanosis, edema.  Hair: Noticeably thinning of noticeable thinning of hair, possible receding of hairline.  Pull test negative, no patches.    Assessment & Plan:     66 y.o. female with the following -     1. Hair loss  Chronic, stable.  Patient noticing hair loss.  Will check thyroid labs to further evaluate.  Patient with previous abnormalities to thyroid in the past.  Discussed with patient if no abnormalities, will refer to dermatology for further evaluation.  Patient agreeable to plan.  Will trial  minoxidil.  Patient advised that it takes up to 4 months to see visible change, also advised that this medication provides no long-term benefit.  We will continue to evaluate for cause of hair loss.  - TSH; Future  - FREE THYROXINE; Future  - TRIIDOTHYRONINE; Future  - THYROID PEROXIDASE  (TPO) AB; Future  - Comp Metabolic Panel; Future  - CBC WITHOUT DIFFERENTIAL; Future   - MINOXIDIL, TOPICAL, 5 % Solution; Apply 1 mL topically every day. Minoxidil 5% solution (1 mL). Apply minoxidil to the scalp, not the hair. Massage minoxidil onto the scalp with fingers, and hands should be washed after application. Apply the product at least two hours before bed to allow adequate time for drying  Dispense: 60 mL; Refill: 0     No follow-ups on file.    Please note that this dictation was created using voice recognition software. I have made every reasonable attempt to correct obvious errors, but I expect that there are errors of grammar and possibly content that I did not discover before finalizing the note.

## 2022-10-27 DIAGNOSIS — L65.9 HAIR LOSS: ICD-10-CM

## 2022-11-03 LAB — INR PPP: 2.1 (ref 2–3.5)

## 2022-11-07 ENCOUNTER — ANTICOAGULATION MONITORING (OUTPATIENT)
Dept: MEDICAL GROUP | Facility: PHYSICIAN GROUP | Age: 67
End: 2022-11-07
Payer: MEDICARE

## 2022-11-07 DIAGNOSIS — Z95.2 S/P AVR (AORTIC VALVE REPLACEMENT): ICD-10-CM

## 2022-11-08 NOTE — PROGRESS NOTES
Anticoagulation Summary  As of 2022      INR goal:  2.0-3.0   TTR:  54.8 % (4.8 y)   INR used for dosin.10 (11/3/2022)   Warfarin maintenance plan:  5 mg (5 mg x 1) every Tue, Thu, Sat; 2.5 mg (5 mg x 0.5) all other days; Starting 2022   Weekly warfarin total:  25 mg   Plan last modified:  Gabriela Shannon, Pharmacy Intern (2022)   Next INR check:  2022   Target end date:  Indefinite    Indications    S/P AVR (aortic valve replacement) [Z95.2]                 Anticoagulation Episode Summary       INR check location:  Home Draw    Preferred lab:      Send INR reminders to:      Comments:  Denny CELESTIN  only call if out of range          Anticoagulation Care Providers       Provider Role Specialty Phone number    Chinyere Marcus M.D. Referring Cardiovascular Disease (Cardiology) [phone]    Yossi Khalil, PharmD Responsible Pharmacy             Refer to Anticoagulation Patient Findings for HPI    INR therapeutic at 2.1.      Per chart review pt prefers no phone call if INR in range.    Pt to continue with current warfarin dosing regimen.     Will follow up in 2 week(s).     Huseyin Conrad, PharmD, BCACP

## 2022-11-09 ENCOUNTER — PATIENT MESSAGE (OUTPATIENT)
Dept: HEALTH INFORMATION MANAGEMENT | Facility: OTHER | Age: 67
End: 2022-11-09

## 2022-11-18 LAB — INR PPP: 3.2 (ref 2–3.5)

## 2022-11-21 ENCOUNTER — ANTICOAGULATION MONITORING (OUTPATIENT)
Dept: VASCULAR LAB | Facility: MEDICAL CENTER | Age: 67
End: 2022-11-21
Payer: MEDICARE

## 2022-11-21 DIAGNOSIS — Z95.2 S/P AVR (AORTIC VALVE REPLACEMENT): ICD-10-CM

## 2022-11-22 NOTE — PROGRESS NOTES
OP Anticoagulation Service Note    Date: 11/21/2022    Anticoagulation Summary  As of 11/21/2022      INR goal:  2.0-3.0   TTR:  54.9 % (4.8 y)   INR used for dosing:  3.20 (11/18/2022)   Warfarin maintenance plan:  5 mg (5 mg x 1) every Tue, Thu, Sat; 2.5 mg (5 mg x 0.5) all other days; Starting 11/21/2022   Weekly warfarin total:  25 mg   Plan last modified:  Gabriela Shannon, Pharmacy Intern (7/28/2022)   Next INR check:  12/5/2022   Target end date:  Indefinite    Indications    S/P AVR (aortic valve replacement) [Z95.2]                 Anticoagulation Episode Summary       INR check location:  Home Draw    Preferred lab:      Send INR reminders to:      Comments:  Denny   only call if out of range          Anticoagulation Care Providers       Provider Role Specialty Phone number    Chinyere Marcus M.D. Referring Cardiovascular Disease (Cardiology) [phone]    Yossi Khalil, PharmD Responsible Pharmacy           Anticoagulation Patient Findings            Patient's preferred phone number:  [phone]        HPI:   The reason for today's call is to prevent morbidity and mortality from a blood clot and/or stroke and to reduce the risk of bleeding while on a anticoagulant.     PCP:  Elba Dinh P.A.-C.  [address] [place] [phone]    Assessment:     INR  supra-therapeutic.     Lab Results   Component Value Date/Time    BUN 15 10/26/2022 01:44 PM    CREATININE 0.88 10/26/2022 01:44 PM     Lab Results   Component Value Date/Time    HEMOGLOBIN 13.8 10/26/2022 01:44 PM    HEMATOCRIT 41.4 10/26/2022 01:44 PM    PLATELETCT 246 10/26/2022 01:44 PM    ALKPHOSPHAT 91 10/26/2022 01:44 PM    ASTSGOT 40 10/26/2022 01:44 PM    ALTSGPT 41 10/26/2022 01:44 PM          Current Outpatient Medications:     MINOXIDIL (TOPICAL), 1 mL, Apply externally, DAILY    rosuvastatin, TAKE 1 TABLET EVERY EVENING    warfarin, TAKE ONE-HALF (1/2) TO ONE TABLET DAILY OR AS DIRECTED BY ANTICOAGULATION CLINIC     Spiriva Respimat, USE 2 INHALATIONS DAILY. ASSEMBLE AND PRIME    azithromycin, 250 mg, Oral, MO, WE + FR    cycloSPORINE, 1 Drop, Both Eyes, BID    SUMAtriptan,     ascorbic acid, 500 mg, Oral, DAILY    Flax Seed Oil, Take  by mouth 2 Times a Day.    Vitamin D3, Take  by mouth.      Plan:     She will continue with the same weekly warfarin dose as she held her dose preemptively on Friday after she saw her INR result.    Follow-up:     test in 2 weeks.        Additional information discussed with patient:     Asked patient to please call the anticoagulation clinic if they have any signs/symptoms of bleeding and/or thrombosis or any changes to diet or medications.      National recommendations regarding anticoagulation therapy:     The CHEST guidelines recommends frequent INR monitoring at regular intervals (a few days up to a max of 12 weeks) to ensure patients are on the proper dose of warfarin, and patients are not having any complications from therapy.  INRs can dramatically change over a short time period due to diet, medications, and medical conditions.       Brian Ribera, PharmD, MS, BCACP, LCC  [facility]  Phone: [phone]  Fax: [phone]  On call: [phone]  General scheduling/information [phone]  For emergencies please dial [phone]  Please do not use [employer] for urgent matters, call the phone numbers listed above.    This note was created using voice recognition software (Dragon). The accuracy of the dictation is limited by the abilities of the software. I have reviewed the note prior to signing, however some errors in grammar and context are still possible. If you have any questions related to this note please do not hesitate to contact our office.

## 2022-12-07 LAB — INR PPP: 3.4 (ref 2–3.5)

## 2022-12-08 ENCOUNTER — ANTICOAGULATION MONITORING (OUTPATIENT)
Dept: VASCULAR LAB | Facility: MEDICAL CENTER | Age: 67
End: 2022-12-08
Payer: MEDICARE

## 2022-12-08 DIAGNOSIS — Z95.2 S/P AVR (AORTIC VALVE REPLACEMENT): ICD-10-CM

## 2022-12-09 NOTE — PROGRESS NOTES
Anticoagulation Summary  As of 12/8/2022      INR goal:  2.0-3.0   TTR:  54.4 % (4.8 y)   INR used for dosing:  3.40 (12/7/2022)   Warfarin maintenance plan:  5 mg (5 mg x 1) every Tue, Sat; 2.5 mg (5 mg x 0.5) all other days; Starting 12/8/2022   Weekly warfarin total:  22.5 mg   Plan last modified:  Santo CartyD (12/8/2022)   Next INR check:  12/21/2022   Target end date:  Indefinite    Indications    S/P AVR (aortic valve replacement) [Z95.2]                 Anticoagulation Episode Summary       INR check location:  Home Draw    Preferred lab:      Send INR reminders to:      Comments:  Denny CELESTIN  only call if out of range          Anticoagulation Care Providers       Provider Role Specialty Phone number    Chinyere Marcus M.D. Referring Cardiovascular Disease (Cardiology) [phone]    Yossi Khalil, PharmD Responsible Pharmacy             Refer to Anticoagulation Patient Findings for HPI  Patient Findings       Positives:  Missed doses (Pt held of her own volition d/t elevated INR), Change in diet/appetite (Eating more greens lately.)    Negatives:  Signs/symptoms of thrombosis, Signs/symptoms of bleeding, Laboratory test error suspected, Change in health, Change in alcohol use, Change in activity, Upcoming invasive procedure, Emergency department visit, Upcoming dental procedure, Extra doses, Change in medications, Hospital admission, Bruising, Other complaints            Spoke with pt.  INR is SUPRA therapeutic.     Pt verifies warfarin weekly dosing.     Will have pt begin newly decreased regimen of 5 mg Tues/Sat and 2.5 mg ROW.    Repeat INR in 2 week(s).     Huseyin Conrad, PharmD, BCACP

## 2023-01-02 LAB — INR PPP: 3 (ref 2–3.5)

## 2023-01-03 ENCOUNTER — ANTICOAGULATION MONITORING (OUTPATIENT)
Dept: VASCULAR LAB | Facility: MEDICAL CENTER | Age: 68
End: 2023-01-03
Payer: MEDICARE

## 2023-01-03 DIAGNOSIS — Z95.2 S/P AVR (AORTIC VALVE REPLACEMENT): ICD-10-CM

## 2023-01-03 NOTE — PROGRESS NOTES
OP Anticoagulation Service Note    Date: 1/3/2023    Anticoagulation Summary  As of 1/3/2023      INR goal:  2.0-3.0   TTR:  53.7 % (4.9 y)   INR used for dosing:  3.00 (1/2/2023)   Warfarin maintenance plan:  5 mg (5 mg x 1) every Tue, Sat; 2.5 mg (5 mg x 0.5) all other days   Weekly warfarin total:  22.5 mg   Plan last modified:  Santo CartyD (12/8/2022)   Next INR check:  1/17/2023   Target end date:  Indefinite    Indications    S/P AVR (aortic valve replacement) [Z95.2]                 Anticoagulation Episode Summary       INR check location:  Home Draw    Preferred lab:      Send INR reminders to:      Comments:  Denny   only call if out of range          Anticoagulation Care Providers       Provider Role Specialty Phone number    Chinyere Marcus M.D. Referring Cardiovascular Disease (Cardiology) [phone]    Yossi Khalil, PharmD Responsible Pharmacy           Anticoagulation Patient Findings        Voice message for patient regarding their anticoagulant.   Patient's preferred phone number:  [phone]        HPI:   The reason for today's call is to prevent morbidity and mortality from a blood clot and/or stroke and to reduce the risk of bleeding while on a anticoagulant.     PCP:  Elba Dinh P.A.-C.  [address] [place] [id number]    Assessment:     INR-therapeutic.     Lab Results   Component Value Date/Time    BUN 15 10/26/2022 01:44 PM    CREATININE 0.88 10/26/2022 01:44 PM     Lab Results   Component Value Date/Time    HEMOGLOBIN 13.8 10/26/2022 01:44 PM    HEMATOCRIT 41.4 10/26/2022 01:44 PM    PLATELETCT 246 10/26/2022 01:44 PM    ALKPHOSPHAT 91 10/26/2022 01:44 PM    ASTSGOT 40 10/26/2022 01:44 PM    ALTSGPT 41 10/26/2022 01:44 PM          Current Outpatient Medications:     MINOXIDIL (TOPICAL), 1 mL, Apply externally, DAILY    rosuvastatin, TAKE 1 TABLET EVERY EVENING    warfarin, TAKE ONE-HALF (1/2) TO ONE TABLET DAILY OR AS DIRECTED BY ANTICOAGULATION  CLINIC    Spiriva Respimat, USE 2 INHALATIONS DAILY. ASSEMBLE AND PRIME    azithromycin, 250 mg, Oral, MO, WE + FR    cycloSPORINE, 1 Drop, Both Eyes, BID    SUMAtriptan,     ascorbic acid, 500 mg, Oral, DAILY    Flax Seed Oil, Take  by mouth 2 Times a Day.    Vitamin D3, Take  by mouth.      Plan:     Continue normal weekly warfarin dose    Follow-up:     test in 2 weeks.        Additional information discussed with patient:     Asked patient to please call the anticoagulation clinic if they have any signs/symptoms of bleeding and/or thrombosis or any changes to diet or medications.      National recommendations regarding anticoagulation therapy:     The CHEST guidelines recommends frequent INR monitoring at regular intervals (a few days up to a max of 12 weeks) to ensure patients are on the proper dose of warfarin, and patients are not having any complications from therapy.  INRs can dramatically change over a short time period due to diet, medications, and medical conditions.       Brian Ribera, PharmD, MS, BCACP, C  [facility]  Phone: [phone]  Fax: [phone]  On call: [phone]  General scheduling/information [phone]  For emergencies please dial 911  Please do not use [facility]uldi for urgent matters, call the phone numbers listed above.    This note was created using voice recognition software (Dragon). The accuracy of the dictation is limited by the abilities of the software. I have reviewed the note prior to signing, however some errors in grammar and context are still possible. If you have any questions related to this note please do not hesitate to contact our office.

## 2023-01-15 LAB — INR PPP: 2.8 (ref 2–3.5)

## 2023-01-16 ENCOUNTER — ANTICOAGULATION MONITORING (OUTPATIENT)
Dept: MEDICAL GROUP | Facility: PHYSICIAN GROUP | Age: 68
End: 2023-01-16
Payer: MEDICARE

## 2023-01-16 DIAGNOSIS — Z95.2 S/P AVR (AORTIC VALVE REPLACEMENT): ICD-10-CM

## 2023-01-16 NOTE — PROGRESS NOTES
Anticoagulation Summary  As of 2023      INR goal:  2.0-3.0   TTR:  54.0 % (5 y)   INR used for dosin.80 (1/15/2023)   Warfarin maintenance plan:  5 mg (5 mg x 1) every Tue, Sat; 2.5 mg (5 mg x 0.5) all other days   Weekly warfarin total:  22.5 mg   Plan last modified:  Santo CartyD (2022)   Next INR check:  2023   Target end date:  Indefinite    Indications    S/P AVR (aortic valve replacement) [Z95.2]                 Anticoagulation Episode Summary       INR check location:  Home Draw    Preferred lab:      Send INR reminders to:      Comments:  Denny CELESTIN  only call if out of range          Anticoagulation Care Providers       Provider Role Specialty Phone number    Chinyere Marcus M.D. Referring Cardiovascular Disease (Cardiology) [phone]    Yossi Khalil, PharmD Responsible Pharmacy           Anticoagulation Patient Findings    INR therapeutic at 2.8.  Per chart notes, patient requests contact only when out of range.  Pt is to continue with current warfarin dosing regimen.  Follow up in 2 weeks, to reduce risk of adverse events related to this high risk medication,  Warfarin.    Tulio Sotelo, PharmD, BCACP

## 2023-01-24 DIAGNOSIS — J47.9 BRONCHIECTASIS WITHOUT COMPLICATION (HCC): ICD-10-CM

## 2023-01-30 ENCOUNTER — PATIENT MESSAGE (OUTPATIENT)
Dept: SLEEP MEDICINE | Facility: MEDICAL CENTER | Age: 68
End: 2023-01-30
Payer: MEDICARE

## 2023-01-30 DIAGNOSIS — J47.9 BRONCHIECTASIS WITHOUT COMPLICATION (HCC): ICD-10-CM

## 2023-02-02 NOTE — TELEPHONE ENCOUNTER
Have we ever prescribed this med? Yes.  If yes, what date? 01/19/22    Last OV: 06/14/22    Next OV: 003/15/23    DX: Bronchiectasis without complication    Medications: azithromycin

## 2023-02-03 RX ORDER — AZITHROMYCIN 250 MG/1
TABLET, FILM COATED ORAL
Qty: 40 TABLET | Refills: 3 | Status: SHIPPED | OUTPATIENT
Start: 2023-02-03 | End: 2023-09-29 | Stop reason: SDUPTHER

## 2023-02-05 LAB — INR PPP: 2.4 (ref 2–3.5)

## 2023-02-06 ENCOUNTER — ANTICOAGULATION MONITORING (OUTPATIENT)
Dept: VASCULAR LAB | Facility: MEDICAL CENTER | Age: 68
End: 2023-02-06
Payer: MEDICARE

## 2023-02-06 DIAGNOSIS — Z95.2 S/P AVR (AORTIC VALVE REPLACEMENT): ICD-10-CM

## 2023-02-06 NOTE — PROGRESS NOTES
Anticoagulation Summary  As of 2023      INR goal:  2.0-3.0   TTR:  54.6 % (5 y)   INR used for dosin.40 (2023)   Warfarin maintenance plan:  5 mg (5 mg x 1) every Tue, Sat; 2.5 mg (5 mg x 0.5) all other days   Weekly warfarin total:  22.5 mg   Plan last modified:  Santo CartyD (2022)   Next INR check:  2023   Target end date:  Indefinite    Indications    S/P AVR (aortic valve replacement) [Z95.2]                 Anticoagulation Episode Summary       INR check location:  Home Draw    Preferred lab:      Send INR reminders to:      Comments:  Denny CELESTIN  only call if out of range          Anticoagulation Care Providers       Provider Role Specialty Phone number    Chinyere Marcus M.D. Referring Cardiovascular Disease (Cardiology) [phone]    Yossi Khalil, PharmD Responsible Pharmacy           Anticoagulation Patient Findings    INR therapeutic at 2.4.  Per chart notes, patient requests contact only when out of range.  Pt is to continue with current warfarin dosing regimen.  Follow up in 2 weeks, to reduce risk of adverse events related to this high risk medication,  Warfarin.    Tulio Sotelo, PharmD, BCACP

## 2023-02-21 ENCOUNTER — ANTICOAGULATION MONITORING (OUTPATIENT)
Dept: VASCULAR LAB | Facility: MEDICAL CENTER | Age: 68
End: 2023-02-21
Payer: MEDICARE

## 2023-02-21 DIAGNOSIS — Z95.2 S/P AVR (AORTIC VALVE REPLACEMENT): ICD-10-CM

## 2023-02-21 LAB — INR PPP: 3.2 (ref 2–3.5)

## 2023-02-22 NOTE — PROGRESS NOTES
Anticoagulation Summary  As of 2/21/2023      INR goal:  2.0-3.0   TTR:  54.6 % (5.1 y)   INR used for dosing:  3.20 (2/21/2023)   Warfarin maintenance plan:  5 mg (5 mg x 1) every Tue, Sat; 2.5 mg (5 mg x 0.5) all other days   Weekly warfarin total:  22.5 mg   Plan last modified:  Santo CartyD (12/8/2022)   Next INR check:  3/7/2023   Target end date:  Indefinite    Indications    S/P AVR (aortic valve replacement) [Z95.2]                 Anticoagulation Episode Summary       INR check location:  Home Draw    Preferred lab:      Send INR reminders to:      Comments:  Denny CELESTIN  only call if out of range          Anticoagulation Care Providers       Provider Role Specialty Phone number    Chinyere Marcus M.D. Referring Cardiovascular Disease (Cardiology) [phone]    Yossi Khalil, PharmD Responsible Pharmacy           Anticoagulation Patient Findings    Left voicemail message to report a supratherapeutic INR of 3.2.  Requested pt contact the clinic for any s/s of unusual bleeding, bruising, clotting or any changes to diet or medication.   Pt is to decrease today's dose to 2.5mg, then continue with current warfarin dosing regimen.    Pt is not on antiplatelet therapy      FU 2 weeks.  Tulio Sotelo, SantoD, BCACP

## 2023-02-27 ENCOUNTER — OFFICE VISIT (OUTPATIENT)
Dept: MEDICAL GROUP | Facility: MEDICAL CENTER | Age: 68
End: 2023-02-27
Payer: MEDICARE

## 2023-02-27 VITALS
BODY MASS INDEX: 24.74 KG/M2 | DIASTOLIC BLOOD PRESSURE: 72 MMHG | WEIGHT: 126 LBS | TEMPERATURE: 97.2 F | HEIGHT: 60 IN | RESPIRATION RATE: 16 BRPM | HEART RATE: 63 BPM | OXYGEN SATURATION: 91 % | SYSTOLIC BLOOD PRESSURE: 116 MMHG

## 2023-02-27 DIAGNOSIS — E78.00 HYPERCHOLESTEREMIA: ICD-10-CM

## 2023-02-27 DIAGNOSIS — J47.9 BRONCHIECTASIS WITHOUT COMPLICATION (HCC): ICD-10-CM

## 2023-02-27 DIAGNOSIS — M35.00 SJOGREN'S SYNDROME, WITH UNSPECIFIED ORGAN INVOLVEMENT (HCC): ICD-10-CM

## 2023-02-27 DIAGNOSIS — I34.0 NON-RHEUMATIC MITRAL REGURGITATION: ICD-10-CM

## 2023-02-27 DIAGNOSIS — Z00.00 MEDICARE ANNUAL WELLNESS VISIT, SUBSEQUENT: Primary | ICD-10-CM

## 2023-02-27 DIAGNOSIS — I35.1 NONRHEUMATIC AORTIC VALVE INSUFFICIENCY: ICD-10-CM

## 2023-02-27 DIAGNOSIS — Z79.01 WARFARIN ANTICOAGULATION: ICD-10-CM

## 2023-02-27 DIAGNOSIS — I71.21 ANEURYSM OF ASCENDING AORTA WITHOUT RUPTURE (HCC): ICD-10-CM

## 2023-02-27 DIAGNOSIS — M35.9 DIFFUSE CONNECTIVE TISSUE DISEASE (HCC): ICD-10-CM

## 2023-02-27 PROCEDURE — G0438 PPPS, INITIAL VISIT: HCPCS | Performed by: STUDENT IN AN ORGANIZED HEALTH CARE EDUCATION/TRAINING PROGRAM

## 2023-02-27 ASSESSMENT — FIBROSIS 4 INDEX: FIB4 SCORE: 1.7

## 2023-02-27 ASSESSMENT — PATIENT HEALTH QUESTIONNAIRE - PHQ9: CLINICAL INTERPRETATION OF PHQ2 SCORE: 0

## 2023-02-27 ASSESSMENT — ACTIVITIES OF DAILY LIVING (ADL): BATHING_REQUIRES_ASSISTANCE: 0

## 2023-02-27 ASSESSMENT — ENCOUNTER SYMPTOMS: GENERAL WELL-BEING: GOOD

## 2023-02-27 NOTE — PROGRESS NOTES
Chief Complaint   Patient presents with    Annual Wellness Visit       HPI:  Marline Perry is a 67 y.o. here for Medicare Annual Wellness Visit     Bronchiectasis  Patient continues to follow with pulmonary.  Patient notes that she has been trying Trelegy but it feels that it is too harsh and prefers using Spiriva.  Patient has follow-up next month with pulmonary.  Patient also notes increased  sinus pressure secondary to the weather changes.  Patient using Vicks and heating pad with relief.         Sjogren's syndrome  Fibromyalgia  Patient previously following with rheumatology.  Patient notes that she has no longer following with rheumatology because patient did not receive relief from treatments.  Patient previously taking sertraline, amitriptyline and gabapentin but is no longer taking these medications.  Patient does see ophthalmologist and continues on Restasis.  Patient also follows regularly with dentist due to Sjogren's     Mechanical aortic valve  Patient continues to follow with cardiology.       Foot pain  Patient scheduled to see a podiatrist.  Patient notes that she has pain in her great toe.  Patient notes that this has been ongoing for several years and causes pain up her leg.  Continues to note pain of her right leg.  Patient using arch support.  Patient only seeing podiatry as needed.      Chemical aortic valve, mitral regurgitation  Patient following with cardiology.  Recent echo with no significant change.  We will continue with annual echoes.  Clinical follow-up for mitral regurg.  Continues on rosuvastatin.     Hyperlipidemia  Patient continues on rosuvastatin 20 mg.     Thoracic aortic aneurysm  CT in 2018 was normal.  Cardiology has not recommended further follow-up.          Patient Active Problem List    Diagnosis Date Noted    Osteopenia of multiple sites 11/17/2021    Skin rash 08/10/2021    Vitamin B12 deficiency 05/05/2020    Transaminitis 02/24/2020    Chronic pain of right  knee 01/15/2020    Hypercholesteremia 10/14/2019    Migraine with aura and without status migrainosus, not intractable 10/14/2019    Fibromyalgia 08/27/2019    Bronchiectasis without complication (HCC) 09/18/2018    Non-rheumatic mitral regurgitation 01/22/2018    Nonrheumatic aortic valve insufficiency 01/22/2018    S/P AVR (aortic valve replacement) 01/22/2018    Sjoegren syndrome 01/22/2018    Warfarin anticoagulation 01/22/2018    Thoracic aortic aneurysm without rupture 01/22/2018    Diffuse connective tissue disease (HCC) 12/15/2008       Current Outpatient Medications   Medication Sig Dispense Refill    azithromycin (ZITHROMAX) 250 MG Tab TAKE 1 TABLET EVERY MONDAY, WEDNESDAY AND FRIDAY 40 Tablet 3    MINOXIDIL, TOPICAL, 5 % Solution Apply 1 mL topically every day. Minoxidil 5% solution (1 mL). Apply minoxidil to the scalp, not the hair. Massage minoxidil onto the scalp with fingers, and hands should be washed after application. Apply the product at least two hours before bed to allow adequate time for drying 60 mL 0    rosuvastatin (CRESTOR) 20 MG Tab TAKE 1 TABLET EVERY EVENING 90 Tablet 3    warfarin (COUMADIN) 5 MG Tab TAKE ONE-HALF (1/2) TO ONE TABLET DAILY OR AS DIRECTED BY ANTICOAGULATION CLINIC 90 Tablet 3    tiotropium (SPIRIVA RESPIMAT) 2.5 mcg/Act Aero Soln USE 2 INHALATIONS DAILY. ASSEMBLE AND PRIME 3 Each 3    cyclosporin (RESTASIS) 0.05 % ophthalmic emulsion Place 1 Drop in both eyes 2 times a day.      SUMAtriptan (IMITREX) 50 MG Tab       ascorbic acid (ASCORBIC ACID) 500 MG Tab Take 500 mg by mouth every day.      Flaxseed, Linseed, (FLAX SEED OIL) 1000 MG Cap Take  by mouth 2 Times a Day.      Cholecalciferol (VITAMIN D3) 2000 units Tab Take  by mouth.       No current facility-administered medications for this visit.            Current supplements as per medication list.     Allergies: Spironolactone, Neomycin, and Tape    Current social contact/activities: Remain active with her  .    She  reports that she has never smoked. She has never used smokeless tobacco. She reports current alcohol use of about 2.4 oz per week. She reports that she does not use drugs.  Counseling given: Yes      ROS:    Gait: Uses no assistive device   Ostomy: no   Other tubes: no   Amputations: no   Chronic oxygen use: no   Last eye exam:   : Denies any urinary leakage during the last 6 months incontinence.     Screening:  Depression Screening  Little interest or pleasure in doing things?  0 - not at all  Feeling down, depressed , or hopeless? 0 - not at all  Patient Health Questionnaire Score: 0     If depressive symptoms identified deferred to follow up visit unless specifically addressed in assessment and plan.    Interpretation of PHQ-9 Total Score   Score Severity   1-4 No Depression   5-9 Mild Depression   10-14 Moderate Depression   15-19 Moderately Severe Depression   20-27 Severe Depression    Screening for Cognitive Impairment  Three Minute Recall (daughter, heaven, mountain) 2/3    Sunil clock face with all 12 numbers and set the hands to show 10 past 11.  Yes    Cognitive concerns identified deferred for follow up unless specifically addressed in assessment and plan.    Fall Risk Assessment  Has the patient had two or more falls in the last year or any fall with injury in the last year?  No    Safety Assessment  Throw rugs on floor.  No  Handrails on all stairs.  No  Good lighting in all hallways.  No  Difficulty hearing.  No  Patient counseled about all safety risks that were identified.    Functional Assessment ADLs  Are there any barriers preventing you from cooking for yourself or meeting nutritional needs?  No.    Are there any barriers preventing you from driving safely or obtaining transportation?  No.    Are there any barriers preventing you from using a telephone or calling for help?  No.    Are there any barriers preventing you from shopping?  No.    Are there any barriers preventing you  from taking care of your own finances?  No.    Are there any barriers preventing you from managing your medications?  No.    Are there any barriers preventing you from showering, bathing or dressing yourself?  No.    Are you currently engaging in any exercise or physical activity?  Yes.     What is your perception of your health?  Good    Advance Care Planning  Do you have an Advance Directive, Living Will, Durable Power of , or POLST? No                 Health Maintenance Summary            Overdue - IMM ZOSTER VACCINES (1 of 2) Overdue - never done      No completion history exists for this topic.              Overdue - IMM PNEUMOCOCCAL VACCINE: 65+ Years (2 - PCV) Overdue since 10/18/2019      10/18/2018  Imm Admin: Pneumococcal polysaccharide vaccine (PPSV-23)              Overdue - COVID-19 Vaccine (3 - Booster for Pfizer series) Overdue since 5/27/2021 04/01/2021  Imm Admin: PFIZER PURPLE CAP SARS-COV-2 VACCINATION (12+)    03/11/2021  Imm Admin: PFIZER PURPLE CAP SARS-COV-2 VACCINATION (12+)              MAMMOGRAM (Every 2 Years) Next due on 11/17/2023 11/17/2021  MA-SCREENING MAMMO BILAT W/TOMOSYNTHESIS W/CAD    12/26/2018  MA-SCREEN MAMMO W/CAD-BILAT              Annual Wellness Visit (Every 366 Days) Next due on 2/28/2024 02/27/2023  Level of Service: ANNUAL WELLNESS VISIT-INCLUDES PPPS SUBSEQUE*    02/27/2023  Visit Dx: Medicare annual wellness visit, subsequent              COLORECTAL CANCER SCREENING (COLOGUARD STOOL DNA - Every 3 Years) Next due on 10/12/2025      10/12/2022  COLOGUARD COLON CANCER SCREENING    10/26/2016  COLOGUARD COLON CANCER SCREENING              BONE DENSITY (Every 5 Years) Next due on 11/17/2026 11/17/2021  DS-BONE DENSITY STUDY (DEXA)              IMM DTaP/Tdap/Td Vaccine (2 - Td or Tdap) Next due on 10/14/2029      10/14/2019  Imm Admin: Tdap Vaccine    07/03/2011  Imm Admin: TD Vaccine              HEPATITIS C SCREENING  Completed      08/13/2019   HEP C VIRUS ANTIBODY              IMM INFLUENZA (Series Information) Completed      10/21/2022  Imm Admin: Influenza Vaccine Adult HD    11/30/2021  Imm Admin: Influenza Vaccine Adult HD    09/21/2020  Imm Admin: Influenza Vaccine Quad Inj (Pf)    10/14/2019  Imm Admin: Influenza Vaccine Quad Inj (Pf)    09/18/2018  Imm Admin: Influenza Vaccine Quad Inj (Pf)    Only the first 5 history entries have been loaded, but more history exists.              IMM HEP B VACCINE (Series Information) Aged Out      No completion history exists for this topic.              IMM MENINGOCOCCAL ACWY VACCINE (Series Information) Aged Out      No completion history exists for this topic.              Discontinued - Annual Pulmonary Function Test / Spirometry  Discontinued        Frequency changed to Never automatically (Topic No Longer Applies)    07/29/2021  PULMONARY FUNCTION TESTS -Test requested: Complete Pulmonary Function Test    05/07/2019  PULMONARY FUNCTION TESTS -Test requested: Complete Pulmonary Function Test                    Patient Care Team:  Elba Dinh P.A.-C. as PCP - General (Physician Assistant)  Dr.Dean Dempsey (Family Medicine)  [facility]  as Respiratory Therapist  CHRISTIANE Palm as Consulting Physician (Pulmonary Medicine)  Chinyere Marcus M.D. as Consulting Physician (Cardiovascular Disease (Cardiology))  Marilyn Talbot M.D. as Consulting Physician (Pulmonary Medicine)        Social History     Tobacco Use    Smoking status: Never    Smokeless tobacco: Never   Vaping Use    Vaping Use: Never used   Substance Use Topics    Alcohol use: Yes     Alcohol/week: 2.4 oz     Types: 4 Shots of liquor per week    Drug use: No     Family History   Problem Relation Age of Onset    Arthritis Mother     Hypertension Father     Kidney Disease Father     Arthritis Sister     No Known Problems Brother     No Known Problems Brother     No Known Problems Son     Heart Attack Maternal Grandmother     Stroke  Maternal Grandfather     Heart Disease Paternal Grandmother     No Known Problems Paternal Grandfather      She  has a past medical history of Bronchiectasis (HCC), Bronchitis, Chickenpox, COPD (chronic obstructive pulmonary disease) (HCC), Hyperlipidemia, Influenza, Migraines, Mumps, Nasal drainage, and Sjogren's disease (HCC).   Past Surgical History:   Procedure Laterality Date    AORTIC VALVE REPLACEMENT      BRONCHOSCOPY      HYSTERECTOMY LAPAROSCOPY      SINUSCOPE         Exam:   /72 (BP Location: Right arm, Patient Position: Sitting, BP Cuff Size: Adult)   Pulse 63   Temp 36.2 °C (97.2 °F) (Temporal)   Resp 16   Ht 1.524 m (5')   Wt 57.2 kg (126 lb)   SpO2 91%  Body mass index is 24.61 kg/m².    Hearing good.    Dentition good  Alert, oriented in no acute distress.  Eye contact is good, speech goal directed, affect calm    Assessment and Plan. The following treatment and monitoring plan is recommended:    1. Medicare annual wellness visit, subsequent    2. Bronchiectasis without complication (HCC)  Chronic, stable following with pulmonary  3. Diffuse connective tissue disease (HCC)  Chronic, stable.  4. Warfarin anticoagulation  Chronic, stable.  Patient continues on warfarin.  5. Aneurysm of ascending aorta without rupture  Chronic, stable.  Continues to follow with cardiology.  6. Sjogren's syndrome, with unspecified organ involvement (HCC)  Chronic, stable.  Following with optometry and dentist.  Patient notes no new concerns.  7. Non-rheumatic mitral regurgitation  Chronic, stable.  Following with cardiology.  8. Nonrheumatic aortic valve insufficiency  Chronic, stable.  Following cardiology.    9. Hypercholesteremia  Chronic, stable.  Continues on rosuvastatin 20    Services suggested: No services needed at this time  Health Care Screening: Age-appropriate preventive services Medicare covers discussed today and ordered if indicated.  Referrals offered: Community-based lifestyle interventions  to reduce health risks and promote self-management and wellness, fall prevention, nutrition, physical activity, tobacco-use cessation, weight loss, and mental health services as per orders if indicated.    Discussion today about general wellness and lifestyle habits:    Prevent falls and reduce trip hazards; Cautioned about securing or removing rugs.  Have a working fire alarm and carbon monoxide detector;   Engage in regular physical activity and social activities     Follow-up: No follow-ups on file.

## 2023-03-11 LAB — INR PPP: 3 (ref 2–3.5)

## 2023-03-13 ENCOUNTER — ANTICOAGULATION MONITORING (OUTPATIENT)
Dept: VASCULAR LAB | Facility: MEDICAL CENTER | Age: 68
End: 2023-03-13
Payer: MEDICARE

## 2023-03-13 DIAGNOSIS — Z95.2 S/P AVR (AORTIC VALVE REPLACEMENT): ICD-10-CM

## 2023-03-13 NOTE — PROGRESS NOTES
Anticoagulation Summary  As of 3/13/2023      INR goal:  2.0-3.0   TTR:  54.2 % (5.1 y)   INR used for dosing:  3.00 (3/11/2023)   Warfarin maintenance plan:  5 mg (5 mg x 1) every Tue, Sat; 2.5 mg (5 mg x 0.5) all other days   Weekly warfarin total:  22.5 mg   Plan last modified:  Santo CartyD (12/8/2022)   Next INR check:  3/25/2023   Target end date:  Indefinite    Indications    S/P AVR (aortic valve replacement) [Z95.2]                 Anticoagulation Episode Summary       INR check location:  Home Draw    Preferred lab:      Send INR reminders to:      Comments:  Denny CELESTIN  only call if out of range          Anticoagulation Care Providers       Provider Role Specialty Phone number    Chinyere Marcus M.D. Referring Cardiovascular Disease (Cardiology) [phone]    Yossi Khalil, PharmD Responsible Pharmacy           Anticoagulation Patient Findings      Pt reported a therapeutic INR  Per chart review, pt does not want to be contacted if INR is therapeutic   Pt to continue with current warfarin dosing regimen. Requested pt contact the clinic for any s/s of unusual bleeding, bruising, clotting or any changes to diet or medication.    FU INR in 2 week(s).    Diann Warren, PharmD

## 2023-03-15 ENCOUNTER — OFFICE VISIT (OUTPATIENT)
Dept: SLEEP MEDICINE | Facility: MEDICAL CENTER | Age: 68
End: 2023-03-15
Attending: PHYSICIAN ASSISTANT
Payer: MEDICARE

## 2023-03-15 VITALS
DIASTOLIC BLOOD PRESSURE: 78 MMHG | HEIGHT: 60 IN | HEART RATE: 76 BPM | SYSTOLIC BLOOD PRESSURE: 118 MMHG | BODY MASS INDEX: 23.56 KG/M2 | WEIGHT: 120 LBS | OXYGEN SATURATION: 94 % | RESPIRATION RATE: 16 BRPM

## 2023-03-15 DIAGNOSIS — M35.00 SJOGREN'S SYNDROME, WITH UNSPECIFIED ORGAN INVOLVEMENT (HCC): ICD-10-CM

## 2023-03-15 DIAGNOSIS — J47.9 BRONCHIECTASIS WITHOUT COMPLICATION (HCC): ICD-10-CM

## 2023-03-15 PROCEDURE — 99213 OFFICE O/P EST LOW 20 MIN: CPT | Performed by: PHYSICIAN ASSISTANT

## 2023-03-15 PROCEDURE — 1125F AMNT PAIN NOTED PAIN PRSNT: CPT | Performed by: PHYSICIAN ASSISTANT

## 2023-03-15 PROCEDURE — 3074F SYST BP LT 130 MM HG: CPT | Performed by: PHYSICIAN ASSISTANT

## 2023-03-15 PROCEDURE — 3078F DIAST BP <80 MM HG: CPT | Performed by: PHYSICIAN ASSISTANT

## 2023-03-15 PROCEDURE — 99212 OFFICE O/P EST SF 10 MIN: CPT | Performed by: PHYSICIAN ASSISTANT

## 2023-03-15 ASSESSMENT — ENCOUNTER SYMPTOMS
WEIGHT LOSS: 0
CHILLS: 0
SINUS PAIN: 1
WHEEZING: 0
SHORTNESS OF BREATH: 1
ORTHOPNEA: 0
DIZZINESS: 0
INSOMNIA: 0
SORE THROAT: 0
SPUTUM PRODUCTION: 1
TREMORS: 0
HEADACHES: 1
FEVER: 0
COUGH: 1
HEARTBURN: 0
PALPITATIONS: 0

## 2023-03-15 ASSESSMENT — FIBROSIS 4 INDEX: FIB4 SCORE: 1.7

## 2023-03-15 NOTE — PROGRESS NOTES
CC: Bronchiectasis    HPI:  Marline Perry is a 67 y.o. year old female here today for follow-up on diffuse connective tissue disease, bronchiectasis. Last seen in clinic 6/14/2022 by Kassandra Miguel PA-C.  Patient previously evaluated by Dr. Talbot in 2018.  She is a never smoker.    Pertinent past medical history includes bronchiectasis diagnosed by bronchoscopy in 2008.  Diffuse connective tissue disease, Sjogren's, fibromyalgia, thoracic aortic aneurysm, AVR, nonrheumatic mitral regurgitation decreased GFR, chronic anticoagulation and osteopenia.  Patient is followed by rheumatology.  Positive sputum cultures have included Staph aureus.  She has had difficulty producing subsequent specimens.    Reviewed in clinic vitals  /78   Pulse 76   Resp 16   Ht 1.524 m (5')   Wt 54.4 kg (120 lb)   SpO2 94%     Reviewed home medication regimen includes azithromycin maintenance antibiotic Monday Wednesdays and Fridays, Spiriva Respimat, warfarin.    Reviewed most recent imaging including echocardiogram obtained 3/14/2022 demonstrating normal left ventricular chamber size, wall thickness, systolic and diastolic function, LVEF estimated 60%.  Normal right ventricular size with reduced right ventricular systolic function.  Moderate mitral regurgitation.  Mechanical prosthetic aortic valve functioning normally.  Mild tricuspid regurgitation with estimated RVSP of 27 mmHg.  Mild pulmonic insufficiency.    No recent chest imaging, did have an HRCT 4/26/2018 demonstrating hyperexpanded lungs, lateral right lower lobe subsegmental atelectasis and mild bronchiectasis, left basilar atelectasis.  No evidence of ILD.  Evidence of AVR.     Reviewed pulmonary function testing obtained 7/29/2021 demonstrating FEV1 of 1.84 L or 89% predicted, FVC of 2.33 L or 89% predicted, FEV1/FVC ratio 79, residual volume 93% predicted, TLC 94% predicted, DLCO 85% predicted.  Per pulmonologist interpretation normal appearing flow  "volume loops, normal pulmonary function test.       Review of Systems   Constitutional:  Positive for malaise/fatigue (mild). Negative for chills, fever and weight loss.   HENT:  Positive for congestion and sinus pain (below eyes). Negative for hearing loss, nosebleeds, sore throat (tickley, some post nasal drip) and tinnitus.    Eyes:         Presc glasses   Respiratory:  Positive for cough, sputum production (cloudy yellow) and shortness of breath (with exertion). Negative for wheezing.    Cardiovascular:  Negative for chest pain, palpitations, orthopnea and leg swelling.   Gastrointestinal:  Negative for heartburn.        Has bilat lower bridges, some difficulty swallowing \"mildly stuck\"    Neurological:  Positive for headaches (sinus and migraines). Negative for dizziness and tremors.   Psychiatric/Behavioral:  The patient does not have insomnia.        Past Medical History:   Diagnosis Date    Bronchiectasis (HCC)     Bronchitis     Chickenpox     COPD (chronic obstructive pulmonary disease) (HCC)     Hyperlipidemia     Influenza     Migraines     Mumps     Nasal drainage     Sjogren's disease (HCC)        Past Surgical History:   Procedure Laterality Date    AORTIC VALVE REPLACEMENT      BRONCHOSCOPY      HYSTERECTOMY LAPAROSCOPY      SINUSCOPE         Family History   Problem Relation Age of Onset    Arthritis Mother     Hypertension Father     Kidney Disease Father     Arthritis Sister     No Known Problems Brother     No Known Problems Brother     No Known Problems Son     Heart Attack Maternal Grandmother     Stroke Maternal Grandfather     Heart Disease Paternal Grandmother     No Known Problems Paternal Grandfather        Social History     Socioeconomic History    Marital status:      Spouse name: Not on file    Number of children: Not on file    Years of education: Not on file    Highest education level: Not on file   Occupational History    Not on file   Tobacco Use    Smoking status: Never    " Smokeless tobacco: Never   Vaping Use    Vaping Use: Never used   Substance and Sexual Activity    Alcohol use: Yes     Alcohol/week: 2.4 oz     Types: 4 Shots of liquor per week    Drug use: No    Sexual activity: Yes     Partners: Male   Other Topics Concern    Not on file   Social History Narrative    Not on file     Social Determinants of Health     Financial Resource Strain: Not on file   Food Insecurity: Not on file   Transportation Needs: Not on file   Physical Activity: Not on file   Stress: Not on file   Social Connections: Not on file   Intimate Partner Violence: Not on file   Housing Stability: Not on file       Allergies as of 03/15/2023 - Reviewed 03/15/2023   Allergen Reaction Noted    Spironolactone Rash 06/11/2008    Neomycin Rash 06/11/2008    Tape  06/11/2008          Current medications as of today   Current Outpatient Medications   Medication Sig Dispense Refill    azithromycin (ZITHROMAX) 250 MG Tab TAKE 1 TABLET EVERY MONDAY, WEDNESDAY AND FRIDAY 40 Tablet 3    MINOXIDIL, TOPICAL, 5 % Solution Apply 1 mL topically every day. Minoxidil 5% solution (1 mL). Apply minoxidil to the scalp, not the hair. Massage minoxidil onto the scalp with fingers, and hands should be washed after application. Apply the product at least two hours before bed to allow adequate time for drying 60 mL 0    rosuvastatin (CRESTOR) 20 MG Tab TAKE 1 TABLET EVERY EVENING 90 Tablet 3    warfarin (COUMADIN) 5 MG Tab TAKE ONE-HALF (1/2) TO ONE TABLET DAILY OR AS DIRECTED BY ANTICOAGULATION CLINIC 90 Tablet 3    tiotropium (SPIRIVA RESPIMAT) 2.5 mcg/Act Aero Soln USE 2 INHALATIONS DAILY. ASSEMBLE AND PRIME 3 Each 3    cyclosporin (RESTASIS) 0.05 % ophthalmic emulsion Place 1 Drop in both eyes 2 times a day.      SUMAtriptan (IMITREX) 50 MG Tab       ascorbic acid (ASCORBIC ACID) 500 MG Tab Take 500 mg by mouth every day.      Flaxseed, Linseed, (FLAX SEED OIL) 1000 MG Cap Take  by mouth 2 Times a Day.      Cholecalciferol  (VITAMIN D3) 2000 units Tab Take  by mouth.       No current facility-administered medications for this visit.         Physical Exam:   Gen:           Alert and oriented, No apparent distress. Mood and affect appropriate, normal interaction with provider.  Eyes:          sclere white, conjunctive moist.  Hearing:     Grossly intact.  Dentition:    Lower bridges, fair dentition.  Oropharynx:   Tongue normal, posterior pharynx without erythema or exudate.  Neck:        Supple, trachea midline, no masses.  Respiratory Effort: No intercostal retractions or use of accessory muscles.   Lung Auscultation:      Decreased bases bilaterally; no rales, rhonchi or wheezing.  CV:            Regular rate and rhythm. No edema. No murmurs, rubs or gallops.  Digits, Nails, Ext: No clubbing, cyanosis, petechiae, or nodes.   Skin:        No rashes, lesions or ulcers noted on exposed skin surfaces.                     Assessment:  1. Bronchiectasis without complication (HCC)  PULMONARY FUNCTION TESTS -Test requested: Complete Pulmonary Function Test      2. Sjogren's syndrome, with unspecified organ involvement (HCC)            Immunizations:    Flu: 10/21/2022, 11/30/2021  Pneumovax 23: 10/18/2018  Prevnar 13: Deferred  Pfizer SARS CoV2 vaccine: 4/1/2021, 3/11/2021      Plan:  67-year-old female here to follow-up on bronchiectasis.    Bronchiectasis: Patient is using Acapella as needed, maintenance antibiotics, reports doing well this past winter.  Does have some difficulty clearing secretions at times, trial use guaifenesin to help thin secretions.  Does not require refills today.  No recent imaging.  Update if symptoms worsen.  Update pulmonary function testing at next visit.    Follow-up in 6 months, sooner if needed.    This dictation was created using voice recognition software. The accuracy of the dictation is limited to the abilities of the software. I expect there may be some errors of grammar and possibly content.

## 2023-03-15 NOTE — PATIENT INSTRUCTIONS
1-did well this past winter  2-will obtain PFT at next visit  3-continue same regimen  4-using acapella as needed  5-on maintenance antibiotics  6-no refills today  7-may use guaifenesin/Mucinex(R) to help clear secretions  8-follow up in 6 months

## 2023-03-28 ENCOUNTER — TELEPHONE (OUTPATIENT)
Dept: HEALTH INFORMATION MANAGEMENT | Facility: OTHER | Age: 68
End: 2023-03-28
Payer: MEDICARE

## 2023-04-05 LAB — INR PPP: 3.2 (ref 2–3.5)

## 2023-04-10 ENCOUNTER — ANTICOAGULATION MONITORING (OUTPATIENT)
Dept: VASCULAR LAB | Facility: MEDICAL CENTER | Age: 68
End: 2023-04-10
Payer: MEDICARE

## 2023-04-10 DIAGNOSIS — Z95.2 S/P AVR (AORTIC VALVE REPLACEMENT): ICD-10-CM

## 2023-04-10 NOTE — PROGRESS NOTES
Anticoagulation Summary  As of 4/10/2023      INR goal:  2.0-3.0   TTR:  53.5 % (5.2 y)   INR used for dosing:  3.20 (4/5/2023)   Warfarin maintenance plan:  5 mg (5 mg x 1) every Tue, Sat; 2.5 mg (5 mg x 0.5) all other days   Weekly warfarin total:  22.5 mg   Plan last modified:  Santo CartyD (12/8/2022)   Next INR check:  4/24/2023   Target end date:  Indefinite    Indications    S/P AVR (aortic valve replacement) [Z95.2]                 Anticoagulation Episode Summary       INR check location:  Home Draw    Preferred lab:      Send INR reminders to:      Comments:  Denny CELESTIN  only call if out of range          Anticoagulation Care Providers       Provider Role Specialty Phone number    Chinyere Marcus M.D. Referring Cardiovascular Disease (Cardiology) [phone]    Yossi Khalil, PharmD Responsible Pharmacy           Anticoagulation Patient Findings  Patient Findings       Positives:  Change in diet/appetite (small decrease in greens recently.)    Negatives:  Signs/symptoms of thrombosis, Signs/symptoms of bleeding, Laboratory test error suspected, Change in health, Change in alcohol use, Change in activity, Upcoming invasive procedure, Emergency department visit, Upcoming dental procedure, Missed doses, Extra doses, Change in medications, Hospital admission, Bruising, Other complaints          Spoke with patient today regarding supratherapeutic INR of 3.2.  Patient denies any signs/symptoms of bruising or bleeding or any changes in medications.  Instructed patient to call clinic with any questions or concerns.    Pt is not on antiplatelet therapy    Pt is to continue with current warfarin dosing regimen and increase green vegetable intake.  Follow up in 2 weeks, to reduce risk of adverse events related to this high risk medication,  Warfarin.    Tulio Sotelo, PharmD, BCACP

## 2023-04-17 ENCOUNTER — PATIENT MESSAGE (OUTPATIENT)
Dept: MEDICAL GROUP | Facility: MEDICAL CENTER | Age: 68
End: 2023-04-17
Payer: MEDICARE

## 2023-04-17 DIAGNOSIS — H91.90 SUBJECTIVE HEARING CHANGE: ICD-10-CM

## 2023-04-21 ENCOUNTER — ANTICOAGULATION MONITORING (OUTPATIENT)
Dept: MEDICAL GROUP | Facility: PHYSICIAN GROUP | Age: 68
End: 2023-04-21
Payer: MEDICARE

## 2023-04-21 DIAGNOSIS — Z95.2 S/P AVR (AORTIC VALVE REPLACEMENT): ICD-10-CM

## 2023-04-21 LAB — INR PPP: 2.2 (ref 2–3.5)

## 2023-04-21 NOTE — PROGRESS NOTES
Anticoagulation Summary  As of 2023      INR goal:  2.0-3.0   TTR:  53.7 % (5.2 y)   INR used for dosin.20 (2023)   Warfarin maintenance plan:  5 mg (5 mg x 1) every Tue, Sat; 2.5 mg (5 mg x 0.5) all other days   Weekly warfarin total:  22.5 mg   Plan last modified:  Santo CartyD (2022)   Next INR check:  2023   Target end date:  Indefinite    Indications    S/P AVR (aortic valve replacement) [Z95.2]                 Anticoagulation Episode Summary       INR check location:  Home Draw    Preferred lab:      Send INR reminders to:      Comments:  Denny CELESTIN  only call if out of range          Anticoagulation Care Providers       Provider Role Specialty Phone number    Chinyere Marcus M.D. Referring Cardiovascular Disease (Cardiology) [phone]    Yossi Khalil, PharmD Responsible Pharmacy           Anticoagulation Patient Findings    INR therapeutic at 2.2.  Per chart notes, patient requests contact only when out of range.  Pt is to continue with current warfarin dosing regimen.  Follow up in 2 weeks, to reduce risk of adverse events related to this high risk medication,  Warfarin.    Tulio Sotelo, SantoD, BCACP

## 2023-05-07 LAB — INR PPP: 2.4 (ref 2–3.5)

## 2023-05-08 ENCOUNTER — ANTICOAGULATION MONITORING (OUTPATIENT)
Dept: VASCULAR LAB | Facility: MEDICAL CENTER | Age: 68
End: 2023-05-08
Payer: MEDICARE

## 2023-05-08 DIAGNOSIS — Z95.2 S/P AVR (AORTIC VALVE REPLACEMENT): ICD-10-CM

## 2023-05-08 NOTE — PROGRESS NOTES
OP Anticoagulation Service Note    Date: 2023    Anticoagulation Summary  As of 2023      INR goal:  2.0-3.0   TTR:  54.1 % (5.3 y)   INR used for dosin.40 (2023)   Warfarin maintenance plan:  5 mg (5 mg x 1) every Tue, Sat; 2.5 mg (5 mg x 0.5) all other days   Weekly warfarin total:  22.5 mg   Plan last modified:  Santo CartyD (2022)   Next INR check:  2023   Target end date:  Indefinite    Indications    S/P AVR (aortic valve replacement) [Z95.2]                 Anticoagulation Episode Summary       INR check location:  Home Draw    Preferred lab:      Send INR reminders to:      Comments:  Denny CELESTIN  only call if out of range          Anticoagulation Care Providers       Provider Role Specialty Phone number    Chinyere Marcus M.D. Referring Cardiovascular Disease (Cardiology) [phone]    Yossi Khalil, PharmD Responsible Pharmacy           Anticoagulation Patient Findings        Patient's preferred phone number:  [phone]        HPI:   The reason for today's call is to prevent morbidity and mortality from a blood clot and/or stroke and to reduce the risk of bleeding while on a anticoagulant.     PCP:  Elba Dinh P.A.-C.  [address] [place] [phone]    Assessment:     INR  therapeutic.     Lab Results   Component Value Date/Time    BUN 15 10/26/2022 01:44 PM    CREATININE 0.88 10/26/2022 01:44 PM     Lab Results   Component Value Date/Time    HEMOGLOBIN 13.8 10/26/2022 01:44 PM    HEMATOCRIT 41.4 10/26/2022 01:44 PM    PLATELETCT 246 10/26/2022 01:44 PM    ALKPHOSPHAT 91 10/26/2022 01:44 PM    ASTSGOT 40 10/26/2022 01:44 PM    ALTSGPT 41 10/26/2022 01:44 PM          Current Outpatient Medications:     azithromycin, TAKE 1 TABLET EVERY MONDAY, WEDNESDAY AND FRIDAY    MINOXIDIL (TOPICAL), 1 mL, Apply externally, DAILY    rosuvastatin, TAKE 1 TABLET EVERY EVENING    warfarin, TAKE ONE-HALF (1/2) TO ONE TABLET DAILY OR AS DIRECTED BY ANTICOAGULATION  CLINIC    Spiriva Respimat, USE 2 INHALATIONS DAILY. ASSEMBLE AND PRIME    cycloSPORINE, 1 Drop, Both Eyes, BID    SUMAtriptan,     ascorbic acid, 500 mg, Oral, DAILY    Flax Seed Oil, Take  by mouth 2 Times a Day.    Vitamin D3, Take  by mouth.      Plan:     Continue the same warfarin dose, as noted above.       Follow-up:     As seen above      Additional information discussed with patient:     Asked patient to please call the anticoagulation clinic if they have any signs/symptoms of bleeding and/or thrombosis or any changes to diet or medications.      National recommendations regarding anticoagulation therapy:     The CHEST guidelines recommends frequent INR monitoring at regular intervals (a few days up to a max of 12 weeks) to ensure patients are on the proper dose of warfarin, and patients are not having any complications from therapy.  INRs can dramatically change over a short time period due to diet, medications, and medical conditions.         [facility]  Phone: [phone]  Fax: [phone]  On call: [phone]  General scheduling/information [phone]  For emergencies please dial [phone]  Please do not use [employer] for urgent matters, call the phone numbers listed above.    This note was created using voice recognition software (Dragon). The accuracy of the dictation is limited by the abilities of the software. I have reviewed the note prior to signing, however some errors in grammar and context are still possible. If you have any questions related to this note please do not hesitate to contact our office.

## 2023-05-25 ENCOUNTER — OFFICE VISIT (OUTPATIENT)
Dept: MEDICAL GROUP | Facility: MEDICAL CENTER | Age: 68
End: 2023-05-25
Payer: MEDICARE

## 2023-05-25 VITALS
HEIGHT: 60 IN | HEART RATE: 78 BPM | TEMPERATURE: 97.6 F | BODY MASS INDEX: 24.93 KG/M2 | OXYGEN SATURATION: 93 % | WEIGHT: 126.98 LBS | DIASTOLIC BLOOD PRESSURE: 60 MMHG | SYSTOLIC BLOOD PRESSURE: 108 MMHG | RESPIRATION RATE: 16 BRPM

## 2023-05-25 DIAGNOSIS — G43.109 MIGRAINE WITH AURA AND WITHOUT STATUS MIGRAINOSUS, NOT INTRACTABLE: ICD-10-CM

## 2023-05-25 DIAGNOSIS — M79.641 BILATERAL HAND PAIN: ICD-10-CM

## 2023-05-25 DIAGNOSIS — M79.642 BILATERAL HAND PAIN: ICD-10-CM

## 2023-05-25 PROCEDURE — 99214 OFFICE O/P EST MOD 30 MIN: CPT | Performed by: STUDENT IN AN ORGANIZED HEALTH CARE EDUCATION/TRAINING PROGRAM

## 2023-05-25 PROCEDURE — 3074F SYST BP LT 130 MM HG: CPT | Performed by: STUDENT IN AN ORGANIZED HEALTH CARE EDUCATION/TRAINING PROGRAM

## 2023-05-25 PROCEDURE — 3078F DIAST BP <80 MM HG: CPT | Performed by: STUDENT IN AN ORGANIZED HEALTH CARE EDUCATION/TRAINING PROGRAM

## 2023-05-25 RX ORDER — SUMATRIPTAN 50 MG/1
50 TABLET, FILM COATED ORAL
Qty: 10 TABLET | Refills: 3 | Status: SHIPPED | OUTPATIENT
Start: 2023-05-25

## 2023-05-25 ASSESSMENT — FIBROSIS 4 INDEX: FIB4 SCORE: 1.7

## 2023-05-25 NOTE — PROGRESS NOTES
Subjective:     Chief Complaint   Patient presents with    Hand Pain     Bump inside the palm and spurs on the sides of the hands, one week both hands right hand hurts more     Migraine     Changing migraine medication          HPI:   Marline presents today with    Hand pain  Patient presents today for concern of new nodules on bilateral hands.  Patient notes a nodule on the ulnar side right hand that is very tender to touch, no pain with movement of wrist.  Patient also notes a nodule in midline and left hand.  Patient did develop ago she was working on stitch work but has not been doing that regularly.    Migraines  With a history of migraines.  Patient notes that migraines do not occur frequently.  Patient notes that she has been having an increase in migraines with the change in fluctuations in weather.  Patient does treat her sinuses regularly with sinus spray and antihistamine.  Notes worsening migraines with headaches.  Patient requesting refill on Imitrex 50 mg.  Patient uses as needed to suppress migraines.          ROS:  Gen: no fevers/chills  Pulm: no sob, no cough  CV: no chest pain, no palpitations  GI: no nausea/vomiting, no diarrhea        Objective:     Exam:  /60 (BP Location: Right arm, Patient Position: Sitting, BP Cuff Size: Adult)   Pulse 78   Temp 36.4 °C (97.6 °F) (Temporal)   Resp 16   Ht 1.524 m (5')   Wt 57.6 kg (126 lb 15.8 oz)   SpO2 93%   BMI 24.80 kg/m²  Body mass index is 24.8 kg/m².    Gen: Alert and oriented, No apparent distress.  Neck: Neck is supple without lymphadenopathy.  Lungs: Normal effort, CTA bilaterally, no wheezes, rhonchi, or rales  CV: Regular rate and rhythm. No murmurs, rubs, or gallops.  Ext: No clubbing, cyanosis, edema.      Assessment & Plan:     67 y.o. female with the following -     1. Migraine with aura and without status migrainosus, not intractable  - SUMAtriptan (IMITREX) 50 MG Tab; Take 1 Tablet by mouth one time as needed for Migraine for up  to 1 dose.  Dispense: 10 Tablet; Refill: 3    2. Bilateral hand pain  Chronic, stable.  Patient appears to have ganglion cyst and ulnar side of hand.  Discussed with patient decreasing inflammation, stopping/reducing repetitive movements.  Discussed treating with Voltaren gel, bracing and symptomatic treatment.  Discussed x-ray but no significant change in management with x-ray.  Will refer to orthopedics if no improvement with symptomatic treatment.      Return if symptoms worsen or fail to improve.    Please note that this dictation was created using voice recognition software. I have made every reasonable attempt to correct obvious errors, but I expect that there are errors of grammar and possibly content that I did not discover before finalizing the note.

## 2023-05-29 LAB — INR PPP: 2.1 (ref 2–3.5)

## 2023-05-30 ENCOUNTER — ANTICOAGULATION MONITORING (OUTPATIENT)
Dept: VASCULAR LAB | Facility: MEDICAL CENTER | Age: 68
End: 2023-05-30
Payer: MEDICARE

## 2023-05-30 DIAGNOSIS — Z95.2 S/P AVR (AORTIC VALVE REPLACEMENT): ICD-10-CM

## 2023-05-31 NOTE — PROGRESS NOTES
OP Anticoagulation Service Note    Date: 2023    Anticoagulation Summary  As of 2023      INR goal:  2.0-3.0   TTR:  54.6 % (5.3 y)   INR used for dosin.10 (2023)   Warfarin maintenance plan:  5 mg (5 mg x 1) every Tue, Sat; 2.5 mg (5 mg x 0.5) all other days   Weekly warfarin total:  22.5 mg   Plan last modified:  Santo CartyD (2022)   Next INR check:  2023   Target end date:  Indefinite    Indications    S/P AVR (aortic valve replacement) [Z95.2]                 Anticoagulation Episode Summary       INR check location:  Home Draw    Preferred lab:      Send INR reminders to:      Comments:  Denny CELESTIN  only call if out of range          Anticoagulation Care Providers       Provider Role Specialty Phone number    Chinyere Marcus M.D. Referring Cardiovascular Disease (Cardiology) [phone]    Yossi Khalil, PharmD Responsible Pharmacy           Anticoagulation Patient Findings        Patient's preferred phone number:  [phone]        HPI:   The reason for today's call is to prevent morbidity and mortality from a blood clot and/or stroke and to reduce the risk of bleeding while on a anticoagulant.     PCP:  Elba Dinh P.A.-C.  [address] [place] [phone]    Assessment:     INR  therapeutic.     Lab Results   Component Value Date/Time    BUN 15 10/26/2022 01:44 PM    CREATININE 0.88 10/26/2022 01:44 PM     Lab Results   Component Value Date/Time    HEMOGLOBIN 13.8 10/26/2022 01:44 PM    HEMATOCRIT 41.4 10/26/2022 01:44 PM    PLATELETCT 246 10/26/2022 01:44 PM    ALKPHOSPHAT 91 10/26/2022 01:44 PM    ASTSGOT 40 10/26/2022 01:44 PM    ALTSGPT 41 10/26/2022 01:44 PM          Current Outpatient Medications:     SUMAtriptan, 50 mg, Oral, Once PRN    azithromycin, TAKE 1 TABLET EVERY MONDAY, WEDNESDAY AND FRIDAY    MINOXIDIL (TOPICAL), 1 mL, Apply externally, DAILY    rosuvastatin, TAKE 1 TABLET EVERY EVENING    warfarin, TAKE ONE-HALF (1/2) TO ONE  TABLET DAILY OR AS DIRECTED BY ANTICOAGULATION CLINIC    Spiriva Respimat, USE 2 INHALATIONS DAILY. ASSEMBLE AND PRIME    cycloSPORINE, 1 Drop, Both Eyes, BID    ascorbic acid, 500 mg, Oral, DAILY    Flax Seed Oil, Take  by mouth 2 Times a Day.    Vitamin D3, Take  by mouth.      Plan:     Continue the same warfarin dose, as noted above.       Follow-up:     As seen above      Additional information discussed with patient:     Asked patient to please call the anticoagulation clinic if they have any signs/symptoms of bleeding and/or thrombosis or any changes to diet or medications.      National recommendations regarding anticoagulation therapy:     The CHEST guidelines recommends frequent INR monitoring at regular intervals (a few days up to a max of 12 weeks) to ensure patients are on the proper dose of warfarin, and patients are not having any complications from therapy.  INRs can dramatically change over a short time period due to diet, medications, and medical conditions.         [facility]  Phone: [phone]  Fax: [phone]  On call: [phone]  General scheduling/information [phone]  For emergencies please dial [phone]  Please do not use [employer] for urgent matters, call the phone numbers listed above.    This note was created using voice recognition software (Dragon). The accuracy of the dictation is limited by the abilities of the software. I have reviewed the note prior to signing, however some errors in grammar and context are still possible. If you have any questions related to this note please do not hesitate to contact our office.

## 2023-06-14 ENCOUNTER — ANTICOAGULATION MONITORING (OUTPATIENT)
Dept: VASCULAR LAB | Facility: MEDICAL CENTER | Age: 68
End: 2023-06-14
Payer: MEDICARE

## 2023-06-14 DIAGNOSIS — Z95.2 S/P AVR (AORTIC VALVE REPLACEMENT): ICD-10-CM

## 2023-06-14 LAB — INR PPP: 3.1 (ref 2–3.5)

## 2023-06-14 NOTE — PROGRESS NOTES
Anticoagulation Summary  As of 6/14/2023      INR goal:  2.0-3.0   TTR:  54.8 % (5.4 y)   INR used for dosing:  3.10 (6/14/2023)   Warfarin maintenance plan:  5 mg (5 mg x 1) every Tue, Sat; 2.5 mg (5 mg x 0.5) all other days   Weekly warfarin total:  22.5 mg   Plan last modified:  Yolanda Beck PharmD (6/14/2023)   Next INR check:  6/28/2023   Target end date:  Indefinite    Indications    S/P AVR (aortic valve replacement) [Z95.2]                 Anticoagulation Episode Summary       INR check location:  Home Draw    Preferred lab:      Send INR reminders to:      Comments:  Denny CELESTIN  only call if out of range          Anticoagulation Care Providers       Provider Role Specialty Phone number    Chinyere Marcus M.D. Referring Cardiovascular Disease (Cardiology) [phone]    Santo EncisoD Responsible Pharmacy             Refer to Anticoagulation Patient Findings for HPI  Patient Findings       Negatives:  Signs/symptoms of thrombosis, Signs/symptoms of bleeding, Laboratory test error suspected, Change in health, Change in alcohol use, Change in activity, Upcoming invasive procedure, Emergency department visit, Upcoming dental procedure, Missed doses, Extra doses, Change in medications, Change in diet/appetite, Hospital admission, Bruising, Other complaints            Spoke with patient to report a therapeutic INR.      Pt is NOT on antiplatelet therapy     Pt instructed to continue with current warfarin dosing regimen, confirms dosing.   Will follow up in 2 week(s).     Yolanda Beck PharmD

## 2023-06-22 DIAGNOSIS — J47.9 BRONCHIECTASIS WITHOUT COMPLICATION (HCC): ICD-10-CM

## 2023-06-23 NOTE — TELEPHONE ENCOUNTER
Have we ever prescribed this med? Yes.  If yes, what date? 6/1/2022    Last OV: 3/15/2023 EUGENIO Miguel P.A.-C.    Next OV: No appt on file; pt is due back around 9/15/2023     DX: Bronchiectasis without complication (HCC) [J47.9]    Medications: SPIRIVA RESPIMAT 2.5 MCG/ACT Aero Soln

## 2023-06-27 ENCOUNTER — PATIENT MESSAGE (OUTPATIENT)
Dept: SLEEP MEDICINE | Facility: MEDICAL CENTER | Age: 68
End: 2023-06-27
Payer: MEDICARE

## 2023-06-27 DIAGNOSIS — J47.9 BRONCHIECTASIS WITHOUT COMPLICATION (HCC): ICD-10-CM

## 2023-06-27 RX ORDER — TIOTROPIUM BROMIDE INHALATION SPRAY 3.12 UG/1
SPRAY, METERED RESPIRATORY (INHALATION)
Qty: 3 EACH | Refills: 3 | Status: CANCELLED | OUTPATIENT
Start: 2023-06-27

## 2023-06-29 RX ORDER — TIOTROPIUM BROMIDE INHALATION SPRAY 3.12 UG/1
SPRAY, METERED RESPIRATORY (INHALATION)
Qty: 12 G | Refills: 3 | Status: SHIPPED | OUTPATIENT
Start: 2023-06-29

## 2023-07-01 LAB — INR PPP: 2 (ref 2–3.5)

## 2023-07-03 ENCOUNTER — ANTICOAGULATION MONITORING (OUTPATIENT)
Dept: VASCULAR LAB | Facility: MEDICAL CENTER | Age: 68
End: 2023-07-03
Payer: MEDICARE

## 2023-07-03 DIAGNOSIS — Z95.2 S/P AVR (AORTIC VALVE REPLACEMENT): ICD-10-CM

## 2023-07-05 NOTE — PROGRESS NOTES
Anticoagulation Summary  As of 7/3/2023      INR goal:  2.0-3.0   TTR:  55.2 % (5.4 y)   INR used for dosin.00 (2023)   Warfarin maintenance plan:  5 mg (5 mg x 1) every Tue, Sat; 2.5 mg (5 mg x 0.5) all other days   Weekly warfarin total:  22.5 mg   Plan last modified:  Yolanda Beck PharmD (2023)   Next INR check:  2023   Target end date:  Indefinite    Indications    S/P AVR (aortic valve replacement) [Z95.2]                 Anticoagulation Episode Summary       INR check location:  Home Draw    Preferred lab:      Send INR reminders to:      Comments:  Denny CELESTIN  only call if out of range          Anticoagulation Care Providers       Provider Role Specialty Phone number    Chinyere Marcus M.D. Referring Cardiovascular Disease (Cardiology) [phone]    Yossi Khalil, PharmD Responsible Pharmacy           Anticoagulation Patient Findings      Pt reported a therapeutic INR  Per chart review, pt does not want to be contacted if INR is therapeutic   Pt to continue with current warfarin dosing regimen. Requested pt contact the clinic for any s/s of unusual bleeding, bruising, clotting or any changes to diet or medication.    FU INR in 2 week(s).    Lilibeth Turner, Pharmacy Intern

## 2023-07-21 ENCOUNTER — ANTICOAGULATION MONITORING (OUTPATIENT)
Dept: VASCULAR LAB | Facility: MEDICAL CENTER | Age: 68
End: 2023-07-21
Payer: MEDICARE

## 2023-07-21 DIAGNOSIS — Z95.2 S/P AVR (AORTIC VALVE REPLACEMENT): ICD-10-CM

## 2023-07-21 LAB — INR PPP: 3.3 (ref 2–3.5)

## 2023-07-21 NOTE — PROGRESS NOTES
OP Anticoagulation Service Note    Date: 7/21/2023    Anticoagulation Summary  As of 7/21/2023      INR goal:  2.0-3.0   TTR:  55.4 % (5.5 y)   INR used for dosing:  3.30 (7/21/2023)   Warfarin maintenance plan:  5 mg (5 mg x 1) every Tue, Sat; 2.5 mg (5 mg x 0.5) all other days   Weekly warfarin total:  22.5 mg   Plan last modified:  Yolanda Beck PharmD (6/14/2023)   Next INR check:  8/4/2023   Target end date:  Indefinite    Indications    S/P AVR (aortic valve replacement) [Z95.2]                 Anticoagulation Episode Summary       INR check location:  Home Draw    Preferred lab:      Send INR reminders to:      Comments:  Denny CELESTIN  only call if out of range          Anticoagulation Care Providers       Provider Role Specialty Phone number    hCinyere Marcus M.D. Referring Cardiovascular Disease (Cardiology) [phone]    Yossi Khalil, PharmD Responsible Pharmacy           Anticoagulation Patient Findings        Patient's preferred phone number:  [phone]        HPI:   The reason for today's call is to prevent morbidity and mortality from a blood clot and/or stroke and to reduce the risk of bleeding while on a anticoagulant.     PCP:  Elba Dinh P.A.-C.  [address] [place] [phone]    Assessment:     INR  supra-therapeutic.     Lab Results   Component Value Date/Time    BUN 15 10/26/2022 01:44 PM    CREATININE 0.88 10/26/2022 01:44 PM     Lab Results   Component Value Date/Time    HEMOGLOBIN 13.8 10/26/2022 01:44 PM    HEMATOCRIT 41.4 10/26/2022 01:44 PM    PLATELETCT 246 10/26/2022 01:44 PM    ALKPHOSPHAT 91 10/26/2022 01:44 PM    ASTSGOT 40 10/26/2022 01:44 PM    ALTSGPT 41 10/26/2022 01:44 PM          Current Outpatient Medications:     Spiriva Respimat, USE 2 INHALATIONS DAILY. ASSEMBLE AND PRIME    SUMAtriptan, 50 mg, Oral, Once PRN    azithromycin, TAKE 1 TABLET EVERY MONDAY, WEDNESDAY AND FRIDAY    MINOXIDIL (TOPICAL), 1 mL, Apply externally, DAILY    rosuvastatin,  TAKE 1 TABLET EVERY EVENING    warfarin, TAKE ONE-HALF (1/2) TO ONE TABLET DAILY OR AS DIRECTED BY ANTICOAGULATION CLINIC    cycloSPORINE, 1 Drop, Both Eyes, BID    ascorbic acid, 500 mg, Oral, DAILY    Flax Seed Oil, Take  by mouth 2 Times a Day.    Vitamin D3, Take  by mouth.      Plan:     Hold today then resume     Follow-up:     On date seen above    Additional information discussed with patient:     Asked patient to please call the anticoagulation clinic if they have any signs/symptoms of bleeding and/or thrombosis or any changes to diet or medications.      National recommendations regarding anticoagulation therapy:     The CHEST guidelines recommends frequent INR monitoring at regular intervals (a few days up to a max of 12 weeks) to ensure patients are on the proper dose of warfarin, and patients are not having any complications from therapy.  INRs can dramatically change over a short time period due to diet, medications, and medical conditions.       [facility]  Phone: [phone]  Fax: [phone]  On call: [phone]  General scheduling/information [phone]  For emergencies please dial [phone]  Please do not use [email or web address]) for urgent matters, call the phone numbers listed above.    This note was created using voice recognition software (Dragon). The accuracy of the dictation is limited by the abilities of the software. I have reviewed the note prior to signing, however some errors in grammar and context are still possible. If you have any questions related to this note please do not hesitate to contact our office.

## 2023-08-02 ENCOUNTER — ANTICOAGULATION MONITORING (OUTPATIENT)
Dept: VASCULAR LAB | Facility: MEDICAL CENTER | Age: 68
End: 2023-08-02
Payer: MEDICARE

## 2023-08-02 DIAGNOSIS — Z95.2 S/P AVR (AORTIC VALVE REPLACEMENT): ICD-10-CM

## 2023-08-02 LAB — INR PPP: 3 (ref 2–3.5)

## 2023-08-02 NOTE — PROGRESS NOTES
Anticoagulation Summary  As of 8/2/2023      INR goal:  2.0-3.0   TTR:  55.1 % (5.5 y)   INR used for dosing:  3.00 (8/2/2023)   Warfarin maintenance plan:  5 mg (5 mg x 1) every Tue, Sat; 2.5 mg (5 mg x 0.5) all other days   Weekly warfarin total:  22.5 mg   Plan last modified:  Yolanda Beck PharmD (6/14/2023)   Next INR check:  8/16/2023   Target end date:  Indefinite    Indications    S/P AVR (aortic valve replacement) [Z95.2]                 Anticoagulation Episode Summary       INR check location:  Home Draw    Preferred lab:      Send INR reminders to:      Comments:  Denny CELESTIN  only call if out of range          Anticoagulation Care Providers       Provider Role Specialty Phone number    Chinyere Marcus M.D. Referring Cardiovascular Disease (Cardiology) [phone]    Yossi Khalil, PharmD Responsible Pharmacy             Refer to Anticoagulation Patient Findings for HPI    INR therapeutic at 3.      Per chart review pt prefers no phone call if INR in range.    Pt to continue with current warfarin dosing regimen.     Will follow up in 2 week(s).     Huseyin Conrad, PharmD, BCACP

## 2023-08-19 LAB — INR PPP: 2.4 (ref 2–3.5)

## 2023-08-21 ENCOUNTER — ANTICOAGULATION MONITORING (OUTPATIENT)
Dept: VASCULAR LAB | Facility: MEDICAL CENTER | Age: 68
End: 2023-08-21
Payer: MEDICARE

## 2023-08-21 DIAGNOSIS — Z95.2 S/P AVR (AORTIC VALVE REPLACEMENT): ICD-10-CM

## 2023-08-21 DIAGNOSIS — Z79.01 CHRONIC ANTICOAGULATION: Primary | ICD-10-CM

## 2023-08-21 NOTE — PROGRESS NOTES
Anticoagulation Summary  As of 2023      INR goal:  2.0-3.0   TTR:  55.4 % (5.5 y)   INR used for dosin.40 (2023)   Warfarin maintenance plan:  5 mg (5 mg x 1) every Tue, Sat; 2.5 mg (5 mg x 0.5) all other days   Weekly warfarin total:  22.5 mg   Plan last modified:  Yolanda Beck PharmD (2023)   Next INR check:  2023   Target end date:  Indefinite    Indications    S/P AVR (aortic valve replacement) [Z95.2]                 Anticoagulation Episode Summary       INR check location:  Home Draw    Preferred lab:      Send INR reminders to:      Comments:  Denny CELESTIN  only call if out of range          Anticoagulation Care Providers       Provider Role Specialty Phone number    Chinyere Marcus M.D. Referring Cardiovascular Disease (Cardiology) [phone]    Yossi Khalil, PharmD Responsible Pharmacy             Refer to Anticoagulation Patient Findings for HPI    INR therapeutic at 2.4.      Per chart review pt prefers no phone call if INR in range.    Pt to continue with current warfarin dosing regimen.     Will follow up in 2 week(s).     Huseyin Conrad, PharmD, BCACP

## 2023-08-22 RX ORDER — WARFARIN SODIUM 5 MG/1
TABLET ORAL
Qty: 90 TABLET | Refills: 1 | Status: SHIPPED | OUTPATIENT
Start: 2023-08-22 | End: 2024-02-21

## 2023-09-08 ENCOUNTER — ANTICOAGULATION MONITORING (OUTPATIENT)
Dept: VASCULAR LAB | Facility: MEDICAL CENTER | Age: 68
End: 2023-09-08
Payer: MEDICARE

## 2023-09-08 DIAGNOSIS — Z95.2 S/P AVR (AORTIC VALVE REPLACEMENT): ICD-10-CM

## 2023-09-08 LAB — INR PPP: 3.1 (ref 2–3.5)

## 2023-09-08 NOTE — PROGRESS NOTES
OP   Telephone Anticoagulation Service Note      Anticoagulation Summary  As of 9/8/2023      INR goal:  2.0-3.0   TTR:  55.7 % (5.6 y)   INR used for dosing:  3.10 (9/8/2023)   Warfarin maintenance plan:  5 mg (5 mg x 1) every Tue, Sat; 2.5 mg (5 mg x 0.5) all other days   Weekly warfarin total:  22.5 mg   Plan last modified:  Yolanda Beck PharmD (6/14/2023)   Next INR check:  9/15/2023   Target end date:  Indefinite    Indications    S/P AVR (aortic valve replacement) [Z95.2]                 Anticoagulation Episode Summary       INR check location:  Home Draw    Preferred lab:      Send INR reminders to:      Comments:  Denny CELESTIN  only call if out of range          Anticoagulation Care Providers       Provider Role Specialty Phone number    Chinyere Marcus M.D. Referring Cardiovascular Disease (Cardiology) [phone]    Yossi Khalil, PharmD Responsible Pharmacy           Anticoagulation Patient Findings  Patient Findings       Positives:  Change in diet/appetite (less greens over the past week)    Negatives:  Signs/symptoms of thrombosis, Signs/symptoms of bleeding, Laboratory test error suspected, Change in health, Change in alcohol use, Change in activity, Upcoming invasive procedure, Emergency department visit, Upcoming dental procedure, Missed doses, Extra doses, Change in medications, Hospital admission, Bruising, Other complaints          Spoke with the patient on the phone today, reporting a slightly SUPRA-therapeutic INR of 3.1.  Confirmed the current warfarin dosing regimen and patient compliance.  Patient reports eating less greens over the past week.  Patient denies any interval changes medications.   Patient denies any signs/symptoms of bleeding or clotting.  Since her INR is only 0.1 away from goal, she would prefer to stay on the same dosing regimen and will incorporate a few extra greens over the weekend and then resume her usual intake.   Patient will retest again in 1 week.     Pollo Patel   PharmD

## 2023-09-18 ENCOUNTER — ANTICOAGULATION MONITORING (OUTPATIENT)
Dept: VASCULAR LAB | Facility: MEDICAL CENTER | Age: 68
End: 2023-09-18
Payer: MEDICARE

## 2023-09-18 DIAGNOSIS — Z95.2 S/P AVR (AORTIC VALVE REPLACEMENT): ICD-10-CM

## 2023-09-18 LAB — INR PPP: 3 (ref 2–3.5)

## 2023-09-18 NOTE — PROGRESS NOTES
OP Anticoagulation Service Note    Date: 9/18/2023    Anticoagulation Summary  As of 9/18/2023      INR goal:  2.0-3.0   TTR:  55.5 % (5.6 y)   INR used for dosing:  3.00 (9/17/2023)   Warfarin maintenance plan:  5 mg (5 mg x 1) every Tue, Sat; 2.5 mg (5 mg x 0.5) all other days   Weekly warfarin total:  22.5 mg   Plan last modified:  Yolanda Beck PharmD (6/14/2023)   Next INR check:  10/2/2023   Target end date:  Indefinite    Indications    S/P AVR (aortic valve replacement) [Z95.2]                 Anticoagulation Episode Summary       INR check location:  Home Draw    Preferred lab:      Send INR reminders to:      Comments:  Denny CELESTIN  only call if out of range          Anticoagulation Care Providers       Provider Role Specialty Phone number    Chinyere Marcus M.D. Referring Cardiovascular Disease (Cardiology) [phone]    Yossi Khalil, PharmD Responsible Pharmacy           Anticoagulation Patient Findings        Patient's preferred phone number:  [phone]        HPI:   The reason for today's call is to prevent morbidity and mortality from a blood clot and/or stroke and to reduce the risk of bleeding while on a anticoagulant.     PCP:  Elba Dinh P.A.-C.  [address] [place] [phone]    Assessment:     INR  therapeutic.     Lab Results   Component Value Date/Time    BUN 15 10/26/2022 01:44 PM    CREATININE 0.88 10/26/2022 01:44 PM     Lab Results   Component Value Date/Time    HEMOGLOBIN 13.8 10/26/2022 01:44 PM    HEMATOCRIT 41.4 10/26/2022 01:44 PM    PLATELETCT 246 10/26/2022 01:44 PM    ALKPHOSPHAT 91 10/26/2022 01:44 PM    ASTSGOT 40 10/26/2022 01:44 PM    ALTSGPT 41 10/26/2022 01:44 PM          Current Outpatient Medications:     Spiriva Respimat, USE 2 INHALATIONS DAILY. ASSEMBLE AND PRIME    warfarin, TAKE ONE-HALF (1/2) TO ONE TABLET DAILY OR AS DIRECTED BY ANTICOAGULATION CLINIC    SUMAtriptan, 50 mg, Oral, Once PRN    azithromycin, TAKE 1 TABLET EVERY MONDAY,  WEDNESDAY AND FRIDAY    MINOXIDIL (TOPICAL), 1 mL, Apply externally, DAILY    rosuvastatin, TAKE 1 TABLET EVERY EVENING    cycloSPORINE, 1 Drop, Both Eyes, BID    ascorbic acid, 500 mg, Oral, DAILY    Flax Seed Oil, Take  by mouth 2 Times a Day.    Vitamin D3, Take  by mouth.      Plan:     Continue the same warfarin dose, as noted above.       Follow-up:     As seen above      Additional information discussed with patient:     Asked patient to please call the anticoagulation clinic if they have any signs/symptoms of bleeding and/or thrombosis or any changes to diet or medications.      National recommendations regarding anticoagulation therapy:     The CHEST guidelines recommends frequent INR monitoring at regular intervals (a few days up to a max of 12 weeks) to ensure patients are on the proper dose of warfarin, and patients are not having any complications from therapy.  INRs can dramatically change over a short time period due to diet, medications, and medical conditions.         [facility]  Phone: [phone]  Fax: [phone]  On call: [phone]  General scheduling/information [phone]  For emergencies please dial 911  Please do not use [employer] for urgent matters, call the phone numbers listed above.    This note was created using voice recognition software (Dragon). The accuracy of the dictation is limited by the abilities of the software. I have reviewed the note prior to signing, however some errors in grammar and context are still possible. If you have any questions related to this note please do not hesitate to contact our office.

## 2023-09-21 ENCOUNTER — NON-PROVIDER VISIT (OUTPATIENT)
Dept: SLEEP MEDICINE | Facility: MEDICAL CENTER | Age: 68
End: 2023-09-21
Attending: PHYSICIAN ASSISTANT
Payer: MEDICARE

## 2023-09-21 VITALS — BODY MASS INDEX: 24.35 KG/M2 | WEIGHT: 124 LBS | HEIGHT: 60 IN

## 2023-09-21 DIAGNOSIS — J47.9 BRONCHIECTASIS WITHOUT COMPLICATION (HCC): ICD-10-CM

## 2023-09-21 PROCEDURE — 94726 PLETHYSMOGRAPHY LUNG VOLUMES: CPT | Mod: 26 | Performed by: INTERNAL MEDICINE

## 2023-09-21 PROCEDURE — 94726 PLETHYSMOGRAPHY LUNG VOLUMES: CPT | Performed by: PHYSICIAN ASSISTANT

## 2023-09-21 PROCEDURE — 94060 EVALUATION OF WHEEZING: CPT | Mod: 26 | Performed by: INTERNAL MEDICINE

## 2023-09-21 PROCEDURE — 94729 DIFFUSING CAPACITY: CPT | Mod: 26 | Performed by: INTERNAL MEDICINE

## 2023-09-21 PROCEDURE — 94729 DIFFUSING CAPACITY: CPT | Performed by: PHYSICIAN ASSISTANT

## 2023-09-21 PROCEDURE — 94060 EVALUATION OF WHEEZING: CPT | Performed by: PHYSICIAN ASSISTANT

## 2023-09-21 ASSESSMENT — PULMONARY FUNCTION TESTS
FEV1_PREDICTED: 1.98
FEV1: 1.95
FVC: 2.36
FEV1/FVC: 79.92
FEV1_PERCENT_CHANGE: 5
FEV1/FVC_PERCENT_CHANGE: 167
FEV1/FVC_PERCENT_PREDICTED: 79
FEV1/FVC_PERCENT_CHANGE: 2
FEV1/FVC: 80
FEV1/FVC_PREDICTED: 79
FEV1/FVC_PERCENT_PREDICTED: 100
FEV1/FVC_PERCENT_PREDICTED: 101
FEV1_PERCENT_PREDICTED: 93
FEV1_LLN: 1.65
FEV1/FVC_PERCENT_PREDICTED: 102
FEV1: 1.84
FEV1/FVC_PERCENT_PREDICTED: 99
FEV1_PERCENT_PREDICTED: 98
FVC_LLN: 2.11
FVC: 2.44
FEV1/FVC: 78
FVC_PERCENT_PREDICTED: 96
FVC_PERCENT_PREDICTED: 93
FVC_PREDICTED: 2.52
FEV1/FVC: 78
FEV1_PERCENT_CHANGE: 3
FEV1/FVC_PERCENT_LLN: 66

## 2023-09-21 ASSESSMENT — FIBROSIS 4 INDEX: FIB4 SCORE: 1.7

## 2023-09-21 NOTE — PROCEDURES
Tech: Carissa Colunga, RT  Good patient effort & cooperation.  Test was performed on the [employer] Body Plethysmograph- Elite DX system.  The predicted sets used for Spirometry are GLI-2012, for Lung Volumes are ITS, and for DLCO is GLI 2017.  The results of this test meet the ATS standards for acceptability and repeatability.  The DLCO was uncorrected for Hb.  A bronchodilator of Ventolin HFA 2 puffs via spacer was administered.  DLCO was performed during dilation period.    Pulmonary function test  Date of study 9/21/2023  Interpreting physician: Zoila Carty  Reason for study: Bronchiectasis    Results:  Spirometry:  FVC is 2.36 L which is 93% predicted without a significant change postbronchodilator  FEV1 is 1.84 L which is 93% predicted without a significant change postbronchodilator  FEV1/FVC is 80    Lung volumes:  TLC is 4.48 L which is 100% predicted  RV is 1.99 L which is 102% predicted    Diffusion capacity:  DLCO is 16.55 which is 95% predicted  DL/VA is 4 which is 103% predicted    Interpretation:  1.  Spirometry is normal  2.  There is no significant change postbronchodilator  3.  The flow-volume loop is consistent with the spirometry data  4.  Lung volumes are normal  5.  Diffusion capacity is normal  6.  In comparison to the prior study from 7/29/2021 the FEV1 is identical.    I, Dr. Zoila Carty have interpreted and dictated the results of this study.    Zoila Carty MD RD  Pulmonary and Critical Care    Available on [employer]

## 2023-09-29 ENCOUNTER — OFFICE VISIT (OUTPATIENT)
Dept: SLEEP MEDICINE | Facility: MEDICAL CENTER | Age: 68
End: 2023-09-29
Attending: NURSE PRACTITIONER
Payer: MEDICARE

## 2023-09-29 VITALS
DIASTOLIC BLOOD PRESSURE: 82 MMHG | SYSTOLIC BLOOD PRESSURE: 124 MMHG | WEIGHT: 126.9 LBS | OXYGEN SATURATION: 93 % | HEIGHT: 60 IN | BODY MASS INDEX: 24.91 KG/M2 | HEART RATE: 84 BPM | RESPIRATION RATE: 16 BRPM

## 2023-09-29 DIAGNOSIS — J47.9 BRONCHIECTASIS WITHOUT COMPLICATION (HCC): ICD-10-CM

## 2023-09-29 PROCEDURE — 3074F SYST BP LT 130 MM HG: CPT | Performed by: NURSE PRACTITIONER

## 2023-09-29 PROCEDURE — 99214 OFFICE O/P EST MOD 30 MIN: CPT | Performed by: NURSE PRACTITIONER

## 2023-09-29 PROCEDURE — 3079F DIAST BP 80-89 MM HG: CPT | Performed by: NURSE PRACTITIONER

## 2023-09-29 PROCEDURE — 99212 OFFICE O/P EST SF 10 MIN: CPT | Performed by: NURSE PRACTITIONER

## 2023-09-29 RX ORDER — AZITHROMYCIN 250 MG/1
TABLET, FILM COATED ORAL
Qty: 40 TABLET | Refills: 3 | Status: SHIPPED | OUTPATIENT
Start: 2023-09-29

## 2023-09-29 ASSESSMENT — FIBROSIS 4 INDEX: FIB4 SCORE: 1.7

## 2023-09-29 NOTE — PROGRESS NOTES
Chief Complaint   Patient presents with    Follow-Up     Bronchiectasis  last seen on 3/15/2023 - Kassandra WOODWARD        HPI:  Marline Perry is a 67 y.o. year old female here today for follow-up on bronchiectasis.  Last seen 3/15/2023.  Patient is a never smoker.  Past medical history includes bronchiectasis, Sjogren's disorder, fibromyalgia, aortic aneurysm, AVR, mitral valve regurgitation, kidney disease, osteopenia, and on chronic anticoagulation.  Positive sputum cultures have included Staph aureus.  She has had difficulty producing subsequent specimens.    Presents today for pulmonary function test follow-up.  Patient currently using Acapella valve, as needed and maintenance antibiotics.  Currently denies cough, wheezing, chest congestion, chest tightness and is well controlled on Spiriva with no need for rescue inhaler at this time.    PFT (9/21/2023):  Normal spirometry, no evidence of change postbronchodilator, normal lung volumes, no evidence of air trapping, and normal diffusion capacity.  ROS: As per HPI and otherwise negative if not stated.    Past Medical History:   Diagnosis Date    Bronchiectasis (HCC)     Bronchitis     Chickenpox     COPD (chronic obstructive pulmonary disease) (HCC)     Hyperlipidemia     Influenza     Migraines     Mumps     Nasal drainage     Sjogren's disease (HCC)        Past Surgical History:   Procedure Laterality Date    AORTIC VALVE REPLACEMENT      BRONCHOSCOPY      HYSTERECTOMY LAPAROSCOPY      SINUSCOPE         Family History   Problem Relation Age of Onset    Arthritis Mother     Hypertension Father     Kidney Disease Father     Arthritis Sister     No Known Problems Brother     No Known Problems Brother     No Known Problems Son     Heart Attack Maternal Grandmother     Stroke Maternal Grandfather     Heart Disease Paternal Grandmother     No Known Problems Paternal Grandfather        Allergies as of 09/29/2023 - Reviewed 09/29/2023   Allergen Reaction  Noted    Spironolactone Rash 06/11/2008    Neomycin Rash 06/11/2008    Tape  06/11/2008        Vitals:  Resp 16   Ht 1.524 m (5')   Wt 57.6 kg (126 lb 14.4 oz)     Current medications as of today   Current Outpatient Medications   Medication Sig Dispense Refill    warfarin (COUMADIN) 5 MG Tab TAKE ONE-HALF (1/2) TO ONE TABLET DAILY OR AS DIRECTED BY ANTICOAGULATION CLINIC 90 Tablet 1    SPIRIVA RESPIMAT 2.5 MCG/ACT Aero Soln USE 2 INHALATIONS DAILY. ASSEMBLE AND PRIME 12 g 3    SUMAtriptan (IMITREX) 50 MG Tab Take 1 Tablet by mouth one time as needed for Migraine for up to 1 dose. 10 Tablet 3    azithromycin (ZITHROMAX) 250 MG Tab TAKE 1 TABLET EVERY MONDAY, WEDNESDAY AND FRIDAY 40 Tablet 3    MINOXIDIL, TOPICAL, 5 % Solution Apply 1 mL topically every day. Minoxidil 5% solution (1 mL). Apply minoxidil to the scalp, not the hair. Massage minoxidil onto the scalp with fingers, and hands should be washed after application. Apply the product at least two hours before bed to allow adequate time for drying 60 mL 0    rosuvastatin (CRESTOR) 20 MG Tab TAKE 1 TABLET EVERY EVENING 90 Tablet 3    cyclosporin (RESTASIS) 0.05 % ophthalmic emulsion Place 1 Drop in both eyes 2 times a day.      ascorbic acid (ASCORBIC ACID) 500 MG Tab Take 500 mg by mouth every day.      Flaxseed, Linseed, (FLAX SEED OIL) 1000 MG Cap Take  by mouth 2 Times a Day.      Cholecalciferol (VITAMIN D3) 2000 units Tab Take  by mouth.       No current facility-administered medications for this visit.         Physical Exam:   Gen:           Alert and oriented, No apparent distress. Mood and affect appropriate, normal interaction with examiner.  Eyes:          PERRL, EOM intact, sclere white, conjunctive moist.  Ears:          Not examined.   Hearing:     Grossly intact.  Nose:          Normal, no lesions or deformities.  Dentition:    Good dentition.  Oropharynx:   Tongue normal, posterior pharynx without erythema or exudate.  Neck:        Supple,  trachea midline, no masses.  Respiratory Effort: No intercostal retractions or use of accessory muscles.   Lung Auscultation:      Clear to auscultation bilaterally; no rales, rhonchi or wheezing.  CV:            Regular rate and rhythm. No murmurs, rubs or gallops.  Abd:           Not examined.   Lymphadenopathy: Not examined.  Gait and Station: Normal.  Digits and Nails: No clubbing, cyanosis, petechiae, or nodes.   Cranial Nerves: II-XII grossly intact.  Skin:        No rashes, lesions or ulcers noted.               Ext:           No cyanosis or edema.      Assessment:  1. Bronchiectasis without complication (HCC)            Plan:  Stable.  Continue current regimen of Spiriva with Acapella valve.  Pulmonary function test reviewed and were essentially normal.  Follow-up in 6 months.    Please note that this dictation was created using voice recognition software. I have made every reasonable attempt to correct obvious errors, but it is possible there are errors of grammar and possibly content that I did not discover before finalizing the note.

## 2023-09-29 NOTE — PROGRESS NOTES
Anticoagulation Summary  As of 3/30/2020    INR goal:   2.0-3.0   TTR:   47.0 % (2.2 y)   INR used for dosing:   3.20! (3/29/2020)   Warfarin maintenance plan:   2.5 mg (5 mg x 0.5) every Mon, Wed; 5 mg (5 mg x 1) all other days   Weekly warfarin total:   30 mg   Plan last modified:   Tulio Sotelo PharmD (3/30/2020)   Next INR check:   4/13/2020   Target end date:   Indefinite    Indications    H/O mechanical aortic valve replacement [Z95.2]             Anticoagulation Episode Summary     INR check location:   Home Draw    Preferred lab:       Send INR reminders to:       Comments:   Denny       Anticoagulation Care Providers     Provider Role Specialty Phone number    Chinyere Marcus M.D. Referring Cardiology [phone]    Yossi Khalil, PharmD Responsible          Anticoagulation Patient Findings  Patient Findings     Negatives:   Signs/symptoms of thrombosis, Signs/symptoms of bleeding, Laboratory test error suspected, Change in health, Change in alcohol use, Change in activity, Upcoming invasive procedure, Emergency department visit, Upcoming dental procedure, Missed doses, Extra doses, Change in medications, Change in diet/appetite, Hospital admission, Bruising, Other complaints        Spoke with patient today regarding supratherapeutic INR of 3.2.  Patient denies any signs/symptoms of bruising or bleeding or any changes in diet and medications.  Instructed patient to call clinic with any questions or concerns.  Instructed patient to decrease weekly warfarin regimen as detailed above.  Follow up in 2 weeks, to reduce risk of adverse events related to this high risk medication,  Warfarin.    Tulio Sotelo, Analilia, BCACP           
PRE-OP DIAGNOSIS:  Diabetic ulcer of left foot 29-Sep-2023 15:56:23  Paz Bui

## 2023-10-02 ENCOUNTER — HOSPITAL ENCOUNTER (OUTPATIENT)
Dept: CARDIOLOGY | Facility: MEDICAL CENTER | Age: 68
End: 2023-10-02
Attending: INTERNAL MEDICINE
Payer: MEDICARE

## 2023-10-02 DIAGNOSIS — Z95.2 S/P AVR (AORTIC VALVE REPLACEMENT): ICD-10-CM

## 2023-10-02 LAB
LV EJECT FRACT  99904: 60
LV EJECT FRACT MOD 2C 99903: 58.65
LV EJECT FRACT MOD 4C 99902: 56.2
LV EJECT FRACT MOD BP 99901: 55.29

## 2023-10-02 PROCEDURE — 93306 TTE W/DOPPLER COMPLETE: CPT | Mod: 26 | Performed by: INTERNAL MEDICINE

## 2023-10-02 PROCEDURE — 93306 TTE W/DOPPLER COMPLETE: CPT

## 2023-10-07 LAB — INR PPP: 3.4 (ref 2–3.5)

## 2023-10-09 ENCOUNTER — ANTICOAGULATION MONITORING (OUTPATIENT)
Dept: VASCULAR LAB | Facility: MEDICAL CENTER | Age: 68
End: 2023-10-09

## 2023-10-09 ENCOUNTER — NON-PROVIDER VISIT (OUTPATIENT)
Dept: MEDICAL GROUP | Facility: MEDICAL CENTER | Age: 68
End: 2023-10-09
Payer: MEDICARE

## 2023-10-09 DIAGNOSIS — Z95.2 S/P AVR (AORTIC VALVE REPLACEMENT): ICD-10-CM

## 2023-10-09 DIAGNOSIS — Z23 NEED FOR VACCINATION: ICD-10-CM

## 2023-10-09 PROCEDURE — G0008 ADMIN INFLUENZA VIRUS VAC: HCPCS | Performed by: STUDENT IN AN ORGANIZED HEALTH CARE EDUCATION/TRAINING PROGRAM

## 2023-10-09 PROCEDURE — 90662 IIV NO PRSV INCREASED AG IM: CPT | Performed by: STUDENT IN AN ORGANIZED HEALTH CARE EDUCATION/TRAINING PROGRAM

## 2023-10-09 NOTE — PROGRESS NOTES
Anticoagulation Summary  As of 10/9/2023      INR goal:  2.0-3.0   TTR:  54.9 % (5.7 y)   INR used for dosing:  3.40 (10/7/2023)   Warfarin maintenance plan:  5 mg (5 mg x 1) every Tue, Sat; 2.5 mg (5 mg x 0.5) all other days   Weekly warfarin total:  22.5 mg   Plan last modified:  Hamilton Chilel, Pharmacy Intern (10/9/2023)   Next INR check:  10/20/2023   Target end date:  Indefinite    Indications    S/P AVR (aortic valve replacement) [Z95.2]                 Anticoagulation Episode Summary       INR check location:  Home Draw    Preferred lab:      Send INR reminders to:      Comments:  Denny CELESTIN  only call if out of range          Anticoagulation Care Providers       Provider Role Specialty Phone number    Chinyere Marcus M.D. Referring Cardiovascular Disease (Cardiology) [phone]    Yossi Khalil, PharmD Responsible Pharmacy           Anticoagulation Patient Findings  Patient Findings       Positives:  Missed doses, Extra doses, Change in diet/appetite (Patient had decrease in greens from feeling unwell due to a headache)    Negatives:  Signs/symptoms of thrombosis, Signs/symptoms of bleeding, Laboratory test error suspected, Change in health, Change in alcohol use, Change in activity, Upcoming invasive procedure, Emergency department visit, Upcoming dental procedure, Change in medications, Hospital admission, Bruising, Other complaints    Comments:  Patient tends to adjust doses to either full or half tablets per green intake, typically on Sunday's and Thursday's.           Spoke with patient today regarding a supra therapeutic INR of 3.40.  Patient denies any signs/symptoms of bruising or bleeding or any changes medications.     Patient did articulate recently not feeling well and had a change in diet that she stated could contribute to her INR elevation seen today. Patient was able to verify their home dosing regimen as detailed above, but usually this is not consistent due to changes in diet that  the patient knows to compensate for while at home.     Patient is to hold today's warfarin dose and continue the above regimen, patient informed that if INR continue to be elevated then a consistent regimen will be needed going forward to make modifications to ensure INR is kept within goal.     Instructed patient to call clinic with any questions or concerns.    Pt is not on antiplatelet therapy    Follow up in 2 weeks, to reduce risk of adverse events related to this high risk medication,  Warfarin.    Hamilton Chilel, Pharmacy Intern  - This plan was developed with Analilia Russell

## 2023-10-09 NOTE — PROGRESS NOTES
"Marline Perry is a 67 y.o. female here for a non-provider visit for:   FLU    Reason for immunization: Annual Flu Vaccine  Immunization records indicate need for vaccine: Yes, confirmed with Epic  Minimum interval has been met for this vaccine: Yes  ABN completed: Not Indicated    VIS Dated  8/6/21 was given to patient: Yes  All IAC Questionnaire questions were answered \"No.\"    Patient tolerated injection and no adverse effects were observed or reported: Yes    Pt scheduled for next dose in series: No   "

## 2023-10-10 ENCOUNTER — OFFICE VISIT (OUTPATIENT)
Dept: CARDIOLOGY | Facility: MEDICAL CENTER | Age: 68
End: 2023-10-10
Attending: INTERNAL MEDICINE
Payer: MEDICARE

## 2023-10-10 VITALS
HEART RATE: 72 BPM | OXYGEN SATURATION: 96 % | HEIGHT: 60 IN | RESPIRATION RATE: 14 BRPM | BODY MASS INDEX: 24.74 KG/M2 | DIASTOLIC BLOOD PRESSURE: 80 MMHG | SYSTOLIC BLOOD PRESSURE: 124 MMHG | WEIGHT: 126 LBS

## 2023-10-10 DIAGNOSIS — I71.21 ANEURYSM OF ASCENDING AORTA WITHOUT RUPTURE (HCC): ICD-10-CM

## 2023-10-10 DIAGNOSIS — Z95.2 S/P AVR (AORTIC VALVE REPLACEMENT): ICD-10-CM

## 2023-10-10 DIAGNOSIS — M35.00 SJOGREN'S SYNDROME, WITH UNSPECIFIED ORGAN INVOLVEMENT (HCC): ICD-10-CM

## 2023-10-10 DIAGNOSIS — I34.0 NON-RHEUMATIC MITRAL REGURGITATION: ICD-10-CM

## 2023-10-10 PROBLEM — I35.1 NONRHEUMATIC AORTIC VALVE INSUFFICIENCY: Status: RESOLVED | Noted: 2018-01-22 | Resolved: 2023-10-10

## 2023-10-10 PROCEDURE — 3074F SYST BP LT 130 MM HG: CPT | Performed by: INTERNAL MEDICINE

## 2023-10-10 PROCEDURE — 99212 OFFICE O/P EST SF 10 MIN: CPT | Performed by: INTERNAL MEDICINE

## 2023-10-10 PROCEDURE — 99214 OFFICE O/P EST MOD 30 MIN: CPT | Performed by: INTERNAL MEDICINE

## 2023-10-10 PROCEDURE — 3079F DIAST BP 80-89 MM HG: CPT | Performed by: INTERNAL MEDICINE

## 2023-10-10 ASSESSMENT — FIBROSIS 4 INDEX: FIB4 SCORE: 1.7

## 2023-10-10 NOTE — PROGRESS NOTES
CARDIOLOGY OUTPATIENT FOLLOWUP    PCP: Elba Dinh P.A.-C.    1. S/P AVR (aortic valve replacement)    2. Aneurysm of ascending aorta without rupture (HCC)    3. Sjogren's syndrome, with unspecified organ involvement (HCC)    4. Non-rheumatic mitral regurgitation        Marline Perry is stable from a cardiovascular standpoint well-controlled risk factors and stable valve function on the echocardiogram.  I recommend repeating an echocardiogram again in 2 to 3 years.  I will also update a laboratory profile.    Follow up: 1 year    History: Marline Perry is a 67 y.o. female with history of mechanical aortic valve replacement and root repair in 2001 presenting for follow-up.  She also has a history of Sjogren's syndrome.  She has been in stable health without any cardiovascular symptoms.  She recently completed an echocardiogram which had the chance to personally review.  There is mild to moderate mitral insufficiency.      Physical Exam:  /80 (BP Location: Left arm, Patient Position: Sitting, BP Cuff Size: Adult)   Pulse 72   Resp 14   Ht 1.524 m (5')   Wt 57.2 kg (126 lb)   SpO2 96%   BMI 24.61 kg/m²   GEN: NAD  RESP: CTAB  CVS: RRR, mechanical S2.  Systolic flow murmur  ABD: Soft, NT/ND  EXT: WWP, no edema    The 10-year ASCVD risk score (Fatoumata DK, et al., 2019) is: 5.8%         Studies  Lab Results   Component Value Date/Time    CHOLSTRLTOT 148 03/23/2022 08:25 AM    LDL 84 03/23/2022 08:25 AM    HDL 51 03/23/2022 08:25 AM    TRIGLYCERIDE 65 03/23/2022 08:25 AM       Lab Results   Component Value Date/Time    SODIUM 135 10/26/2022 01:44 PM    POTASSIUM 3.9 10/26/2022 01:44 PM    CHLORIDE 102 10/26/2022 01:44 PM    CO2 23 10/26/2022 01:44 PM    GLUCOSE 88 10/26/2022 01:44 PM    BUN 15 10/26/2022 01:44 PM    CREATININE 0.88 10/26/2022 01:44 PM     Lab Results   Component Value Date/Time    ALKPHOSPHAT 91 10/26/2022 01:44 PM    ASTSGOT 40 10/26/2022 01:44 PM    ALTSGPT 41  10/26/2022 01:44 PM    TBILIRUBIN 0.4 10/26/2022 01:44 PM        Past Medical History:   Diagnosis Date    Bronchiectasis ([facility])     Bronchitis     Chickenpox     COPD (chronic obstructive pulmonary disease) (Former[facility])     Hyperlipidemia     Influenza     Migraines     Mumps     Nasal drainage     Sjogren's disease ([facility])      Allergies   Allergen Reactions    Spironolactone Rash     ALL OVER BODY     Neomycin Rash     CONTACT DERMATITIS     Tape      Adhesives-RASH      Outpatient Encounter Medications as of 10/10/2023   Medication Sig Dispense Refill    azithromycin (ZITHROMAX) 250 MG Tab TAKE 1 TABLET EVERY MONDAY, WEDNESDAY AND FRIDAY 40 Tablet 3    warfarin (COUMADIN) 5 MG Tab TAKE ONE-HALF (1/2) TO ONE TABLET DAILY OR AS DIRECTED BY ANTICOAGULATION CLINIC 90 Tablet 1    SPIRIVA RESPIMAT 2.5 MCG/ACT Aero Soln USE 2 INHALATIONS DAILY. ASSEMBLE AND PRIME 12 g 3    SUMAtriptan (IMITREX) 50 MG Tab Take 1 Tablet by mouth one time as needed for Migraine for up to 1 dose. 10 Tablet 3    rosuvastatin (CRESTOR) 20 MG Tab TAKE 1 TABLET EVERY EVENING 90 Tablet 3    ascorbic acid (ASCORBIC ACID) 500 MG Tab Take 500 mg by mouth every day.      Flaxseed, Linseed, (FLAX SEED OIL) 1000 MG Cap Take  by mouth 2 Times a Day.      Cholecalciferol (VITAMIN D3) 2000 units Tab Take  by mouth.      MINOXIDIL, TOPICAL, 5 % Solution Apply 1 mL topically every day. Minoxidil 5% solution (1 mL). Apply minoxidil to the scalp, not the hair. Massage minoxidil onto the scalp with fingers, and hands should be washed after application. Apply the product at least two hours before bed to allow adequate time for drying (Patient not taking: Reported on 10/10/2023) 60 mL 0    cyclosporin (RESTASIS) 0.05 % ophthalmic emulsion Place 1 Drop in both eyes 2 times a day. (Patient not taking: Reported on 10/10/2023)       No facility-administered encounter medications on file as of 10/10/2023.     Social History     Socioeconomic History    Marital status:       Spouse name: Not on file    Number of children: Not on file    Years of education: Not on file    Highest education level: Not on file   Occupational History    Not on file   Tobacco Use    Smoking status: Never    Smokeless tobacco: Never   Vaping Use    Vaping Use: Never used   Substance and Sexual Activity    Alcohol use: Yes     Alcohol/week: 2.4 oz     Types: 4 Shots of liquor per week    Drug use: No    Sexual activity: Yes     Partners: Male   Other Topics Concern    Not on file   Social History Narrative    Not on file     Social Determinants of Health     Financial Resource Strain: Not on file   Food Insecurity: Not on file   Transportation Needs: Not on file   Physical Activity: Not on file   Stress: Not on file   Social Connections: Not on file   Intimate Partner Violence: Not on file   Housing Stability: Not on file         ROS:   10 point review systems is otherwise negative except as per the HPI    Chief Complaint   Patient presents with    Other     F/V DX: Non-rheumatic mitral regurgitation

## 2023-10-12 ENCOUNTER — OFFICE VISIT (OUTPATIENT)
Dept: DERMATOLOGY | Facility: IMAGING CENTER | Age: 68
End: 2023-10-12
Payer: MEDICARE

## 2023-10-12 DIAGNOSIS — L30.9 DERMATITIS: ICD-10-CM

## 2023-10-12 DIAGNOSIS — L29.9 ITCHING: ICD-10-CM

## 2023-10-12 PROCEDURE — 99203 OFFICE O/P NEW LOW 30 MIN: CPT | Performed by: DERMATOLOGY

## 2023-10-12 RX ORDER — TRIAMCINOLONE ACETONIDE 1 MG/G
OINTMENT TOPICAL
Qty: 80 G | Refills: 0 | Status: SHIPPED | OUTPATIENT
Start: 2023-10-12

## 2023-10-12 NOTE — PROGRESS NOTES
CC:  Red moles of concern    Subjective: New Patient here for Red spots on body. Itchy arms - weeks/month. Is starting to improve but has photos to share today.     Denies preceding infections. No new meds/topicals used.     History of skin cancer: No  History of biopsies:No  History of blistering/severe sunburns: yes   Family history of skin cancer:No   Family history of atypical moles:No     ROS: no fevers/chills. No itch.  No cough  Relevant PMH:Sjogrens-CT dz  Social:NS    PE: Gen:WDWN female in NAD. Skin :focal exam on arms: scattered pink/erythematous macules on shoulder/forearms, nondescript. Cell photos showing past urticarial papules    A/P:dermatitis NOS, consider hives, resolved. 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  -to RTC PRN for biopsy if worsening/progressive, fresh sites erupts  -reviewed OTC antihistamine use X 30days and moisturizer use  -TAC 0.1% oint BID PRN itching    F/u PRN    I have reviewed medications relevant to my specialty.

## 2023-10-23 ENCOUNTER — ANTICOAGULATION MONITORING (OUTPATIENT)
Dept: VASCULAR LAB | Facility: MEDICAL CENTER | Age: 68
End: 2023-10-23
Payer: MEDICARE

## 2023-10-23 DIAGNOSIS — Z95.2 S/P AVR (AORTIC VALVE REPLACEMENT): ICD-10-CM

## 2023-10-23 LAB — INR PPP: 2.5 (ref 2–3.5)

## 2023-10-23 NOTE — PROGRESS NOTES
Anticoagulation Summary  As of 10/23/2023      INR goal:  2.0-3.0   TTR:  55.0 % (5.7 y)   INR used for dosin.50 (10/23/2023)   Warfarin maintenance plan:  5 mg (5 mg x 1) every Tue, Sat; 2.5 mg (5 mg x 0.5) all other days   Weekly warfarin total:  22.5 mg   Plan last modified:  Hamilton Chilel, Pharmacy Intern (10/9/2023)   Next INR check:  2023   Target end date:  Indefinite    Indications    S/P AVR (aortic valve replacement) [Z95.2]                 Anticoagulation Episode Summary       INR check location:  Home Draw    Preferred lab:      Send INR reminders to:      Comments:  Denny CELESTIN  only call if out of range          Anticoagulation Care Providers       Provider Role Specialty Phone number    Chinyere Marcus M.D. Referring Cardiovascular Disease (Cardiology) [phone]    Yossi Khalil, PharmD Responsible Pharmacy           Anticoagulation Patient Findings       As per notes, only requests call if out of range.   Patient is therapeutic today.   Confirmed dosing.   Instructed patient to call clinic if any unusual bleeding or bruising occurs.   Will continue dosing as outlined.   Will follow-up with patient in 2 week(s).  Jasmyn Holman, Student Pharmacist

## 2023-11-07 ENCOUNTER — ANTICOAGULATION MONITORING (OUTPATIENT)
Dept: MEDICAL GROUP | Facility: PHYSICIAN GROUP | Age: 68
End: 2023-11-07
Payer: MEDICARE

## 2023-11-07 DIAGNOSIS — Z95.2 S/P AVR (AORTIC VALVE REPLACEMENT): ICD-10-CM

## 2023-11-07 LAB — INR PPP: 3 (ref 2–3.5)

## 2023-11-07 NOTE — PROGRESS NOTES
Anticoagulation Summary  As of 11/7/2023      INR goal:  2.0-3.0   TTR:  55.3 % (5.8 y)   INR used for dosing:  3.00 (11/7/2023)   Warfarin maintenance plan:  5 mg (5 mg x 1) every Tue, Sat; 2.5 mg (5 mg x 0.5) all other days   Weekly warfarin total:  22.5 mg   Plan last modified:  Hamilton Chilel, Pharmacy Intern (10/9/2023)   Next INR check:  11/21/2023   Target end date:  Indefinite    Indications    S/P AVR (aortic valve replacement) [Z95.2]                 Anticoagulation Episode Summary       INR check location:  Home Draw    Preferred lab:      Send INR reminders to:      Comments:  Denny CELESTIN  only call if out of range          Anticoagulation Care Providers       Provider Role Specialty Phone number    Chinyere Marcus M.D. Referring Cardiovascular Disease (Cardiology) [phone]    Yossi Khalil, PharmD Responsible Pharmacy           Anticoagulation Patient Findings    INR therapeutic at 3.0.  Per chart notes, patient requests contact only when out of range.  Pt is to continue with current warfarin dosing regimen.  Follow up in 2 weeks, to reduce risk of adverse events related to this high risk medication,  Warfarin.    Tulio Sotelo, SantoD, BCACP

## 2023-11-10 DIAGNOSIS — E78.5 HYPERLIPIDEMIA, UNSPECIFIED HYPERLIPIDEMIA TYPE: ICD-10-CM

## 2023-11-10 NOTE — TELEPHONE ENCOUNTER
Is the patient due for a refill? Yes    Was the patient seen the past year? Yes    Date of last office visit: 10/10/2023    Does the patient have an upcoming appointment?  No    Provider to refill:BE    Does the patients insurance require a 100 day supply?  No

## 2023-11-13 RX ORDER — ROSUVASTATIN CALCIUM 20 MG/1
TABLET, COATED ORAL
Qty: 90 TABLET | Refills: 3 | Status: SHIPPED | OUTPATIENT
Start: 2023-11-13

## 2023-11-20 LAB — INR PPP: 3 (ref 2–3.5)

## 2023-11-21 ENCOUNTER — ANTICOAGULATION MONITORING (OUTPATIENT)
Dept: CARDIOLOGY | Facility: MEDICAL CENTER | Age: 68
End: 2023-11-21
Payer: MEDICARE

## 2023-11-21 ENCOUNTER — ANTICOAGULATION MONITORING (OUTPATIENT)
Dept: VASCULAR LAB | Facility: MEDICAL CENTER | Age: 68
End: 2023-11-21
Payer: MEDICARE

## 2023-11-21 DIAGNOSIS — Z95.2 S/P AVR (AORTIC VALVE REPLACEMENT): ICD-10-CM

## 2023-11-21 LAB — INR PPP: 3 (ref 2–3.5)

## 2023-11-21 NOTE — PROGRESS NOTES
Anticoagulation Summary  As of 11/21/2023      INR goal:  2.0-3.0   TTR:  55.6 % (5.8 y)   INR used for dosing:  3.00 (11/20/2023)   Warfarin maintenance plan:  5 mg (5 mg x 1) every Tue, Sat; 2.5 mg (5 mg x 0.5) all other days   Weekly warfarin total:  22.5 mg   Plan last modified:  Hamilton Chilel, Pharmacy Intern (10/9/2023)   Next INR check:     Target end date:  Indefinite    Indications    S/P AVR (aortic valve replacement) [Z95.2]                 Anticoagulation Episode Summary       INR check location:  Home Draw    Preferred lab:      Send INR reminders to:      Comments:  Denny CELESTIN  only call if out of range          Anticoagulation Care Providers       Provider Role Specialty Phone number    Chinyere Marcus M.D. Referring Cardiovascular Disease (Cardiology) [phone]    Yossi Khalil, PharmD Responsible Pharmacy           Anticoagulation Patient Findings      Pt reported a therapeutic INR  Per chart review, pt does not want to be contacted if INR is therapeutic   Pt to continue with current warfarin dosing regimen. Requested pt contact the clinic for any s/s of unusual bleeding, bruising, clotting or any changes to diet or medication.    Follow-up INR in 2 week(s).    Ines Mcmahan, Pharmacy Intern

## 2023-11-21 NOTE — PROGRESS NOTES
OP Anticoagulation Service Note    Date: 11/21/2023    Anticoagulation Summary  As of 11/21/2023      INR goal:  2.0-3.0   TTR:  55.6 % (5.8 y)   INR used for dosing:  3.00 (11/21/2023)   Warfarin maintenance plan:  5 mg (5 mg x 1) every Tue, Sat; 2.5 mg (5 mg x 0.5) all other days   Weekly warfarin total:  22.5 mg   Plan last modified:  Hamilton Chilel, Pharmacy Intern (10/9/2023)   Next INR check:     Target end date:  Indefinite    Indications    S/P AVR (aortic valve replacement) [Z95.2]                 Anticoagulation Episode Summary       INR check location:  Home Draw    Preferred lab:      Send INR reminders to:      Comments:  Denny CELESTIN  only call if out of range          Anticoagulation Care Providers       Provider Role Specialty Phone number    Cihnyere Marcus M.D. Referring Cardiovascular Disease (Cardiology) [phone]    Yossi Khalil, PharmD Responsible Pharmacy           Anticoagulation Patient Findings        Patient's preferred phone number:  [phone]        HPI:   The reason for today's call is to prevent morbidity and mortality from a blood clot and/or stroke and to reduce the risk of bleeding while on a anticoagulant.     PCP:  Elba Dinh P.A.-C.  [address] [place] [phone]    Assessment:     INR  therapeutic.     Lab Results   Component Value Date/Time    BUN 15 10/26/2022 01:44 PM    CREATININE 0.88 10/26/2022 01:44 PM     Lab Results   Component Value Date/Time    HEMOGLOBIN 13.8 10/26/2022 01:44 PM    HEMATOCRIT 41.4 10/26/2022 01:44 PM    PLATELETCT 246 10/26/2022 01:44 PM    ALKPHOSPHAT 91 10/26/2022 01:44 PM    ASTSGOT 40 10/26/2022 01:44 PM    ALTSGPT 41 10/26/2022 01:44 PM          Current Outpatient Medications:     rosuvastatin, TAKE 1 TABLET EVERY EVENING    triamcinolone acetonide, AAA arms BID PRN itching. Do not use on face, axilla, groin.    azithromycin, TAKE 1 TABLET EVERY MONDAY, WEDNESDAY AND FRIDAY    warfarin, TAKE ONE-HALF (1/2) TO ONE  TABLET DAILY OR AS DIRECTED BY ANTICOAGULATION CLINIC    Spiriva Respimat, USE 2 INHALATIONS DAILY. ASSEMBLE AND PRIME    SUMAtriptan, 50 mg, Oral, Once PRN    MINOXIDIL (TOPICAL), 1 mL, Apply externally, DAILY (Patient not taking: Reported on 10/10/2023)    cycloSPORINE, 1 Drop, Both Eyes, BID (Patient not taking: Reported on 10/10/2023)    ascorbic acid, 500 mg, Oral, DAILY    Flax Seed Oil, Take  by mouth 2 Times a Day.    Vitamin D3, Take  by mouth.      Plan:     Continue the same warfarin dose, as noted above.       Follow-up:     As seen above      Additional information discussed with patient:     Asked patient to please call the anticoagulation clinic if they have any signs/symptoms of bleeding and/or thrombosis or any changes to diet or medications.      National recommendations regarding anticoagulation therapy:     The CHEST guidelines recommends frequent INR monitoring at regular intervals (a few days up to a max of 12 weeks) to ensure patients are on the proper dose of warfarin, and patients are not having any complications from therapy.  INRs can dramatically change over a short time period due to diet, medications, and medical conditions.         [facility]  Phone: [phone]  Fax: [phone]  On call: [phone]  General scheduling/information [phone]  For emergencies please dial [phone]  Please do not use dscovered for urgent matters, call the phone numbers listed above.    This note was created using voice recognition software (Dragon). The accuracy of the dictation is limited by the abilities of the software. I have reviewed the note prior to signing, however some errors in grammar and context are still possible. If you have any questions related to this note please do not hesitate to contact our office.

## 2023-12-07 ENCOUNTER — ANTICOAGULATION MONITORING (OUTPATIENT)
Dept: VASCULAR LAB | Facility: MEDICAL CENTER | Age: 68
End: 2023-12-07
Payer: MEDICARE

## 2023-12-07 DIAGNOSIS — Z95.2 S/P AVR (AORTIC VALVE REPLACEMENT): ICD-10-CM

## 2023-12-07 LAB — INR PPP: 2.8 (ref 2–3.5)

## 2023-12-07 NOTE — PROGRESS NOTES
Anticoagulation Summary  As of 2023      INR goal:  2.0-3.0   TTR:  55.9 % (5.8 y)   INR used for dosin.80 (2023)   Warfarin maintenance plan:  5 mg (5 mg x 1) every Tue, Sat; 2.5 mg (5 mg x 0.5) all other days   Weekly warfarin total:  22.5 mg   Plan last modified:  Hamilton Chilel, Pharmacy Intern (10/9/2023)   Next INR check:  2023   Target end date:  Indefinite    Indications    S/P AVR (aortic valve replacement) [Z95.2]                 Anticoagulation Episode Summary       INR check location:  Home Draw    Preferred lab:      Send INR reminders to:      Comments:  Denny CELESTIN  only call if out of range          Anticoagulation Care Providers       Provider Role Specialty Phone number    Chinyere Marcus M.D. Referring Cardiovascular Disease (Cardiology) [phone]    Yossi Khalil, PharmD Responsible Pharmacy             Refer to Anticoagulation Patient Findings for HPI    INR therapeutic at 2.8.      Per chart review pt prefers no phone call if INR in range.    Pt to continue with current warfarin dosing regimen.     Will follow up in 2 week(s).     Huseyin Conrad, PharmD, BCACP

## 2023-12-26 ENCOUNTER — ANTICOAGULATION MONITORING (OUTPATIENT)
Dept: VASCULAR LAB | Facility: MEDICAL CENTER | Age: 68
End: 2023-12-26
Payer: MEDICARE

## 2023-12-26 ENCOUNTER — TELEPHONE (OUTPATIENT)
Dept: SCHEDULING | Facility: IMAGING CENTER | Age: 68
End: 2023-12-26
Payer: MEDICARE

## 2023-12-26 DIAGNOSIS — Z95.2 S/P AVR (AORTIC VALVE REPLACEMENT): ICD-10-CM

## 2023-12-26 LAB — INR PPP: 2.8 (ref 2–3.5)

## 2023-12-27 NOTE — PROGRESS NOTES
OP   Telephone Anticoagulation Service Note      Anticoagulation Summary  As of 2023      INR goal:  2.0-3.0   TTR:  56.3 % (5.9 y)   INR used for dosin.80 (2023)   Warfarin maintenance plan:  5 mg (5 mg x 1) every Tue, Sat; 2.5 mg (5 mg x 0.5) all other days   Weekly warfarin total:  22.5 mg   Plan last modified:  Hamilton Chilel, Pharmacy Intern (10/9/2023)   Next INR check:  2024   Target end date:  Indefinite    Indications    S/P AVR (aortic valve replacement) [Z95.2]                 Anticoagulation Episode Summary       INR check location:  Home Draw    Preferred lab:      Send INR reminders to:      Comments:  Denny CELESTIN  only call if out of range          Anticoagulation Care Providers       Provider Role Specialty Phone number    Chinyere Marcus M.D. Referring Cardiovascular Disease (Cardiology) [phone]    Yossi Khalil, PharmD Responsible Pharmacy           Anticoagulation Patient Findings    Patient prefers a call ONLY if her INR is out of range.   INR is therapeutic today at 2.8.  Patient is aware to call the clinic with any changes to diet or medications, with any signs/sx of bleeding or clotting or with any questions or concerns.     Patient will continue with the current dosing regimen and will retest again in 2 weeks.     Pollo BlanchardD

## 2023-12-27 NOTE — TELEPHONE ENCOUNTER
Caller: Marline Perry    Topic/issue: Patient calling in INR.    INR: 2.8  Date: 12/26/23    Callback Number: [phone] (home)     Thank you,  Tamar GUARDADO

## 2024-01-09 ENCOUNTER — ANTICOAGULATION MONITORING (OUTPATIENT)
Dept: VASCULAR LAB | Facility: MEDICAL CENTER | Age: 69
End: 2024-01-09
Payer: MEDICARE

## 2024-01-09 ENCOUNTER — TELEPHONE (OUTPATIENT)
Dept: SCHEDULING | Facility: IMAGING CENTER | Age: 69
End: 2024-01-09
Payer: MEDICARE

## 2024-01-09 DIAGNOSIS — Z95.2 S/P AVR (AORTIC VALVE REPLACEMENT): ICD-10-CM

## 2024-01-09 LAB — INR PPP: 3.5 (ref 2–3.5)

## 2024-01-09 NOTE — TELEPHONE ENCOUNTER
Caller: Marline Perry    Topic/issue: Patient calling in INR:    INR: 3.5  Date: 01/09/24    Callback Number: [phone] (home)     Thank you,  Tamar GUARDADO

## 2024-01-09 NOTE — PROGRESS NOTES
Anticoagulation Summary  As of 1/9/2024      INR goal:  2.0-3.0   TTR:  56.1 % (5.9 y)   INR used for dosing:  3.50 (1/9/2024)   Warfarin maintenance plan:  2.5 mg (5 mg x 0.5) every Mon, Wed, Fri; 5 mg (5 mg x 1) all other days   Weekly warfarin total:  27.5 mg   Plan last modified:  Diann Warren PharmD (1/9/2024)   Next INR check:  1/23/2024   Target end date:  Indefinite    Indications    S/P AVR (aortic valve replacement) [Z95.2]                 Anticoagulation Episode Summary       INR check location:  Home Draw    Preferred lab:      Send INR reminders to:      Comments:  Denny CELESTIN  only call if out of range          Anticoagulation Care Providers       Provider Role Specialty Phone number    Chinyere Marcus M.D. Referring Cardiovascular Disease (Cardiology) [phone]    Yossi Khalil, PharmD Responsible Pharmacy           Anticoagulation Patient Findings  Patient Findings       Positives:  Extra doses (has been taking 2.5mg MWF, 5mg AOD), Change in diet/appetite (decreased vit K intake)    Negatives:  Signs/symptoms of thrombosis, Signs/symptoms of bleeding, Laboratory test error suspected, Change in health, Change in alcohol use, Change in activity, Upcoming invasive procedure, Emergency department visit, Upcoming dental procedure, Missed doses, Change in medications, Hospital admission, Bruising, Other complaints            INR is supratherapeutic    Called and spoke to patient.    Warfarin Plan: Take 2.5mg tonight, then Continue regimen as listed above.    Next INR in 2 week(s).    Diann Warren, SantoD

## 2024-01-11 ENCOUNTER — DOCUMENTATION (OUTPATIENT)
Dept: HEALTH INFORMATION MANAGEMENT | Facility: OTHER | Age: 69
End: 2024-01-11
Payer: MEDICARE

## 2024-01-26 ENCOUNTER — TELEPHONE (OUTPATIENT)
Dept: VASCULAR LAB | Facility: MEDICAL CENTER | Age: 69
End: 2024-01-26
Payer: MEDICARE

## 2024-01-26 ENCOUNTER — ANTICOAGULATION MONITORING (OUTPATIENT)
Dept: VASCULAR LAB | Facility: MEDICAL CENTER | Age: 69
End: 2024-01-26
Payer: MEDICARE

## 2024-01-26 DIAGNOSIS — Z95.2 S/P AVR (AORTIC VALVE REPLACEMENT): ICD-10-CM

## 2024-01-26 LAB — INR PPP: 3.1 (ref 2–3.5)

## 2024-01-26 NOTE — TELEPHONE ENCOUNTER
INR Reporting   Caller: Marline Perry     Topic/issue: Patient called in to report her INR. Patient stated that she had to take an Advil today due to a sinus headache and wanted to let the office know that may be why it is a little higher.    Value: 3.1    Callback Number: [phone]      Thank you,  Leena EDWARD

## 2024-01-26 NOTE — PROGRESS NOTES
OP   Telephone Anticoagulation Service Note      Anticoagulation Summary  As of 1/26/2024      INR goal:  2.0-3.0   TTR:  55.7 % (6 y)   INR used for dosing:  3.10 (1/26/2024)   Warfarin maintenance plan:  2.5 mg (5 mg x 0.5) every Mon, Wed, Fri; 5 mg (5 mg x 1) all other days   Weekly warfarin total:  27.5 mg   Plan last modified:  Diann Warren, PharmD (1/9/2024)   Next INR check:  2/9/2024   Target end date:  Indefinite    Indications    S/P AVR (aortic valve replacement) [Z95.2]                 Anticoagulation Episode Summary       INR check location:  Home Draw    Preferred lab:      Send INR reminders to:      Comments:  Denyn CELESTIN  only call if out of range          Anticoagulation Care Providers       Provider Role Specialty Phone number    Chinyere Marcus M.D. Referring Cardiovascular Disease (Cardiology) [phone]    Yossi Khalil, PharmD Responsible Pharmacy           Anticoagulation Patient Findings  Patient Findings       Positives:  Change in medications (patient took some advil for terrible HA this past week.)    Negatives:  Signs/symptoms of thrombosis, Signs/symptoms of bleeding, Laboratory test error suspected, Change in health, Change in alcohol use, Change in activity, Upcoming invasive procedure, Emergency department visit, Upcoming dental procedure, Missed doses, Extra doses, Change in diet/appetite, Hospital admission, Bruising, Other complaints          Spoke with the patient on the phone today, reporting a slightly SUPRA-therapeutic INR of 3.1.  Confirmed the current warfarin dosing regimen and patient compliance.  Patient reports that she was struggling with terrible HA and took some advil this past week. She is aware of increased risk of bleeding with usage, but she said APAP did not work.   Patient denies any interval changes to diet.  Patient denies any signs/symptoms of bleeding or clotting.    Patient instructed to HOLD warfarin TONIGHT ONLY, as a one time dose change, then to  resume her current dosing regimen thereafter. Patient will retest again in 2 weeks.     Pollo BlanchardD

## 2024-02-18 DIAGNOSIS — Z79.01 CHRONIC ANTICOAGULATION: ICD-10-CM

## 2024-02-20 ENCOUNTER — ANTICOAGULATION MONITORING (OUTPATIENT)
Dept: MEDICAL GROUP | Facility: PHYSICIAN GROUP | Age: 69
End: 2024-02-20
Payer: MEDICARE

## 2024-02-20 ENCOUNTER — TELEPHONE (OUTPATIENT)
Dept: VASCULAR LAB | Facility: MEDICAL CENTER | Age: 69
End: 2024-02-20
Payer: MEDICARE

## 2024-02-20 DIAGNOSIS — Z95.2 S/P AVR (AORTIC VALVE REPLACEMENT): ICD-10-CM

## 2024-02-20 LAB — INR PPP: 3 (ref 2–3.5)

## 2024-02-20 NOTE — PROGRESS NOTES
Anticoagulation Summary  As of 2/20/2024      INR goal:  2.0-3.0   TTR:  55.0% (6 y)   INR used for dosing:  3.00 (2/20/2024)   Warfarin maintenance plan:  2.5 mg (5 mg x 0.5) every Mon, Wed, Fri; 5 mg (5 mg x 1) all other days   Weekly warfarin total:  27.5 mg   Plan last modified:  Diann Warren, PharmD (1/9/2024)   Next INR check:  3/5/2024   Target end date:  Indefinite    Indications    S/P AVR (aortic valve replacement) [Z95.2]                 Anticoagulation Episode Summary       INR check location:  Home Draw    Preferred lab:      Send INR reminders to:      Comments:  Denny CELESTIN  only call if out of range          Anticoagulation Care Providers       Provider Role Specialty Phone number    Chinyere Marcus M.D. Referring Cardiovascular Disease (Cardiology) [phone]    Yossi Khalil, PharmD Responsible Pharmacy           Anticoagulation Patient Findings    INR therapeutic at 3.0.  Per chart notes, patient requests contact only when out of range.  Pt is to continue with current warfarin dosing regimen.  Follow up in 2 weeks, to reduce risk of adverse events related to this high risk medication,  Warfarin.    Tulio Sotelo, SantoD, BCACP

## 2024-02-20 NOTE — TELEPHONE ENCOUNTER
INR Reporting  PT Name: Marline Perry    PT Reports INR Value: 3.0    Concerns/Questions Reported: or N/A: Taken on 02/18/2024    Callback Number: [phone]     Thank you,  Leena EDWARD

## 2024-02-21 RX ORDER — WARFARIN SODIUM 5 MG/1
TABLET ORAL
Qty: 100 TABLET | Refills: 1 | Status: SHIPPED | OUTPATIENT
Start: 2024-02-21

## 2024-02-29 LAB — INR PPP: 3 (ref 2–3.5)

## 2024-03-11 ENCOUNTER — ANTICOAGULATION MONITORING (OUTPATIENT)
Dept: VASCULAR LAB | Facility: MEDICAL CENTER | Age: 69
End: 2024-03-11
Payer: MEDICARE

## 2024-03-11 DIAGNOSIS — Z95.2 S/P AVR (AORTIC VALVE REPLACEMENT): ICD-10-CM

## 2024-03-11 NOTE — PROGRESS NOTES
OP   Telephone Anticoagulation Service Note      Anticoagulation Summary  As of 3/11/2024      INR goal:  2.0-3.0   TTR:  55.2% (6.1 y)   INR used for dosing:  3.00 (2/29/2024)   Warfarin maintenance plan:  2.5 mg (5 mg x 0.5) every Mon, Wed, Fri; 5 mg (5 mg x 1) all other days   Weekly warfarin total:  27.5 mg   Plan last modified:  Diann Warren, PharmD (1/9/2024)   Next INR check:  3/14/2024   Target end date:  Indefinite    Indications    S/P AVR (aortic valve replacement) [Z95.2]                 Anticoagulation Episode Summary       INR check location:  Home Draw    Preferred lab:      Send INR reminders to:      Comments:  Denny CELESTIN  only call if out of range          Anticoagulation Care Providers       Provider Role Specialty Phone number    Chinyere Marcus M.D. Referring Cardiovascular Disease (Cardiology) [phone]    Yossi Khalil, PharmD Responsible Pharmacy           Anticoagulation Patient Findings  Patient Findings       Negatives:  Signs/symptoms of thrombosis, Signs/symptoms of bleeding, Laboratory test error suspected, Change in health, Change in alcohol use, Change in activity, Upcoming invasive procedure, Emergency department visit, Upcoming dental procedure, Missed doses, Extra doses, Change in medications, Change in diet/appetite, Hospital admission, Bruising, Other complaints          Spoke with the patient on the phone today, reporting a therapeutic INR of 3.0.  This result was from 2/29/24 but only submitted today.   Called the patient to discuss need for a test soon since this result is almost 2 weeks old now.   Patient denies any interval changes to diet and/or medications. Patient denies any signs/symptoms of bleeding or clotting.  Patient instructed to continue with the current warfarin dosing regimen, and asked to follow up again in a few days.     Pollo BlanchardD

## 2024-03-15 ENCOUNTER — ANTICOAGULATION MONITORING (OUTPATIENT)
Dept: VASCULAR LAB | Facility: MEDICAL CENTER | Age: 69
End: 2024-03-15
Payer: MEDICARE

## 2024-03-15 DIAGNOSIS — Z95.2 S/P AVR (AORTIC VALVE REPLACEMENT): ICD-10-CM

## 2024-03-15 LAB — INR PPP: 2.7 (ref 2–3.5)

## 2024-03-15 NOTE — PROGRESS NOTES
Anticoagulation Summary  As of 3/15/2024      INR goal:  2.0-3.0   TTR:  55.5% (6.1 y)   INR used for dosin.70 (3/15/2024)   Warfarin maintenance plan:  2.5 mg (5 mg x 0.5) every Mon, Wed, Fri; 5 mg (5 mg x 1) all other days   Weekly warfarin total:  27.5 mg   Plan last modified:  Santo JoD (2024)   Next INR check:  3/28/2024   Target end date:  Indefinite    Indications    S/P AVR (aortic valve replacement) [Z95.2]                 Anticoagulation Episode Summary       INR check location:  Home Draw    Preferred lab:      Send INR reminders to:      Comments:  Denny CELESTIN  only call if out of range          Anticoagulation Care Providers       Provider Role Specialty Phone number    Chinyere Marcus M.D. Referring Cardiovascular Disease (Cardiology) [phone]    Santo EncisoD Responsible Pharmacy             Refer to Anticoagulation Patient Findings for HPI  Patient Findings       Negatives:  Signs/symptoms of thrombosis, Signs/symptoms of bleeding, Laboratory test error suspected, Change in health, Change in alcohol use, Change in activity, Upcoming invasive procedure, Emergency department visit, Upcoming dental procedure, Missed doses, Extra doses, Change in medications, Change in diet/appetite, Hospital admission, Bruising, Other complaints            Spoke with patient.  INR is therapeutic.     Pt verifies warfarin weekly dosing.     Will have pt Continue current warfarin regimen as above without changes      Repeat INR in 2 week(s).     Yolanda Beck, SantoD

## 2024-03-21 ENCOUNTER — OFFICE VISIT (OUTPATIENT)
Dept: DERMATOLOGY | Facility: IMAGING CENTER | Age: 69
End: 2024-03-21
Payer: MEDICARE

## 2024-03-21 DIAGNOSIS — L30.9 DERMATITIS: ICD-10-CM

## 2024-03-21 PROCEDURE — 99213 OFFICE O/P EST LOW 20 MIN: CPT | Performed by: DERMATOLOGY

## 2024-03-21 NOTE — PROGRESS NOTES
CC: Skin Lesion     Subjective: est Patient here for Red spot on face x 6 days, it itches     Had been outdoors.   Unsure if bite/triggers?   Denies use of many products on skin.     History of skin cancer: No  History of biopsies:No  History of blistering/severe sunburns: yes   Family history of skin cancer:No   Family history of atypical moles:No     ROS: no fevers/chills. No itch.  No cough  Relevant PMH:Sjogrens-CT dz  Social:NS    PE: Gen:WDWN female in NAD. Skin :focal exam on face: isolated pink/erythematous patch, fine desquamation, approx quarter sized on left upper forehead    A/P:dermatitis NOS, consider possible resolving bite reaction/irritation derm:  -HCT 2.5% cream   -f/u 4 weeks    F/u PRN    I have reviewed medications relevant to my specialty.

## 2024-03-29 ENCOUNTER — OFFICE VISIT (OUTPATIENT)
Dept: SLEEP MEDICINE | Facility: MEDICAL CENTER | Age: 69
End: 2024-03-29
Payer: MEDICARE

## 2024-03-29 VITALS
HEIGHT: 60 IN | BODY MASS INDEX: 24.25 KG/M2 | DIASTOLIC BLOOD PRESSURE: 66 MMHG | HEART RATE: 86 BPM | SYSTOLIC BLOOD PRESSURE: 122 MMHG | OXYGEN SATURATION: 94 % | WEIGHT: 123.5 LBS

## 2024-03-29 DIAGNOSIS — R09.82 POSTNASAL DRIP: ICD-10-CM

## 2024-03-29 DIAGNOSIS — J47.9 BRONCHIECTASIS WITHOUT COMPLICATION (HCC): ICD-10-CM

## 2024-03-29 DIAGNOSIS — Z23 NEED FOR VACCINATION: ICD-10-CM

## 2024-03-29 DIAGNOSIS — J31.0 CHRONIC RHINITIS: ICD-10-CM

## 2024-03-29 PROCEDURE — 99213 OFFICE O/P EST LOW 20 MIN: CPT

## 2024-03-29 PROCEDURE — 90471 IMMUNIZATION ADMIN: CPT

## 2024-03-29 RX ORDER — SODIUM CHLORIDE FOR INHALATION 3 %
3 VIAL, NEBULIZER (ML) INHALATION 2 TIMES DAILY
Qty: 180 ML | Refills: 11 | Status: SHIPPED | OUTPATIENT
Start: 2024-03-29

## 2024-03-29 ASSESSMENT — ENCOUNTER SYMPTOMS
SPUTUM PRODUCTION: 1
NAUSEA: 0
CHILLS: 0
DIZZINESS: 0
PALPITATIONS: 0
DIAPHORESIS: 0
WEAKNESS: 0
DIARRHEA: 0
HEMOPTYSIS: 0
VOMITING: 0
WHEEZING: 0
MYALGIAS: 0
HEADACHES: 0
FEVER: 0
FALLS: 0
SHORTNESS OF BREATH: 0
SINUS PAIN: 0
HEARTBURN: 0
COUGH: 1

## 2024-03-29 ASSESSMENT — FIBROSIS 4 INDEX: FIB4 SCORE: 1.73

## 2024-03-29 NOTE — ASSESSMENT & PLAN NOTE
HRCT in 2018 showed mild bronchiectasis.  PFTs in 2023 were normal with a FVC 2.36 L or 93%, FEV1 1.84 L or 93%, FEV1/FVC 78%, RV 1 to 2%, %, DLCO 95% predicted, without positive bronchodilator response.  Patient is currently on Spiriva.  She is also on azithromycin 250 mg MWF.  She is currently not doing any airway clearance.  -- Encouraged airway clearance with hypertonic saline and flutter valve.  If airway clearance is effective in controlling patient's symptoms, we will discontinue azithromycin.  -- Advised patient to have a Spiriva vacation to see if the medication is effective

## 2024-03-29 NOTE — ASSESSMENT & PLAN NOTE
Patient is currently using Benadryl and saline nasal rinses.  --Encouraged sinus hygiene with saline nasal rinse followed by Flonase

## 2024-03-29 NOTE — PROGRESS NOTES
Pulmonary Clinic Note    Date of Visit: 3/29/2024     Chief Complaint:  Chief Complaint   Patient presents with    Follow-Up     Last seen 9/29/23 AD Spencer     HPI:   Marline Perry is a very pleasant 68 y.o. year old female never smoker, with a PMHx of bronchiectasis, Sjogren's, fibromyalgia, aortic aneurysm, AVR, mitral valve regurgitation, CKD who presented to the Pulmonary Clinic for a regular follow up. Last seen in the office on 9/29/2023 with AD Escalera.     Patient is followed by the pulmonary office for bronchiectasis.  HRCT in 2018 showed mild bronchiectasis.  PFTs in 2023 were normal with a FVC 2.36 L or 93%, FEV1 1.84 L or 93%, FEV1/FVC 78%, RV 1 to 2%, %, DLCO 95% predicted, without positive bronchodilator response.  Patient is currently on Spiriva.  She is also on azithromycin 250 mg MWF.  She is currently not doing any airway clearance.  Symptomatically, she states she has a cough with yellow/brown sputum, chest congestion, and occasional wheezing.  She also states she has postnasal drip which she is taking Benadryl and saline nasal rinses for.  She has not had any bronchiectasis exacerbations this past year.    Exacerbations this year:  0    Current medication regimen: Spiriva    Oxygen use: None    MMRC Grade:  0- Breathless only during strenuous exercise      Past Medical History:   Diagnosis Date    Bronchiectasis (HCC)     Bronchitis     Chickenpox     COPD (chronic obstructive pulmonary disease) (HCC)     Hyperlipidemia     Influenza     Migraines     Mumps     Nasal drainage     Sjogren's disease (HCC)      Past Surgical History:   Procedure Laterality Date    AORTIC VALVE REPLACEMENT      BRONCHOSCOPY      HYSTERECTOMY LAPAROSCOPY      SINUSCOPE       Social History     Socioeconomic History    Marital status:      Spouse name: Not on file    Number of children: Not on file    Years of education: Not on file    Highest education level: Not on file    Occupational History    Not on file   Tobacco Use    Smoking status: Never    Smokeless tobacco: Never   Vaping Use    Vaping Use: Never used   Substance and Sexual Activity    Alcohol use: Yes     Alcohol/week: 2.4 oz     Types: 4 Shots of liquor per week    Drug use: No    Sexual activity: Yes     Partners: Male   Other Topics Concern    Not on file   Social History Narrative    Not on file     Social Determinants of Health     Financial Resource Strain: Not on file   Food Insecurity: Not on file   Transportation Needs: Not on file   Physical Activity: Not on file   Stress: Not on file   Social Connections: Not on file   Intimate Partner Violence: Not on file   Housing Stability: Not on file        Family History   Problem Relation Age of Onset    Arthritis Mother     Hypertension Father     Kidney Disease Father     Arthritis Sister     No Known Problems Brother     No Known Problems Brother     No Known Problems Son     Heart Attack Maternal Grandmother     Stroke Maternal Grandfather     Heart Disease Paternal Grandmother     No Known Problems Paternal Grandfather      Current Outpatient Medications on File Prior to Visit   Medication Sig Dispense Refill    hydrocortisone 2.5 % Cream topical cream AAA face BID PRN itching, redness, rash. Stop use after 2-3 weeks. Avoid use around eyes 20 g 0    warfarin (COUMADIN) 5 MG Tab TAKE ONE-HALF (1/2) TO ONE TABLET DAILY OR AS DIRECTED BY ANTICOAGULATION CLINIC 100 Tablet 1    rosuvastatin (CRESTOR) 20 MG Tab TAKE 1 TABLET EVERY EVENING 90 Tablet 3    triamcinolone acetonide (KENALOG) 0.1 % Ointment AAA arms BID PRN itching. Do not use on face, axilla, groin. 80 g 0    azithromycin (ZITHROMAX) 250 MG Tab TAKE 1 TABLET EVERY MONDAY, WEDNESDAY AND FRIDAY 40 Tablet 3    SPIRIVA RESPIMAT 2.5 MCG/ACT Aero Soln USE 2 INHALATIONS DAILY. ASSEMBLE AND PRIME 12 g 3    SUMAtriptan (IMITREX) 50 MG Tab Take 1 Tablet by mouth one time as needed for Migraine for up to 1 dose. 10  Tablet 3    ascorbic acid (ASCORBIC ACID) 500 MG Tab Take 500 mg by mouth every day.      Flaxseed, Linseed, (FLAX SEED OIL) 1000 MG Cap Take  by mouth 2 Times a Day.      Cholecalciferol (VITAMIN D3) 2000 units Tab Take  by mouth.      MINOXIDIL, TOPICAL, 5 % Solution Apply 1 mL topically every day. Minoxidil 5% solution (1 mL). Apply minoxidil to the scalp, not the hair. Massage minoxidil onto the scalp with fingers, and hands should be washed after application. Apply the product at least two hours before bed to allow adequate time for drying (Patient not taking: Reported on 10/10/2023) 60 mL 0    cyclosporin (RESTASIS) 0.05 % ophthalmic emulsion Place 1 Drop in both eyes 2 times a day. (Patient not taking: Reported on 10/10/2023)       No current facility-administered medications on file prior to visit.     Allergies: Spironolactone, Neomycin, and Tape    ROS:   Review of Systems   Constitutional:  Negative for chills, diaphoresis, fever and malaise/fatigue.   HENT:  Negative for congestion and sinus pain.    Respiratory:  Positive for cough and sputum production (Yellow/brown). Negative for hemoptysis, shortness of breath and wheezing (Occasional).         Chest congestion   Cardiovascular:  Negative for chest pain, palpitations and leg swelling.   Gastrointestinal:  Negative for diarrhea, heartburn, nausea and vomiting.   Musculoskeletal:  Negative for falls and myalgias.   Neurological:  Negative for dizziness, weakness and headaches.     Vitals:  /66 (BP Location: Right arm, Patient Position: Sitting, BP Cuff Size: Adult)   Pulse 86   Ht 1.524 m (5')   Wt 56 kg (123 lb 8 oz)   SpO2 94%     Physical Exam  Constitutional:       General: She is not in acute distress.     Appearance: Normal appearance. She is not ill-appearing, toxic-appearing or diaphoretic.   Cardiovascular:      Rate and Rhythm: Normal rate and regular rhythm.      Heart sounds: No murmur heard.     No friction rub. No gallop.    Pulmonary:      Effort: No respiratory distress.      Breath sounds: Normal breath sounds. No stridor. No wheezing, rhonchi or rales.   Musculoskeletal:         General: No swelling.      Right lower leg: No edema.      Left lower leg: No edema.   Skin:     General: Skin is warm.   Neurological:      General: No focal deficit present.      Mental Status: She is alert and oriented to person, place, and time.   Psychiatric:         Mood and Affect: Mood normal.         Behavior: Behavior normal.         Thought Content: Thought content normal.         Judgment: Judgment normal.         Laboratory Data:  PFTs: (Date: 9/21/2023)-      Impression:  1.  Spirometry is normal  2.  There is no significant change postbronchodilator  3.  The flow-volume loop is consistent with the spirometry data  4.  Lung volumes are normal  5.  Diffusion capacity is normal  6.  In comparison to the prior study from 7/29/2021 the FEV1 is identical.    CT Chest: (Date: 4/26/2018)-  Impression:  Hyperexpanded lungs.  Lateral right lower lobe subsegmental atelectasis and mild bronchiectasis.  No evidence interstitial lung disease.  Aortic valve replacement.      Assessment and Plan:    Problem List Items Addressed This Visit       Bronchiectasis without complication (HCC)     HRCT in 2018 showed mild bronchiectasis.  PFTs in 2023 were normal with a FVC 2.36 L or 93%, FEV1 1.84 L or 93%, FEV1/FVC 78%, RV 1 to 2%, %, DLCO 95% predicted, without positive bronchodilator response.  Patient is currently on Spiriva.  She is also on azithromycin 250 mg MWF.  She is currently not doing any airway clearance.  -- Encouraged airway clearance with hypertonic saline and flutter valve.  If airway clearance is effective in controlling patient's symptoms, we will discontinue azithromycin.  -- Advised patient to have a Spiriva vacation to see if the medication is effective         Relevant Medications    sodium chloride 3% 3 % nebulizer solution    Other  Relevant Orders    DME Nebulizer    DME Other    Chronic rhinitis     Patient is currently using Benadryl and saline nasal rinses.  --Encouraged sinus hygiene with saline nasal rinse followed by Flonase         RESOLVED: Postnasal drip     Other Visit Diagnoses       Need for vaccination        Relevant Orders    Pneumococcal Conjugate Vaccine 20-Valent (6 wks+)          Diagnostic studies have been reviewed with the patient.    Return in about 6 months (around 9/29/2024), or if symptoms worsen or fail to improve, for bronchiectasis.     This note was generated using voice recognition software which has a chance of producing errors of grammar and possibly content.  I have made every reasonable attempt to find and correct any obvious errors, but it should be expected that some may not be found prior to finalization of this note.    Time spent in record review prior to patient arrival, reviewing results, and in face-to-face encounter totaled 20 min.  __________  AD Tavarez  Pulmonary Medicine  [facility]

## 2024-04-01 ENCOUNTER — ANTICOAGULATION MONITORING (OUTPATIENT)
Dept: VASCULAR LAB | Facility: MEDICAL CENTER | Age: 69
End: 2024-04-01
Payer: MEDICARE

## 2024-04-01 DIAGNOSIS — Z95.2 S/P AVR (AORTIC VALVE REPLACEMENT): ICD-10-CM

## 2024-04-01 LAB — INR PPP: 2.4 (ref 2–3.5)

## 2024-04-01 NOTE — PROGRESS NOTES
Anticoagulation Summary  As of 2024      INR goal:  2.0-3.0   TTR:  55.9% (6.2 y)   INR used for dosin.40 (2024)   Warfarin maintenance plan:  2.5 mg (5 mg x 0.5) every Mon, Wed, Fri; 5 mg (5 mg x 1) all other days   Weekly warfarin total:  27.5 mg   Plan last modified:  Santo JoD (2024)   Next INR check:  4/15/2024   Target end date:  Indefinite    Indications    S/P AVR (aortic valve replacement) [Z95.2]                 Anticoagulation Episode Summary       INR check location:  Home Draw    Preferred lab:      Send INR reminders to:      Comments:  Denny CELESTIN  only call if out of range          Anticoagulation Care Providers       Provider Role Specialty Phone number    Chinyere Marcus M.D. Referring Cardiovascular Disease (Cardiology) [phone]    Santo EncisoD Responsible Pharmacy             Per chart review, pt does not want to be contacted if INR is therapeutic     INR therapeutic    Unless patient reports any changes that would warrant an adjustment to the plan:  Warfarin Plan: Continue regimen as listed above.    Next INR in 2 week(s).    Drew Craft, SantoD, BCACP

## 2024-04-17 ENCOUNTER — ANTICOAGULATION MONITORING (OUTPATIENT)
Dept: VASCULAR LAB | Facility: MEDICAL CENTER | Age: 69
End: 2024-04-17
Payer: MEDICARE

## 2024-04-17 DIAGNOSIS — Z95.2 S/P AVR (AORTIC VALVE REPLACEMENT): ICD-10-CM

## 2024-04-17 LAB — INR PPP: 2.9 (ref 2–3.5)

## 2024-04-17 NOTE — PROGRESS NOTES
Anticoagulation Summary  As of 2024      INR goal:  2.0-3.0   TTR:  56.2% (6.2 y)   INR used for dosin.90 (2024)   Warfarin maintenance plan:  2.5 mg (5 mg x 0.5) every Mon, Wed, Fri; 5 mg (5 mg x 1) all other days   Weekly warfarin total:  27.5 mg   Plan last modified:  Diann Warren, PharmD (2024)   Next INR check:  2024   Target end date:  Indefinite    Indications    S/P AVR (aortic valve replacement) [Z95.2]                 Anticoagulation Episode Summary       INR check location:  Home Draw    Preferred lab:      Send INR reminders to:      Comments:  Denny CELESTIN  only call if out of range          Anticoagulation Care Providers       Provider Role Specialty Phone number    Chinyere Marcus M.D. Referring Cardiovascular Disease (Cardiology) [phone]    Yossi Khalil, PharmD Responsible Pharmacy           Anticoagulation Patient Findings    INR therapeutic at 2.9.  Per chart notes, patient requests contact only when out of range.  Pt is to continue with current warfarin dosing regimen.  Follow up in 2 weeks, to reduce risk of adverse events related to this high risk medication,  Warfarin.    Tulio Sotelo, SantoD, BCACP

## 2024-04-18 ENCOUNTER — OFFICE VISIT (OUTPATIENT)
Dept: DERMATOLOGY | Facility: IMAGING CENTER | Age: 69
End: 2024-04-18
Payer: MEDICARE

## 2024-04-18 DIAGNOSIS — L82.1 SEBORRHEIC KERATOSIS: ICD-10-CM

## 2024-04-18 DIAGNOSIS — L30.9 DERMATITIS: ICD-10-CM

## 2024-04-18 DIAGNOSIS — L29.9 ITCHING: ICD-10-CM

## 2024-04-18 PROCEDURE — 99213 OFFICE O/P EST LOW 20 MIN: CPT | Performed by: DERMATOLOGY

## 2024-04-18 NOTE — PROGRESS NOTES
CC: Follow-Up (Red spots)     Subjective: est Patient here for Red spot fv, spots are showing up today       Had been outdoors.   Unsure if bite/triggers? Had had sleeves and full coverage when outside.   Denies use of many products on skin.     Brown spot on face, fading. Using HCT cream as needed for redness/itching.    History of skin cancer: No  History of biopsies:No  History of blistering/severe sunburns: yes   Family history of skin cancer:No   Family history of atypical moles:No     ROS: no fevers/chills. No itch.  No cough  Relevant PMH:Sjogrens-CT dz  Social:NS    PE: Gen:WDWN female in NAD. Skin :focal exam on face/arms: isolated hyperpigmented patch on left forehead. Scattered erythematous appearing waxy papules on arms    A/P:hx dermatitis NOS/resulting PIPA: forehead  -HCT 2.5% cream qday-BID PRN itching, redness, rash  -sunprotection  -anticipate resolution with time    SK/ISK, arms: itching:  -advised use of lidex cream BID PRN itching    Itching:  -OTC antihistamine use reviewed    F/u PRN      I have reviewed medications relevant to my specialty.

## 2024-04-19 ENCOUNTER — APPOINTMENT (OUTPATIENT)
Dept: MEDICAL GROUP | Facility: MEDICAL CENTER | Age: 69
End: 2024-04-19
Payer: MEDICARE

## 2024-04-19 VITALS
HEART RATE: 71 BPM | RESPIRATION RATE: 16 BRPM | OXYGEN SATURATION: 95 % | BODY MASS INDEX: 23.95 KG/M2 | SYSTOLIC BLOOD PRESSURE: 110 MMHG | TEMPERATURE: 97.6 F | HEIGHT: 60 IN | WEIGHT: 122 LBS | DIASTOLIC BLOOD PRESSURE: 66 MMHG

## 2024-04-19 DIAGNOSIS — Z79.01 WARFARIN ANTICOAGULATION: ICD-10-CM

## 2024-04-19 DIAGNOSIS — G43.109 MIGRAINE WITH AURA AND WITHOUT STATUS MIGRAINOSUS, NOT INTRACTABLE: ICD-10-CM

## 2024-04-19 DIAGNOSIS — R10.32 DISCOMFORT OF LEFT GROIN: ICD-10-CM

## 2024-04-19 DIAGNOSIS — H91.90 SUBJECTIVE HEARING CHANGE: ICD-10-CM

## 2024-04-19 DIAGNOSIS — E78.00 HYPERCHOLESTEREMIA: ICD-10-CM

## 2024-04-19 DIAGNOSIS — J47.9 BRONCHIECTASIS WITHOUT COMPLICATION (HCC): ICD-10-CM

## 2024-04-19 DIAGNOSIS — Z12.31 ENCOUNTER FOR SCREENING MAMMOGRAM FOR BREAST CANCER: ICD-10-CM

## 2024-04-19 PROCEDURE — 3074F SYST BP LT 130 MM HG: CPT | Performed by: FAMILY MEDICINE

## 2024-04-19 PROCEDURE — 3078F DIAST BP <80 MM HG: CPT | Performed by: FAMILY MEDICINE

## 2024-04-19 PROCEDURE — 99214 OFFICE O/P EST MOD 30 MIN: CPT | Performed by: FAMILY MEDICINE

## 2024-04-19 ASSESSMENT — PATIENT HEALTH QUESTIONNAIRE - PHQ9: CLINICAL INTERPRETATION OF PHQ2 SCORE: 0

## 2024-04-19 ASSESSMENT — FIBROSIS 4 INDEX: FIB4 SCORE: 1.73

## 2024-04-19 NOTE — PROGRESS NOTES
"Verbal consent was acquired by the patient to use [employer] ambient listening note generation during this visit    Subjective:     CC: \"establish care, referral request\"    History of Present Illness  The patient is a 68-year-old female who presents to establish care. She is accompanied by her .    The patient's primary concern is establishing care, enabling her annual Medicare exam for both her and her . She has requested a referral for [facility] hearing, which has been scheduled for later this year. Additionally, she is due for a mammogram and has recently started using a few new creams. She recently consulted with a new pulmonologist, who advised her to discontinue Spiriva and monitor her symptoms until her next appointment in 6 months. Her current medications include rosuvastatin 20 mg, warfarin (0.5 or 1 strength several days a week), and sumatriptan as needed for migraines. She reports an increase in migraines during the winter months, but there is no discernible pattern to her migraines. She identifies the onset of her migraines as regular headache or sinus-related, and recently, she has not needed to use sumatriptan. She has a titanium heart valve and takes warfarin for it. She is uncertain about the need for medication refills. Her cardiologist has ordered labs, which she has not yet completed, but she is aware that she needs to complete them prior to her next appointment in 10/2024. She underwent a hysterectomy 4 years ago. She denies smoking or vaping, and drinks 1 to 2 alcoholic beverages per week. She has a history of shingles approximately 20 years ago, which was treated with medication. She denies any changes in her symptoms. She regularly visits the Coumadin clinic and has a home monitoring device that maintains her levels within the normal range.    Supplemental Information  She pulled her inner muscle in her leg a couple of weeks ago when she stepped weird with her puppy. She used a " heating pad with her Coumadin. She had bruising from it. The bruise is clear now, but it is still a little sore. She tries to keep walking and stretching it out. She was wrapping her leg with an Ace bandage, which was helpful, but she does not really need it now.   She denies any family history of breast cancer. Her sister had a stroke and Sjogren's. Her maternal great aunt had cancer.          Objective:     Exam:  /66   Pulse 71   Temp 36.4 °C (97.6 °F) (Temporal)   Resp 16   Ht 1.524 m (5')   Wt 55.3 kg (122 lb)   SpO2 95%   Breastfeeding No   BMI 23.83 kg/m²  Body mass index is 23.83 kg/m².    Physical Exam  Vitals reviewed.   Constitutional:       General: She is not in acute distress.     Appearance: Normal appearance.   HENT:      Head: Normocephalic and atraumatic.   Cardiovascular:      Rate and Rhythm: Normal rate and regular rhythm.      Heart sounds: Normal heart sounds.      Friction rub: Audible click.   Pulmonary:      Effort: Pulmonary effort is normal. No respiratory distress.      Breath sounds: Normal breath sounds. No wheezing.   Skin:     General: Skin is warm and dry.   Neurological:      Mental Status: She is alert. Mental status is at baseline.      Gait: Gait normal.   Psychiatric:         Mood and Affect: Mood normal.         Behavior: Behavior normal.               Results  Imaging  Last echo showed ejection fraction was 60%.    Testing  Cologuard test in 2023 was negative.      Assessment & Plan:       1. Subjective hearing change  Referral for continuation of care was created  - Referral to Audiology    2. Encounter for screening mammogram for breast cancer  - MA-SCREENING MAMMO BILAT W/TOMOSYNTHESIS W/CAD; Future    3. Migraine with aura and without status migrainosus, not intractable  Chronic, stable.  Patient has sumatriptan she uses as needed does not need refill at this time    4. Warfarin anticoagulation  Chronic and stable patient is followed by Coumadin clinic she  does check her own INR is at home which is nice.  This is secondary to aortic valve replacement.    5. Bronchiectasis without complication (HCC)  Chronic and stable lungs sound clear on exam today.  Patient is followed by pulmonology.  She is taking a break from her Spiriva under guidance of the pulmonologist.    6. Hypercholesteremia  Chronic and stable.  Patient is on rosuvastatin 20 mg does not need refills at this time.  Lipid panel ordered by cardiologist is pending    7. Discomfort of left groin  Acute injury patient pulled her groin playing with her new puppy.  She did have significant bruising secondary to warfarin use but no concerning signs of traumatic hemorrhage.  Patient is healing well over the last couple weeks.  She is treating it with supportive therapy including heat, ice, stretching.  Discussed as long as her trajectory stays appropriate and no need for further follow-up for this.    Assessment & Plan  1. Health maintenance.  The patient's most recent echocardiogram yielded satisfactory results, revealing an ejection fraction of 60 percent. Her most recent mammogram, conducted in 2021, did not reveal any concerning findings. A Cologuard test conducted in 2023 yielded a negative result, necessitating a repeat test in 2025. The patient will maintain her Spiriva regimen. A Cologuard test will be scheduled for her in 2025. It was recommended that she receive the shingles vaccine at her local pharmacy.         Return in about 1 year (around 4/19/2025) for Annual Medicare.      This note was created using voice recognition software (Dragon). The accuracy of the dictation is limited by the abilities of the software. I have reviewed the note prior to signing, however some errors in grammar and context are still possible. If you have any questions related to this note please do not hesitate to contact our office.

## 2024-05-06 ENCOUNTER — ANTICOAGULATION MONITORING (OUTPATIENT)
Dept: VASCULAR LAB | Facility: MEDICAL CENTER | Age: 69
End: 2024-05-06
Payer: MEDICARE

## 2024-05-06 DIAGNOSIS — Z95.2 S/P AVR (AORTIC VALVE REPLACEMENT): ICD-10-CM

## 2024-05-06 LAB — INR PPP: 2.3 (ref 2–3.5)

## 2024-05-06 NOTE — PROGRESS NOTES
Anticoagulation Summary  As of 2024      INR goal:  2.0-3.0   TTR:  56.5% (6.3 y)   INR used for dosin.30 (2024)   Warfarin maintenance plan:  2.5 mg (5 mg x 0.5) every Mon, Wed, Fri; 5 mg (5 mg x 1) all other days   Weekly warfarin total:  27.5 mg   Plan last modified:  Santo JoD (2024)   Next INR check:  2024   Target end date:  Indefinite    Indications    S/P AVR (aortic valve replacement) [Z95.2]                 Anticoagulation Episode Summary       INR check location:  Home Draw    Preferred lab:      Send INR reminders to:      Comments:  Denny CELESTIN  only call if out of range          Anticoagulation Care Providers       Provider Role Specialty Phone number    Chinyere Marcus M.D. Referring Cardiovascular Disease (Cardiology) [phone]    [facility]             Refer to Anticoagulation Patient Findings for HPI    INR therapeutic at 2.3.      Per chart review pt prefers no phone call if INR in range.    Pt to continue with current warfarin dosing regimen.     Will follow up in 3 week(s).     Huseyin Conrad, PharmD, BCACP

## 2024-05-22 ENCOUNTER — HOSPITAL ENCOUNTER (OUTPATIENT)
Dept: RADIOLOGY | Facility: MEDICAL CENTER | Age: 69
End: 2024-05-22
Attending: FAMILY MEDICINE
Payer: MEDICARE

## 2024-05-22 DIAGNOSIS — Z12.31 ENCOUNTER FOR SCREENING MAMMOGRAM FOR BREAST CANCER: ICD-10-CM

## 2024-05-23 ENCOUNTER — TELEPHONE (OUTPATIENT)
Dept: MEDICAL GROUP | Facility: MEDICAL CENTER | Age: 69
End: 2024-05-23
Payer: MEDICARE

## 2024-05-23 DIAGNOSIS — R92.8 ABNORMAL MAMMOGRAM OF LEFT BREAST: ICD-10-CM

## 2024-05-23 NOTE — TELEPHONE ENCOUNTER
----- Message from Amrik Duncan D.O. sent at 5/23/2024  9:33 AM PDT -----  Please make sure patient schedules her follow-up imaging for her abnormal mammogram.  Thank you

## 2024-06-04 DIAGNOSIS — Z79.01 CHRONIC ANTICOAGULATION: ICD-10-CM

## 2024-06-05 ENCOUNTER — ANTICOAGULATION MONITORING (OUTPATIENT)
Dept: MEDICAL GROUP | Facility: PHYSICIAN GROUP | Age: 69
End: 2024-06-05
Payer: MEDICARE

## 2024-06-05 ENCOUNTER — TELEPHONE (OUTPATIENT)
Dept: VASCULAR LAB | Facility: MEDICAL CENTER | Age: 69
End: 2024-06-05
Payer: MEDICARE

## 2024-06-05 DIAGNOSIS — Z95.2 S/P AVR (AORTIC VALVE REPLACEMENT): ICD-10-CM

## 2024-06-05 LAB — INR PPP: 1.8 (ref 2–3.5)

## 2024-06-05 NOTE — PROGRESS NOTES
Anticoagulation Summary  As of 2024      INR goal:  2.0-3.0   TTR:  56.6% (6.3 y)   INR used for dosin.80 (2024)   Warfarin maintenance plan:  2.5 mg (5 mg x 0.5) every Mon, Wed, Fri; 5 mg (5 mg x 1) all other days   Weekly warfarin total:  27.5 mg   Plan last modified:  Diann Warren PharmD (2024)   Next INR check:  2024   Target end date:  Indefinite    Indications    S/P AVR (aortic valve replacement) [Z95.2]                 Anticoagulation Episode Summary       INR check location:  Home Draw    Preferred lab:      Send INR reminders to:      Comments:  Denny CELESTIN  only call if out of range          Anticoagulation Care Providers       Provider Role Specialty Phone number    Chinyere Marcus M.D. Referring Cardiovascular Disease (Cardiology) [phone]    [facility]             Refer to Anticoagulation Patient Findings for HPI  Patient Findings       Positives:  Missed doses (D/t recent travel)    Negatives:  Signs/symptoms of thrombosis, Signs/symptoms of bleeding, Laboratory test error suspected, Change in health, Change in alcohol use, Change in activity, Upcoming invasive procedure, Emergency department visit, Upcoming dental procedure, Extra doses, Change in medications, Change in diet/appetite, Hospital admission, Bruising, Other complaints            Spoke with pt.  INR is SUB therapeutic.     Pt verifies warfarin weekly dosing.     Will have pt BOLUS x 1 dose w/ 5 mg today and then continue on w/ her current regimen.    Repeat INR in 2 week(s).     Huseyin Conrad, PharmD, BCACP    ADDENDUM 24: Pt to have upcoming biopsy on . [facility] w/o prior stroke or thrombosis. Per current CHEST guidelines, bridging with Lovenox is unnecessary. Pt will hold warfarin 5 days prior to procedure. However, I requested her to test INR tomorrow  prior to holding and we will give her post-op dosing instructions at that time. Diann FARIAS  Kelvin, PharmD

## 2024-06-05 NOTE — TELEPHONE ENCOUNTER
INR READING    PT Name: Marline Perry    PT Reports INR Value: 1.8    Date of INR Results: 6/5/24    Concerns/Questions Reported: or N/A: N/A    Callback Number: [phone] (home)

## 2024-06-12 ENCOUNTER — HOSPITAL ENCOUNTER (OUTPATIENT)
Dept: RADIOLOGY | Facility: MEDICAL CENTER | Age: 69
End: 2024-06-12
Attending: FAMILY MEDICINE
Payer: MEDICARE

## 2024-06-12 DIAGNOSIS — R92.8 ABNORMAL MAMMOGRAM OF LEFT BREAST: ICD-10-CM

## 2024-06-12 PROCEDURE — 76642 ULTRASOUND BREAST LIMITED: CPT | Mod: LT

## 2024-06-13 ENCOUNTER — TELEPHONE (OUTPATIENT)
Dept: VASCULAR LAB | Facility: MEDICAL CENTER | Age: 69
End: 2024-06-13
Payer: MEDICARE

## 2024-06-13 NOTE — TELEPHONE ENCOUNTER
[facility]    Received anticoagulation clearance from breast center regarding upcoming biopsy.    Procedure date 06/21/24    Pt is on warfarin for mechanical AVR.    Per current CHEST guidelines, pt is at low risk of VTE/stroke given no prior CVA or valve thrombosis.             IF PT ON WARFARIN  Given pt's hx, will hold warfarin x 5 days prior to procedure and will not bridge w/ Lovenox.            Faxed clearance to [phone]; (will send to [place] to scan in media)    Called and spoke to patient. See addendum on AC encounter from 6/5.    Diann Warren, PharmD

## 2024-06-14 ENCOUNTER — ANTICOAGULATION MONITORING (OUTPATIENT)
Dept: VASCULAR LAB | Facility: MEDICAL CENTER | Age: 69
End: 2024-06-14
Payer: MEDICARE

## 2024-06-14 DIAGNOSIS — Z95.2 S/P AVR (AORTIC VALVE REPLACEMENT): ICD-10-CM

## 2024-06-14 LAB — INR PPP: 1.7 (ref 2–3.5)

## 2024-06-15 NOTE — PROGRESS NOTES
Anticoagulation Summary  As of 2024      INR goal:  2.0-3.0   TTR:  56.4% (6.4 y)   INR used for dosin.70 (2024)   Warfarin maintenance plan:  2.5 mg (5 mg x 0.5) every Mon, Wed, Fri; 5 mg (5 mg x 1) all other days   Weekly warfarin total:  27.5 mg   Plan last modified:  Diann Warren, PharmD (2024)   Next INR check:  2024   Target end date:  Indefinite    Indications    S/P AVR (aortic valve replacement) [Z95.2]                 Anticoagulation Episode Summary       INR check location:  Home Draw    Preferred lab:      Send INR reminders to:      Comments:  Denny CELESTIN  only call if out of range          Anticoagulation Care Providers       Provider Role Specialty Phone number    Chinyere Marcus M.D. Referring Cardiovascular Disease (Cardiology) [phone]    [facility]             Refer to Anticoagulation Patient Findings for HPI  Patient Findings       Positives:  Upcoming invasive procedure (needle biopsy 24)    Negatives:  Signs/symptoms of thrombosis, Signs/symptoms of bleeding, Laboratory test error suspected, Change in health, Change in alcohol use, Change in activity, Emergency department visit, Upcoming dental procedure, Missed doses, Extra doses, Change in medications, Change in diet/appetite, Hospital admission, Bruising, Other complaints            Spoke with patient.  INR is SUB-therapeutic.     Pt verifies warfarin weekly dosing.       Will have pt continue regimen and follow holding instructions for procedure that are previously documented. [employer] message sent to patient with instructions.     Repeat INR in 2 week(s).     Yolanda Beck, [facility]anto[facility]

## 2024-06-17 ENCOUNTER — TELEPHONE (OUTPATIENT)
Dept: CARDIOLOGY | Facility: MEDICAL CENTER | Age: 69
End: 2024-06-17
Payer: MEDICARE

## 2024-06-17 NOTE — TELEPHONE ENCOUNTER
Phone Number Called: [phone]    Call outcome: Spoke to patient regarding message below.    Message: was able to relay message to patient that INR should resume 6/27/24. Patient understood.

## 2024-06-17 NOTE — TELEPHONE ENCOUNTER
Caller: Marline Perry      Topic/issue: Patient was calling to confirming that she didn't need to do another draw on the day of her biopsy on 6/21 and that the date of her recommended draw was 6/27 and she was asking for a call back to confirm this      Callback Number: [phone]      Thank you    -Johnnie GOMES

## 2024-06-21 ENCOUNTER — HOSPITAL ENCOUNTER (OUTPATIENT)
Dept: RADIOLOGY | Facility: MEDICAL CENTER | Age: 69
End: 2024-06-21
Attending: FAMILY MEDICINE
Payer: MEDICARE

## 2024-06-21 DIAGNOSIS — R92.8 ABNORMAL FINDINGS ON DIAGNOSTIC IMAGING OF BREAST: ICD-10-CM

## 2024-06-21 LAB — PATHOLOGY CONSULT NOTE: NORMAL

## 2024-06-21 PROCEDURE — 88305 TISSUE EXAM BY PATHOLOGIST: CPT

## 2024-06-21 PROCEDURE — 88360 TUMOR IMMUNOHISTOCHEM/MANUAL: CPT

## 2024-06-21 PROCEDURE — 88342 IMHCHEM/IMCYTCHM 1ST ANTB: CPT | Mod: XU

## 2024-06-21 PROCEDURE — 76942 ECHO GUIDE FOR BIOPSY: CPT

## 2024-06-21 PROCEDURE — 19083 BX BREAST 1ST LESION US IMAG: CPT | Mod: LT

## 2024-06-24 ENCOUNTER — TELEPHONE (OUTPATIENT)
Dept: RADIOLOGY | Facility: MEDICAL CENTER | Age: 69
End: 2024-06-24
Payer: MEDICARE

## 2024-06-24 ENCOUNTER — TELEPHONE (OUTPATIENT)
Dept: MEDICAL GROUP | Facility: MEDICAL CENTER | Age: 69
End: 2024-06-24
Payer: MEDICARE

## 2024-06-24 DIAGNOSIS — C50.912 INFILTRATING DUCTAL CARCINOMA OF LEFT BREAST (HCC): ICD-10-CM

## 2024-06-24 NOTE — TELEPHONE ENCOUNTER
Spoke to patient, pathology results are positive for carcinoma. I have placed orders for breast surgeon referral. Patient to contact clinic once that information is available.

## 2024-06-27 ENCOUNTER — PATIENT OUTREACH (OUTPATIENT)
Dept: ONCOLOGY | Facility: MEDICAL CENTER | Age: 69
End: 2024-06-27
Payer: MEDICARE

## 2024-06-27 LAB — INR PPP: 2 (ref 2–3.5)

## 2024-06-27 NOTE — PROGRESS NOTES
Oncology Nurse Navigation  Phoned pt for follow up post breast bx.  Pt is scheduled to see Dr. Ross for surgical consult on 7/5/24.  She denies any transportation barriers.  She plans to have her  accompany her to that appointment.  Briefly reviewed role of ONN.  Intro letter sent via My Chart.  Encouraged to contact ONN should she have any questions.

## 2024-07-01 ENCOUNTER — ANTICOAGULATION MONITORING (OUTPATIENT)
Dept: MEDICAL GROUP | Facility: PHYSICIAN GROUP | Age: 69
End: 2024-07-01
Payer: MEDICARE

## 2024-07-01 DIAGNOSIS — Z95.2 S/P AVR (AORTIC VALVE REPLACEMENT): ICD-10-CM

## 2024-07-05 ENCOUNTER — OFFICE VISIT (OUTPATIENT)
Dept: SURGERY | Facility: MEDICAL CENTER | Age: 69
End: 2024-07-05
Payer: MEDICARE

## 2024-07-05 VITALS
TEMPERATURE: 97.2 F | SYSTOLIC BLOOD PRESSURE: 136 MMHG | HEART RATE: 66 BPM | BODY MASS INDEX: 23.36 KG/M2 | OXYGEN SATURATION: 96 % | WEIGHT: 119 LBS | HEIGHT: 60 IN | DIASTOLIC BLOOD PRESSURE: 86 MMHG

## 2024-07-05 DIAGNOSIS — C50.412 MALIGNANT NEOPLASM OF UPPER-OUTER QUADRANT OF LEFT BREAST IN FEMALE, ESTROGEN RECEPTOR POSITIVE (HCC): ICD-10-CM

## 2024-07-05 DIAGNOSIS — Z17.0 MALIGNANT NEOPLASM OF UPPER-OUTER QUADRANT OF LEFT BREAST IN FEMALE, ESTROGEN RECEPTOR POSITIVE (HCC): ICD-10-CM

## 2024-07-05 PROCEDURE — 99205 OFFICE O/P NEW HI 60 MIN: CPT | Performed by: SURGERY

## 2024-07-05 PROCEDURE — G2212 PROLONG OUTPT/OFFICE VIS: HCPCS | Performed by: SURGERY

## 2024-07-05 PROCEDURE — 3075F SYST BP GE 130 - 139MM HG: CPT | Performed by: SURGERY

## 2024-07-05 PROCEDURE — 3079F DIAST BP 80-89 MM HG: CPT | Performed by: SURGERY

## 2024-07-05 ASSESSMENT — ENCOUNTER SYMPTOMS
GASTROINTESTINAL NEGATIVE: 1
CONSTITUTIONAL NEGATIVE: 1
EYES NEGATIVE: 1
MUSCULOSKELETAL NEGATIVE: 1
PSYCHIATRIC NEGATIVE: 1
NEUROLOGICAL NEGATIVE: 1
COUGH: 1
SHORTNESS OF BREATH: 1

## 2024-07-05 ASSESSMENT — FIBROSIS 4 INDEX: FIB4 SCORE: 1.73

## 2024-07-08 ENCOUNTER — PATIENT OUTREACH (OUTPATIENT)
Dept: ONCOLOGY | Facility: MEDICAL CENTER | Age: 69
End: 2024-07-08
Payer: MEDICARE

## 2024-07-08 ENCOUNTER — TELEPHONE (OUTPATIENT)
Dept: SURGERY | Facility: MEDICAL CENTER | Age: 69
End: 2024-07-08
Payer: MEDICARE

## 2024-07-08 ENCOUNTER — DOCUMENTATION (OUTPATIENT)
Dept: RADIATION ONCOLOGY | Facility: MEDICAL CENTER | Age: 69
End: 2024-07-08
Payer: MEDICARE

## 2024-07-09 ENCOUNTER — TELEPHONE (OUTPATIENT)
Dept: CARDIOLOGY | Facility: MEDICAL CENTER | Age: 69
End: 2024-07-09
Payer: MEDICARE

## 2024-07-09 ENCOUNTER — DOCUMENTATION (OUTPATIENT)
Dept: SURGERY | Facility: MEDICAL CENTER | Age: 69
End: 2024-07-09
Payer: MEDICARE

## 2024-07-09 ENCOUNTER — APPOINTMENT (OUTPATIENT)
Dept: ADMISSIONS | Facility: MEDICAL CENTER | Age: 69
End: 2024-07-09
Attending: SURGERY
Payer: MEDICARE

## 2024-07-11 ENCOUNTER — TELEPHONE (OUTPATIENT)
Dept: RADIATION ONCOLOGY | Facility: MEDICAL CENTER | Age: 69
End: 2024-07-11
Payer: MEDICARE

## 2024-07-11 ENCOUNTER — PRE-ADMISSION TESTING (OUTPATIENT)
Dept: ADMISSIONS | Facility: MEDICAL CENTER | Age: 69
End: 2024-07-11
Attending: SURGERY
Payer: MEDICARE

## 2024-07-11 RX ORDER — ACETAMINOPHEN 325 MG/1
650 TABLET ORAL EVERY 4 HOURS PRN
COMMUNITY

## 2024-07-12 ENCOUNTER — PRE-ADMISSION TESTING (OUTPATIENT)
Dept: ADMISSIONS | Facility: MEDICAL CENTER | Age: 69
End: 2024-07-12
Attending: SURGERY
Payer: MEDICARE

## 2024-07-12 ENCOUNTER — TELEPHONE (OUTPATIENT)
Dept: RADIATION ONCOLOGY | Facility: MEDICAL CENTER | Age: 69
End: 2024-07-12

## 2024-07-12 DIAGNOSIS — Z01.810 PRE-OPERATIVE CARDIOVASCULAR EXAMINATION: ICD-10-CM

## 2024-07-12 DIAGNOSIS — Z01.812 PRE-OPERATIVE LABORATORY EXAMINATION: ICD-10-CM

## 2024-07-12 LAB
ANION GAP SERPL CALC-SCNC: 8 MMOL/L (ref 7–16)
BUN SERPL-MCNC: 19 MG/DL (ref 8–22)
CALCIUM SERPL-MCNC: 9.5 MG/DL (ref 8.5–10.5)
CHLORIDE SERPL-SCNC: 105 MMOL/L (ref 96–112)
CO2 SERPL-SCNC: 25 MMOL/L (ref 20–33)
CREAT SERPL-MCNC: 0.89 MG/DL (ref 0.5–1.4)
EKG IMPRESSION: NORMAL
ERYTHROCYTE [DISTWIDTH] IN BLOOD BY AUTOMATED COUNT: 43.1 FL (ref 35.9–50)
GFR SERPLBLD CREATININE-BSD FMLA CKD-EPI: 70 ML/MIN/1.73 M 2
GLUCOSE SERPL-MCNC: 90 MG/DL (ref 65–99)
HCT VFR BLD AUTO: 40.3 % (ref 37–47)
HGB BLD-MCNC: 13.7 G/DL (ref 12–16)
MCH RBC QN AUTO: 30.1 PG (ref 27–33)
MCHC RBC AUTO-ENTMCNC: 34 G/DL (ref 32.2–35.5)
MCV RBC AUTO: 88.6 FL (ref 81.4–97.8)
PLATELET # BLD AUTO: 230 K/UL (ref 164–446)
PMV BLD AUTO: 9 FL (ref 9–12.9)
POTASSIUM SERPL-SCNC: 4.5 MMOL/L (ref 3.6–5.5)
RBC # BLD AUTO: 4.55 M/UL (ref 4.2–5.4)
SODIUM SERPL-SCNC: 138 MMOL/L (ref 135–145)
WBC # BLD AUTO: 5.8 K/UL (ref 4.8–10.8)

## 2024-07-12 PROCEDURE — 80048 BASIC METABOLIC PNL TOTAL CA: CPT

## 2024-07-12 PROCEDURE — 93005 ELECTROCARDIOGRAM TRACING: CPT

## 2024-07-12 PROCEDURE — 93010 ELECTROCARDIOGRAM REPORT: CPT | Performed by: INTERNAL MEDICINE

## 2024-07-12 PROCEDURE — 85027 COMPLETE CBC AUTOMATED: CPT

## 2024-07-12 PROCEDURE — 36415 COLL VENOUS BLD VENIPUNCTURE: CPT

## 2024-07-15 ENCOUNTER — ANESTHESIA EVENT (OUTPATIENT)
Dept: SURGERY | Facility: MEDICAL CENTER | Age: 69
End: 2024-07-15
Payer: MEDICARE

## 2024-07-15 ENCOUNTER — HOSPITAL ENCOUNTER (OUTPATIENT)
Dept: RADIOLOGY | Facility: MEDICAL CENTER | Age: 69
End: 2024-07-15
Attending: SURGERY
Payer: MEDICARE

## 2024-07-15 DIAGNOSIS — C50.412 MALIGNANT NEOPLASM OF UPPER-OUTER QUADRANT OF LEFT FEMALE BREAST, UNSPECIFIED ESTROGEN RECEPTOR STATUS (HCC): ICD-10-CM

## 2024-07-15 PROCEDURE — 38792 RA TRACER ID OF SENTINL NODE: CPT | Mod: LT

## 2024-07-16 ENCOUNTER — APPOINTMENT (OUTPATIENT)
Dept: RADIOLOGY | Facility: MEDICAL CENTER | Age: 69
End: 2024-07-16
Attending: SURGERY
Payer: MEDICARE

## 2024-07-16 ENCOUNTER — HOSPITAL ENCOUNTER (OUTPATIENT)
Facility: MEDICAL CENTER | Age: 69
End: 2024-07-16
Attending: SURGERY | Admitting: SURGERY
Payer: MEDICARE

## 2024-07-16 ENCOUNTER — ANESTHESIA (OUTPATIENT)
Dept: SURGERY | Facility: MEDICAL CENTER | Age: 69
End: 2024-07-16
Payer: MEDICARE

## 2024-07-16 VITALS
HEIGHT: 60 IN | DIASTOLIC BLOOD PRESSURE: 55 MMHG | RESPIRATION RATE: 19 BRPM | HEART RATE: 77 BPM | BODY MASS INDEX: 23.07 KG/M2 | WEIGHT: 117.5 LBS | OXYGEN SATURATION: 91 % | TEMPERATURE: 97.1 F | SYSTOLIC BLOOD PRESSURE: 115 MMHG

## 2024-07-16 DIAGNOSIS — G89.18 POSTOPERATIVE PAIN: ICD-10-CM

## 2024-07-16 DIAGNOSIS — C50.412 MALIGNANT NEOPLASM OF UPPER-OUTER QUADRANT OF LEFT FEMALE BREAST, UNSPECIFIED ESTROGEN RECEPTOR STATUS (HCC): ICD-10-CM

## 2024-07-16 LAB
INR PPP: 0.97 (ref 0.87–1.13)
PATHOLOGY CONSULT NOTE: NORMAL
PROTHROMBIN TIME: 12.9 SEC (ref 12–14.6)

## 2024-07-16 PROCEDURE — 160046 HCHG PACU - 1ST 60 MINS PHASE II: Performed by: SURGERY

## 2024-07-16 PROCEDURE — A9270 NON-COVERED ITEM OR SERVICE: HCPCS | Performed by: STUDENT IN AN ORGANIZED HEALTH CARE EDUCATION/TRAINING PROGRAM

## 2024-07-16 PROCEDURE — 85610 PROTHROMBIN TIME: CPT

## 2024-07-16 PROCEDURE — 700111 HCHG RX REV CODE 636 W/ 250 OVERRIDE (IP): Performed by: STUDENT IN AN ORGANIZED HEALTH CARE EDUCATION/TRAINING PROGRAM

## 2024-07-16 PROCEDURE — 38525 BIOPSY/REMOVAL LYMPH NODES: CPT | Mod: LT | Performed by: SURGERY

## 2024-07-16 PROCEDURE — 700105 HCHG RX REV CODE 258: Performed by: SURGERY

## 2024-07-16 PROCEDURE — 160009 HCHG ANES TIME/MIN: Performed by: SURGERY

## 2024-07-16 PROCEDURE — 160041 HCHG SURGERY MINUTES - EA ADDL 1 MIN LEVEL 4: Performed by: SURGERY

## 2024-07-16 PROCEDURE — 19285 PERQ DEV BREAST 1ST US IMAG: CPT | Mod: 59,LT | Performed by: SURGERY

## 2024-07-16 PROCEDURE — 160035 HCHG PACU - 1ST 60 MINS PHASE I: Performed by: SURGERY

## 2024-07-16 PROCEDURE — 700101 HCHG RX REV CODE 250: Performed by: STUDENT IN AN ORGANIZED HEALTH CARE EDUCATION/TRAINING PROGRAM

## 2024-07-16 PROCEDURE — 160002 HCHG RECOVERY MINUTES (STAT): Performed by: SURGERY

## 2024-07-16 PROCEDURE — 88342 IMHCHEM/IMCYTCHM 1ST ANTB: CPT

## 2024-07-16 PROCEDURE — 160029 HCHG SURGERY MINUTES - 1ST 30 MINS LEVEL 4: Performed by: SURGERY

## 2024-07-16 PROCEDURE — 76098 X-RAY EXAM SURGICAL SPECIMEN: CPT | Performed by: SURGERY

## 2024-07-16 PROCEDURE — 19301 PARTIAL MASTECTOMY: CPT | Mod: LT | Performed by: SURGERY

## 2024-07-16 PROCEDURE — 88341 IMHCHEM/IMCYTCHM EA ADD ANTB: CPT | Mod: 91

## 2024-07-16 PROCEDURE — 160025 RECOVERY II MINUTES (STATS): Performed by: SURGERY

## 2024-07-16 PROCEDURE — 700111 HCHG RX REV CODE 636 W/ 250 OVERRIDE (IP): Mod: JG | Performed by: SURGERY

## 2024-07-16 PROCEDURE — 38900 IO MAP OF SENT LYMPH NODE: CPT | Mod: LT | Performed by: SURGERY

## 2024-07-16 PROCEDURE — 700102 HCHG RX REV CODE 250 W/ 637 OVERRIDE(OP): Performed by: STUDENT IN AN ORGANIZED HEALTH CARE EDUCATION/TRAINING PROGRAM

## 2024-07-16 PROCEDURE — 76098 X-RAY EXAM SURGICAL SPECIMEN: CPT | Mod: LT

## 2024-07-16 PROCEDURE — 36415 COLL VENOUS BLD VENIPUNCTURE: CPT

## 2024-07-16 PROCEDURE — 700101 HCHG RX REV CODE 250: Performed by: SURGERY

## 2024-07-16 PROCEDURE — 88307 TISSUE EXAM BY PATHOLOGIST: CPT | Mod: 59

## 2024-07-16 PROCEDURE — 160048 HCHG OR STATISTICAL LEVEL 1-5: Performed by: SURGERY

## 2024-07-16 PROCEDURE — 700111 HCHG RX REV CODE 636 W/ 250 OVERRIDE (IP)

## 2024-07-16 RX ORDER — MAGNESIUM SULFATE HEPTAHYDRATE 40 MG/ML
INJECTION, SOLUTION INTRAVENOUS PRN
Status: DISCONTINUED | OUTPATIENT
Start: 2024-07-16 | End: 2024-07-16 | Stop reason: SURG

## 2024-07-16 RX ORDER — METOPROLOL TARTRATE 1 MG/ML
1 INJECTION, SOLUTION INTRAVENOUS
Status: DISCONTINUED | OUTPATIENT
Start: 2024-07-16 | End: 2024-07-16 | Stop reason: HOSPADM

## 2024-07-16 RX ORDER — DEXAMETHASONE SODIUM PHOSPHATE 4 MG/ML
INJECTION, SOLUTION INTRA-ARTICULAR; INTRALESIONAL; INTRAMUSCULAR; INTRAVENOUS; SOFT TISSUE PRN
Status: DISCONTINUED | OUTPATIENT
Start: 2024-07-16 | End: 2024-07-16 | Stop reason: SURG

## 2024-07-16 RX ORDER — CEFAZOLIN SODIUM 1 G/3ML
INJECTION, POWDER, FOR SOLUTION INTRAMUSCULAR; INTRAVENOUS PRN
Status: DISCONTINUED | OUTPATIENT
Start: 2024-07-16 | End: 2024-07-16 | Stop reason: SURG

## 2024-07-16 RX ORDER — SODIUM CHLORIDE, SODIUM LACTATE, POTASSIUM CHLORIDE, CALCIUM CHLORIDE 600; 310; 30; 20 MG/100ML; MG/100ML; MG/100ML; MG/100ML
INJECTION, SOLUTION INTRAVENOUS CONTINUOUS
Status: ACTIVE | OUTPATIENT
Start: 2024-07-16 | End: 2024-07-16

## 2024-07-16 RX ORDER — ACETAMINOPHEN 500 MG
1000 TABLET ORAL ONCE
Status: COMPLETED | OUTPATIENT
Start: 2024-07-16 | End: 2024-07-16

## 2024-07-16 RX ORDER — OXYCODONE HCL 5 MG/5 ML
5 SOLUTION, ORAL ORAL
Status: DISCONTINUED | OUTPATIENT
Start: 2024-07-16 | End: 2024-07-16 | Stop reason: HOSPADM

## 2024-07-16 RX ORDER — LIDOCAINE HYDROCHLORIDE 20 MG/ML
INJECTION, SOLUTION EPIDURAL; INFILTRATION; INTRACAUDAL; PERINEURAL PRN
Status: DISCONTINUED | OUTPATIENT
Start: 2024-07-16 | End: 2024-07-16 | Stop reason: SURG

## 2024-07-16 RX ORDER — SODIUM CHLORIDE, SODIUM LACTATE, POTASSIUM CHLORIDE, CALCIUM CHLORIDE 600; 310; 30; 20 MG/100ML; MG/100ML; MG/100ML; MG/100ML
INJECTION, SOLUTION INTRAVENOUS CONTINUOUS
Status: DISCONTINUED | OUTPATIENT
Start: 2024-07-16 | End: 2024-07-16 | Stop reason: HOSPADM

## 2024-07-16 RX ORDER — LIDOCAINE HYDROCHLORIDE 10 MG/ML
INJECTION, SOLUTION EPIDURAL; INFILTRATION; INTRACAUDAL; PERINEURAL
Status: COMPLETED
Start: 2024-07-16 | End: 2024-07-16

## 2024-07-16 RX ORDER — TRAMADOL HYDROCHLORIDE 50 MG/1
50 TABLET ORAL EVERY 6 HOURS PRN
Qty: 8 TABLET | Refills: 0 | Status: SHIPPED | OUTPATIENT
Start: 2024-07-16 | End: 2024-07-18

## 2024-07-16 RX ORDER — LABETALOL HYDROCHLORIDE 5 MG/ML
5 INJECTION, SOLUTION INTRAVENOUS
Status: DISCONTINUED | OUTPATIENT
Start: 2024-07-16 | End: 2024-07-16 | Stop reason: HOSPADM

## 2024-07-16 RX ORDER — ISOSULFAN BLUE 50 MG/5ML
INJECTION, SOLUTION SUBCUTANEOUS
Status: DISCONTINUED | OUTPATIENT
Start: 2024-07-16 | End: 2024-07-16 | Stop reason: HOSPADM

## 2024-07-16 RX ORDER — ALPRAZOLAM 0.25 MG/1
.25-.5 TABLET ORAL
Status: DISCONTINUED | OUTPATIENT
Start: 2024-07-16 | End: 2024-07-16 | Stop reason: HOSPADM

## 2024-07-16 RX ORDER — ONDANSETRON 4 MG/1
4 TABLET, FILM COATED ORAL EVERY 8 HOURS PRN
Qty: 9 TABLET | Refills: 0 | Status: SHIPPED | OUTPATIENT
Start: 2024-07-16 | End: 2024-07-19

## 2024-07-16 RX ORDER — BUPIVACAINE HYDROCHLORIDE AND EPINEPHRINE 5; 5 MG/ML; UG/ML
INJECTION, SOLUTION EPIDURAL; INTRACAUDAL; PERINEURAL
Status: DISCONTINUED | OUTPATIENT
Start: 2024-07-16 | End: 2024-07-16 | Stop reason: HOSPADM

## 2024-07-16 RX ORDER — ONDANSETRON 2 MG/ML
4 INJECTION INTRAMUSCULAR; INTRAVENOUS
Status: DISCONTINUED | OUTPATIENT
Start: 2024-07-16 | End: 2024-07-16 | Stop reason: HOSPADM

## 2024-07-16 RX ORDER — MIDAZOLAM HYDROCHLORIDE 1 MG/ML
INJECTION INTRAMUSCULAR; INTRAVENOUS PRN
Status: DISCONTINUED | OUTPATIENT
Start: 2024-07-16 | End: 2024-07-16 | Stop reason: SURG

## 2024-07-16 RX ORDER — HYDROMORPHONE HYDROCHLORIDE 1 MG/ML
0.1 INJECTION, SOLUTION INTRAMUSCULAR; INTRAVENOUS; SUBCUTANEOUS
Status: DISCONTINUED | OUTPATIENT
Start: 2024-07-16 | End: 2024-07-16 | Stop reason: HOSPADM

## 2024-07-16 RX ORDER — HYDROMORPHONE HYDROCHLORIDE 1 MG/ML
0.2 INJECTION, SOLUTION INTRAMUSCULAR; INTRAVENOUS; SUBCUTANEOUS
Status: DISCONTINUED | OUTPATIENT
Start: 2024-07-16 | End: 2024-07-16 | Stop reason: HOSPADM

## 2024-07-16 RX ORDER — HYDROMORPHONE HYDROCHLORIDE 1 MG/ML
0.4 INJECTION, SOLUTION INTRAMUSCULAR; INTRAVENOUS; SUBCUTANEOUS
Status: DISCONTINUED | OUTPATIENT
Start: 2024-07-16 | End: 2024-07-16 | Stop reason: HOSPADM

## 2024-07-16 RX ORDER — HALOPERIDOL 5 MG/ML
1 INJECTION INTRAMUSCULAR
Status: DISCONTINUED | OUTPATIENT
Start: 2024-07-16 | End: 2024-07-16 | Stop reason: HOSPADM

## 2024-07-16 RX ORDER — EPHEDRINE SULFATE 50 MG/ML
INJECTION, SOLUTION INTRAVENOUS PRN
Status: DISCONTINUED | OUTPATIENT
Start: 2024-07-16 | End: 2024-07-16 | Stop reason: SURG

## 2024-07-16 RX ORDER — MEPERIDINE HYDROCHLORIDE 25 MG/ML
6.25 INJECTION INTRAMUSCULAR; INTRAVENOUS; SUBCUTANEOUS
Status: DISCONTINUED | OUTPATIENT
Start: 2024-07-16 | End: 2024-07-16 | Stop reason: HOSPADM

## 2024-07-16 RX ORDER — EPHEDRINE SULFATE 50 MG/ML
5 INJECTION, SOLUTION INTRAVENOUS
Status: DISCONTINUED | OUTPATIENT
Start: 2024-07-16 | End: 2024-07-16 | Stop reason: HOSPADM

## 2024-07-16 RX ORDER — BUPIVACAINE HYDROCHLORIDE AND EPINEPHRINE 5; 5 MG/ML; UG/ML
INJECTION, SOLUTION EPIDURAL; INTRACAUDAL; PERINEURAL
Status: DISCONTINUED
Start: 2024-07-16 | End: 2024-07-16 | Stop reason: HOSPADM

## 2024-07-16 RX ORDER — ONDANSETRON 2 MG/ML
INJECTION INTRAMUSCULAR; INTRAVENOUS PRN
Status: DISCONTINUED | OUTPATIENT
Start: 2024-07-16 | End: 2024-07-16 | Stop reason: SURG

## 2024-07-16 RX ORDER — OXYCODONE HCL 5 MG/5 ML
10 SOLUTION, ORAL ORAL
Status: DISCONTINUED | OUTPATIENT
Start: 2024-07-16 | End: 2024-07-16 | Stop reason: HOSPADM

## 2024-07-16 RX ORDER — SCOLOPAMINE TRANSDERMAL SYSTEM 1 MG/1
1 PATCH, EXTENDED RELEASE TRANSDERMAL
Status: DISCONTINUED | OUTPATIENT
Start: 2024-07-16 | End: 2024-07-16 | Stop reason: HOSPADM

## 2024-07-16 RX ORDER — DIPHENHYDRAMINE HYDROCHLORIDE 50 MG/ML
12.5 INJECTION INTRAMUSCULAR; INTRAVENOUS
Status: DISCONTINUED | OUTPATIENT
Start: 2024-07-16 | End: 2024-07-16 | Stop reason: HOSPADM

## 2024-07-16 RX ORDER — HYDRALAZINE HYDROCHLORIDE 20 MG/ML
5 INJECTION INTRAMUSCULAR; INTRAVENOUS
Status: DISCONTINUED | OUTPATIENT
Start: 2024-07-16 | End: 2024-07-16 | Stop reason: HOSPADM

## 2024-07-16 RX ORDER — ISOSULFAN BLUE 50 MG/5ML
INJECTION, SOLUTION SUBCUTANEOUS
Status: DISCONTINUED
Start: 2024-07-16 | End: 2024-07-16 | Stop reason: HOSPADM

## 2024-07-16 RX ORDER — LIDOCAINE AND PRILOCAINE 25; 25 MG/G; MG/G
CREAM TOPICAL ONCE
Status: COMPLETED | OUTPATIENT
Start: 2024-07-16 | End: 2024-07-16

## 2024-07-16 RX ADMIN — FENTANYL CITRATE 50 MCG: 50 INJECTION, SOLUTION INTRAMUSCULAR; INTRAVENOUS at 08:49

## 2024-07-16 RX ADMIN — LIDOCAINE HYDROCHLORIDE 0.5 ML: 10 INJECTION, SOLUTION EPIDURAL; INFILTRATION; INTRACAUDAL at 07:14

## 2024-07-16 RX ADMIN — EPHEDRINE SULFATE 5 MG: 50 INJECTION, SOLUTION INTRAVENOUS at 08:26

## 2024-07-16 RX ADMIN — CEFAZOLIN 2 G: 1 INJECTION, POWDER, FOR SOLUTION INTRAMUSCULAR; INTRAVENOUS at 08:14

## 2024-07-16 RX ADMIN — ACETAMINOPHEN 1000 MG: 500 TABLET ORAL at 06:51

## 2024-07-16 RX ADMIN — FENTANYL CITRATE 50 MCG: 50 INJECTION, SOLUTION INTRAMUSCULAR; INTRAVENOUS at 09:17

## 2024-07-16 RX ADMIN — EPHEDRINE SULFATE 5 MG: 50 INJECTION, SOLUTION INTRAVENOUS at 08:36

## 2024-07-16 RX ADMIN — LIDOCAINE HYDROCHLORIDE 60 MG: 20 INJECTION, SOLUTION EPIDURAL; INFILTRATION; INTRACAUDAL at 08:10

## 2024-07-16 RX ADMIN — MAGNESIUM SULFATE HEPTAHYDRATE 2 G: 4 INJECTION, SOLUTION INTRAVENOUS at 08:55

## 2024-07-16 RX ADMIN — MIDAZOLAM HYDROCHLORIDE 2 MG: 2 INJECTION, SOLUTION INTRAMUSCULAR; INTRAVENOUS at 08:07

## 2024-07-16 RX ADMIN — DEXAMETHASONE SODIUM PHOSPHATE 6 MG: 4 INJECTION INTRA-ARTICULAR; INTRALESIONAL; INTRAMUSCULAR; INTRAVENOUS; SOFT TISSUE at 08:13

## 2024-07-16 RX ADMIN — Medication 0.5 ML: at 07:14

## 2024-07-16 RX ADMIN — EPHEDRINE SULFATE 5 MG: 50 INJECTION, SOLUTION INTRAVENOUS at 08:20

## 2024-07-16 RX ADMIN — EPHEDRINE SULFATE 5 MG: 50 INJECTION, SOLUTION INTRAVENOUS at 09:02

## 2024-07-16 RX ADMIN — SCOPOLAMINE 1 PATCH: 1.5 PATCH, EXTENDED RELEASE TRANSDERMAL at 06:51

## 2024-07-16 RX ADMIN — SODIUM CHLORIDE, POTASSIUM CHLORIDE, SODIUM LACTATE AND CALCIUM CHLORIDE: 600; 310; 30; 20 INJECTION, SOLUTION INTRAVENOUS at 09:30

## 2024-07-16 RX ADMIN — EPHEDRINE SULFATE 5 MG: 50 INJECTION, SOLUTION INTRAVENOUS at 08:34

## 2024-07-16 RX ADMIN — ONDANSETRON 4 MG: 2 INJECTION INTRAMUSCULAR; INTRAVENOUS at 10:02

## 2024-07-16 RX ADMIN — FENTANYL CITRATE 50 MCG: 50 INJECTION, SOLUTION INTRAMUSCULAR; INTRAVENOUS at 08:32

## 2024-07-16 RX ADMIN — SODIUM CHLORIDE, POTASSIUM CHLORIDE, SODIUM LACTATE AND CALCIUM CHLORIDE: 600; 310; 30; 20 INJECTION, SOLUTION INTRAVENOUS at 07:13

## 2024-07-16 RX ADMIN — PROPOFOL 120 MG: 10 INJECTION, EMULSION INTRAVENOUS at 08:10

## 2024-07-16 ASSESSMENT — PAIN DESCRIPTION - PAIN TYPE
TYPE: SURGICAL PAIN

## 2024-07-16 ASSESSMENT — FIBROSIS 4 INDEX: FIB4 SCORE: 1.85

## 2024-07-17 ENCOUNTER — TELEPHONE (OUTPATIENT)
Dept: SURGERY | Facility: MEDICAL CENTER | Age: 69
End: 2024-07-17
Payer: MEDICARE

## 2024-07-18 ENCOUNTER — TELEPHONE (OUTPATIENT)
Dept: VASCULAR LAB | Facility: MEDICAL CENTER | Age: 69
End: 2024-07-18
Payer: MEDICARE

## 2024-07-18 ENCOUNTER — ANTICOAGULATION MONITORING (OUTPATIENT)
Dept: VASCULAR LAB | Facility: MEDICAL CENTER | Age: 69
End: 2024-07-18
Payer: MEDICARE

## 2024-07-18 DIAGNOSIS — Z95.2 S/P AVR (AORTIC VALVE REPLACEMENT): ICD-10-CM

## 2024-07-18 LAB — INR PPP: 2.1 (ref 2–3.5)

## 2024-07-23 ENCOUNTER — ANTICOAGULATION MONITORING (OUTPATIENT)
Dept: MEDICAL GROUP | Facility: PHYSICIAN GROUP | Age: 69
End: 2024-07-23
Payer: MEDICARE

## 2024-07-23 ENCOUNTER — ANTICOAGULATION MONITORING (OUTPATIENT)
Dept: VASCULAR LAB | Facility: MEDICAL CENTER | Age: 69
End: 2024-07-23
Payer: MEDICARE

## 2024-07-23 DIAGNOSIS — Z95.2 S/P AVR (AORTIC VALVE REPLACEMENT): ICD-10-CM

## 2024-07-23 LAB — INR PPP: 2 (ref 2–3.5)

## 2024-07-25 ENCOUNTER — OFFICE VISIT (OUTPATIENT)
Dept: SURGERY | Facility: MEDICAL CENTER | Age: 69
End: 2024-07-25
Payer: MEDICARE

## 2024-07-25 VITALS
WEIGHT: 121 LBS | SYSTOLIC BLOOD PRESSURE: 130 MMHG | DIASTOLIC BLOOD PRESSURE: 63 MMHG | BODY MASS INDEX: 23.75 KG/M2 | HEIGHT: 60 IN | OXYGEN SATURATION: 92 % | HEART RATE: 68 BPM | TEMPERATURE: 97.2 F

## 2024-07-25 DIAGNOSIS — C50.412 MALIGNANT NEOPLASM OF UPPER-OUTER QUADRANT OF LEFT BREAST IN FEMALE, ESTROGEN RECEPTOR POSITIVE (HCC): ICD-10-CM

## 2024-07-25 DIAGNOSIS — Z17.0 MALIGNANT NEOPLASM OF UPPER-OUTER QUADRANT OF LEFT BREAST IN FEMALE, ESTROGEN RECEPTOR POSITIVE (HCC): ICD-10-CM

## 2024-07-25 PROCEDURE — 99024 POSTOP FOLLOW-UP VISIT: CPT | Performed by: SPECIALIST

## 2024-07-25 ASSESSMENT — FIBROSIS 4 INDEX: FIB4 SCORE: 1.85

## 2024-07-26 ENCOUNTER — HOSPITAL ENCOUNTER (OUTPATIENT)
Dept: HEMATOLOGY ONCOLOGY | Facility: MEDICAL CENTER | Age: 69
End: 2024-07-26
Attending: INTERNAL MEDICINE
Payer: MEDICARE

## 2024-07-26 VITALS
DIASTOLIC BLOOD PRESSURE: 60 MMHG | WEIGHT: 121.47 LBS | OXYGEN SATURATION: 96 % | SYSTOLIC BLOOD PRESSURE: 110 MMHG | BODY MASS INDEX: 23.85 KG/M2 | HEART RATE: 65 BPM | TEMPERATURE: 97.9 F | HEIGHT: 60 IN

## 2024-07-26 DIAGNOSIS — C50.412 MALIGNANT NEOPLASM OF UPPER-OUTER QUADRANT OF LEFT BREAST IN FEMALE, ESTROGEN RECEPTOR POSITIVE (HCC): ICD-10-CM

## 2024-07-26 DIAGNOSIS — Z79.811 LONG TERM (CURRENT) USE OF AROMATASE INHIBITORS: ICD-10-CM

## 2024-07-26 DIAGNOSIS — Z17.0 MALIGNANT NEOPLASM OF UPPER-OUTER QUADRANT OF LEFT BREAST IN FEMALE, ESTROGEN RECEPTOR POSITIVE (HCC): ICD-10-CM

## 2024-07-26 PROCEDURE — 99212 OFFICE O/P EST SF 10 MIN: CPT | Performed by: INTERNAL MEDICINE

## 2024-07-26 ASSESSMENT — ENCOUNTER SYMPTOMS
CARDIOVASCULAR NEGATIVE: 1
GASTROINTESTINAL NEGATIVE: 1
EYES NEGATIVE: 1
RESPIRATORY NEGATIVE: 1
MUSCULOSKELETAL NEGATIVE: 1
PSYCHIATRIC NEGATIVE: 1
NEUROLOGICAL NEGATIVE: 1
CONSTITUTIONAL NEGATIVE: 1

## 2024-07-26 ASSESSMENT — FIBROSIS 4 INDEX: FIB4 SCORE: 1.85

## 2024-07-26 ASSESSMENT — PAIN SCALES - GENERAL: PAINLEVEL: NO PAIN

## 2024-07-31 DIAGNOSIS — Z79.01 CHRONIC ANTICOAGULATION: ICD-10-CM

## 2024-07-31 DIAGNOSIS — Z95.2 S/P AVR (AORTIC VALVE REPLACEMENT): ICD-10-CM

## 2024-08-01 RX ORDER — WARFARIN SODIUM 5 MG/1
TABLET ORAL
Qty: 100 TABLET | Refills: 3 | Status: SHIPPED | OUTPATIENT
Start: 2024-08-01

## 2024-08-07 ENCOUNTER — HOSPITAL ENCOUNTER (OUTPATIENT)
Dept: LAB | Facility: MEDICAL CENTER | Age: 69
End: 2024-08-07
Attending: INTERNAL MEDICINE
Payer: MEDICARE

## 2024-08-07 DIAGNOSIS — Z95.2 S/P AVR (AORTIC VALVE REPLACEMENT): ICD-10-CM

## 2024-08-07 LAB
ALBUMIN SERPL BCP-MCNC: 3.9 G/DL (ref 3.2–4.9)
ALBUMIN/GLOB SERPL: 1.2 G/DL
ALP SERPL-CCNC: 85 U/L (ref 30–99)
ALT SERPL-CCNC: 41 U/L (ref 2–50)
ANION GAP SERPL CALC-SCNC: 10 MMOL/L (ref 7–16)
AST SERPL-CCNC: 43 U/L (ref 12–45)
BILIRUB SERPL-MCNC: 0.3 MG/DL (ref 0.1–1.5)
BUN SERPL-MCNC: 17 MG/DL (ref 8–22)
CALCIUM ALBUM COR SERPL-MCNC: 9.7 MG/DL (ref 8.5–10.5)
CALCIUM SERPL-MCNC: 9.6 MG/DL (ref 8.5–10.5)
CHLORIDE SERPL-SCNC: 107 MMOL/L (ref 96–112)
CHOLEST SERPL-MCNC: 151 MG/DL (ref 100–199)
CO2 SERPL-SCNC: 23 MMOL/L (ref 20–33)
CREAT SERPL-MCNC: 0.92 MG/DL (ref 0.5–1.4)
GFR SERPLBLD CREATININE-BSD FMLA CKD-EPI: 68 ML/MIN/1.73 M 2
GLOBULIN SER CALC-MCNC: 3.3 G/DL (ref 1.9–3.5)
GLUCOSE SERPL-MCNC: 97 MG/DL (ref 65–99)
HDLC SERPL-MCNC: 37 MG/DL
LDLC SERPL CALC-MCNC: 88 MG/DL
POTASSIUM SERPL-SCNC: 4.3 MMOL/L (ref 3.6–5.5)
PROT SERPL-MCNC: 7.2 G/DL (ref 6–8.2)
SODIUM SERPL-SCNC: 140 MMOL/L (ref 135–145)
TRIGL SERPL-MCNC: 131 MG/DL (ref 0–149)

## 2024-08-07 PROCEDURE — 80061 LIPID PANEL: CPT | Mod: GA

## 2024-08-07 PROCEDURE — 80053 COMPREHEN METABOLIC PANEL: CPT

## 2024-08-07 PROCEDURE — 36415 COLL VENOUS BLD VENIPUNCTURE: CPT | Mod: GA

## 2024-08-09 ENCOUNTER — ANTICOAGULATION MONITORING (OUTPATIENT)
Dept: MEDICAL GROUP | Facility: PHYSICIAN GROUP | Age: 69
End: 2024-08-09
Payer: MEDICARE

## 2024-08-09 DIAGNOSIS — Z95.2 S/P AVR (AORTIC VALVE REPLACEMENT): ICD-10-CM

## 2024-08-09 LAB — INR PPP: 3.2 (ref 2–3.5)

## 2024-08-09 NOTE — PROGRESS NOTES
Anticoagulation Summary  As of 8/9/2024      INR goal:  2.0-3.0   TTR:  55.9% (6.5 y)   INR used for dosing:  3.20 (8/9/2024)   Warfarin maintenance plan:  2.5 mg (5 mg x 0.5) every Mon, Wed, Fri; 5 mg (5 mg x 1) all other days   Weekly warfarin total:  27.5 mg   Plan last modified:  Santo JoD (1/9/2024)   Next INR check:  8/23/2024   Target end date:  Indefinite    Indications    S/P AVR (aortic valve replacement) [Z95.2]                 Anticoagulation Episode Summary       INR check location:  Home Draw    Preferred lab:      Send INR reminders to:      Comments:  Denny CELESTIN  only call if out of range          Anticoagulation Care Providers       Provider Role Specialty Phone number    Chinyere Marcus M.D. Referring Cardiovascular Disease (Cardiology) [phone]    [facility]             Refer to Anticoagulation Patient Findings for HPI  Patient Findings       Positives:  Change in diet/appetite (Pt recently returned from vacation - was eating less greens)    Negatives:  Signs/symptoms of thrombosis, Signs/symptoms of bleeding, Laboratory test error suspected, Change in health, Change in alcohol use, Change in activity, Upcoming invasive procedure, Emergency department visit, Upcoming dental procedure, Missed doses, Extra doses, Change in medications, Hospital admission, Bruising, Other complaints            Spoke with pt.  INR is SUPRA therapeutic.     Pt verifies warfarin weekly dosing.     Will have pt HOLD x 1 dose and then continue on w/ her current regimen.    Repeat INR in 2 week(s).     Huseyin Conrad, PharmD, BCACP

## 2024-08-12 RX ORDER — LIDOCAINE/PRILOCAINE 2.5 %-2.5%
CREAM (GRAM) TOPICAL
COMMUNITY
Start: 2024-07-08

## 2024-08-13 ENCOUNTER — APPOINTMENT (OUTPATIENT)
Dept: MEDICAL GROUP | Facility: MEDICAL CENTER | Age: 69
End: 2024-08-13
Payer: MEDICARE

## 2024-08-13 ENCOUNTER — TELEPHONE (OUTPATIENT)
Dept: MEDICAL GROUP | Facility: MEDICAL CENTER | Age: 69
End: 2024-08-13

## 2024-08-13 VITALS
OXYGEN SATURATION: 95 % | RESPIRATION RATE: 16 BRPM | HEART RATE: 74 BPM | WEIGHT: 119 LBS | SYSTOLIC BLOOD PRESSURE: 122 MMHG | TEMPERATURE: 97.3 F | DIASTOLIC BLOOD PRESSURE: 58 MMHG | HEIGHT: 60 IN | BODY MASS INDEX: 23.36 KG/M2

## 2024-08-13 DIAGNOSIS — G43.109 MIGRAINE WITH AURA AND WITHOUT STATUS MIGRAINOSUS, NOT INTRACTABLE: ICD-10-CM

## 2024-08-13 DIAGNOSIS — Z00.00 WELLNESS EXAMINATION: ICD-10-CM

## 2024-08-13 PROCEDURE — G0439 PPPS, SUBSEQ VISIT: HCPCS | Performed by: FAMILY MEDICINE

## 2024-08-13 PROCEDURE — 3074F SYST BP LT 130 MM HG: CPT | Performed by: FAMILY MEDICINE

## 2024-08-13 PROCEDURE — 3078F DIAST BP <80 MM HG: CPT | Performed by: FAMILY MEDICINE

## 2024-08-13 RX ORDER — SUMATRIPTAN 50 MG/1
50 TABLET, FILM COATED ORAL
Qty: 10 TABLET | Refills: 3 | Status: SHIPPED | OUTPATIENT
Start: 2024-08-13

## 2024-08-13 ASSESSMENT — ENCOUNTER SYMPTOMS: GENERAL WELL-BEING: GOOD

## 2024-08-13 ASSESSMENT — ACTIVITIES OF DAILY LIVING (ADL): BATHING_REQUIRES_ASSISTANCE: 0

## 2024-08-13 ASSESSMENT — PATIENT HEALTH QUESTIONNAIRE - PHQ9: CLINICAL INTERPRETATION OF PHQ2 SCORE: 0

## 2024-08-13 ASSESSMENT — FIBROSIS 4 INDEX: FIB4 SCORE: 1.99

## 2024-08-13 NOTE — PROGRESS NOTES
Chief Complaint   Patient presents with    Medicare Annual Wellness       HPI:  Marline Perry is a 68 y.o. here for Medicare Annual Wellness Visit     History of Present Illness  The patient is a 68-year-old female who presents for an annual wellness visit today.    She reports overall good health, albeit with some soreness on her left side, particularly in the lymph node. She has been prescribed pain medications and anti-nausea medications, and has been using Tylenol for pain management. She underwent a partial mastectomy on 07/16/2024, performed by Dr. Ross. The irregular mass was determined to be tissue-related rather than a lump. Post-surgery, she had a postoperative follow-up. Dr. Ross suggested the possibility of removing the remaining skin, but she declined. She has been taking Coumadin twice daily. She consulted with Dr. Kaur, oncology, on 07/26/2024, who recommended radiation. She has a radiation appointment scheduled with Dr. Matta next week to discuss his plans. She will then follow up with endocrine therapy after 8 weeks of radiation. She reports some soreness and bruising above her binder, but denies any swelling in her arm. She is left-handed. She has resumed taking Coumadin and her INRs are monitored. She is scheduled for a bone density test on Thursday due to a lower back issue. Her appetite is normal. She has seen a dermatologist for spots on her arms and face, which were itchy, and applies Lidex cream. Her hair has been thinning. She has been taking rosuvastatin 20 mg for a while. She occasionally uses sumatriptan for migraines if Tylenol does not alleviate them. She has a history of sinus issues. She has not needed to take her migraine medication recently. She keeps herself busy with her dog and sewing projects. She denies any bladder leakage. She denies feeling down or depressed. She denies any memory problems. She denies any recent falls. Her hearing is normal. She completed a  Geremias in 2022. She receives her influenza vaccine every fall. She denies any unusual lumps or bumps.       Patient Active Problem List    Diagnosis Date Noted    Malignant neoplasm of upper-outer quadrant of left breast in female, estrogen receptor positive (HCC) 07/26/2024    Long term (current) use of aromatase inhibitors 07/26/2024    Chronic rhinitis 03/29/2024    Osteopenia of multiple sites 11/17/2021    Skin rash 08/10/2021    Vitamin B12 deficiency 05/05/2020    Transaminitis 02/24/2020    Chronic pain of right knee 01/15/2020    Hypercholesteremia 10/14/2019    Migraine with aura and without status migrainosus, not intractable 10/14/2019    Fibromyalgia 08/27/2019    Bronchiectasis without complication ([facility]) 09/18/2018    Non-rheumatic mitral regurgitation 01/22/2018    S/P AVR (aortic valve replacement) 01/22/2018    Sjoegren syndrome 01/22/2018    Warfarin anticoagulation 01/22/2018    Thoracic aortic aneurysm without rupture ([facility]) 01/22/2018    Diffuse connective tissue disease ([facility]) 12/15/2008       Current Outpatient Medications   Medication Sig Dispense Refill    SUMAtriptan (IMITREX) 50 MG Tab Take 1 Tablet by mouth one time as needed for Migraine for up to 1 dose. 10 Tablet 3    lidocaine-prilocaine (EMLA) 2.5-2.5 % Cream APPLY TO AFFECTED BREAST 2 HOURS PRIOR TO INJECTION AND REPEAT 1 HOUR PRIOR TO INJECTION      warfarin (COUMADIN) 5 MG Tab TAKE ONE-HALF (1/2) TO ONE TABLET DAILY OR AS DIRECTED BY ANTICOAGULATION CLINIC. 100 Tablet 3    BIOTIN PO Take 1 Capsule by mouth every day.       MEDICATION INSTRUCTIONS FOR SURGERY/PROCEDURE SCHEDULED FOR 7/16/24   DO NOT TAKE 7 DAYS PRIOR TO SURGERY      acetaminophen (TYLENOL) 325 MG Tab Take 650 mg by mouth every four hours as needed for Mild Pain.       MEDICATION INSTRUCTIONS FOR SURGERY/PROCEDURE SCHEDULED FOR 7/16/24   YOU MAY CONTINUE TAKING UP TO AND ON DAY OR SURGERY      hydrocortisone 2.5 % Cream topical cream AAA face BID PRN itching,  redness, rash. Stop use after 2-3 weeks. Avoid use around eyes (Patient taking differently: Apply 1 Application topically 2 times a day. AAA face BID PRN itching, redness, rash. Stop use after 2-3 weeks. Avoid use around eyes      FOR SURGERY ON 7/16/24: DO NOT TAKE DAY OF SURGERY) 20 g 0    fluocinonide (LIDEX) 0.05 % Cream AAA arm spots BID PRN itching. Avoid use on face, axilla, groin (Patient taking differently: Apply 1 Application topically 2 times a day. AAA arm spots BID PRN itching. Avoid use on face, axilla, groin        FOR SURGERY ON 7/16/24: DO NOT TAKE DAY OF SURGERY) 30 g 1    rosuvastatin (CRESTOR) 20 MG Tab TAKE 1 TABLET EVERY EVENING (Patient taking differently: Take 20 mg by mouth every day. TAKE 1 TABLET EVERY EVENING      MEDICATION INSTRUCTIONS FOR SURGERY/PROCEDURE SCHEDULED FOR 7/16/24   OK TO CONTINUE TAKING PRIOR TO SURGERY AND DAY OF SURGERY) 90 Tablet 3     No current facility-administered medications for this visit.          Current supplements as per medication list.     Allergies: Spironolactone, Latex, Neomycin, and Tape    Current social contact/activities:   9 month old puppy, Dalmatian  Knitting       She  reports that she has never smoked. She has never used smokeless tobacco. She reports current alcohol use of about 1.8 - 2.4 oz of alcohol per week. She reports that she does not use drugs.  Counseling given: Not Answered      ROS:    Gait: Uses no assistive device  Ostomy: No  Other tubes: No  Amputations: No  Chronic oxygen use: No  Last eye exam: 1 year ago   Wears hearing aids: No   : Denies any urinary leakage during the last 6 months    Screening:    Depression Screening  Little interest or pleasure in doing things?  0 - not at all  Feeling down, depressed , or hopeless? 0 - not at all  Patient Health Questionnaire Score: 0     If depressive symptoms identified deferred to follow up visit unless specifically addressed in assessment and plan.    Interpretation of PHQ-9 Total  Score   Score Severity   1-4 No Depression   5-9 Mild Depression   10-14 Moderate Depression   15-19 Moderately Severe Depression   20-27 Severe Depression    Screening for Cognitive Impairment  Do you or any of your friends or family members have any concern about your memory? No  Three Minute Recall (Leader, Season, Table) 3/3    Sunil clock face with all 12 numbers and set the hands to show 10 minutes after 11.  Yes    Cognitive concerns identified deferred for follow up unless specifically addressed in assessment and plan.    Fall Risk Assessment  Has the patient had two or more falls in the last year or any fall with injury in the last year?  No    Safety Assessment  Do you always wear your seatbelt?  Yes  Any changes to home needed to function safely? No  Difficulty hearing.  No  Patient counseled about all safety risks that were identified.    Functional Assessment ADLs  Are there any barriers preventing you from cooking for yourself or meeting nutritional needs?  No.    Are there any barriers preventing you from driving safely or obtaining transportation?  No.    Are there any barriers preventing you from using a telephone or calling for help?  No    Are there any barriers preventing you from shopping?  No.    Are there any barriers preventing you from taking care of your own finances?  No    Are there any barriers preventing you from managing your medications?  No    Are there any barriers preventing you from showering, bathing or dressing yourself? No    Are there any barriers preventing you from doing housework or laundry? No  Are there any barriers preventing you from using the toilet?No  Are you currently engaging in any exercise or physical activity?  Yes. Walking 1x day    Self-Assessment of Health  What is your perception of your health? Good    Do you sleep more than six hours a night? Yes    In the past 7 days, how much did pain keep you from doing your normal work? None    Do you spend quality time  with family or friends (virtually or in person)? Yes    Do you usually eat a heart healthy diet that constists of a variety of fruits, vegetables, whole grains and fiber? Yes    Do you eat foods high in fat and/or Fast Food more than three times per week? No    How concerned are you that your medical conditions are not being well managed? Not at all    Are you worried that in the next 2 months, you may not have stable housing that you own, rent, or stay in as part of a household? No      Advance Care Planning  Do you have an Advance Directive, Living Will, Durable Power of , or POLST? Yes      Durable Power of    is on file      Health Maintenance Summary            Overdue - Zoster (Shingles) Vaccines (1 of 2) Never done      No completion history exists for this topic.              Overdue - COVID-19 Vaccine (3 - Pfizer risk series) Overdue since 4/29/2021 04/01/2021  Imm Admin: PFIZER PURPLE CAP SARS-COV-2 VACCINATION (12+)    03/11/2021  Imm Admin: PFIZER PURPLE CAP SARS-COV-2 VACCINATION (12+)              Overdue - Annual Wellness Visit (Yearly) Overdue since 2/27/2024 02/27/2023  Level of Service: [place] ANNUAL WELLNESS VISIT-INCLUDES PPPS SUBSEQUE*    02/27/2023  Visit Dx: Medicare annual wellness visit, subsequent              Influenza Vaccine (1) Next due on 9/1/2024      10/09/2023  Imm Admin: Influenza Vaccine Adult HD    10/21/2022  Imm Admin: Influenza Vaccine Adult HD    11/30/2021  Imm Admin: Influenza Vaccine Adult HD    09/21/2020  Imm Admin: Influenza Vaccine Quad Inj (Pf)    10/14/2019  Imm Admin: Influenza Vaccine Quad Inj (Pf)    Only the first 5 history entries have been loaded, but more history exists.              Colorectal Cancer Screening (Colon Cancer Screening Cologuard Stool (FIT DNA) - Preferred) Next due on 10/12/2025      10/12/2022  COLOGUARD COLON CANCER SCREENING    10/26/2016  COLOGUARD COLON CANCER SCREENING              Ordered - Mammogram (Every 2  Years) Ordered on 5/23/2024 05/22/2024  MA-SCREENING MAMMO BILAT W/TOMOSYNTHESIS W/CAD    11/17/2021  MA-SCREENING MAMMO BILAT W/TOMOSYNTHESIS W/CAD    12/26/2018  MA-SCREEN MAMMO W/CAD-BILAT              Scheduled - Bone Density Scan (Every 5 Years) Scheduled for 8/15/2024      11/17/2021  DS-BONE DENSITY STUDY (DEXA)              IMM DTaP/Tdap/Td Vaccine (2 - Td or Tdap) Next due on 10/14/2029      10/14/2019  Imm Admin: Tdap Vaccine    07/03/2011  Imm Admin: TD Vaccine              Hepatitis C Screening  Completed      08/13/2019  Hepatitis C Antibody component of HEP C VIRUS ANTIBODY              Pneumococcal Vaccine: 65+ Years (Series Information) Completed      03/29/2024  Imm Admin: Pneumococcal Conjugate Vaccine (PCV20)    10/18/2018  Imm Admin: Pneumococcal polysaccharide vaccine (PPSV-23)              Hepatitis A Vaccine (Hep A) (Series Information) Aged Out      No completion history exists for this topic.              Hepatitis B Vaccine (Hep B) (Series Information) Aged Out      No completion history exists for this topic.              HPV Vaccines (Series Information) Aged Out      No completion history exists for this topic.              Polio Vaccine (Inactivated Polio) (Series Information) Aged Out      No completion history exists for this topic.              Meningococcal Immunization (Series Information) Aged Out      No completion history exists for this topic.              Discontinued - Annual Pulmonary Function Test / Spirometry  Discontinued        Frequency changed to Never automatically (Topic No Longer Applies)    09/21/2023  PULMONARY FUNCTION TESTS -Test requested: Complete Pulmonary Function Test    07/29/2021  PULMONARY FUNCTION TESTS -Test requested: Complete Pulmonary Function Test    05/07/2019  PULMONARY FUNCTION TESTS -Test requested: Complete Pulmonary Function Test                    Patient Care Team:  Amrik Duncan D.O. as PCP - General (Family Medicine)    Ke (Family Medicine)  Donny  as Respiratory Therapist  CHRISTIANE Palm as Consulting Physician (Pulmonary Medicine)  Chinyere Marcus M.D. as Consulting Physician (Cardiovascular Disease (Cardiology))  Marilyn Talbot M.D. as Consulting Physician (Pulmonary Medicine)  Anderson Galarza R.N. as Nurse Navigator  Michael Kaur M.D. (Medical Oncology)        Social History     Tobacco Use    Smoking status: Never    Smokeless tobacco: Never   Vaping Use    Vaping status: Never Used   Substance Use Topics    Alcohol use: Yes     Alcohol/week: 1.8 - 2.4 oz     Types: 3 - 4 Shots of liquor per week    Drug use: No     Family History   Problem Relation Age of Onset    Arthritis Mother     Hypertension Father     Kidney Disease Father     Stroke Sister     Arthritis Sister     No Known Problems Brother     No Known Problems Brother     Breast Cancer Paternal Aunt     Colon Cancer Paternal Uncle     Heart Attack Maternal Grandmother     Stroke Maternal Grandfather     Heart Disease Paternal Grandmother     No Known Problems Paternal Grandfather     No Known Problems Son     Cancer Other         Gallbladder cancer     She  has a past medical history of Anemia, Breath shortness, Bronchiectasis (HCC), Bronchitis, Cancer (HCC), Chickenpox, Chronic cough, COPD (chronic obstructive pulmonary disease) (HCC), Heart murmur, Heart valve disease (2001), Hemorrhagic disorder (HCC), High cholesterol, Hyperlipidemia, Influenza, Migraines, Mumps, Nasal drainage, and Sjogren's disease (HCC).   Past Surgical History:   Procedure Laterality Date    PB MASTECTOMY, PARTIAL Left 7/16/2024    Procedure: LEFT PARTIAL MASTECTOMY, INTRAOPERATIVE WIRE LOCALIZATION, LEFT SENTINEL LYMPH NODE INJECTION WITH EXCISION OF AXILLARY NODES;  Surgeon: Sari Ross M.D.;  Location: SURGERY SAME DAY [facility];  Service: General    NODE BIOPSY SENTINEL Left 7/16/2024    Procedure: BIOPSY, LYMPH NODE, SENTINEL;  Surgeon:  Sari Ross M.D.;  Location: SURGERY SAME DAY [facility];  Service: General    AORTIC VALVE REPLACEMENT      BRONCHOSCOPY      HYSTERECTOMY LAPAROSCOPY      SINUSCOPE         Exam:   /58   Pulse 74   Temp 36.3 °C (97.3 °F)   Resp 16   Ht 1.524 m (5')   Wt 54 kg (119 lb)   SpO2 95%  Body mass index is 23.24 kg/m².    Hearing good.    Dentition good  Alert, oriented in no acute distress.  Eye contact is good, speech goal directed, affect calm    Results  Laboratory Studies  Kidney and liver function look good. Cholesterol is at goal range.    Imaging  Annual screening mammogram on 05/22/2023 showed an irregular lobulated mass in the left breast, upper outer quadrant. Follow-up imaging and biopsy confirmed invasive ductal carcinoma.    Testing  Pathology from 06/21/2024 indicated invasive ductal carcinoma.       Assessment and Plan. The following treatment and monitoring plan is recommended:    1. Wellness examination    2. Migraine with aura and without status migrainosus, not intractable  - SUMAtriptan (IMITREX) 50 MG Tab; Take 1 Tablet by mouth one time as needed for Migraine for up to 1 dose.  Dispense: 10 Tablet; Refill: 3    Assessment & Plan  1. Annual wellness visit.  Age-appropriate guidance counseling was provided regarding diet, exercise, cancer screening, vaccines, follow-ups.  Her kidney and liver functions are within normal limits. Cholesterol levels are within the target range. A refill for sumatriptan was provided. She was advised to receive the shingles vaccine at a pharmacy.       Services suggested: No services needed at this time  Health Care Screening: Age-appropriate preventive services recommended by USPTF and ACIP covered by Medicare were discussed today. Services ordered if indicated and agreed upon by the patient.  Referrals offered: Community-based lifestyle interventions to reduce health risks and promote self-management and wellness, fall prevention, nutrition, physical  activity, tobacco-use cessation, weight loss, and mental health services as per orders if indicated.    Discussion today about general wellness and lifestyle habits:    Prevent falls and reduce trip hazards; Cautioned about securing or removing rugs.  Have a working fire alarm and carbon monoxide detector;   Engage in regular physical activity and social activities     Follow-up: Return in about 6 months (around 2/13/2025), or if symptoms worsen or fail to improve, for Lifestyle.

## 2024-08-13 NOTE — TELEPHONE ENCOUNTER
Pt called and LVM that she got a letter from [facility] stating that they need a new prescription to renew pt supplies as well as authorization put in with pt insurance. Pt stated that they will only cover for the next 30 days. Letter was dated 8/7/24.     Pt stated she on her last container of strips

## 2024-08-15 ENCOUNTER — HOSPITAL ENCOUNTER (OUTPATIENT)
Dept: RADIOLOGY | Facility: MEDICAL CENTER | Age: 69
End: 2024-08-15
Attending: INTERNAL MEDICINE
Payer: MEDICARE

## 2024-08-15 DIAGNOSIS — Z79.811 USE OF AROMATASE INHIBITORS: ICD-10-CM

## 2024-08-15 PROCEDURE — 77080 DXA BONE DENSITY AXIAL: CPT

## 2024-08-20 ENCOUNTER — HOSPITAL ENCOUNTER (OUTPATIENT)
Dept: RADIATION ONCOLOGY | Facility: MEDICAL CENTER | Age: 69
End: 2024-08-20
Attending: RADIOLOGY
Payer: MEDICARE

## 2024-08-20 ENCOUNTER — HOSPITAL ENCOUNTER (OUTPATIENT)
Dept: RADIATION ONCOLOGY | Facility: MEDICAL CENTER | Age: 69
End: 2024-08-20

## 2024-08-20 VITALS
SYSTOLIC BLOOD PRESSURE: 119 MMHG | HEIGHT: 60 IN | BODY MASS INDEX: 23.5 KG/M2 | DIASTOLIC BLOOD PRESSURE: 72 MMHG | WEIGHT: 119.71 LBS | OXYGEN SATURATION: 95 % | HEART RATE: 74 BPM

## 2024-08-20 DIAGNOSIS — C50.412 MALIGNANT NEOPLASM OF UPPER-OUTER QUADRANT OF LEFT BREAST IN FEMALE, ESTROGEN RECEPTOR POSITIVE (HCC): ICD-10-CM

## 2024-08-20 DIAGNOSIS — Z17.0 MALIGNANT NEOPLASM OF UPPER-OUTER QUADRANT OF LEFT BREAST IN FEMALE, ESTROGEN RECEPTOR POSITIVE (HCC): ICD-10-CM

## 2024-08-20 PROCEDURE — 77290 THER RAD SIMULAJ FIELD CPLX: CPT | Performed by: RADIOLOGY

## 2024-08-20 PROCEDURE — 99214 OFFICE O/P EST MOD 30 MIN: CPT | Mod: 25 | Performed by: RADIOLOGY

## 2024-08-20 PROCEDURE — 77334 RADIATION TREATMENT AID(S): CPT | Performed by: RADIOLOGY

## 2024-08-20 RX ORDER — MULTIVIT-MIN/IRON/FOLIC ACID/K 18-600-40
500 CAPSULE ORAL DAILY
COMMUNITY

## 2024-08-20 RX ORDER — VITAMIN E 268 MG
2000 CAPSULE ORAL DAILY
COMMUNITY

## 2024-08-20 RX ORDER — CALCIUM CARBONATE 300MG(750)
400 TABLET,CHEWABLE ORAL DAILY
COMMUNITY

## 2024-08-20 RX ORDER — MULTIVIT-MIN/IRON/FOLIC ACID/K 18-600-40
50 CAPSULE ORAL DAILY
COMMUNITY

## 2024-08-20 ASSESSMENT — FIBROSIS 4 INDEX: FIB4 SCORE: 1.99

## 2024-08-20 ASSESSMENT — PAIN SCALES - GENERAL: PAINLEVEL: 1=MINIMAL PAIN

## 2024-08-20 NOTE — RADIATION PLANNING NOTES
DATE OF SERVICE: 8/20/2024    DIAGNOSIS:  Malignant neoplasm of upper-outer quadrant of left breast in female, estrogen receptor positive (HCC)  Staging form: Breast, AJCC 8th Edition  - Pathologic stage from 7/16/2024: Stage IA (pT1c, pN0, cM0, G2, ER+, UT+, HER2-) - Signed by Michael Kaur M.D. on 7/26/2024  Stage prefix: Initial diagnosis  Histologic grading system: 3 grade system       DATE OF SERVICE: 8/20/2024    TYPE OF SIMULATION: Breast       [] Right    [x] Left      GOAL OF TREATMENT:   [x] Curative  [] Palliative  [] Oligometastatic    COMPLEX:  [x] Complex Blocking   []Arcs  [] Custom Blocks  [] >3 Sites    PROCEDURE: Patient placed in supine position on wing board with VAC NORI bag with appropriate slant to compensate for slope of chest wall.  Breast/chest wall borders marked CT scan obtained to contain entire volume of interest.      I have personally reviewed the relevant data, performed the target localization, and determined all relevant factors for this patient’s simulation.

## 2024-08-20 NOTE — CONSULTS
RADIATION ONCOLOGY CONSULT    DATE OF SERVICE: 8/20/2024    IDENTIFICATION:  Stage IA ([id number]) G2 invasive ductal carcinoma of the left upper outer quadrant of breast ER/NM positive HER2/denilson negative with a Ki-67 of 15% status post lumpectomy and sentinel lymph node biopsy on 7/16/2024 planning on an aromatase inhibitor.      HISTORY OF PRESENT ILLNESS: I had the pleasure of seeing Ms. Perry today in consultation at the request of Dr. Ross for her breast cancer.  Patient is a 68-year-old woman who by mammography was appreciated to have irregular lobulated mass in the left upper outer quadrant of her breast.  Ultrasound showed a 6 mm hypoechoic lesion.  Biopsy confirmed invasive ductal carcinoma with grade 2 features.  Tumor was ER/NM positive HER2/denilson negative with a Ki-67 of 15% a tumor infiltrating lymphocyte count 20%.  She elected to proceed with breast conservation surgery and had a lumpectomy and sentinel lymph node biopsy on 7/16/2024.  This confirmed a 1.2 cm invasive ductal carcinoma with grade 2 features.  There was a separate adjacent intermediate grade DCIS.  2 sentinel lymph nodes were negative for malignancy.  Anterior margin which was skin had a 2 mm breath involvement.  She discussed this with Dr. Ross but since it was felt to be skin elected to not proceed with reexcision.  She presents to me today for further discussion regarding radiation.  She plans to proceed with an aromatase inhibitor.    PAST MEDICAL HISTORY:   Past Medical History:   Diagnosis Date    Anemia     Remote hx    Breath shortness     Related to bronchiectasis    Bronchiectasis (HCC)     Bronchitis     Cancer (HCC)     Left breast    Chickenpox     Chronic cough     COPD (chronic obstructive pulmonary disease) (HCC)     Heart murmur     From birth    Heart valve disease 2001    Aortic Valve Replacement AAA repair    Hemorrhagic disorder (HCC)     Coumadin    High cholesterol     Hyperlipidemia     Influenza     Malignant  "neoplasm of upper-outer quadrant of left breast in female, estrogen receptor positive (HCC)     Migraines     Mumps     Nasal drainage     Sjogren's disease (HCC)        PAST SURGICAL HISTORY:  Past Surgical History:   Procedure Laterality Date    PB MASTECTOMY, PARTIAL Left 2024    Procedure: LEFT PARTIAL MASTECTOMY, INTRAOPERATIVE WIRE LOCALIZATION, LEFT SENTINEL LYMPH NODE INJECTION WITH EXCISION OF AXILLARY NODES;  Surgeon: Sari Ross M.D.;  Location: SURGERY SAME DAY [facility];  Service: General    NODE BIOPSY SENTINEL Left 2024    Procedure: BIOPSY, LYMPH NODE, SENTINEL;  Surgeon: Sari Ross M.D.;  Location: SURGERY SAME DAY [facility];  Service: General    AORTIC VALVE REPLACEMENT      BRONCHOSCOPY      HYSTERECTOMY LAPAROSCOPY      SINUSCOPE         GYNECOLOGICAL STATUS:  : 3, Para: 2, Number of Interrupted Pregnancies: 1, and Age at first birth: 19    HORMONE USE:  Post-menopause use 3 years. \"I used the cream for a few years, I can't tell you how many.\"    CURRENT MEDICATIONS:  Current Outpatient Medications   Medication Sig Dispense Refill    Flaxseed, Linseed, (FLAXSEED OIL) 1000 MG Cap Take 2,000 mg by mouth every day.      Magnesium 400 MG Tab Take 400 mg by mouth every day.      Vitamin D, Cholecalciferol, 50 MCG (2000 UT) Cap Take 50 mcg by mouth every day.      Ascorbic Acid (VITAMIN C) 500 MG Cap Take 500 mg by mouth every day.      SUMAtriptan (IMITREX) 50 MG Tab Take 1 Tablet by mouth one time as needed for Migraine for up to 1 dose. 10 Tablet 3    warfarin (COUMADIN) 5 MG Tab TAKE ONE-HALF (1/2) TO ONE TABLET DAILY OR AS DIRECTED BY ANTICOAGULATION CLINIC. 100 Tablet 3    BIOTIN PO Take 1 Capsule by mouth every day.       MEDICATION INSTRUCTIONS FOR SURGERY/PROCEDURE SCHEDULED FOR 24   DO NOT TAKE 7 DAYS PRIOR TO SURGERY      acetaminophen (TYLENOL) 325 MG Tab Take 650 mg by mouth every four hours as needed for Mild Pain.       MEDICATION INSTRUCTIONS FOR " SURGERY/PROCEDURE SCHEDULED FOR 7/16/24   YOU MAY CONTINUE TAKING UP TO AND ON DAY OR SURGERY      hydrocortisone 2.5 % Cream topical cream AAA face BID PRN itching, redness, rash. Stop use after 2-3 weeks. Avoid use around eyes (Patient taking differently: Apply 1 Application topically 2 times a day. AAA face BID PRN itching, redness, rash. Stop use after 2-3 weeks. Avoid use around eyes      FOR SURGERY ON 7/16/24: DO NOT TAKE DAY OF SURGERY) 20 g 0    fluocinonide (LIDEX) 0.05 % Cream AAA arm spots BID PRN itching. Avoid use on face, axilla, groin (Patient taking differently: Apply 1 Application topically 2 times a day. AAA arm spots BID PRN itching. Avoid use on face, axilla, groin        FOR SURGERY ON 7/16/24: DO NOT TAKE DAY OF SURGERY) 30 g 1    rosuvastatin (CRESTOR) 20 MG Tab TAKE 1 TABLET EVERY EVENING (Patient taking differently: Take 20 mg by mouth every day. TAKE 1 TABLET EVERY EVENING      MEDICATION INSTRUCTIONS FOR SURGERY/PROCEDURE SCHEDULED FOR 7/16/24   OK TO CONTINUE TAKING PRIOR TO SURGERY AND DAY OF SURGERY) 90 Tablet 3    lidocaine-prilocaine (EMLA) 2.5-2.5 % Cream APPLY TO AFFECTED BREAST 2 HOURS PRIOR TO INJECTION AND REPEAT 1 HOUR PRIOR TO INJECTION (Patient not taking: Reported on 8/20/2024)       No current facility-administered medications for this encounter.       ALLERGIES:    Spironolactone, Latex, Neomycin, and Tape    FAMILY HISTORY:    Family History   Problem Relation Age of Onset    Arthritis Mother     Hypertension Father     Kidney Disease Father     Stroke Sister     Arthritis Sister     No Known Problems Brother     No Known Problems Brother     Cancer Maternal Uncle         Lung ca- smoker    Breast Cancer Paternal Aunt     Colorectal Cancer Paternal Uncle         Colon    Colon Cancer Paternal Uncle     Heart Attack Maternal Grandmother     Stroke Maternal Grandfather     Heart Disease Paternal Grandmother     No Known Problems Paternal Grandfather     No Known Problems  Son     Cancer Other         Gallbladder cancer       SOCIAL HISTORY:    Social History     Tobacco Use    Smoking status: Never    Smokeless tobacco: Never   Vaping Use    Vaping status: Never Used   Substance Use Topics    Alcohol use: Yes     Alcohol/week: 1.8 - 2.4 oz     Types: 3 - 4 Shots of liquor per week    Drug use: No     Patient is retired from  with [employer].  Lives with: Spouse.     REVIEW OF SYSTEMS:  A complete review of systems was completed in patient's chart on 2024.  All are negative with relationship to this diagnosis with the exception of:  Patient is healing well from surgery, does not have any suspicious lesions in the breast or axilla, denies any acute areas of bony tenderness or deformity, denies any new headaches or neurologic symptoms     PHYSICAL EXAM:    Vitals:    24 1002   BP: 119/72   BP Location: Right arm   Patient Position: Sitting   BP Cuff Size: Adult   Pulse: 74   SpO2: 95%   Weight: 54.3 kg (119 lb 11.4 oz)   Height: 1.524 m (5')   Pain Score: 1=Minimal Pain        ECO= Fully active, able to carry on all pre-disease performance without restriction.    PAIN:  1  What makes the pain better: Tylenol PRN   What makes the pain worse: S/P left partial mastectomy and SLNBx  Pain controlled with current regimen: yes  Pain related to condition being seen here for: yes    GENERAL: No apparent distress.  HEENT:  Pupils are equal, round, and reactive to light.  Extraocular muscles   are intact. Sclerae nonicteric.  Conjunctivae pink.  Oral cavity, tongue   protrudes midline.   NECK:  Supple without evidence of thyromegaly.  NODES:  No peripheral adenopathy of the neck, supraclavicular fossa or axillae   bilaterally.  LUNGS:  Clear to ascultation bilaterally   HEART:  Regular rate and rhythm.  No murmur appreciated  BREAST: No suspicious lesions found in either breast or axilla.  ABDOMEN:  Soft. No evidence of hepatosplenomegaly.  Positive bowel  sounds.  EXTREMITIES:  Without Edema.  NEUROLOGIC:  Cranial nerves II through XII were intact. Normal stance and gait motor and sensory grossly within normal limits       IMPRESSION:    Stage IA (L0sF1G0) G2 invasive ductal carcinoma of the left upper outer quadrant of breast ER/UT positive HER2/denilson negative with a Ki-67 of 15% status post lumpectomy and sentinel lymph node biopsy on 7/16/2024 planning on an aromatase inhibitor.      RECOMMENDATIONS:    I discussed the diagnosis, prognosis, and treatment options over a 1 hr 5 min time period, 95% of that time dedicated to ongoing treatment management.  I discussed with the patient and her  the early stage favorable features of her tumor.  She is just outside the parameters for consideration of omission of radiation.  Combined with her anterior margin status I have recommended adjuvant radiotherapy.  We went over the data comparing mastectomy with lumpectomy and radiation.  We went over lumpectomy with and without radiation.  Now that we utilize external beam radiation for partial breast radiation we then discussed partial breast irradiation with coverage to the skin.  That was once a cautionary parameter, proximity to skin, however we have not seen similar toxicity thus far with the external beam experience.  Patient feels most comfortable with the partial breast irradiation.  We did have a cancellation for simulation today and she plans to proceed with simulation immediately after consultation.    We discussed the risks, benefits and side effects of treatment and the patient is amenable to treatment.  If patient has any questions or concerns, she should feel free to contact me.    Thank you for the opportunity to participate in her care.  If any questions or comments, please do not hesitate in calling.

## 2024-08-20 NOTE — CT SIMULATION
PATIENT NAME Marline Perry   PRIMARY PHYSICIAN Amrki Duncan [id number]   REFERRING PHYSICIAN Sari Ross M.D. 1955     Malignant neoplasm of upper-outer quadrant of left breast in female, estrogen receptor positive (HCC)  Staging form: Breast, AJCC 8th Edition  - Pathologic stage from 7/16/2024: Stage IA (pT1c, pN0, cM0, G2, ER+, UT+, HER2-) - Signed by Michael Kaur M.D. on 7/26/2024  Stage prefix: Initial diagnosis  Histologic grading system: 3 grade system         Treatment Planning CT Simulation        Order Questions       Question Answer    Is this for a new course of treatment? Yes    Is this an Addendum? No    Implanted Device/Pacemaker No    Simulation Status Initial    Treatment Site Breast    Laterality Left    Treatment Technique 3D CRT    Other Technique(s) 3D    Treatment Pattern/Frequency Daily    Concurrent Chemotherapy No    CT Technique 3D    Slice Thickness 3mm    Scan Extent Chest    Bowel Preparation No    Treatment Device(s) Vac Tyshawn     Wing Board    Treatment Marker Scar    Patient Attire Gown    Patient Position Supine    Patient Orientation Head First    Arm Position Up    Treatment Machine No preference    Treatment Image Guidance CBCT    Frequency (CBCT) Daily    Image Guidance Match PTV - Soft Tissue    Other Orders Weekly Physics Check                  Comments       5 fx APBI

## 2024-08-20 NOTE — RADIATION PLANNING NOTES
Clinical Treatment Planning Note    DATE OF SERVICE: 8/20/2024    DIAGNOSIS:  Malignant neoplasm of upper-outer quadrant of left breast in female, estrogen receptor positive (HCC)  Staging form: Breast, AJCC 8th Edition  - Pathologic stage from 7/16/2024: Stage IA (pT1c, pN0, cM0, G2, ER+, [place]+, HER2-) - Signed by Michael Kaur M.D. on 7/26/2024  Stage prefix: Initial diagnosis  Histologic grading system: 3 grade system         IMAGING REVIEWED:  [x] CT     [] MRI     [] PET/CT     [] BONE SCAN     [x] MAMMO     [] OTHER      TREATMENT INTENT:   [x] CURATIVE     [] MAINTENANCE     []  PALLIATIVE      []  SUPPORTIVE     []  PROPHYLACTIC     [] BENIGN     []  CONSOLIDATIVE      [] DEFINITIVE   []  OLOGIMETASTATIC      LINE OF TREATMENT:  [x] ADJUVANT   [] DEFINITIVE   [] NEOADJUVANT   [] RE-TREATMENT      TECHNIQUE PLANNED:  [] IMRT   [x] 3D   [] SBRT   [] SRS/SRT   [] HDR   [] ELECTRON       IMRT JUSTIFICATION:  []   An immediately adjacent area has been previously irradiated and abutting portals must be established with high precision.    []  Dose escalation is planned to deliver radiation doses in excess of those commonly utilized for similar tumors with conventional treatment.    []  The target volume is concave or convex, and the critical normal tissues are within or around that convexity or concavity.    []  The target volume is in close proximity to critical structures that must be protected.    []  The volume of interest must be covered with narrow margins to adequately protect  immediately adjacent structures.      FIELDS & BLOCKING:  [x] COMPLEX BLOCKS     []  = 3 TX AREAS     []  ARCS     []  CUSTOM SHEILD        []  SIMPLE BLOCK      CHEMOTHERAPY:  []  CONCURRENT     []  INDUCTION     [] SEQUENTIAL     []  <30 DAYS FROM XRT      NOTES:    OAR CONSTRAINTS: (GUIDELINES ONLY NOT ABSOLUTE)    Target Prescribed Coverage   PTV 95% of PTV covered by 95% (cGy) of RX Dose       IVON Goal   Max point dose PTV  Eval <=110%   Contralateral Breast V5% < 2Gy   Ipsilateral Lung V15% < 20Gy   Ipsilateral Lung V35% < 10Gy   Ipsilateral Lung V50% < 5Gy   Contralateral Lung V10% < 5Gy   Heart (L Sided) V5% < 20Gy   *RTOG 1005, START-A, START-B

## 2024-08-20 NOTE — PROGRESS NOTES
Patient was seen today in clinic with Dr. Whyte for consultation.  Vitals signs and weight were obtained and pain assessment was completed.  Allergies and medications were reviewed with the patient.       Vitals/Pain:  Vitals:    08/20/24 1002   BP: 119/72   BP Location: Right arm   Patient Position: Sitting   BP Cuff Size: Adult   Pulse: 74   SpO2: 95%   Weight: 54.3 kg (119 lb 11.4 oz)   Height: 1.524 m (5')   Pain Score: 1=Minimal Pain  Pain Scale: 0-10  Pain Assessement: Initial  Pain Location, Orientation and Scale: Axilla: Left : Acute : 1 and Breast: Left : Acute : 1  What makes the pain better: Tylenol PRN   What makes the pain worse: S/P partial mastectomy, SLNBx      Allergies:   Spironolactone, Latex, Neomycin, and Tape    Current Medications:  Current Outpatient Medications   Medication Sig Dispense Refill    Flaxseed, Linseed, (FLAXSEED OIL) 1000 MG Cap Take 2,000 mg by mouth every day.      Magnesium 400 MG Tab Take 400 mg by mouth every day.      Vitamin D, Cholecalciferol, 50 MCG (2000 UT) Cap Take 50 mcg by mouth every day.      Ascorbic Acid (VITAMIN C) 500 MG Cap Take 500 mg by mouth every day.      SUMAtriptan (IMITREX) 50 MG Tab Take 1 Tablet by mouth one time as needed for Migraine for up to 1 dose. 10 Tablet 3    warfarin (COUMADIN) 5 MG Tab TAKE ONE-HALF (1/2) TO ONE TABLET DAILY OR AS DIRECTED BY ANTICOAGULATION CLINIC. 100 Tablet 3    BIOTIN PO Take 1 Capsule by mouth every day.       MEDICATION INSTRUCTIONS FOR SURGERY/PROCEDURE SCHEDULED FOR 7/16/24   DO NOT TAKE 7 DAYS PRIOR TO SURGERY      acetaminophen (TYLENOL) 325 MG Tab Take 650 mg by mouth every four hours as needed for Mild Pain.       MEDICATION INSTRUCTIONS FOR SURGERY/PROCEDURE SCHEDULED FOR 7/16/24   YOU MAY CONTINUE TAKING UP TO AND ON DAY OR SURGERY      hydrocortisone 2.5 % Cream topical cream AAA face BID PRN itching, redness, rash. Stop use after 2-3 weeks. Avoid use around eyes (Patient taking differently: Apply 1  Application topically 2 times a day. AAA face BID PRN itching, redness, rash. Stop use after 2-3 weeks. Avoid use around eyes      FOR SURGERY ON 7/16/24: DO NOT TAKE DAY OF SURGERY) 20 g 0    fluocinonide (LIDEX) 0.05 % Cream AAA arm spots BID PRN itching. Avoid use on face, axilla, groin (Patient taking differently: Apply 1 Application topically 2 times a day. AAA arm spots BID PRN itching. Avoid use on face, axilla, groin        FOR SURGERY ON 7/16/24: DO NOT TAKE DAY OF SURGERY) 30 g 1    rosuvastatin (CRESTOR) 20 MG Tab TAKE 1 TABLET EVERY EVENING (Patient taking differently: Take 20 mg by mouth every day. TAKE 1 TABLET EVERY EVENING      MEDICATION INSTRUCTIONS FOR SURGERY/PROCEDURE SCHEDULED FOR 7/16/24   OK TO CONTINUE TAKING PRIOR TO SURGERY AND DAY OF SURGERY) 90 Tablet 3    lidocaine-prilocaine (EMLA) 2.5-2.5 % Cream APPLY TO AFFECTED BREAST 2 HOURS PRIOR TO INJECTION AND REPEAT 1 HOUR PRIOR TO INJECTION (Patient not taking: Reported on 8/20/2024)       No current facility-administered medications for this encounter.         PCP:  Dakota Alcantara R.N.

## 2024-08-22 PROCEDURE — 77334 RADIATION TREATMENT AID(S): CPT | Performed by: RADIOLOGY

## 2024-08-22 PROCEDURE — 77300 RADIATION THERAPY DOSE PLAN: CPT | Performed by: RADIOLOGY

## 2024-08-22 PROCEDURE — 77295 3-D RADIOTHERAPY PLAN: CPT | Performed by: RADIOLOGY

## 2024-08-24 LAB — INR PPP: 2.8 (ref 2–3.5)

## 2024-08-26 ENCOUNTER — ANTICOAGULATION MONITORING (OUTPATIENT)
Dept: MEDICAL GROUP | Facility: PHYSICIAN GROUP | Age: 69
End: 2024-08-26
Payer: MEDICARE

## 2024-08-26 DIAGNOSIS — Z95.2 S/P AVR (AORTIC VALVE REPLACEMENT): ICD-10-CM

## 2024-08-26 NOTE — PROGRESS NOTES
8/28/2024 12:49 PM    Primary care provider:  Amrik Duncan D.O.  Cardiologist: James Catherine M.D.   Radiation oncologist:  Feliciano Whyte M.D.  Medical oncologist: Michael Kaur M.D.  Imaging facility:  [facility]    CC:   Chief Complaint   Patient presents with    Post-op        HPI: This very pleasant 68 y.o. female is scheduled for routine postoperative appointment.  She continues to recover well. She informs me she has a little hard spot under her incision otherwise has no complaints. She is slowly increasing her range of motion.     She saw Dr. Whyte on 8/20/24 and plans to start APBI today. She is also scheduled for genetic lab draw test today. She is scheduled for genetic testing lab draw on 8/28/24. She saw Dr. Kaur on 7/26/24 and plan for adjuvant endocrine therapy after radiotherpy.     Allergies   Allergen Reactions    Spironolactone Rash     ALL OVER BODY     Latex Rash     .    Neomycin Rash     CONTACT DERMATITIS     Tape Rash     Adhesives-RASH   LATEX      Current Outpatient Medications   Medication Sig    Flaxseed, Linseed, (FLAXSEED OIL) 1000 MG Cap Take 2,000 mg by mouth every day.    Magnesium 400 MG Tab Take 400 mg by mouth every day.    Vitamin D, Cholecalciferol, 50 MCG (2000 UT) Cap Take 50 mcg by mouth every day.    Ascorbic Acid (VITAMIN C) 500 MG Cap Take 500 mg by mouth every day.    SUMAtriptan (IMITREX) 50 MG Tab Take 1 Tablet by mouth one time as needed for Migraine for up to 1 dose.    lidocaine-prilocaine (EMLA) 2.5-2.5 % Cream     warfarin (COUMADIN) 5 MG Tab TAKE ONE-HALF (1/2) TO ONE TABLET DAILY OR AS DIRECTED BY ANTICOAGULATION CLINIC.    BIOTIN PO Take 1 Capsule by mouth every day.       MEDICATION INSTRUCTIONS FOR SURGERY/PROCEDURE SCHEDULED FOR 7/16/24   DO NOT TAKE 7 DAYS PRIOR TO SURGERY    acetaminophen (TYLENOL) 325 MG Tab Take 650 mg by mouth every four hours as needed for Mild Pain.       MEDICATION INSTRUCTIONS FOR SURGERY/PROCEDURE SCHEDULED FOR  7/16/24   YOU MAY CONTINUE TAKING UP TO AND ON DAY OR SURGERY    hydrocortisone 2.5 % Cream topical cream AAA face BID PRN itching, redness, rash. Stop use after 2-3 weeks. Avoid use around eyes (Patient taking differently: Apply 1 Application topically 2 times a day. AAA face BID PRN itching, redness, rash. Stop use after 2-3 weeks. Avoid use around eyes      FOR SURGERY ON 7/16/24: DO NOT TAKE DAY OF SURGERY)    fluocinonide (LIDEX) 0.05 % Cream AAA arm spots BID PRN itching. Avoid use on face, axilla, groin (Patient taking differently: Apply 1 Application topically 2 times a day. AAA arm spots BID PRN itching. Avoid use on face, axilla, groin        FOR SURGERY ON 7/16/24: DO NOT TAKE DAY OF SURGERY)    rosuvastatin (CRESTOR) 20 MG Tab TAKE 1 TABLET EVERY EVENING (Patient taking differently: Take 20 mg by mouth every day. TAKE 1 TABLET EVERY EVENING      MEDICATION INSTRUCTIONS FOR SURGERY/PROCEDURE SCHEDULED FOR 7/16/24   OK TO CONTINUE TAKING PRIOR TO SURGERY AND DAY OF SURGERY)       Physical Exam:  /68 (BP Location: Left arm, Patient Position: Sitting, BP Cuff Size: Large adult)   Pulse 78   Temp 36.2 °C (97.2 °F) (Temporal)   Ht 1.524 m (5')   Wt 54.1 kg (119 lb 3.2 oz)   SpO2 95%   BMI 23.28 kg/m²     General: Very pleasant demeanor.  Appears well, no acute distress.  Surgical site: Radial incision well healed.  No infection or hematoma.     1. Hyperlipidemia, unspecified hyperlipidemia type            ASSESSMENT:  LEFT upper outer (1:00, 6cm FN) IDC.  pT1c pN0 M0. Anatomic stage IA, Prognostic stage IA. US biopsy 6/12/24 [facility]. Left partial mastectomy with wire LOC and placement of lattice and LSND (7/16/24) ABPI started 8/28/24. (Dr. Whyte) Plan for endocrine therapy afterwards (Dr. Kaur)  1.2 cm, invasive carcinoma at margin anteriorly - breadth of 2 mm  ( 6mm on US.  HM COIL.)              Grade 2.  LVI not seen.  (TIL 20% on biopsy)              ER/WI >90%.              Ki-67  15%.              HER2 low/neg (2+ IHC, FISH neg).                0 out of 2 axillary lymph nodes negative      Last bilateral mammogram 24 [facility]:  Scattered FGD.  Last prior mammo .     FH:  Paternal aunt with breast cancer.  Paternal uncle with colon cancer.  No known AJ heritage.  Getting genetic testing lab draw today.      Menopausal/HRT status:  .  Age of first delivery: 19   Menarche: 13   LMP: , HRT used for a few years, stopped in early          LIFESTYLE:  No smoking or vaping history.  3-4 alcohol per week. No drug history.  BMI 23.       ECOG 0= Fully active, able to carry on all pre-disease performance without restriction.      History of AVR (mechanical, ) and aortic arch replacement.  On Coumadin.      DISCUSSED/PLAN:  We have reviewed post operative instructions.    She has been to the Lymphedema Education and Prevention class, and will plan to refer to lymphedema physical therapy to help with her range of motion.    We have discussed the importance of walking daily.  We have discussed nutrition and exercise.     Please advise patient to call in 4 months for SAF-2 in 6 months    REFERRALS:    Lymphedema Physical Therapy

## 2024-08-26 NOTE — PROGRESS NOTES
Anticoagulation Summary  As of 2024      INR goal:  2.0-3.0   TTR:  55.8% (6.6 y)   INR used for dosin.80 (2024)   Warfarin maintenance plan:  2.5 mg (5 mg x 0.5) every Mon, Wed, Fri; 5 mg (5 mg x 1) all other days   Weekly warfarin total:  27.5 mg   Plan last modified:  Diann Warren PharmD (2024)   Next INR check:  2024   Target end date:  Indefinite    Indications    S/P AVR (aortic valve replacement) [Z95.2]                 Anticoagulation Episode Summary       INR check location:  Home Draw    Preferred lab:  --    Send INR reminders to:  --    Comments:  Denny CELESTIN  only call if out of range          Anticoagulation Care Providers       Provider Role Specialty Phone number    Chinyere Marcus M.D. Referring Cardiovascular Disease (Cardiology) [phone]    Yossi Khalil, PharmD Responsible Pharmacy           Anticoagulation Patient Findings      Pt reported a therapeutic INR  Per chart review, pt does not want to be contacted if INR is therapeutic   Pt to continue with current warfarin dosing regimen. Requested pt contact the clinic for any s/s of unusual bleeding, bruising, clotting or any changes to diet or medication.    FU INR in 2 week(s).    Diann Warren, SantoD

## 2024-08-28 ENCOUNTER — NON-PROVIDER VISIT (OUTPATIENT)
Dept: GENETICS | Facility: MEDICAL CENTER | Age: 69
End: 2024-08-28
Payer: MEDICARE

## 2024-08-28 ENCOUNTER — HOSPITAL ENCOUNTER (OUTPATIENT)
Dept: RADIATION ONCOLOGY | Facility: MEDICAL CENTER | Age: 69
End: 2024-08-28

## 2024-08-28 ENCOUNTER — OFFICE VISIT (OUTPATIENT)
Dept: SURGERY | Facility: MEDICAL CENTER | Age: 69
End: 2024-08-28
Payer: MEDICARE

## 2024-08-28 ENCOUNTER — HOSPITAL ENCOUNTER (OUTPATIENT)
Dept: RADIATION ONCOLOGY | Facility: MEDICAL CENTER | Age: 69
End: 2024-08-28
Attending: RADIOLOGY
Payer: MEDICARE

## 2024-08-28 VITALS
SYSTOLIC BLOOD PRESSURE: 128 MMHG | BODY MASS INDEX: 23.4 KG/M2 | TEMPERATURE: 97.2 F | OXYGEN SATURATION: 95 % | HEIGHT: 60 IN | HEART RATE: 78 BPM | WEIGHT: 119.2 LBS | DIASTOLIC BLOOD PRESSURE: 68 MMHG

## 2024-08-28 DIAGNOSIS — E78.5 HYPERLIPIDEMIA, UNSPECIFIED HYPERLIPIDEMIA TYPE: ICD-10-CM

## 2024-08-28 DIAGNOSIS — Z17.0 MALIGNANT NEOPLASM OF UPPER-OUTER QUADRANT OF LEFT BREAST IN FEMALE, ESTROGEN RECEPTOR POSITIVE (HCC): ICD-10-CM

## 2024-08-28 DIAGNOSIS — C50.412 MALIGNANT NEOPLASM OF UPPER-OUTER QUADRANT OF LEFT BREAST IN FEMALE, ESTROGEN RECEPTOR POSITIVE (HCC): ICD-10-CM

## 2024-08-28 LAB
CHEMOTHERAPY INFUSION START DATE: NORMAL
CHEMOTHERAPY RECORDS: 3000
CHEMOTHERAPY RECORDS: 6
CHEMOTHERAPY RECORDS: NORMAL
CHEMOTHERAPY RX CANCER: NORMAL
DATE 1ST CHEMO CANCER: NORMAL
RAD ONC ARIA COURSE LAST TREATMENT DATE: NORMAL
RAD ONC ARIA COURSE TREATMENT ELAPSED DAYS: NORMAL
RAD ONC ARIA REFERENCE POINT DOSAGE GIVEN TO DATE: 6
RAD ONC ARIA REFERENCE POINT ID: NORMAL
RAD ONC ARIA REFERENCE POINT SESSION DOSAGE GIVEN: 6

## 2024-08-28 PROCEDURE — 77280 THER RAD SIMULAJ FIELD SMPL: CPT | Performed by: RADIOLOGY

## 2024-08-28 PROCEDURE — 77412 RADIATION TX DELIVERY LVL 3: CPT | Performed by: RADIOLOGY

## 2024-08-28 PROCEDURE — 77387 GUIDANCE FOR RADJ TX DLVR: CPT | Performed by: RADIOLOGY

## 2024-08-28 PROCEDURE — 77417 THER RADIOLOGY PORT IMAGE(S): CPT | Performed by: RADIOLOGY

## 2024-08-28 ASSESSMENT — FIBROSIS 4 INDEX: FIB4 SCORE: 1.99

## 2024-08-28 NOTE — ON TREATMENT VISIT
ON TREATMENT NOTE  RADIATION ONCOLOGY DEPARTMENT    Patient name:  Marline Perry    Primary Physician:  Amrik Duncan D.O. MRN: [id number]  CSN: [id number]   Referring physician:  Sari Ross M.D. : 1955, 68 y.o.     ENCOUNTER DATE:  24    DIAGNOSIS:    Malignant neoplasm of upper-outer quadrant of left breast in female, estrogen receptor positive (HCC)  Staging form: Breast, AJCC 8th Edition  - Pathologic stage from 2024: Stage IA (pT1c, pN0, cM0, G2, ER+, ME+, HER2-) - Signed by Michael Kaur M.D. on 2024  Stage prefix: Initial diagnosis  Histologic grading system: 3 grade system      TREATMENT SUMMARY:  Aria Treatment Information          2024   Aria Course Treatment Dates   Course First Treatment Date 2024    Course Last Treatment Date 2024    Aria Treatment Summary   L Breast APBI  Plan from Course C1_[email or web address]   Fraction 1 of 5   Elapsed Course Days 0 @ [id number]   Prescribed Fraction Dose 600 cGy   Prescribed Total Dose 3,000 cGy   L Breast APBI  Reference Point from Course C1_LBreastAPBI   Elapsed Course Days 0 @ [id number]   Session Dose 600 cGy   Total Dose 600 cGy      Radiation Treatments       Active   Plans   L Breast APBI   Most recent treatment: Dose planned: 600 cGy (fraction 1 of 5 on 2024)   Total: Dose planned: 3,000 cGy   Elapsed Days: 0 @            Historical   No historical radiation treatments to show.               SUBJECTIVE:   Patient tolerated her first day well.  She does not have any questions regarding her radiation.    VITAL SIGNS:           2024    10:02 AM 2024     9:20 AM   Pain Assessment   Pain Score MINIMAL PAIN NO PAIN   Pain Loc BREAST           PHYSICAL EXAM:  No erythema    TOXICITY       No data to display                  IMPRESSION:  Cancer Staging   Malignant neoplasm of upper-outer quadrant of left breast in female, estrogen receptor positive (HCC)  Staging form:  Breast, AJCC 8th Edition  - Pathologic stage from 7/16/2024: Stage IA (pT1c, pN0, cM0, G2, ER+, ND+, HER2-) - Signed by Michael Kaur M.D. on 7/26/2024      PLAN:  No change in treatment plan    Disposition:  Treatment plan reviewed. Questions answered. Continue therapy outlined.     Feliciano Whyte M.D.    No orders of the defined types were placed in this encounter.

## 2024-08-28 NOTE — CT SIMULATION
DATE OF SERVICE: 8/28/2024    Radiation Therapy Episodes       Active Episodes       Radiation Therapy: 3D CRT                   Radiation Treatments         Plan Last Treated On Elapsed Days Fractions Treated Prescribed Fraction Dose (cGy) Prescribed Total Dose (cGy)    L Breast APBI 8/28/2024 0 @ [id number] 1 of 5 600 3,000                  Reference Point Last Treated On Elapsed Days Most Recent Session Dose (cGy) Total Dose (cGy)    L Breast APBI 8/28/2024 0 @ [id number] 600 600                            First Visit Simple Simulation: Called by [employer] machine to verify treatment parameters including:  treatment site, treatment dose, and treatment setup prior to first treatment. Image derived shifts reviewed in all appropriate planes.  Shifts approved.  Patient treated.    I have personally reviewed the relevant data, performed the target localization, and determined all relevant factors for this patient’s simulation.

## 2024-08-28 NOTE — PROGRESS NOTES
Marline Perry is a 68 y.o. female here for a non-provider visit for: Lab Draws  on 8/28/2024 at 11:32 AM    Procedure Performed: Venipuncture     Anatomical site: Right Antecubital Area (AC)    Equipment used: 25 butterfly    Labs drawn: [employer]    Ordering Provider: [employer] Heidi Barber By: Loren Meza, Med Ass't

## 2024-08-29 ENCOUNTER — HOSPITAL ENCOUNTER (OUTPATIENT)
Dept: RADIATION ONCOLOGY | Facility: MEDICAL CENTER | Age: 69
End: 2024-08-29

## 2024-08-29 LAB
CHEMOTHERAPY INFUSION START DATE: NORMAL
CHEMOTHERAPY RECORDS: 3000 CGY
CHEMOTHERAPY RECORDS: 6 GY
CHEMOTHERAPY RECORDS: NORMAL
CHEMOTHERAPY RX CANCER: NORMAL
DATE 1ST CHEMO CANCER: NORMAL
RAD ONC ARIA COURSE LAST TREATMENT DATE: NORMAL
RAD ONC ARIA COURSE TREATMENT ELAPSED DAYS: NORMAL
RAD ONC ARIA REFERENCE POINT DOSAGE GIVEN TO DATE: 12 GY
RAD ONC ARIA REFERENCE POINT ID: NORMAL
RAD ONC ARIA REFERENCE POINT SESSION DOSAGE GIVEN: 6 GY

## 2024-08-29 PROCEDURE — 77412 RADIATION TX DELIVERY LVL 3: CPT | Performed by: RADIOLOGY

## 2024-08-29 PROCEDURE — 77387 GUIDANCE FOR RADJ TX DLVR: CPT | Performed by: RADIOLOGY

## 2024-08-30 ENCOUNTER — HOSPITAL ENCOUNTER (OUTPATIENT)
Dept: RADIATION ONCOLOGY | Facility: MEDICAL CENTER | Age: 69
End: 2024-08-30

## 2024-08-30 LAB
CHEMOTHERAPY INFUSION START DATE: NORMAL
CHEMOTHERAPY RECORDS: 3000 CGY
CHEMOTHERAPY RECORDS: 6 GY
CHEMOTHERAPY RECORDS: NORMAL
CHEMOTHERAPY RX CANCER: NORMAL
DATE 1ST CHEMO CANCER: NORMAL
RAD ONC ARIA COURSE LAST TREATMENT DATE: NORMAL
RAD ONC ARIA COURSE TREATMENT ELAPSED DAYS: NORMAL
RAD ONC ARIA REFERENCE POINT DOSAGE GIVEN TO DATE: 18 GY
RAD ONC ARIA REFERENCE POINT ID: NORMAL
RAD ONC ARIA REFERENCE POINT SESSION DOSAGE GIVEN: 6 GY

## 2024-08-30 PROCEDURE — 77412 RADIATION TX DELIVERY LVL 3: CPT | Performed by: RADIOLOGY

## 2024-08-30 PROCEDURE — 77387 GUIDANCE FOR RADJ TX DLVR: CPT | Performed by: RADIOLOGY

## 2024-09-03 ENCOUNTER — HOSPITAL ENCOUNTER (OUTPATIENT)
Dept: RADIATION ONCOLOGY | Facility: MEDICAL CENTER | Age: 69
End: 2024-09-03

## 2024-09-03 ENCOUNTER — HOSPITAL ENCOUNTER (OUTPATIENT)
Dept: RADIATION ONCOLOGY | Facility: MEDICAL CENTER | Age: 69
End: 2024-09-03
Attending: RADIOLOGY
Payer: MEDICARE

## 2024-09-03 LAB
CHEMOTHERAPY INFUSION START DATE: NORMAL
CHEMOTHERAPY RECORDS: 3000 CGY
CHEMOTHERAPY RECORDS: 6 GY
CHEMOTHERAPY RECORDS: NORMAL
CHEMOTHERAPY RX CANCER: NORMAL
DATE 1ST CHEMO CANCER: NORMAL
RAD ONC ARIA COURSE LAST TREATMENT DATE: NORMAL
RAD ONC ARIA COURSE TREATMENT ELAPSED DAYS: NORMAL
RAD ONC ARIA REFERENCE POINT DOSAGE GIVEN TO DATE: 24 GY
RAD ONC ARIA REFERENCE POINT ID: NORMAL
RAD ONC ARIA REFERENCE POINT SESSION DOSAGE GIVEN: 6 GY

## 2024-09-03 PROCEDURE — 77387 GUIDANCE FOR RADJ TX DLVR: CPT | Performed by: RADIOLOGY

## 2024-09-03 PROCEDURE — 77014 PR CT GUIDANCE PLACEMENT RAD THERAPY FIELDS: CPT | Mod: 26 | Performed by: RADIOLOGY

## 2024-09-03 PROCEDURE — 77412 RADIATION TX DELIVERY LVL 3: CPT | Performed by: RADIOLOGY

## 2024-09-04 ENCOUNTER — HOSPITAL ENCOUNTER (OUTPATIENT)
Dept: RADIATION ONCOLOGY | Facility: MEDICAL CENTER | Age: 69
End: 2024-09-04
Payer: MEDICARE

## 2024-09-04 ENCOUNTER — HOSPITAL ENCOUNTER (OUTPATIENT)
Dept: RADIATION ONCOLOGY | Facility: MEDICAL CENTER | Age: 69
End: 2024-09-04
Attending: RADIOLOGY
Payer: MEDICARE

## 2024-09-04 VITALS
HEART RATE: 76 BPM | DIASTOLIC BLOOD PRESSURE: 67 MMHG | WEIGHT: 119.27 LBS | SYSTOLIC BLOOD PRESSURE: 106 MMHG | BODY MASS INDEX: 23.29 KG/M2 | OXYGEN SATURATION: 96 %

## 2024-09-04 LAB
CHEMOTHERAPY INFUSION START DATE: NORMAL
CHEMOTHERAPY INFUSION START DATE: NORMAL
CHEMOTHERAPY INFUSION STOP DATE: NORMAL
CHEMOTHERAPY RECORDS: 3000 CGY
CHEMOTHERAPY RECORDS: 3000 CGY
CHEMOTHERAPY RECORDS: 6 GY
CHEMOTHERAPY RECORDS: 6 GY
CHEMOTHERAPY RECORDS: NORMAL
CHEMOTHERAPY RX CANCER: NORMAL
CHEMOTHERAPY RX CANCER: NORMAL
DATE 1ST CHEMO CANCER: NORMAL
DATE 1ST CHEMO CANCER: NORMAL
RAD ONC ARIA COURSE LAST TREATMENT DATE: NORMAL
RAD ONC ARIA COURSE LAST TREATMENT DATE: NORMAL
RAD ONC ARIA COURSE TREATMENT ELAPSED DAYS: NORMAL
RAD ONC ARIA COURSE TREATMENT ELAPSED DAYS: NORMAL
RAD ONC ARIA REFERENCE POINT DOSAGE GIVEN TO DATE: 30 GY
RAD ONC ARIA REFERENCE POINT DOSAGE GIVEN TO DATE: 30 GY
RAD ONC ARIA REFERENCE POINT ID: NORMAL
RAD ONC ARIA REFERENCE POINT ID: NORMAL
RAD ONC ARIA REFERENCE POINT SESSION DOSAGE GIVEN: 6 GY

## 2024-09-04 PROCEDURE — 77014 PR CT GUIDANCE PLACEMENT RAD THERAPY FIELDS: CPT | Mod: 26 | Performed by: RADIOLOGY

## 2024-09-04 PROCEDURE — 77412 RADIATION TX DELIVERY LVL 3: CPT | Performed by: RADIOLOGY

## 2024-09-04 PROCEDURE — 77387 GUIDANCE FOR RADJ TX DLVR: CPT | Performed by: RADIOLOGY

## 2024-09-04 PROCEDURE — 77427 RADIATION TX MANAGEMENT X5: CPT | Performed by: RADIOLOGY

## 2024-09-04 ASSESSMENT — FIBROSIS 4 INDEX: FIB4 SCORE: 1.99

## 2024-09-04 ASSESSMENT — PAIN SCALES - GENERAL: PAINLEVEL: NO PAIN

## 2024-09-04 NOTE — RADIATION COMPLETION NOTES
END OF TREATMENT SUMMARY    Patient name:  Marline Perry    Primary Physician:  Amrik Duncan D.O. MRN: [id number]  CSN: [id number]   Referring physician:  Dr. Ross  : 1955, 68 y.o.       TREATMENT SUMMARY:        Course First Treatment Date 2024    Course Last Treatment Date 2024   Course Elapsed Days 7 @ 2024   Course Intent Curative     Radiation Therapy Episodes       Active Episodes       Radiation Therapy: 3D CRT                   Radiation Treatments         Plan Last Treated On Elapsed Days Fractions Treated Prescribed Fraction Dose (cGy) Prescribed Total Dose (cGy)    L Breast APBI 2024 7 @ 2024 5 of 5 600 3,000                  Reference Point Last Treated On Elapsed Days Most Recent Session Dose (cGy) Total Dose (cGy)    L Breast APBI 2024 7 @ 2024 -- 3,000                                     STAGE:   Malignant neoplasm of upper-outer quadrant of left breast in female, estrogen receptor positive (HCC)  Staging form: Breast, AJCC 8th Edition  - Pathologic stage from 2024: Stage IA (pT1c, pN0, cM0, G2, ER+, OH+, HER2-) - Signed by Michael Kaur M.D. on 2024  Stage prefix: Initial diagnosis  Histologic grading system: 3 grade system       TREATMENT INDICATION:   Breast conservation therapy     CONCURRENT SYSTEMIC TREATMENT:   None     RT COURSE DISCONTINUED EARLY:   No     PATIENT EXPERIENCE:       2024    10:21 AM   Toxicity Assessment   Toxicity Assessment Breast   Fatigue (lethargy, malaise, asthenia) None   Fever (in the absence of neutropenia) None   Radiation Dermatitis None   Lymphatics Normal   RT - Pain due to RT None   Dyspnea Normal        FOLLOW-UP PLAN:   6 Weeks     COMMENT:          ANATOMIC TARGET SUMMARY    ANATOMIC TARGET MODALITY TECHNIQUE   Left partial breast   External beam, photons 3D Conformal            COMMENT:         DIAGRAMS:      DOSE VOLUME HISTOGRAMS:

## 2024-09-04 NOTE — ON TREATMENT VISIT
ON TREATMENT NOTE  RADIATION ONCOLOGY DEPARTMENT    Patient name:  Marline Perry    Primary Physician:  Amrik Duncan D.O. MRN: [id number]  CSN: [id number]   Referring physician:  Sari Ross M.D. : 1955, 68 y.o.     ENCOUNTER DATE:  24    DIAGNOSIS:    Malignant neoplasm of upper-outer quadrant of left breast in female, estrogen receptor positive (HCC)  Staging form: Breast, AJCC 8th Edition  - Pathologic stage from 2024: Stage IA (pT1c, pN0, cM0, G2, ER+, DC+, HER2-) - Signed by Michael Kaur M.D. on 2024  Stage prefix: Initial diagnosis  Histologic grading system: 3 grade system      TREATMENT SUMMARY:  Aria Treatment Information          2024   Aria Course Treatment Dates   Course First Treatment Date 2024    Course Last Treatment Date 2024    Aria Treatment Summary   L Breast APBI  Plan from Course C1_LBreastAPBI   Fraction 5 of 5   Elapsed Course Days 7 @ 2024   Prescribed Fraction Dose 600 cGy   Prescribed Total Dose 3,000 cGy   L Breast APBI  Reference Point from Course C1_LBreastAPBI   Elapsed Course Days 7 @ 2024   Session Dose 600 cGy   Total Dose 3,000 cGy      Radiation Treatments       Active   Plans   L Breast APBI   Most recent treatment: Dose planned: 600 cGy (fraction 5 of 5 on 2024)   Total: Dose planned: 3,000 cGy   Elapsed Days: 7 @ 2024           Historical   No historical radiation treatments to show.               SUBJECTIVE:   Patient is doing well.  She does not have any changes she would attribute to her radiation.    VITAL SIGNS:    Encounter Vitals  Blood Pressure : 106/67  Pulse: 76  Pulse Oximetry: 96 %  Weight: 54.1 kg (119 lb 4.3 oz)  Weight Source: Stand Up Scale  Pain Score: No pain      2024    10:21 AM 2024    10:02 AM 2024     9:20 AM   Pain Assessment   Pain Score NO PAIN MINIMAL PAIN NO PAIN   Pain Loc  BREAST           PHYSICAL EXAM:  No erythema    TOXICITY      2024     10:21 AM   Toxicity Assessment   Toxicity Assessment Breast   Fatigue (lethargy, malaise, asthenia) None   Fever (in the absence of neutropenia) None   Radiation Dermatitis None   Lymphatics Normal   RT - Pain due to RT None   Dyspnea Normal         IMPRESSION:  Cancer Staging   Malignant neoplasm of upper-outer quadrant of left breast in female, estrogen receptor positive (HCC)  Staging form: Breast, AJCC 8th Edition  - Pathologic stage from 7/16/2024: Stage IA (pT1c, pN0, cM0, G2, ER+, MI+, HER2-) - Signed by Michael Kaur M.D. on 7/26/2024      PLAN:  No change in treatment plan    Disposition:  Treatment plan reviewed. Questions answered. Continue therapy outlined.     Feliciano Whyte M.D.    No orders of the defined types were placed in this encounter.

## 2024-09-06 ENCOUNTER — OFFICE VISIT (OUTPATIENT)
Dept: DERMATOLOGY | Facility: IMAGING CENTER | Age: 69
End: 2024-09-06
Payer: MEDICARE

## 2024-09-06 ENCOUNTER — ANTICOAGULATION MONITORING (OUTPATIENT)
Dept: CARDIOLOGY | Facility: MEDICAL CENTER | Age: 69
End: 2024-09-06

## 2024-09-06 DIAGNOSIS — L29.9 ITCHING: ICD-10-CM

## 2024-09-06 DIAGNOSIS — Z95.2 S/P AVR (AORTIC VALVE REPLACEMENT): ICD-10-CM

## 2024-09-06 DIAGNOSIS — R21 RASH: ICD-10-CM

## 2024-09-06 PROCEDURE — 99213 OFFICE O/P EST LOW 20 MIN: CPT | Mod: 25 | Performed by: DERMATOLOGY

## 2024-09-06 PROCEDURE — 11104 PUNCH BX SKIN SINGLE LESION: CPT | Performed by: DERMATOLOGY

## 2024-09-06 RX ORDER — PREDNISONE 10 MG/1
TABLET ORAL
Qty: 32 TABLET | Refills: 0 | Status: SHIPPED | OUTPATIENT
Start: 2024-09-06

## 2024-09-06 NOTE — PROGRESS NOTES
Received notification that pt starting 16 day bolus and subsequent taper of prednisone. This will increase her INR.    Called pt to discuss the above - she will decrease warfarin regimen as noted in dosing calendar. INR f/u previously scheduled for 9/9 - will maintain as pt w/ [facility] INR HM & is required to test every 2 weeks.     Of note, pt unsure if she will start steroid therapy d/t drug shortage. If unable to procure prednisone, she will not modify warfarin dosing regimen.    Huseyin Conrad, PharmD, BCACP

## 2024-09-06 NOTE — PROGRESS NOTES
"CC: Follow-Up and Rash     Subjective: est Patient here for Red spot fv, spots are showing up today and now there are on face/forehead. Pt states last night she felt like she \"was on fire\"  Pt has been applying prescriptions as directed.     Denies changes in meds/products. No new lotions, detergents, soaps. Uses cerave on breast after rads for br cancer trx.    Very itchy.   Denies athletes feet/yeast infection known. No known preceding viral infxn.      PREV VISIT 04/18/2024  Had been outdoors.   Unsure if bite/triggers? Had had sleeves and full coverage when outside.   Denies use of many products on skin.     Brown spot on face, fading. Using HCT cream as needed for redness/itching.    History of skin cancer: No  History of biopsies:No  History of blistering/severe sunburns: yes   Family history of skin cancer:No   Family history of atypical moles:No     ROS: no fevers/chills. No itch.  No cough  Relevant PMH:Sjogrens-CT dz  Social:NS    PE: Gen:WDWN female in NAD. Skin :focal exam face/chest/arms/hands - many diffuse erythematous papules/\"juicy\" in appearance. No vesicles/no bullae.     A/P:rash, consider Id reaction, etiology? Vs PMLE vs scabies?? Vs other:,  Left arm  -consent for bx, including R/B/A. Cleaned with EtOH, anesthesia with lidocaine 1% + epinephrine, 4mm punch bx, 4-0 Prolene to close  -vaseline/bandage and wound care reviewed  -s/r in 1-2 weeks - home kit supplied    Treatment for presumptive Id rxn/inflammatory rash:  -prednisone taper  -may cont use of previously rx topicals - HCT face and lidex body BID PRN itching    Itching:  -OTC antihistamine use reviewed    F/u PRN      I have reviewed medications relevant to my specialty.         "

## 2024-09-09 ENCOUNTER — ANTICOAGULATION MONITORING (OUTPATIENT)
Dept: VASCULAR LAB | Facility: MEDICAL CENTER | Age: 69
End: 2024-09-09
Payer: MEDICARE

## 2024-09-09 ENCOUNTER — TELEPHONE (OUTPATIENT)
Dept: VASCULAR LAB | Facility: MEDICAL CENTER | Age: 69
End: 2024-09-09
Payer: MEDICARE

## 2024-09-09 DIAGNOSIS — Z95.2 S/P AVR (AORTIC VALVE REPLACEMENT): ICD-10-CM

## 2024-09-09 LAB — INR PPP: 3.4 (ref 2–3.5)

## 2024-09-09 NOTE — PROGRESS NOTES
OP   Telephone Anticoagulation Service Note      Anticoagulation Summary  As of 9/9/2024      INR goal:  2.0-3.0   TTR:  55.7% (6.6 y)   INR used for dosing:  3.40 (9/9/2024)   Warfarin maintenance plan:  2.5 mg (5 mg x 0.5) every Mon, Wed, Fri; 5 mg (5 mg x 1) all other days   Weekly warfarin total:  27.5 mg   Plan last modified:  Diann Warren, PharmD (1/9/2024)   Next INR check:  9/16/2024   Target end date:  Indefinite    Indications    S/P AVR (aortic valve replacement) [Z95.2]                 Anticoagulation Episode Summary       INR check location:  Home Draw    Preferred lab:  --    Send INR reminders to:  --    Comments:  Denny CELESTIN  only call if out of range          Anticoagulation Care Providers       Provider Role Specialty Phone number    Chinyere Marcus M.D. Referring Cardiovascular Disease (Cardiology) [phone]    [facility] Responsible [facility]           Anticoagulation Patient Findings  Patient Findings       Positives:  Change in medications (pt currently on steroid taper)    Negatives:  Signs/symptoms of thrombosis, Signs/symptoms of bleeding, Laboratory test error suspected, Change in health, Change in alcohol use, Change in activity, Upcoming invasive procedure, Emergency department visit, Upcoming dental procedure, Missed doses, Extra doses, Change in diet/appetite, Hospital admission, Bruising, Other complaints          Spoke with the patient on the phone today, reporting a SUPRA-therapeutic INR of 3.4.  Confirmed the current warfarin dosing regimen and patient compliance.  Patient still on prednisone taper and currently on 30mg x3 days, will drop to 20mg x 3 days etc.   Patient denies any interval changes to diet and/or medications.   Patient denies any signs/symptoms of bleeding or clotting.  Patient instructed to HOLD warfarin TONIGHT, then begin reduced weekly regimen as noted in calendar.   Patient will retest again on Monday.     Pollo BlanchardD

## 2024-09-09 NOTE — TELEPHONE ENCOUNTER
PT Name: Marline Perry    PT Reports INR Value: 3.4    Date of INR Results: 9/9/24    Concerns/Questions Reported: or N/A: Patient had been taking prednisone medications and it why her INR is higher then normal      Callback Number: [phone]      Thank you    -Johnnie GOMES

## 2024-09-13 ENCOUNTER — TELEPHONE (OUTPATIENT)
Dept: DERMATOLOGY | Facility: IMAGING CENTER | Age: 69
End: 2024-09-13
Payer: MEDICARE

## 2024-09-13 NOTE — TELEPHONE ENCOUNTER
Results are not back yet, Did call [facility] a call they stated that the pathologists  had sent it for further testing. Per representative additional Staining was added.

## 2024-09-16 ENCOUNTER — ANTICOAGULATION MONITORING (OUTPATIENT)
Dept: VASCULAR LAB | Facility: MEDICAL CENTER | Age: 69
End: 2024-09-16

## 2024-09-16 ENCOUNTER — NON-PROVIDER VISIT (OUTPATIENT)
Dept: DERMATOLOGY | Facility: IMAGING CENTER | Age: 69
End: 2024-09-16
Payer: MEDICARE

## 2024-09-16 ENCOUNTER — TELEPHONE (OUTPATIENT)
Dept: VASCULAR LAB | Facility: MEDICAL CENTER | Age: 69
End: 2024-09-16

## 2024-09-16 DIAGNOSIS — Z95.2 S/P AVR (AORTIC VALVE REPLACEMENT): ICD-10-CM

## 2024-09-16 LAB — INR PPP: 2.7 (ref 2–3.5)

## 2024-09-16 NOTE — PROGRESS NOTES
Marline Perry is a 68 y.o. female here for a Non-Provider Visit for Suture Removal.    Sutures were placed by Clarita Harris  on date: 09/06/2024  Skin is healed: Yes  Provider notified if skin is not healed, or if there is redness, heat, pain, or drainage from incision: yes  Sutures removed.   Mastisol and steristips are placed: Yes    Advised to use emollient (vaseline, aquaphor, etc.) as needed, avoid peroxide and antibiotic ointment to reduce irritation.     Path report has not been reviewed by provider.  Path report has not reviewed with patient.

## 2024-09-16 NOTE — TELEPHONE ENCOUNTER
INR READING    PT Name: Marline Perry    PT Reports INR Value: 2.7    Date of INR Results: 9/16    Concerns/Questions Reported: or N/A: N/A    Callback Number: [phone] (home)

## 2024-09-16 NOTE — PROGRESS NOTES
Anticoagulation Summary  As of 2024      INR goal:  2.0-3.0   TTR:  55.6% (6.6 y)   INR used for dosin.70 (2024)   Warfarin maintenance plan:  2.5 mg (5 mg x 0.5) every Mon, Wed, Fri; 5 mg (5 mg x 1) all other days   Weekly warfarin total:  27.5 mg   Plan last modified:  Santo JoD (2024)   Next INR check:  2024   Target end date:  Indefinite    Indications    S/P AVR (aortic valve replacement) [Z95.2]                 Anticoagulation Episode Summary       INR check location:  Home Draw    Preferred lab:  --    Send INR reminders to:  --    Comments:  Denny CELESTIN  only call if out of range          Anticoagulation Care Providers       Provider Role Specialty Phone number    Chinyere Marcus M.D. Referring Cardiovascular Disease (Cardiology) [phone]    [facility]             Refer to Anticoagulation Patient Findings for HPI  Patient Findings       Positives:  Change in medications (Has ~ 6 more days of prednisone)    Negatives:  Signs/symptoms of thrombosis, Signs/symptoms of bleeding, Laboratory test error suspected, Change in health, Change in alcohol use, Change in activity, Upcoming invasive procedure, Emergency department visit, Upcoming dental procedure, Missed doses, Extra doses, Change in diet/appetite, Hospital admission, Bruising, Other complaints            Spoke with patient to report a therapeutic INR.      Pt instructed to adjust warfarin dose tomorrow to 2.5 mg and to otherwise continue on w/ her current regimen as she is on low (and last [5 mg daily]) dose of prednisone through .     Will follow up in 1 week(s).     Huseyin Conrad, PharmD, BCACP

## 2024-09-18 ENCOUNTER — PATIENT MESSAGE (OUTPATIENT)
Dept: DERMATOLOGY | Facility: IMAGING CENTER | Age: 69
End: 2024-09-18
Payer: MEDICARE

## 2024-09-18 DIAGNOSIS — L43.9 LICHEN PLANUS: ICD-10-CM

## 2024-09-18 DIAGNOSIS — Z79.899 HIGH RISK MEDICATION USE: ICD-10-CM

## 2024-09-19 ENCOUNTER — PATIENT MESSAGE (OUTPATIENT)
Dept: DERMATOLOGY | Facility: IMAGING CENTER | Age: 69
End: 2024-09-19
Payer: MEDICARE

## 2024-09-19 RX ORDER — METHOTREXATE 2.5 MG/1
TABLET ORAL
Qty: 2 TABLET | Refills: 0 | Status: SHIPPED | OUTPATIENT
Start: 2024-09-19

## 2024-09-24 NOTE — TELEPHONE ENCOUNTER
hydrocortisone 2.5 % Cream topical cream   Patient comment: Have been using a little more on face currently     Received request via: Patient    Was the patient seen in the last year in this department? Yes    Does the patient have an active prescription (recently filled or refills available) for medication(s) requested? No    Pharmacy Name: [facility] - [address]     Does the patient have long term Plus and need 100-day supply? (This applies to ALL medications) Patient does not have SCP

## 2024-09-25 ENCOUNTER — HOSPITAL ENCOUNTER (OUTPATIENT)
Dept: HEMATOLOGY ONCOLOGY | Facility: MEDICAL CENTER | Age: 69
End: 2024-09-25
Attending: INTERNAL MEDICINE
Payer: MEDICARE

## 2024-09-25 ENCOUNTER — ANTICOAGULATION MONITORING (OUTPATIENT)
Dept: CARDIOLOGY | Facility: MEDICAL CENTER | Age: 69
End: 2024-09-25
Payer: MEDICARE

## 2024-09-25 ENCOUNTER — TELEPHONE (OUTPATIENT)
Dept: VASCULAR LAB | Facility: MEDICAL CENTER | Age: 69
End: 2024-09-25

## 2024-09-25 VITALS
SYSTOLIC BLOOD PRESSURE: 110 MMHG | HEIGHT: 60 IN | DIASTOLIC BLOOD PRESSURE: 68 MMHG | OXYGEN SATURATION: 96 % | HEART RATE: 74 BPM | TEMPERATURE: 98.2 F | BODY MASS INDEX: 24.02 KG/M2 | RESPIRATION RATE: 17 BRPM | WEIGHT: 122.36 LBS

## 2024-09-25 DIAGNOSIS — Z95.2 S/P AVR (AORTIC VALVE REPLACEMENT): ICD-10-CM

## 2024-09-25 DIAGNOSIS — C50.412 MALIGNANT NEOPLASM OF UPPER-OUTER QUADRANT OF LEFT BREAST IN FEMALE, ESTROGEN RECEPTOR POSITIVE (HCC): ICD-10-CM

## 2024-09-25 DIAGNOSIS — Z17.0 MALIGNANT NEOPLASM OF UPPER-OUTER QUADRANT OF LEFT BREAST IN FEMALE, ESTROGEN RECEPTOR POSITIVE (HCC): ICD-10-CM

## 2024-09-25 LAB — INR PPP: 2.7 (ref 2–3.5)

## 2024-09-25 PROCEDURE — 99214 OFFICE O/P EST MOD 30 MIN: CPT | Performed by: INTERNAL MEDICINE

## 2024-09-25 PROCEDURE — 99212 OFFICE O/P EST SF 10 MIN: CPT | Performed by: INTERNAL MEDICINE

## 2024-09-25 RX ORDER — ANASTROZOLE 1 MG/1
1 TABLET ORAL DAILY
Qty: 90 TABLET | Refills: 1 | Status: SHIPPED | OUTPATIENT
Start: 2024-09-25

## 2024-09-25 ASSESSMENT — ENCOUNTER SYMPTOMS
EYES NEGATIVE: 1
CONSTITUTIONAL NEGATIVE: 1
RESPIRATORY NEGATIVE: 1
MUSCULOSKELETAL NEGATIVE: 1
GASTROINTESTINAL NEGATIVE: 1
NEUROLOGICAL NEGATIVE: 1
PSYCHIATRIC NEGATIVE: 1
CARDIOVASCULAR NEGATIVE: 1

## 2024-09-25 ASSESSMENT — FIBROSIS 4 INDEX: FIB4 SCORE: 1.99

## 2024-09-25 ASSESSMENT — PAIN SCALES - GENERAL: PAINLEVEL: NO PAIN

## 2024-09-25 NOTE — PROGRESS NOTES
Anticoagulation Summary  As of 2024      INR goal:  2.0-3.0   TTR:  55.8% (6.6 y)   INR used for dosin.70 (2024)   Warfarin maintenance plan:  2.5 mg (5 mg x 0.5) every Mon, Wed, Fri; 5 mg (5 mg x 1) all other days   Weekly warfarin total:  27.5 mg   Plan last modified:  Santo JoD (2024)   Next INR check:  10/2/2024   Target end date:  Indefinite    Indications    S/P AVR (aortic valve replacement) [Z95.2]                 Anticoagulation Episode Summary       INR check location:  Home Draw    Preferred lab:  --    Send INR reminders to:  --    Comments:  Denny CELESTIN  only call if out of range          Anticoagulation Care Providers       Provider Role Specialty Phone number    Chinyere Marcus M.D. Referring Cardiovascular Disease (Cardiology) [phone]    Yossi Khalil, PharmD Responsible Pharmacy           Anticoagulation Patient Findings    INR therapeutic at 2.7.  Per chart notes, patient requests contact only when out of range.  Pt is to continue with current warfarin dosing regimen.  Follow up in 1 weeks, to reduce risk of adverse events related to this high risk medication,  Warfarin.    Tulio Sotelo, SantoD, BCACP

## 2024-09-25 NOTE — ADDENDUM NOTE
Encounter addended by: Henry Watson, Med Ass't on: 9/25/2024 1:51 PM   Actions taken: Charge Capture section accepted

## 2024-09-25 NOTE — TELEPHONE ENCOUNTER
INR READING    PT Name: Marline Perry    PT Reports INR Value: 2.7    Date of INR Results: 9/25/2024    Concerns/Questions Reported: or N/A: N/A    Callback Number: [phone] (home)

## 2024-09-25 NOTE — PROGRESS NOTES
Medical oncology follow-up visit: 9/25/2024      Referring Physician: Sari Ross MD  Primary Care:  Amrik Duncan D.O.    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 female who had an an abnormal screening mammogram on 5/22/2024.  This showed an irregular lobulated mass in the left upper outer quadrant.  On 621 she underwent a needle biopsy of the left breast and axillary lymph node.  Pathology demonstrated invasive ductal carcinoma grade 2, no lymph vascular invasion, ER positive greater than 90%, AZ positive greater than 90%, Ki-67 15%, HER2 2+ IHC FISH negative, left axillary lymph node negative.  She saw Dr. Ross in consultation and underwent left partial mastectomy and sentinel lymph node biopsy on 7/16/2024.  Pathology demonstrated 1.2 cm grade 2 invasive ductal carcinoma with a separate focus of intermediate grade DCIS.  2 sentinel lymph nodes were negative.  Postoperative course was unremarkable.  She is in good health for age with a artificial aortic valve placed 20 years ago for a congenital bicuspid valve.  She has been on Coumadin subsequently.  She is physically active.  She had menopause in her late 40s and did not have hot flashes or night sweats.  She had a bone density several years ago and apparently has osteopenia in her femur.  It has not been repeated recently.  She is on calcium and vitamin D supplementation.  She has not yet seen radiation but will be scheduled.    Interval history 9/25/2024: She underwent 5 fraction APBI to the left breast and completed in early September 2024.  Overall she tolerated extremely well.  Her only ongoing and new problem is a diffuse rash on her forearms predominantly and some on her back and legs.  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  She was  prescribed low-dose methotrexate after labs which have yet been drawn.  She also had a DEXA scan that showed improvement in her bone density with ongoing osteopenia in the lumbar spine and minimal osteopenia in the femur.  She is taking vitamin D and calcium.  She has no hot flashes or night sweats.  Her anticoagulation shows excellent therapeutic range.      Past Medical History:   Diagnosis Date    Anemia     Remote hx    Breath shortness     Related to bronchiectasis    Bronchiectasis (HCC)     Bronchitis     Cancer (HCC)     Left breast    Chickenpox     Chronic cough     COPD (chronic obstructive pulmonary disease) (HCC)     Heart murmur     From birth    Heart valve disease 2001    Aortic Valve Replacement AAA repair    Hemorrhagic disorder (HCC)     Coumadin    High cholesterol     Hyperlipidemia     Influenza     Malignant neoplasm of upper-outer quadrant of left breast in female, estrogen receptor positive (HCC)     Migraines     Mumps     Nasal drainage     Sjogren's disease (HCC)        Past Surgical History:   Procedure Laterality Date    PB MASTECTOMY, PARTIAL Left 7/16/2024    Procedure: LEFT PARTIAL MASTECTOMY, INTRAOPERATIVE WIRE LOCALIZATION, LEFT SENTINEL LYMPH NODE INJECTION WITH EXCISION OF AXILLARY NODES;  Surgeon: Sari Ross M.D.;  Location: SURGERY SAME DAY [facility];  Service: General    NODE BIOPSY SENTINEL Left 7/16/2024    Procedure: BIOPSY, LYMPH NODE, SENTINEL;  Surgeon: Sari Ross M.D.;  Location: SURGERY SAME DAY [facility];  Service: General    AORTIC VALVE REPLACEMENT      BRONCHOSCOPY      HYSTERECTOMY LAPAROSCOPY      SINUSCOPE         Social History     Tobacco Use    Smoking status: Never    Smokeless tobacco: Never   Vaping Use    Vaping status: Never Used   Substance Use Topics    Alcohol use: Yes     Alcohol/week: 1.8 - 2.4 oz     Types: 3 - 4 Shots of liquor per week    Drug use: No        Family History   Problem Relation Age of Onset    Arthritis Mother      Hypertension Father     Kidney Disease Father     Stroke Sister     Arthritis Sister     No Known Problems Brother     No Known Problems Brother     Cancer Maternal Uncle         Lung ca- smoker    Breast Cancer Paternal Aunt     Colorectal Cancer Paternal Uncle         Colon    Colon Cancer Paternal Uncle     Heart Attack Maternal Grandmother     Stroke Maternal Grandfather     Heart Disease Paternal Grandmother     No Known Problems Paternal Grandfather     No Known Problems Son     Cancer Other         Gallbladder cancer       Allergies as of 09/25/2024 - Reviewed 09/25/2024   Allergen Reaction Noted    Spironolactone Rash 06/11/2008    Latex Rash 07/11/2024    Neomycin Rash 06/11/2008    Tape Rash 06/11/2008         Current Outpatient Medications:     Flaxseed, Linseed, (FLAXSEED OIL) 1000 MG Cap, Take 2,000 mg by mouth every day., Disp: , Rfl:     Magnesium 400 MG Tab, Take 400 mg by mouth every day., Disp: , Rfl:     Vitamin D, Cholecalciferol, 50 MCG (2000 UT) Cap, Take 50 mcg by mouth every day., Disp: , Rfl:     Ascorbic Acid (VITAMIN C) 500 MG Cap, Take 500 mg by mouth every day., Disp: , Rfl:     SUMAtriptan (IMITREX) 50 MG Tab, Take 1 Tablet by mouth one time as needed for Migraine for up to 1 dose., Disp: 10 Tablet, Rfl: 3    warfarin (COUMADIN) 5 MG Tab, TAKE ONE-HALF (1/2) TO ONE TABLET DAILY OR AS DIRECTED BY ANTICOAGULATION CLINIC., Disp: 100 Tablet, Rfl: 3    BIOTIN PO, Take 1 Capsule by mouth every day.    MEDICATION INSTRUCTIONS FOR SURGERY/PROCEDURE SCHEDULED FOR 7/16/24  DO NOT TAKE 7 DAYS PRIOR TO SURGERY, Disp: , Rfl:     acetaminophen (TYLENOL) 325 MG Tab, Take 650 mg by mouth every four hours as needed for Mild Pain.    MEDICATION INSTRUCTIONS FOR SURGERY/PROCEDURE SCHEDULED FOR 7/16/24  YOU MAY CONTINUE TAKING UP TO AND ON DAY OR SURGERY, Disp: , Rfl:     hydrocortisone 2.5 % Cream topical cream, AAA face BID PRN itching, redness, rash. Stop use after 2-3 weeks. Avoid use around eyes  (Patient taking differently: Apply 1 Application topically 2 times a day. AAA face BID PRN itching, redness, rash. Stop use after 2-3 weeks. Avoid use around eyes   FOR SURGERY ON 7/16/24: DO NOT TAKE DAY OF SURGERY), Disp: 20 g, Rfl: 0    fluocinonide (LIDEX) 0.05 % Cream, AAA arm spots BID PRN itching. Avoid use on face, axilla, groin (Patient taking differently: Apply 1 Application topically 2 times a day. AAA arm spots BID PRN itching. Avoid use on face, axilla, groin    FOR SURGERY ON 7/16/24: DO NOT TAKE DAY OF SURGERY), Disp: 30 g, Rfl: 1    rosuvastatin (CRESTOR) 20 MG Tab, TAKE 1 TABLET EVERY EVENING (Patient taking differently: Take 20 mg by mouth every day. TAKE 1 TABLET EVERY EVENING   MEDICATION INSTRUCTIONS FOR SURGERY/PROCEDURE SCHEDULED FOR 7/16/24  OK TO CONTINUE TAKING PRIOR TO SURGERY AND DAY OF SURGERY), Disp: 90 Tablet, Rfl: 3    methotrexate 2.5 MG tablet, Take 2 tabs PO X1, then repeat CBC/CMP 7 days thereafter (Patient not taking: Reported on 9/25/2024), Disp: 2 Tablet, Rfl: 0    predniSONE (DELTASONE) 10 MG Tab, Take 4 X 3 days, then 3 X 3 days, then 2X 3 days, then 1 X 3 days, then 1/2 X 4 days then stop. (Patient not taking: Reported on 9/25/2024), Disp: 32 Tablet, Rfl: 0    lidocaine-prilocaine (EMLA) 2.5-2.5 % Cream, , Disp: , Rfl:     Review of Systems:  Review of Systems   Constitutional: Negative.    HENT: Negative.     Eyes: Negative.    Respiratory: Negative.     Cardiovascular: Negative.    Gastrointestinal: Negative.    Genitourinary: Negative.    Musculoskeletal: Negative.    Skin: Negative.    Neurological: Negative.    Endo/Heme/Allergies: Negative.    Psychiatric/Behavioral: Negative.            Physical Exam:  Vitals:    09/25/24 1300   BP: 110/68   BP Location: Right arm   Patient Position: Sitting   Pulse: 74   Resp: 17   Temp: 36.8 °C (98.2 °F)   TempSrc: Temporal   SpO2: 96%   Weight: 55.5 kg (122 lb 5.7 oz)   Height: 1.524 m (5')       DESC; KARNOFSKY SCALE WITH ECOG  EQUIVALENT: 100, Fully active, able to carry on all pre-disease performed without restriction (ECOG equivalent 0)    DISTRESS LEVEL: no acute distress    Physical Exam  Constitutional:       Appearance: Normal appearance.   HENT:      Head: Normocephalic.      Mouth/Throat:      Mouth: Mucous membranes are moist.      Pharynx: Oropharynx is clear.   Eyes:      Extraocular Movements: Extraocular movements intact.      Conjunctiva/sclera: Conjunctivae normal.      Pupils: Pupils are equal, round, and reactive to light.   Cardiovascular:      Rate and Rhythm: Normal rate and regular rhythm.      Pulses: Normal pulses.      Heart sounds: Murmur heard.      Systolic murmur is present with a grade of 2/6.      Comments: Loud A2 consistent with artificial valve.  Pulmonary:      Effort: Pulmonary effort is normal.      Breath sounds: Normal breath sounds.   Chest:      Comments: Good cosmetic result in the left breast at the lumpectomy and sentinel lymph node biopsy.  Both breasts without masses nipple discharge or tenderness.  Abdominal:      General: Abdomen is flat. Bowel sounds are normal.      Palpations: Abdomen is soft. There is no mass.   Musculoskeletal:         General: Normal range of motion.   Skin:     General: Skin is warm.   Neurological:      General: No focal deficit present.      Mental Status: She is alert and oriented to person, place, and time.   Psychiatric:         Mood and Affect: Mood normal.         Behavior: Behavior normal.          Labs:  Anticoagulation Monitoring on 07/23/2024   Component Date Value Ref Range Status    INR 07/23/2024 2.00  2 - 3.5 Final   Anticoagulation Monitoring on 07/18/2024   Component Date Value Ref Range Status    INR 07/18/2024 2.10  2 - 3.5 Final   Admission on 07/16/2024, Discharged on 07/16/2024   Component Date Value Ref Range Status    PT 07/16/2024 12.9  12.0 - 14.6 sec Final    INR 07/16/2024 0.97  0.87 - 1.13 Final    Comment: INR - Non-therapeutic Reference  Range: 0.87-1.13  INR - Therapeutic Reference Range: 2.0-4.0      Pathology Request 2024 Sent to [facility]   Final   Pre-Admission Testing on 2024   Component Date Value Ref Range Status    WBC 2024 5.8  4.8 - 10.8 K/uL Final    RBC 2024 4.55  4.20 - 5.40 M/uL Final    Hemoglobin 2024 13.7  12.0 - 16.0 g/dL Final    Hematocrit 2024 40.3  37.0 - 47.0 % Final    MCV 2024 88.6  81.4 - 97.8 fL Final    MCH 2024 30.1  27.0 - 33.0 pg Final    MCHC 2024 34.0  32.2 - 35.5 g/dL Final    RDW 2024 43.1  35.9 - 50.0 fL Final    Platelet Count 2024 230  164 - 446 K/uL Final    MPV 2024 9.0  9.0 - 12.9 fL Final    Sodium 2024 138  135 - 145 mmol/L Final    Potassium 2024 4.5  3.6 - 5.5 mmol/L Final    Chloride 2024 105  96 - 112 mmol/L Final    Co2 2024 25  20 - 33 mmol/L Final    Glucose 2024 90  65 - 99 mg/dL Final    Bun 2024 19  8 - 22 mg/dL Final    Creatinine 2024 0.89  0.50 - 1.40 mg/dL Final    Calcium 2024 9.5  8.5 - 10.5 mg/dL Final    Anion Gap 2024 8.0  7.0 - 16.0 Final    Report 2024    Final                    Value:[facility]    Test Date:  2024  Pt Name:    NATAN CERDA             Department: [facility]  MRN:        [id number]                      Room:  Gender:     Female                       Technician: PEPE  :        [id number]                   Requested By:JOSEPH YIN  Order #:    [id number]                    Reading MD: Jorge Cortez MD    Measurements  Intervals                                Axis  Rate:       64                           P:          86  OK:         183                          QRS:        53  QRSD:       101                          T:          52  QT:         431  QTc:        445    Interpretive Statements  Sinus rhythm  Compared to ECG 2018 10:43:52  No significant changes  Electronically Signed On 2024 15:49:35 PDT by  Jorge Cortez MD      GFR (CKD-EPI) 07/12/2024 70  >60 mL/min/1.73 m 2 Final    Comment: Estimated Glomerular Filtration Rate is calculated using  race neutral CKD-EPI 2021 equation per NKF-ASN recommendations.     Anticoagulation Monitoring on 07/01/2024   Component Date Value Ref Range Status    INR 06/27/2024 2.00  2 - 3.5 Final       Imaging:   All listed images below have been independently reviewed by me. I agree with the findings as summarized below:    MA-SURGICAL SPECIMEN (BREAST)    Result Date: 7/16/2024 7/16/2024 9:38 AM HISTORY/REASON FOR EXAM:  Part of surgery; Left breast specimen TECHNIQUE/EXAM DESCRIPTION AND NUMBER OF VIEWS: 4 intraoperative specimen radiographs. COMPARISON: FINDINGS: 4 intraoperative specimen radiographs were provided. The first image demonstrates a  clip in a soft tissue specimen. The second 2 images demonstrates a specimen with a wire localizer within a soft tissue specimen. The fourth image demonstrates 2 soft tissue specimens, one containing a localization wire and the second a biopsy clip.     Portable intraoperative imaging with findings as described above.    NM-INJ TRACER ID SENTINAL NODE LEFT    Result Date: 7/15/2024  7/15/2024 4:02 PM HISTORY/REASON FOR EXAM:  Breast Cancer TECHNIQUE/EXAM DESCRIPTION AND NUMBER OF VIEWS: Creola node injection. COMPARISON: None TECHNIQUE: Four locations around the areola were anesthetized with lidocaine. 1.073 mCi technetium 99m sulphur colloid was given intradermally, at four locations surrounding the areola of the LEFT breast. FINDINGS: Procedure was performed under aseptic conditions with local anesthesia.  Radionuclide was injected intradermally at four locations surrounding the areola of the breast.  No complications were encountered.     Successful injection of radionuclide at four locations surrounding the LEFT breast areola.       Pathology:      Assessment & Plan:  1.  Invasive ductal carcinoma, grade 2, stage I  (pT1c, PN 0) ER greater than 90%, SD greater than 90%, HER2 2+ IHC FISH negative, Ki-67 15%.  Status post partial mastectomy and APBI radiation.  To initiate anastrozole.  2.  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  To start low-dose methotrexate.  3.  Osteopenia with improved bone density    Plan: We will order anastrozole and plan to start it after she has been on the methotrexate a month and tolerating it so that we can determine the etiology of any toxicities.  I will see her back in 3 months for routine follow-up.    Any questions and concerns raised by the patient were answered to the best of my ability. Thank you for allowing me to participate in the care for this patient. Please feel free to contact me for any questions or concerns.     Michael Kaur M.D.

## 2024-09-26 ENCOUNTER — HOSPITAL ENCOUNTER (OUTPATIENT)
Dept: LAB | Facility: MEDICAL CENTER | Age: 69
End: 2024-09-26
Attending: DERMATOLOGY
Payer: MEDICARE

## 2024-09-26 DIAGNOSIS — L43.9 LICHEN PLANUS: ICD-10-CM

## 2024-09-26 DIAGNOSIS — C50.412 MALIGNANT NEOPLASM OF UPPER-OUTER QUADRANT OF LEFT BREAST IN FEMALE, ESTROGEN RECEPTOR POSITIVE (HCC): ICD-10-CM

## 2024-09-26 DIAGNOSIS — Z17.0 MALIGNANT NEOPLASM OF UPPER-OUTER QUADRANT OF LEFT BREAST IN FEMALE, ESTROGEN RECEPTOR POSITIVE (HCC): ICD-10-CM

## 2024-09-26 DIAGNOSIS — Z79.899 HIGH RISK MEDICATION USE: ICD-10-CM

## 2024-09-26 LAB
ALBUMIN SERPL BCP-MCNC: 3.7 G/DL (ref 3.2–4.9)
ALBUMIN/GLOB SERPL: 1.2 G/DL
ALP SERPL-CCNC: 79 U/L (ref 30–99)
ALT SERPL-CCNC: 49 U/L (ref 2–50)
ANION GAP SERPL CALC-SCNC: 13 MMOL/L (ref 7–16)
AST SERPL-CCNC: 41 U/L (ref 12–45)
BILIRUB SERPL-MCNC: 0.5 MG/DL (ref 0.1–1.5)
BUN SERPL-MCNC: 18 MG/DL (ref 8–22)
CALCIUM ALBUM COR SERPL-MCNC: 9.4 MG/DL (ref 8.5–10.5)
CALCIUM SERPL-MCNC: 9.2 MG/DL (ref 8.5–10.5)
CHLORIDE SERPL-SCNC: 105 MMOL/L (ref 96–112)
CO2 SERPL-SCNC: 23 MMOL/L (ref 20–33)
CREAT SERPL-MCNC: 1.07 MG/DL (ref 0.5–1.4)
GFR SERPLBLD CREATININE-BSD FMLA CKD-EPI: 56 ML/MIN/1.73 M 2
GLOBULIN SER CALC-MCNC: 3.1 G/DL (ref 1.9–3.5)
GLUCOSE SERPL-MCNC: 84 MG/DL (ref 65–99)
HCV AB SER QL: NORMAL
POTASSIUM SERPL-SCNC: 4.1 MMOL/L (ref 3.6–5.5)
PROT SERPL-MCNC: 6.8 G/DL (ref 6–8.2)
SODIUM SERPL-SCNC: 141 MMOL/L (ref 135–145)

## 2024-09-26 PROCEDURE — 36415 COLL VENOUS BLD VENIPUNCTURE: CPT

## 2024-09-26 PROCEDURE — 80053 COMPREHEN METABOLIC PANEL: CPT

## 2024-09-26 PROCEDURE — 86803 HEPATITIS C AB TEST: CPT

## 2024-09-26 RX ORDER — HYDROCORTISONE 2.5 %
CREAM (GRAM) TOPICAL
Qty: 20 G | Refills: 0 | Status: SHIPPED | OUTPATIENT
Start: 2024-09-26

## 2024-10-02 ENCOUNTER — PATIENT OUTREACH (OUTPATIENT)
Dept: ONCOLOGY | Facility: MEDICAL CENTER | Age: 69
End: 2024-10-02
Payer: MEDICARE

## 2024-10-02 DIAGNOSIS — Z00.6 CLINICAL TRIAL PARTICIPANT: ICD-10-CM

## 2024-10-03 ENCOUNTER — HOSPITAL ENCOUNTER (OUTPATIENT)
Dept: LAB | Facility: MEDICAL CENTER | Age: 69
End: 2024-10-03
Attending: DERMATOLOGY
Payer: MEDICARE

## 2024-10-03 DIAGNOSIS — Z79.899 HIGH RISK MEDICATION USE: ICD-10-CM

## 2024-10-03 LAB
BASOPHILS # BLD AUTO: 0.9 % (ref 0–1.8)
BASOPHILS # BLD: 0.03 K/UL (ref 0–0.12)
EOSINOPHIL # BLD AUTO: 0.28 K/UL (ref 0–0.51)
EOSINOPHIL NFR BLD: 8.3 % (ref 0–6.9)
ERYTHROCYTE [DISTWIDTH] IN BLOOD BY AUTOMATED COUNT: 49.1 FL (ref 35.9–50)
HCT VFR BLD AUTO: 41.4 % (ref 37–47)
HGB BLD-MCNC: 13.9 G/DL (ref 12–16)
IMM GRANULOCYTES # BLD AUTO: 0.01 K/UL (ref 0–0.11)
IMM GRANULOCYTES NFR BLD AUTO: 0.3 % (ref 0–0.9)
LYMPHOCYTES # BLD AUTO: 1.2 K/UL (ref 1–4.8)
LYMPHOCYTES NFR BLD: 35.7 % (ref 22–41)
MCH RBC QN AUTO: 31.2 PG (ref 27–33)
MCHC RBC AUTO-ENTMCNC: 33.6 G/DL (ref 32.2–35.5)
MCV RBC AUTO: 93 FL (ref 81.4–97.8)
MONOCYTES # BLD AUTO: 0.52 K/UL (ref 0–0.85)
MONOCYTES NFR BLD AUTO: 15.5 % (ref 0–13.4)
NEUTROPHILS # BLD AUTO: 1.32 K/UL (ref 1.82–7.42)
NEUTROPHILS NFR BLD: 39.3 % (ref 44–72)
NRBC # BLD AUTO: 0 K/UL
NRBC BLD-RTO: 0 /100 WBC (ref 0–0.2)
PLATELET # BLD AUTO: 253 K/UL (ref 164–446)
PMV BLD AUTO: 9.8 FL (ref 9–12.9)
RBC # BLD AUTO: 4.45 M/UL (ref 4.2–5.4)
WBC # BLD AUTO: 3.4 K/UL (ref 4.8–10.8)

## 2024-10-03 PROCEDURE — 80053 COMPREHEN METABOLIC PANEL: CPT

## 2024-10-03 PROCEDURE — 85025 COMPLETE CBC W/AUTO DIFF WBC: CPT

## 2024-10-03 PROCEDURE — 36415 COLL VENOUS BLD VENIPUNCTURE: CPT

## 2024-10-04 DIAGNOSIS — Z79.899 HIGH RISK MEDICATION USE: ICD-10-CM

## 2024-10-04 LAB
ALBUMIN SERPL BCP-MCNC: 3.7 G/DL (ref 3.2–4.9)
ALBUMIN/GLOB SERPL: 1.2 G/DL
ALP SERPL-CCNC: 75 U/L (ref 30–99)
ALT SERPL-CCNC: 67 U/L (ref 2–50)
ANION GAP SERPL CALC-SCNC: 8 MMOL/L (ref 7–16)
AST SERPL-CCNC: 50 U/L (ref 12–45)
BILIRUB SERPL-MCNC: 0.4 MG/DL (ref 0.1–1.5)
BUN SERPL-MCNC: 19 MG/DL (ref 8–22)
CALCIUM ALBUM COR SERPL-MCNC: 9.1 MG/DL (ref 8.5–10.5)
CALCIUM SERPL-MCNC: 8.9 MG/DL (ref 8.5–10.5)
CHLORIDE SERPL-SCNC: 108 MMOL/L (ref 96–112)
CO2 SERPL-SCNC: 25 MMOL/L (ref 20–33)
CREAT SERPL-MCNC: 1.03 MG/DL (ref 0.5–1.4)
GFR SERPLBLD CREATININE-BSD FMLA CKD-EPI: 59 ML/MIN/1.73 M 2
GLOBULIN SER CALC-MCNC: 3 G/DL (ref 1.9–3.5)
GLUCOSE SERPL-MCNC: 88 MG/DL (ref 65–99)
POTASSIUM SERPL-SCNC: 4.3 MMOL/L (ref 3.6–5.5)
PROT SERPL-MCNC: 6.7 G/DL (ref 6–8.2)
SODIUM SERPL-SCNC: 141 MMOL/L (ref 135–145)

## 2024-10-09 ENCOUNTER — ANTICOAGULATION MONITORING (OUTPATIENT)
Dept: MEDICAL GROUP | Facility: PHYSICIAN GROUP | Age: 69
End: 2024-10-09
Payer: MEDICARE

## 2024-10-09 DIAGNOSIS — Z95.2 S/P AVR (AORTIC VALVE REPLACEMENT): ICD-10-CM

## 2024-10-09 LAB — INR PPP: 2.5 (ref 2–3.5)

## 2024-10-10 ENCOUNTER — HOSPITAL ENCOUNTER (OUTPATIENT)
Dept: LAB | Facility: MEDICAL CENTER | Age: 69
End: 2024-10-10
Attending: DERMATOLOGY
Payer: MEDICARE

## 2024-10-10 DIAGNOSIS — Z79.899 HIGH RISK MEDICATION USE: ICD-10-CM

## 2024-10-10 LAB
ALBUMIN SERPL BCP-MCNC: 3.8 G/DL (ref 3.2–4.9)
ALBUMIN/GLOB SERPL: 1.3 G/DL
ALP SERPL-CCNC: 83 U/L (ref 30–99)
ALT SERPL-CCNC: 71 U/L (ref 2–50)
ANION GAP SERPL CALC-SCNC: 12 MMOL/L (ref 7–16)
APPEARANCE UR: CLEAR
AST SERPL-CCNC: 65 U/L (ref 12–45)
BACTERIA #/AREA URNS HPF: ABNORMAL /HPF
BASOPHILS # BLD AUTO: 1.1 % (ref 0–1.8)
BASOPHILS # BLD: 0.05 K/UL (ref 0–0.12)
BILIRUB SERPL-MCNC: 0.3 MG/DL (ref 0.1–1.5)
BILIRUB UR QL STRIP.AUTO: NEGATIVE
BUN SERPL-MCNC: 16 MG/DL (ref 8–22)
CALCIUM ALBUM COR SERPL-MCNC: 9.5 MG/DL (ref 8.5–10.5)
CALCIUM SERPL-MCNC: 9.3 MG/DL (ref 8.5–10.5)
CHLORIDE SERPL-SCNC: 105 MMOL/L (ref 96–112)
CO2 SERPL-SCNC: 23 MMOL/L (ref 20–33)
COLOR UR: YELLOW
CREAT SERPL-MCNC: 0.97 MG/DL (ref 0.5–1.4)
EOSINOPHIL # BLD AUTO: 0.25 K/UL (ref 0–0.51)
EOSINOPHIL NFR BLD: 5.3 % (ref 0–6.9)
EPI CELLS #/AREA URNS HPF: ABNORMAL /HPF
ERYTHROCYTE [DISTWIDTH] IN BLOOD BY AUTOMATED COUNT: 49 FL (ref 35.9–50)
GFR SERPLBLD CREATININE-BSD FMLA CKD-EPI: 63 ML/MIN/1.73 M 2
GLOBULIN SER CALC-MCNC: 3 G/DL (ref 1.9–3.5)
GLUCOSE SERPL-MCNC: 85 MG/DL (ref 65–99)
GLUCOSE UR STRIP.AUTO-MCNC: NEGATIVE MG/DL
HCT VFR BLD AUTO: 42.4 % (ref 37–47)
HGB BLD-MCNC: 13.9 G/DL (ref 12–16)
HYALINE CASTS #/AREA URNS LPF: ABNORMAL /LPF
IMM GRANULOCYTES # BLD AUTO: 0.03 K/UL (ref 0–0.11)
IMM GRANULOCYTES NFR BLD AUTO: 0.6 % (ref 0–0.9)
KETONES UR STRIP.AUTO-MCNC: NEGATIVE MG/DL
LEUKOCYTE ESTERASE UR QL STRIP.AUTO: ABNORMAL
LYMPHOCYTES # BLD AUTO: 1.36 K/UL (ref 1–4.8)
LYMPHOCYTES NFR BLD: 29.1 % (ref 22–41)
MCH RBC QN AUTO: 30.8 PG (ref 27–33)
MCHC RBC AUTO-ENTMCNC: 32.8 G/DL (ref 32.2–35.5)
MCV RBC AUTO: 94 FL (ref 81.4–97.8)
MICRO URNS: ABNORMAL
MONOCYTES # BLD AUTO: 0.72 K/UL (ref 0–0.85)
MONOCYTES NFR BLD AUTO: 15.4 % (ref 0–13.4)
NEUTROPHILS # BLD AUTO: 2.27 K/UL (ref 1.82–7.42)
NEUTROPHILS NFR BLD: 48.5 % (ref 44–72)
NITRITE UR QL STRIP.AUTO: NEGATIVE
NRBC # BLD AUTO: 0 K/UL
NRBC BLD-RTO: 0 /100 WBC (ref 0–0.2)
PH UR STRIP.AUTO: 5.5 [PH] (ref 5–8)
PLATELET # BLD AUTO: 265 K/UL (ref 164–446)
PMV BLD AUTO: 9.4 FL (ref 9–12.9)
POTASSIUM SERPL-SCNC: 4.3 MMOL/L (ref 3.6–5.5)
PROT SERPL-MCNC: 6.8 G/DL (ref 6–8.2)
PROT UR QL STRIP: NEGATIVE MG/DL
RBC # BLD AUTO: 4.51 M/UL (ref 4.2–5.4)
RBC # URNS HPF: ABNORMAL /HPF
RBC UR QL AUTO: ABNORMAL
SODIUM SERPL-SCNC: 140 MMOL/L (ref 135–145)
SP GR UR STRIP.AUTO: 1.02
UROBILINOGEN UR STRIP.AUTO-MCNC: 0.2 MG/DL
WBC # BLD AUTO: 4.7 K/UL (ref 4.8–10.8)
WBC #/AREA URNS HPF: ABNORMAL /HPF

## 2024-10-10 PROCEDURE — 81001 URINALYSIS AUTO W/SCOPE: CPT

## 2024-10-10 PROCEDURE — 85025 COMPLETE CBC W/AUTO DIFF WBC: CPT

## 2024-10-10 PROCEDURE — 36415 COLL VENOUS BLD VENIPUNCTURE: CPT

## 2024-10-10 PROCEDURE — 80053 COMPREHEN METABOLIC PANEL: CPT

## 2024-10-10 PROCEDURE — 87086 URINE CULTURE/COLONY COUNT: CPT

## 2024-10-10 PROCEDURE — 84433 ASY THIOPURIN S-MTHYLTRNSFRS: CPT

## 2024-10-11 ENCOUNTER — OFFICE VISIT (OUTPATIENT)
Dept: DERMATOLOGY | Facility: IMAGING CENTER | Age: 69
End: 2024-10-11
Payer: MEDICARE

## 2024-10-11 DIAGNOSIS — R74.01 TRANSAMINITIS: ICD-10-CM

## 2024-10-11 DIAGNOSIS — L43.9 LICHEN PLANUS: ICD-10-CM

## 2024-10-11 DIAGNOSIS — L29.9 ITCHING: ICD-10-CM

## 2024-10-11 DIAGNOSIS — Z79.899 HIGH RISK MEDICATION USE: ICD-10-CM

## 2024-10-11 PROCEDURE — 99214 OFFICE O/P EST MOD 30 MIN: CPT | Performed by: DERMATOLOGY

## 2024-10-12 LAB
BACTERIA UR CULT: NORMAL
SIGNIFICANT IND 70042: NORMAL
SITE SITE: NORMAL
SOURCE SOURCE: NORMAL

## 2024-10-14 LAB — TPMT BLD-CCNC: 29.9 U/ML (ref 24–44)

## 2024-10-16 ENCOUNTER — HOSPITAL ENCOUNTER (OUTPATIENT)
Dept: LAB | Facility: MEDICAL CENTER | Age: 69
End: 2024-10-16
Attending: FAMILY MEDICINE

## 2024-10-16 DIAGNOSIS — Z00.6 CLINICAL TRIAL PARTICIPANT: ICD-10-CM

## 2024-10-21 ENCOUNTER — HOSPITAL ENCOUNTER (OUTPATIENT)
Dept: RADIATION ONCOLOGY | Facility: MEDICAL CENTER | Age: 69
End: 2024-10-21
Attending: RADIOLOGY
Payer: MEDICARE

## 2024-10-21 ENCOUNTER — TELEPHONE (OUTPATIENT)
Dept: HEMATOLOGY ONCOLOGY | Facility: MEDICAL CENTER | Age: 69
End: 2024-10-21
Payer: MEDICARE

## 2024-10-21 LAB
ELF SCORE: 10.2 -
HA (HYALURONIC ACID): 103.26 NG/ML
PIIINP (AMINO-TERMINAL PROPEPTIDE): 9.87 NG/ML
RELATIVE RISK: ABNORMAL
RISK GROUP: ABNORMAL
RISK: 23.6 %
TIMP-1 (TISSUE INHIBITOR OF MMP1): 307.1 NG/ML

## 2024-10-23 ENCOUNTER — ANTICOAGULATION MONITORING (OUTPATIENT)
Dept: VASCULAR LAB | Facility: MEDICAL CENTER | Age: 69
End: 2024-10-23
Payer: MEDICARE

## 2024-10-23 DIAGNOSIS — Z95.2 S/P AVR (AORTIC VALVE REPLACEMENT): ICD-10-CM

## 2024-10-23 LAB — INR PPP: 2.6 (ref 2–3.5)

## 2024-10-25 ENCOUNTER — DOCUMENTATION (OUTPATIENT)
Dept: VASCULAR LAB | Facility: MEDICAL CENTER | Age: 69
End: 2024-10-25
Payer: MEDICARE

## 2024-10-28 LAB
APOB+LDLR+PCSK9 GENE MUT ANL BLD/T: NOT DETECTED
BRCA1+BRCA2 DEL+DUP + FULL MUT ANL BLD/T: NOT DETECTED
MLH1+MSH2+MSH6+PMS2 GN DEL+DUP+FUL M: NOT DETECTED

## 2024-10-29 ENCOUNTER — PHARMACY VISIT (OUTPATIENT)
Dept: PHARMACY | Facility: MEDICAL CENTER | Age: 69
End: 2024-10-29
Payer: MEDICARE

## 2024-10-29 ENCOUNTER — OFFICE VISIT (OUTPATIENT)
Dept: CARDIOLOGY | Facility: MEDICAL CENTER | Age: 69
End: 2024-10-29
Payer: MEDICARE

## 2024-10-29 VITALS
BODY MASS INDEX: 25.11 KG/M2 | OXYGEN SATURATION: 96 % | HEIGHT: 60 IN | RESPIRATION RATE: 18 BRPM | HEART RATE: 91 BPM | SYSTOLIC BLOOD PRESSURE: 112 MMHG | WEIGHT: 127.9 LBS | DIASTOLIC BLOOD PRESSURE: 64 MMHG

## 2024-10-29 DIAGNOSIS — E78.00 HYPERCHOLESTEREMIA: ICD-10-CM

## 2024-10-29 DIAGNOSIS — I71.21 ANEURYSM OF ASCENDING AORTA WITHOUT RUPTURE (HCC): ICD-10-CM

## 2024-10-29 DIAGNOSIS — M35.00 SJOGREN'S SYNDROME, WITH UNSPECIFIED ORGAN INVOLVEMENT (HCC): ICD-10-CM

## 2024-10-29 DIAGNOSIS — Z79.01 WARFARIN ANTICOAGULATION: ICD-10-CM

## 2024-10-29 DIAGNOSIS — I34.0 NON-RHEUMATIC MITRAL REGURGITATION: ICD-10-CM

## 2024-10-29 DIAGNOSIS — Z95.2 S/P AVR (AORTIC VALVE REPLACEMENT): ICD-10-CM

## 2024-10-29 PROCEDURE — RXMED WILLOW AMBULATORY MEDICATION CHARGE: Performed by: INTERNAL MEDICINE

## 2024-10-29 PROCEDURE — 99213 OFFICE O/P EST LOW 20 MIN: CPT

## 2024-10-29 RX ORDER — INFLUENZA A VIRUS A/VICTORIA/4897/2022 IVR-238 (H1N1) ANTIGEN (FORMALDEHYDE INACTIVATED), INFLUENZA A VIRUS A/CALIFORNIA/122/2022 SAN-022 (H3N2) ANTIGEN (FORMALDEHYDE INACTIVATED), AND INFLUENZA B VIRUS B/MICHIGAN/01/2021 ANTIGEN (FORMALDEHYDE INACTIVATED) 60; 60; 60 UG/.5ML; UG/.5ML; UG/.5ML
INJECTION, SUSPENSION INTRAMUSCULAR
Qty: 0.5 ML | Refills: 0 | OUTPATIENT
Start: 2024-10-29

## 2024-10-29 ASSESSMENT — FIBROSIS 4 INDEX: FIB4 SCORE: 1.98

## 2024-10-29 ASSESSMENT — ENCOUNTER SYMPTOMS
NEUROLOGICAL NEGATIVE: 1
DEPRESSION: 0
NERVOUS/ANXIOUS: 0
MUSCULOSKELETAL NEGATIVE: 1
ORTHOPNEA: 0
GASTROINTESTINAL NEGATIVE: 1
SHORTNESS OF BREATH: 0
PALPITATIONS: 0
CONSTITUTIONAL NEGATIVE: 1
PND: 0
EYES NEGATIVE: 1

## 2024-11-03 DIAGNOSIS — E78.5 HYPERLIPIDEMIA, UNSPECIFIED HYPERLIPIDEMIA TYPE: ICD-10-CM

## 2024-11-04 RX ORDER — ROSUVASTATIN CALCIUM 20 MG/1
TABLET, COATED ORAL
Qty: 90 TABLET | Refills: 3 | Status: SHIPPED | OUTPATIENT
Start: 2024-11-04

## 2024-11-04 NOTE — TELEPHONE ENCOUNTER
Is the patient due for a refill? Yes    Was the patient seen the past year? Yes    Date of last office visit: 10/29/24    Does the patient have an upcoming appointment?  No   If yes, When?     Provider to refill:LH    Does the patient have half-way Plus and need 100-day supply? (This applies to ALL medications) Patient does not have SCP

## 2024-11-08 ENCOUNTER — TELEPHONE (OUTPATIENT)
Dept: CARDIOLOGY | Facility: MEDICAL CENTER | Age: 69
End: 2024-11-08
Payer: MEDICARE

## 2024-11-08 ENCOUNTER — ANTICOAGULATION MONITORING (OUTPATIENT)
Dept: VASCULAR LAB | Facility: MEDICAL CENTER | Age: 69
End: 2024-11-08
Payer: MEDICARE

## 2024-11-08 DIAGNOSIS — Z95.2 S/P AVR (AORTIC VALVE REPLACEMENT): ICD-10-CM

## 2024-11-08 DIAGNOSIS — Z29.89 NEED FOR SBE (SUBACUTE BACTERIAL ENDOCARDITIS) PROPHYLAXIS: ICD-10-CM

## 2024-11-08 LAB — INR PPP: 3.4 (ref 2–3.5)

## 2024-11-08 RX ORDER — AMOXICILLIN 500 MG/1
2000 CAPSULE ORAL
Qty: 4 CAPSULE | Refills: 0 | Status: SHIPPED | OUTPATIENT
Start: 2024-11-08

## 2024-11-08 NOTE — TELEPHONE ENCOUNTER
Caller: Samina Perry      Topic/issue: Patient was calling to see if she could get a refill on her Amoxicillin 500 mg for an upcoming dental procedure that she has in January and [facility]. She said if there was an issue with refilling it to call her        Callback Number: [phone]      Thank you    -Johnnie GOMES

## 2024-11-08 NOTE — PROGRESS NOTES
OP   Telephone Anticoagulation Service Note      Anticoagulation Summary  As of 11/8/2024      INR goal:  2.0-3.0   TTR:  56.3% (6.8 y)   INR used for dosing:  3.40 (11/8/2024)   Warfarin maintenance plan:  2.5 mg (5 mg x 0.5) every Mon, Wed, Fri; 5 mg (5 mg x 1) all other days   Weekly warfarin total:  27.5 mg   Plan last modified:  Diann Warren, PharmD (1/9/2024)   Next INR check:  11/22/2024   Target end date:  Indefinite    Indications    S/P AVR (aortic valve replacement) [Z95.2]                 Anticoagulation Episode Summary       INR check location:  Home Draw    Preferred lab:  --    Send INR reminders to:  --    Comments:  Denny CELESTIN  only call if out of range          Anticoagulation Care Providers       Provider Role Specialty Phone number    Chinyere Marcus M.D. Referring Cardiovascular Disease (Cardiology) [phone]    [facility] Responsible Pharmacy           Anticoagulation Patient Findings  Patient Findings       Positives:  Change in diet/appetite (pt did not eat as many green as usual)    Negatives:  Signs/symptoms of thrombosis, Signs/symptoms of bleeding, Laboratory test error suspected, Change in health, Change in alcohol use, Change in activity, Upcoming invasive procedure, Emergency department visit, Upcoming dental procedure, Missed doses, Extra doses, Change in medications, Hospital admission, Bruising, Other complaints          Spoke with the patient on the phone today, reporting a SUPRA-therapeutic INR of 3.4.  Confirmed the current warfarin dosing regimen and patient compliance.  Patient reports she was not eating as many greens as usual this past week.   Patient denies any interval changes to medications.   Patient denies any signs/symptoms of bleeding or clotting.  Patient instructed to HOLD warfarin dose TONIGHT ONLY, then to resume her current dosing regimen thereafter.   Patient will retest again in 2 weeks.     Pollo BlanchardD

## 2024-11-30 LAB — INR PPP: 3 (ref 2–3.5)

## 2024-12-02 ENCOUNTER — TELEPHONE (OUTPATIENT)
Dept: CARDIOLOGY | Facility: MEDICAL CENTER | Age: 69
End: 2024-12-02
Payer: MEDICARE

## 2024-12-02 ENCOUNTER — ANTICOAGULATION MONITORING (OUTPATIENT)
Dept: VASCULAR LAB | Facility: MEDICAL CENTER | Age: 69
End: 2024-12-02
Payer: MEDICARE

## 2024-12-02 DIAGNOSIS — Z29.89 NEED FOR SBE (SUBACUTE BACTERIAL ENDOCARDITIS) PROPHYLAXIS: ICD-10-CM

## 2024-12-02 DIAGNOSIS — Z95.2 S/P AVR (AORTIC VALVE REPLACEMENT): ICD-10-CM

## 2024-12-02 NOTE — TELEPHONE ENCOUNTER
Caller: Marline Perry     Topic/issue: Patient is wanting to know if she can get the antibiotics for 4 dental appointments. (16 pills in total)    Callback Number: [phone]   Okay to Leave Detailed VM)    Thank you,  Meseret KRUGER

## 2024-12-03 RX ORDER — AMOXICILLIN 500 MG/1
2000 CAPSULE ORAL
Qty: 16 CAPSULE | Refills: 0 | Status: SHIPPED | OUTPATIENT
Start: 2024-12-03

## 2024-12-03 NOTE — PROGRESS NOTES
OP Anticoagulation Service Note    Date: 12/2/2024    Anticoagulation Summary  As of 12/2/2024      INR goal:  2.0-3.0   TTR:  55.8% (6.8 y)   INR used for dosing:  3.00 (11/30/2024)   Warfarin maintenance plan:  2.5 mg (5 mg x 0.5) every Mon, Wed, Fri; 5 mg (5 mg x 1) all other days   Weekly warfarin total:  27.5 mg   Plan last modified:  Diann Warren, PharmD (1/9/2024)   Next INR check:  12/30/2024   Target end date:  Indefinite    Indications    S/P AVR (aortic valve replacement) [Z95.2]                 Anticoagulation Episode Summary       INR check location:  Home Draw    Preferred lab:  --    Send INR reminders to:  --    Comments:  Denny CELESTIN  only call if out of range          Anticoagulation Care Providers       Provider Role Specialty Phone number    Chinyere Marcus M.D. Referring Cardiovascular Disease (Cardiology) [phone]    Yossi Khalil, PharmD Responsible Pharmacy           Anticoagulation Patient Findings        Patient's preferred phone number:  Marline Perry  [address] [facility]  [place] [phone]        HPI:   The reason for today's call is to prevent morbidity and mortality from a blood clot and/or stroke and to reduce the risk of bleeding while on a anticoagulant.     PCP:  Amrik Duncan D.O.  [address] [place] [phone]  [place] [phone]    Assessment:     INR  therapeutic.     Lab Results   Component Value Date/Time    BUN 16 10/10/2024 08:11 AM    CREATININE 0.97 10/10/2024 08:11 AM     Lab Results   Component Value Date/Time    HEMOGLOBIN 13.9 10/10/2024 08:11 AM    HEMATOCRIT 42.4 10/10/2024 08:11 AM    PLATELETCT 265 10/10/2024 08:11 AM    ALKPHOSPHAT 83 10/10/2024 08:11 AM    ASTSGOT 65 (H) 10/10/2024 08:11 AM    ALTSGPT 71 (H) 10/10/2024 08:11 AM          Current Outpatient Medications:     amoxicillin, 2,000 mg, Oral, Once PRN    rosuvastatin, TAKE 1 TABLET EVERY EVENING    Fluzone High-Dose, Inject  into the shoulder, thigh, or buttocks.     hydrocortisone, AAA face BID PRN itching, redness, rash. Stop use after 2-3 weeks. Avoid use around eyes    anastrozole, 1 mg, Oral, DAILY    methotrexate, Take 2 tabs PO X1, then repeat CBC/CMP 7 days thereafter (Patient not taking: Reported on 9/25/2024)    predniSONE, Take 4 X 3 days, then 3 X 3 days, then 2X 3 days, then 1 X 3 days, then 1/2 X 4 days then stop. (Patient not taking: Reported on 9/25/2024)    Flaxseed Oil, 2,000 mg, Oral, DAILY    Magnesium, 400 mg, Oral, DAILY    Vitamin D (Cholecalciferol), 50 mcg, Oral, DAILY    Vitamin C, 500 mg, Oral, DAILY    SUMAtriptan, 50 mg, Oral, Once PRN    lidocaine-prilocaine,  (Patient not taking: Reported on 9/25/2024)    warfarin, TAKE ONE-HALF (1/2) TO ONE TABLET DAILY OR AS DIRECTED BY ANTICOAGULATION CLINIC.    BIOTIN PO, 1 Capsule, Oral, DAILY    acetaminophen, 650 mg, Oral, Q4HRS PRN    fluocinonide, AAA arm spots BID PRN itching. Avoid use on face, axilla, groin (Patient taking differently: 1 Application, Topical, 2 TIMES DAILY, AAA arm spots BID PRN itching. Avoid use on face, axilla, groin        FOR SURGERY ON 7/16/24: DO NOT TAKE DAY OF SURGERY )      [id number] Stroke Risk Points: N/A   Values used to calculate this score:    Points  Metrics       0        Has Congestive Heart Failure: No       0        Has Hypertension: No       1        Age: 69       0        Has Diabetes: No       0        Had Stroke: No                 Had TIA: No                 Had Thromboembolism: No       1        Has Vascular Disease: Yes       1        Clinically Relevant Sex: Female     No data recorded     Plan:     Continue the same warfarin dose, as noted above.       Follow-up:     As seen above      Additional information discussed with patient:     Asked patient to please call the anticoagulation clinic if they have any signs/symptoms of bleeding and/or thrombosis or any changes to diet or medications.      National recommendations regarding anticoagulation therapy:      The CHEST guidelines recommends frequent INR monitoring at regular intervals (a few days up to a max of 12 weeks) to ensure patients are on the proper dose of warfarin, and patients are not having any complications from therapy.  INRs can dramatically change over a short time period due to diet, medications, and medical conditions.         [facility]  Phone: [phone]  Fax: [phone]  On call: [phone]  General scheduling/information [phone]  For emergencies please dial [phone]  Please do not use [employer] for urgent matters, call the phone numbers listed above.    This note was created using voice recognition software (Dragon). The accuracy of the dictation is limited by the abilities of the software. I have reviewed the note prior to signing, however some errors in grammar and context are still possible. If you have any questions related to this note please do not hesitate to contact our office.

## 2024-12-03 NOTE — TELEPHONE ENCOUNTER
Phone Number Called: [phone]    Call outcome: Spoke to patient regarding message below.    Message: Called to clarify which pharmacy to send to for prophylactic abx. [facility]

## 2024-12-16 ENCOUNTER — TELEPHONE (OUTPATIENT)
Dept: VASCULAR LAB | Facility: MEDICAL CENTER | Age: 69
End: 2024-12-16
Payer: MEDICARE

## 2024-12-16 ENCOUNTER — DOCUMENTATION (OUTPATIENT)
Dept: MEDICAL GROUP | Facility: PHYSICIAN GROUP | Age: 69
End: 2024-12-16
Payer: MEDICARE

## 2024-12-16 ENCOUNTER — ANTICOAGULATION MONITORING (OUTPATIENT)
Dept: MEDICAL GROUP | Facility: PHYSICIAN GROUP | Age: 69
End: 2024-12-16
Payer: MEDICARE

## 2024-12-16 DIAGNOSIS — Z95.2 S/P AVR (AORTIC VALVE REPLACEMENT): ICD-10-CM

## 2024-12-16 LAB — INR PPP: 2 (ref 2–3.5)

## 2024-12-16 NOTE — TELEPHONE ENCOUNTER
INR READING    PT Name: Marline Perry    PT Reports INR Value: 2.0    Date of INR Results: 12/16    Concerns/Questions Reported: or N/A: N/A    Callback Number: [phone] (home)

## 2024-12-17 ENCOUNTER — HOSPITAL ENCOUNTER (OUTPATIENT)
Dept: HEMATOLOGY ONCOLOGY | Facility: MEDICAL CENTER | Age: 69
End: 2024-12-17
Attending: INTERNAL MEDICINE
Payer: MEDICARE

## 2024-12-17 ENCOUNTER — APPOINTMENT (OUTPATIENT)
Dept: PHYSICAL THERAPY | Facility: REHABILITATION | Age: 69
End: 2024-12-17
Attending: SPECIALIST
Payer: MEDICARE

## 2024-12-17 VITALS
RESPIRATION RATE: 15 BRPM | WEIGHT: 128.97 LBS | HEIGHT: 61 IN | BODY MASS INDEX: 24.35 KG/M2 | TEMPERATURE: 96.8 F | HEART RATE: 68 BPM | DIASTOLIC BLOOD PRESSURE: 62 MMHG | OXYGEN SATURATION: 97 % | SYSTOLIC BLOOD PRESSURE: 122 MMHG

## 2024-12-17 DIAGNOSIS — C50.412 MALIGNANT NEOPLASM OF UPPER-OUTER QUADRANT OF LEFT BREAST IN FEMALE, ESTROGEN RECEPTOR POSITIVE (HCC): ICD-10-CM

## 2024-12-17 DIAGNOSIS — Z17.0 MALIGNANT NEOPLASM OF UPPER-OUTER QUADRANT OF LEFT BREAST IN FEMALE, ESTROGEN RECEPTOR POSITIVE (HCC): ICD-10-CM

## 2024-12-17 PROCEDURE — 99212 OFFICE O/P EST SF 10 MIN: CPT | Performed by: INTERNAL MEDICINE

## 2024-12-17 PROCEDURE — 99213 OFFICE O/P EST LOW 20 MIN: CPT | Performed by: INTERNAL MEDICINE

## 2024-12-17 ASSESSMENT — FIBROSIS 4 INDEX: FIB4 SCORE: 2.01

## 2024-12-17 ASSESSMENT — ENCOUNTER SYMPTOMS
CONSTITUTIONAL NEGATIVE: 1
RESPIRATORY NEGATIVE: 1
NEUROLOGICAL NEGATIVE: 1
EYES NEGATIVE: 1
CARDIOVASCULAR NEGATIVE: 1
MUSCULOSKELETAL NEGATIVE: 1
GASTROINTESTINAL NEGATIVE: 1
PSYCHIATRIC NEGATIVE: 1

## 2024-12-17 ASSESSMENT — PAIN SCALES - GENERAL: PAINLEVEL_OUTOF10: 1=MINIMAL PAIN

## 2024-12-17 NOTE — PROGRESS NOTES
Medical oncology follow-up visit: 12/17/2024      Referring Physician: Sari Ross MD  Primary Care:  Amrik Duncan D.O.    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 female who had an an abnormal screening mammogram on 5/22/2024.  This showed an irregular lobulated mass in the left upper outer quadrant.  On 621 she underwent a needle biopsy of the left breast and axillary lymph node.  Pathology demonstrated invasive ductal carcinoma grade 2, no lymph vascular invasion, ER positive greater than 90%, OK positive greater than 90%, Ki-67 15%, HER2 2+ IHC FISH negative, left axillary lymph node negative.  She saw Dr. Ross in consultation and underwent left partial mastectomy and sentinel lymph node biopsy on 7/16/2024.  Pathology demonstrated 1.2 cm grade 2 invasive ductal carcinoma with a separate focus of intermediate grade DCIS.  2 sentinel lymph nodes were negative.  Postoperative course was unremarkable.  She is in good health for age with a artificial aortic valve placed 20 years ago for a congenital bicuspid valve.  She has been on Coumadin subsequently.  She is physically active.  She had menopause in her late 40s and did not have hot flashes or night sweats.  She had a bone density several years ago and apparently has osteopenia in her femur.  It has not been repeated recently.  She is on calcium and vitamin D supplementation.  She has not yet seen radiation but will be scheduled.    Interval history 9/25/2024: She underwent 5 fraction APBI to the left breast and completed in early September 2024.  Overall she tolerated extremely well.  Her only ongoing and new problem is a diffuse rash on her forearms predominantly and some on her back and legs.  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  She was  prescribed low-dose methotrexate after labs which have yet been drawn.  She also had a DEXA scan that showed improvement in her bone density with ongoing osteopenia in the lumbar spine and minimal osteopenia in the femur.  She is taking vitamin D and calcium.  She has no hot flashes or night sweats.  Her anticoagulation shows excellent therapeutic range.    Interval history 12/17/2024: She is tolerating anastrozole which she started the beginning of October extremely well.  She has no hot flashes night sweats or musculoskeletal symptoms.  She had healthy [place] testing which was negative for BRCA 1 and 2 or other abnormalities.  Primary care is keeping an eye on her liver functions.      Past Medical History:   Diagnosis Date    Anemia     Remote hx    Breath shortness     Related to bronchiectasis    Bronchiectasis (HCC)     Bronchitis     Cancer (HCC)     Left breast    Chickenpox     Chronic cough     COPD (chronic obstructive pulmonary disease) (HCC)     Heart murmur     From birth    Heart valve disease 2001    Aortic Valve Replacement AAA repair    Hemorrhagic disorder (HCC)     Coumadin    High cholesterol     Hyperlipidemia     Influenza     Malignant neoplasm of upper-outer quadrant of left breast in female, estrogen receptor positive (HCC)     Migraines     Mumps     Nasal drainage     Sjogren's disease (HCC)        Past Surgical History:   Procedure Laterality Date    PB MASTECTOMY, PARTIAL Left 7/16/2024    Procedure: LEFT PARTIAL MASTECTOMY, INTRAOPERATIVE WIRE LOCALIZATION, LEFT SENTINEL LYMPH NODE INJECTION WITH EXCISION OF AXILLARY NODES;  Surgeon: Sari Ross M.D.;  Location: SURGERY SAME DAY [facility];  Service: General    NODE BIOPSY SENTINEL Left 7/16/2024    Procedure: BIOPSY, LYMPH NODE, SENTINEL;  Surgeon: Sari Ross M.D.;  Location: SURGERY SAME DAY [facility];  Service: General    AORTIC VALVE REPLACEMENT      BRONCHOSCOPY      HYSTERECTOMY LAPAROSCOPY      SINUSCOPE         Social  History     Tobacco Use    Smoking status: Never    Smokeless tobacco: Never   Vaping Use    Vaping status: Never Used   Substance Use Topics    Alcohol use: Yes     Alcohol/week: 1.8 - 2.4 oz     Types: 3 - 4 Shots of liquor per week    Drug use: No        Family History   Problem Relation Age of Onset    Arthritis Mother     Hypertension Father     Kidney Disease Father     Stroke Sister     Arthritis Sister     No Known Problems Brother     No Known Problems Brother     Cancer Maternal Uncle         Lung ca- smoker    Breast Cancer Paternal Aunt     Colorectal Cancer Paternal Uncle         Colon    Colon Cancer Paternal Uncle     Heart Attack Maternal Grandmother     Stroke Maternal Grandfather     Heart Disease Paternal Grandmother     No Known Problems Paternal Grandfather     No Known Problems Son     Cancer Other         Gallbladder cancer       Allergies as of 12/17/2024 - Reviewed 12/17/2024   Allergen Reaction Noted    Spironolactone Rash 06/11/2008    Latex Rash 07/11/2024    Neomycin Rash 06/11/2008    Tape Rash 06/11/2008         Current Outpatient Medications:     amoxicillin (AMOXIL) 500 MG Cap, Take 4 Capsules by mouth one time as needed (Please take 30-60 minutes prior to any dental procedure or cleaning) for up to 1 dose., Disp: 16 Capsule, Rfl: 0    rosuvastatin (CRESTOR) 20 MG Tab, TAKE 1 TABLET EVERY EVENING, Disp: 90 Tablet, Rfl: 3    influenza vaccine High-Dose (FLUZONE HIGH-DOSE) 0.5 ML Suspension Prefilled Syringe injection, Inject  into the shoulder, thigh, or buttocks., Disp: 0.5 mL, Rfl: 0    hydrocortisone 2.5 % Cream topical cream, AAA face BID PRN itching, redness, rash. Stop use after 2-3 weeks. Avoid use around eyes, Disp: 20 g, Rfl: 0    anastrozole (ARIMIDEX) 1 MG Tab, Take 1 Tablet by mouth every day., Disp: 90 Tablet, Rfl: 1    methotrexate 2.5 MG tablet, Take 2 tabs PO X1, then repeat CBC/CMP 7 days thereafter (Patient not taking: Reported on 9/25/2024), Disp: 2 Tablet, Rfl: 0     predniSONE (DELTASONE) 10 MG Tab, Take 4 X 3 days, then 3 X 3 days, then 2X 3 days, then 1 X 3 days, then 1/2 X 4 days then stop. (Patient not taking: Reported on 9/25/2024), Disp: 32 Tablet, Rfl: 0    Flaxseed, Linseed, (FLAXSEED OIL) 1000 MG Cap, Take 2,000 mg by mouth every day., Disp: , Rfl:     Magnesium 400 MG Tab, Take 400 mg by mouth every day., Disp: , Rfl:     Vitamin D, Cholecalciferol, 50 MCG (2000 UT) Cap, Take 50 mcg by mouth every day., Disp: , Rfl:     Ascorbic Acid (VITAMIN C) 500 MG Cap, Take 500 mg by mouth every day., Disp: , Rfl:     SUMAtriptan (IMITREX) 50 MG Tab, Take 1 Tablet by mouth one time as needed for Migraine for up to 1 dose., Disp: 10 Tablet, Rfl: 3    lidocaine-prilocaine (EMLA) 2.5-2.5 % Cream, , Disp: , Rfl:     warfarin (COUMADIN) 5 MG Tab, TAKE ONE-HALF (1/2) TO ONE TABLET DAILY OR AS DIRECTED BY ANTICOAGULATION CLINIC., Disp: 100 Tablet, Rfl: 3    BIOTIN PO, Take 1 Capsule by mouth every day.    MEDICATION INSTRUCTIONS FOR SURGERY/PROCEDURE SCHEDULED FOR 7/16/24  DO NOT TAKE 7 DAYS PRIOR TO SURGERY, Disp: , Rfl:     acetaminophen (TYLENOL) 325 MG Tab, Take 650 mg by mouth every four hours as needed for Mild Pain.    MEDICATION INSTRUCTIONS FOR SURGERY/PROCEDURE SCHEDULED FOR 7/16/24  YOU MAY CONTINUE TAKING UP TO AND ON DAY OR SURGERY, Disp: , Rfl:     fluocinonide (LIDEX) 0.05 % Cream, AAA arm spots BID PRN itching. Avoid use on face, axilla, groin (Patient taking differently: Apply 1 Application topically 2 times a day. AAA arm spots BID PRN itching. Avoid use on face, axilla, groin    FOR SURGERY ON 7/16/24: DO NOT TAKE DAY OF SURGERY), Disp: 30 g, Rfl: 1    Review of Systems:  Review of Systems   Constitutional: Negative.    HENT: Negative.     Eyes: Negative.    Respiratory: Negative.     Cardiovascular: Negative.    Gastrointestinal: Negative.    Genitourinary: Negative.    Musculoskeletal: Negative.    Skin: Negative.    Neurological: Negative.    Endo/Heme/Allergies:  "Negative.    Psychiatric/Behavioral: Negative.            Physical Exam:  Vitals:    12/17/24 1104   BP: 122/62   Pulse: 68   Resp: 15   Temp: 36 °C (96.8 °F)   SpO2: 97%   Weight: 58.5 kg (128 lb 15.5 oz)   Height: 1.545 m (5' 0.83\")       DESC; KARNOFSKY SCALE WITH ECOG EQUIVALENT: 100, Fully active, able to carry on all pre-disease performed without restriction (ECOG equivalent 0)    DISTRESS LEVEL: no acute distress    Physical Exam  Constitutional:       Appearance: Normal appearance.   HENT:      Head: Normocephalic.      Mouth/Throat:      Mouth: Mucous membranes are moist.      Pharynx: Oropharynx is clear.   Eyes:      Extraocular Movements: Extraocular movements intact.      Conjunctiva/sclera: Conjunctivae normal.      Pupils: Pupils are equal, round, and reactive to light.   Cardiovascular:      Rate and Rhythm: Normal rate and regular rhythm.      Pulses: Normal pulses.      Heart sounds: Murmur heard.      Systolic murmur is present with a grade of 2/6.      Comments: Loud A2 consistent with artificial valve.  Pulmonary:      Effort: Pulmonary effort is normal.      Breath sounds: Normal breath sounds.   Chest:      Comments: Good cosmetic result in the left breast at the lumpectomy and sentinel lymph node biopsy.  Both breasts without masses nipple discharge or tenderness.  Abdominal:      General: Abdomen is flat. Bowel sounds are normal.      Palpations: Abdomen is soft. There is no mass.   Musculoskeletal:         General: Normal range of motion.   Skin:     General: Skin is warm.   Neurological:      General: No focal deficit present.      Mental Status: She is alert and oriented to person, place, and time.   Psychiatric:         Mood and Affect: Mood normal.         Behavior: Behavior normal.          Labs:  Anticoagulation Monitoring on 07/23/2024   Component Date Value Ref Range Status    INR 07/23/2024 2.00  2 - 3.5 Final   Anticoagulation Monitoring on 07/18/2024   Component Date Value Ref " Range Status    INR 2024 2.10  2 - 3.5 Final   Admission on 2024, Discharged on 2024   Component Date Value Ref Range Status    PT 2024 12.9  12.0 - 14.6 sec Final    INR 2024 0.97  0.87 - 1.13 Final    Comment: INR - Non-therapeutic Reference Range: 0.87-1.13  INR - Therapeutic Reference Range: 2.0-4.0      [facility]   Final   Pre-Admission Testing on 2024   Component Date Value Ref Range Status    WBC 2024 5.8  4.8 - 10.8 K/uL Final    RBC 2024 4.55  4.20 - 5.40 M/uL Final    Hemoglobin 2024 13.7  12.0 - 16.0 g/dL Final    Hematocrit 2024 40.3  37.0 - 47.0 % Final    MCV 2024 88.6  81.4 - 97.8 fL Final    MCH 2024 30.1  27.0 - 33.0 pg Final    MCHC 2024 34.0  32.2 - 35.5 g/dL Final    RDW 2024 43.1  35.9 - 50.0 fL Final    Platelet Count 2024 230  164 - 446 K/uL Final    MPV 2024 9.0  9.0 - 12.9 fL Final    Sodium 2024 138  135 - 145 mmol/L Final    Potassium 2024 4.5  3.6 - 5.5 mmol/L Final    Chloride 2024 105  96 - 112 mmol/L Final    Co2 2024 25  20 - 33 mmol/L Final    Glucose 2024 90  65 - 99 mg/dL Final    Bun 2024 19  8 - 22 mg/dL Final    Creatinine 2024 0.89  0.50 - 1.40 mg/dL Final    Calcium 2024 9.5  8.5 - 10.5 mg/dL Final    Anion Gap 2024 8.0  7.0 - 16.0 Final    Report 2024    Final                    Value:[facility]    Test Date:  2024  Pt Name:    NATAN CERDA             Department: [facility]  MRN:        [id number]                      Room:  Gender:     Female                       Technician: [id number]  :        [id number]                   Requested By:JOSEPH YIN  Order #:    [id number]                    Reading MD: Jorge Cortez MD    Measurements  Intervals                                Axis  Rate:       64                           P:          86  NH:         183                           QRS:        53  QRSD:       101                          T:          52  QT:         431  QTc:        445    Interpretive Statements  Sinus rhythm  Compared to ECG 01/22/2018 10:43:52  No significant changes  Electronically Signed On 07- 15:49:35 PDT by Jorge Cortez MD      GFR (CKD-EPI) 07/12/2024 70  >60 mL/min/1.73 m 2 Final    Comment: Estimated Glomerular Filtration Rate is calculated using  race neutral CKD-EPI 2021 equation per NKF-ASN recommendations.     Anticoagulation Monitoring on 07/01/2024   Component Date Value Ref Range Status    INR 06/27/2024 2.00  2 - 3.5 Final       Imaging:   All listed images below have been independently reviewed by me. I agree with the findings as summarized below:    MA-SURGICAL SPECIMEN (BREAST)    Result Date: 7/16/2024 7/16/2024 9:38 AM HISTORY/REASON FOR EXAM:  Part of surgery; Left breast specimen TECHNIQUE/EXAM DESCRIPTION AND NUMBER OF VIEWS: 4 intraoperative specimen radiographs. COMPARISON: FINDINGS: 4 intraoperative specimen radiographs were provided. The first image demonstrates a  clip in a soft tissue specimen. The second 2 images demonstrates a specimen with a wire localizer within a soft tissue specimen. The fourth image demonstrates 2 soft tissue specimens, one containing a localization wire and the second a biopsy clip.     Portable intraoperative imaging with findings as described above.    NM-INJ TRACER ID SENTINAL NODE LEFT    Result Date: 7/15/2024  7/15/2024 4:02 PM HISTORY/REASON FOR EXAM:  Breast Cancer TECHNIQUE/EXAM DESCRIPTION AND NUMBER OF VIEWS: [place] node injection. COMPARISON: None TECHNIQUE: Four locations around the areola were anesthetized with lidocaine. 1.073 mCi technetium 99m sulphur colloid was given intradermally, at four locations surrounding the areola of the LEFT breast. FINDINGS: Procedure was performed under aseptic conditions with local anesthesia.  Radionuclide was injected intradermally at  four locations surrounding the areola of the breast.  No complications were encountered.     Successful injection of radionuclide at four locations surrounding the LEFT breast areola.       Pathology:      Assessment & Plan:  1.  Invasive ductal carcinoma, grade 2, stage I (pT1c, PN 0) ER greater than 90%, OR greater than 90%, HER2 2+ IHC FISH negative, Ki-67 15%.  Status post partial mastectomy and APBI radiation.  On anastrozole since October 2024 with excellent tolerance 2.  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  Resolved without methotrexate  3.  Osteopenia with improved bone density    Plan: Continue anastrozole.  Will see her back in 6 months with repeat mammogram at that time.  Will also recheck labs.  Any questions and concerns raised by the patient were answered to the best of my ability. Thank you for allowing me to participate in the care for this patient. Please feel free to contact me for any questions or concerns.     Michael Kaur M.D.

## 2024-12-17 NOTE — PROGRESS NOTES
OP Anticoagulation Service Note    Date: 2024    Anticoagulation Summary  As of 2024      INR goal:  2.0-3.0   TTR:  56.0% (6.9 y)   INR used for dosin.00 (2024)   Warfarin maintenance plan:  2.5 mg (5 mg x 0.5) every Mon, Wed, Fri; 5 mg (5 mg x 1) all other days   Weekly warfarin total:  27.5 mg   Plan last modified:  Diann Warren, PharmD (2024)   Next INR check:  2025   Target end date:  Indefinite    Indications    S/P AVR (aortic valve replacement) [Z95.2]                 Anticoagulation Episode Summary       INR check location:  Home Draw    Preferred lab:  --    Send INR reminders to:  --    Comments:  Denny CELESTIN  only call if out of range          Anticoagulation Care Providers       Provider Role Specialty Phone number    Chinyere Marcus M.D. Referring Cardiovascular Disease (Cardiology) [phone]    Yossi Khalil, PharmD Responsible Pharmacy           Anticoagulation Patient Findings        Patient's preferred phone number:  Marline Perry  [address] [facility]  [place] [address] [phone]        HPI:   The reason for today's call is to prevent morbidity and mortality from a blood clot and/or stroke and to reduce the risk of bleeding while on a anticoagulant.     PCP:  Amrik Duncan D.O.  [address] [place] [address]  [place] [phone]    Assessment:     INR  therapeutic.     Lab Results   Component Value Date/Time    BUN 16 10/10/2024 08:11 AM    CREATININE 0.97 10/10/2024 08:11 AM     Lab Results   Component Value Date/Time    HEMOGLOBIN 13.9 10/10/2024 08:11 AM    HEMATOCRIT 42.4 10/10/2024 08:11 AM    PLATELETCT 265 10/10/2024 08:11 AM    ALKPHOSPHAT 83 10/10/2024 08:11 AM    ASTSGOT 65 (H) 10/10/2024 08:11 AM    ALTSGPT 71 (H) 10/10/2024 08:11 AM          Current Outpatient Medications:     amoxicillin, 2,000 mg, Oral, Once PRN    rosuvastatin, TAKE 1 TABLET EVERY EVENING    Fluzone High-Dose, Inject  into the shoulder, thigh, or buttocks.     hydrocortisone, AAA face BID PRN itching, redness, rash. Stop use after 2-3 weeks. Avoid use around eyes    anastrozole, 1 mg, Oral, DAILY    methotrexate, Take 2 tabs PO X1, then repeat CBC/CMP 7 days thereafter (Patient not taking: Reported on 9/25/2024)    predniSONE, Take 4 X 3 days, then 3 X 3 days, then 2X 3 days, then 1 X 3 days, then 1/2 X 4 days then stop. (Patient not taking: Reported on 9/25/2024)    Flaxseed Oil, 2,000 mg, Oral, DAILY    Magnesium, 400 mg, Oral, DAILY    Vitamin D (Cholecalciferol), 50 mcg, Oral, DAILY    Vitamin C, 500 mg, Oral, DAILY    SUMAtriptan, 50 mg, Oral, Once PRN    lidocaine-prilocaine,  (Patient not taking: Reported on 9/25/2024)    warfarin, TAKE ONE-HALF (1/2) TO ONE TABLET DAILY OR AS DIRECTED BY ANTICOAGULATION CLINIC.    BIOTIN PO, 1 Capsule, Oral, DAILY    acetaminophen, 650 mg, Oral, Q4HRS PRN    fluocinonide, AAA arm spots BID PRN itching. Avoid use on face, axilla, groin (Patient taking differently: 1 Application, Topical, 2 TIMES DAILY, AAA arm spots BID PRN itching. Avoid use on face, axilla, groinFOR SURGERY ON 7/16/24: DO NOT TAKE DAY OF SURGERY )      [id number] Stroke Risk Points: N/A   Values used to calculate this score:    Points  Metrics                Has Congestive Heart Failure: No                Has Hypertension: No                Age: 69                Has Diabetes: Not on file                Had Stroke: Not on file                 Had TIA: Not on file                 Had Thromboembolism: Not on file                Has Vascular Disease: Not on file                Clinically Relevant Sex: Not on file     No data recorded     Plan:     Continue the same warfarin dose, as noted above.       Follow-up:     As seen above      Additional information discussed with patient:     Asked patient to please call the anticoagulation clinic if they have any signs/symptoms of bleeding and/or thrombosis or any changes to diet or medications.      National  recommendations regarding anticoagulation therapy:     The CHEST guidelines recommends frequent INR monitoring at regular intervals (a few days up to a max of 12 weeks) to ensure patients are on the proper dose of warfarin, and patients are not having any complications from therapy.  INRs can dramatically change over a short time period due to diet, medications, and medical conditions.         [facility]  Phone: [phone]  Fax: [phone]  On call: [phone]  General scheduling/information [phone]  For emergencies please dial [phone]  Please do not use [facility] for urgent matters, call the phone numbers listed above.    This note was created using voice recognition software (Dragon). The accuracy of the dictation is limited by the abilities of the software. I have reviewed the note prior to signing, however some errors in grammar and context are still possible. If you have any questions related to this note please do not hesitate to contact our office.

## 2024-12-31 ENCOUNTER — APPOINTMENT (OUTPATIENT)
Dept: PHYSICAL THERAPY | Facility: REHABILITATION | Age: 69
End: 2024-12-31
Attending: SPECIALIST
Payer: MEDICARE

## 2025-01-02 ENCOUNTER — ANTICOAGULATION MONITORING (OUTPATIENT)
Dept: VASCULAR LAB | Facility: MEDICAL CENTER | Age: 70
End: 2025-01-02
Payer: MEDICARE

## 2025-01-02 ENCOUNTER — TELEPHONE (OUTPATIENT)
Dept: VASCULAR LAB | Facility: MEDICAL CENTER | Age: 70
End: 2025-01-02
Payer: MEDICARE

## 2025-01-02 DIAGNOSIS — Z95.2 S/P AVR (AORTIC VALVE REPLACEMENT): ICD-10-CM

## 2025-01-02 LAB — INR PPP: 2.2 (ref 2–3.5)

## 2025-01-03 NOTE — PROGRESS NOTES
Anticoagulation Summary  As of 2025      INR goal:  2.0-3.0   TTR:  56.3% (6.9 y)   INR used for dosin.20 (2025)   Warfarin maintenance plan:  2.5 mg (5 mg x 0.5) every Mon, Wed, Fri; 5 mg (5 mg x 1) all other days   Weekly warfarin total:  27.5 mg   Plan last modified:  Santo JoD (2024)   Next INR check:  2025   Target end date:  Indefinite    Indications    S/P AVR (aortic valve replacement) [Z95.2]                 Anticoagulation Episode Summary       INR check location:  Home Draw    Preferred lab:  --    Send INR reminders to:  --    Comments:  Denny CELESTIN  only call if out of range          Anticoagulation Care Providers       Provider Role Specialty Phone number    Chinyere Marcus M.D. Referring Cardiovascular Disease (Cardiology) [phone]    Santo EncisoD Responsible Pharmacy           Anticoagulation Patient Findings      INR is therapeutic  Reason(s) for out of range INR today: N/A      Called and left VM for patient.    Pt is not on antiplatelet therapy.    Requested patient to contact the clinic for any s/sx of unusual bleeding, bruising, clotting or any changes to diet or medications. Unless patient reports any changes that would warrant an adjustment to the plan:  Warfarin Plan: Continue regimen as listed above.    Next INR in 2 week(s).    Yolanda Beck, SantoD

## 2025-01-03 NOTE — TELEPHONE ENCOUNTER
INR READING    PT Name: Marline Perry    PT Reports INR Value: 2.2    Date of INR Results: 1/1/25    Concerns/Questions Reported: or N/A: N/A    Callback Number: [phone] (home)

## 2025-01-09 ENCOUNTER — APPOINTMENT (OUTPATIENT)
Dept: PHYSICAL THERAPY | Facility: REHABILITATION | Age: 70
End: 2025-01-09
Attending: SPECIALIST
Payer: MEDICARE

## 2025-01-15 ENCOUNTER — APPOINTMENT (OUTPATIENT)
Dept: PHYSICAL THERAPY | Facility: REHABILITATION | Age: 70
End: 2025-01-15
Attending: SPECIALIST
Payer: MEDICARE

## 2025-01-16 LAB — INR PPP: 2.7 (ref 2–3.5)

## 2025-01-17 ENCOUNTER — ANTICOAGULATION MONITORING (OUTPATIENT)
Dept: VASCULAR LAB | Facility: MEDICAL CENTER | Age: 70
End: 2025-01-17
Payer: MEDICARE

## 2025-01-17 DIAGNOSIS — Z95.2 S/P AVR (AORTIC VALVE REPLACEMENT): ICD-10-CM

## 2025-01-17 NOTE — PROGRESS NOTES
OP   Telephone Anticoagulation Service Note      Anticoagulation Summary  As of 2025      INR goal:  2.0-3.0   TTR:  56.6% (7 y)   INR used for dosin.70 (2025)   Warfarin maintenance plan:  2.5 mg (5 mg x 0.5) every Mon, Wed, Fri; 5 mg (5 mg x 1) all other days   Weekly warfarin total:  27.5 mg   Plan last modified:  Santo JoD (2024)   Next INR check:  2025   Target end date:  Indefinite    Indications    S/P AVR (aortic valve replacement) [Z95.2]                 Anticoagulation Episode Summary       INR check location:  Home Draw    Preferred lab:  --    Send INR reminders to:  --    Comments:  Denny CELESTIN  only call if out of range          Anticoagulation Care Providers       Provider Role Specialty Phone number    Chinyere Marcus M.D. Referring Cardiovascular Disease (Cardiology) [phone]    Santo EncisoD Responsible Pharmacy           Anticoagulation Patient Findings    Patient prefers we call her only if her INR is out of range.   INR is therapeutic today at 2.7.  Patient is aware to call the clinic with any changes to diet or medications, with any signs/sx of bleeding or clotting or with any questions or concerns.     Patient instructed to continue with the current warfarin dosing regimen, and asked to follow up again in 2 weeks.     Pollo Patel  PharmD

## 2025-01-21 ENCOUNTER — OFFICE VISIT (OUTPATIENT)
Dept: MEDICAL GROUP | Facility: MEDICAL CENTER | Age: 70
End: 2025-01-21
Payer: MEDICARE

## 2025-01-21 VITALS
WEIGHT: 129 LBS | HEART RATE: 70 BPM | HEIGHT: 60 IN | SYSTOLIC BLOOD PRESSURE: 126 MMHG | BODY MASS INDEX: 25.32 KG/M2 | TEMPERATURE: 97 F | DIASTOLIC BLOOD PRESSURE: 66 MMHG | OXYGEN SATURATION: 97 %

## 2025-01-21 DIAGNOSIS — C50.412 MALIGNANT NEOPLASM OF UPPER-OUTER QUADRANT OF LEFT BREAST IN FEMALE, ESTROGEN RECEPTOR POSITIVE (HCC): ICD-10-CM

## 2025-01-21 DIAGNOSIS — R21 RASH: ICD-10-CM

## 2025-01-21 DIAGNOSIS — R79.89 ELEVATED LFTS: ICD-10-CM

## 2025-01-21 DIAGNOSIS — Z17.0 MALIGNANT NEOPLASM OF UPPER-OUTER QUADRANT OF LEFT BREAST IN FEMALE, ESTROGEN RECEPTOR POSITIVE (HCC): ICD-10-CM

## 2025-01-21 DIAGNOSIS — E78.00 HYPERCHOLESTEREMIA: ICD-10-CM

## 2025-01-21 PROCEDURE — 3078F DIAST BP <80 MM HG: CPT | Performed by: FAMILY MEDICINE

## 2025-01-21 PROCEDURE — 99214 OFFICE O/P EST MOD 30 MIN: CPT | Performed by: FAMILY MEDICINE

## 2025-01-21 PROCEDURE — 3074F SYST BP LT 130 MM HG: CPT | Performed by: FAMILY MEDICINE

## 2025-01-21 ASSESSMENT — PATIENT HEALTH QUESTIONNAIRE - PHQ9: CLINICAL INTERPRETATION OF PHQ2 SCORE: 0

## 2025-01-21 ASSESSMENT — FIBROSIS 4 INDEX: FIB4 SCORE: 2.01

## 2025-01-21 NOTE — PROGRESS NOTES
"Verbal consent was acquired by the patient to use [employer] ambient listening note generation during this visit    Subjective:     CC: \"lab follow up\"    History of Present Illness  The patient presents for evaluation of elevated liver enzymes, breast cancer, and rash.    She reports a general sense of well-being, with the exception of mild soreness on one side, which she attributes to her surgery in 07/2024. She has been mindful of her left-sided activities and reports no significant swelling. The soreness is localized to the area of the surgical incisions. She has not required the use of a sleeve due to the absence of swelling. She is under the care of Dr. King, with a follow-up appointment scheduled for 07/2025. She is currently on anastrozole 1 mg daily for breast cancer management.    She has been on rosuvastatin 20 mg since approximately 2015. She consumes alcohol sparingly, typically one drink 2 to 4 times per week. She has been engaging in physical activity, specifically walking her dog for 30 minutes in the morning or midday.    She has not experienced a recurrence of the rash that was previously managed by dermatology, and she has not initiated methotrexate therapy. She has undergone laboratory testing in relation to genetic concerns and liver issues. She is seeking advice on whether additional testing is necessary. She has been receiving regular liver enzyme monitoring due to her methotrexate use and the associated rash. She reports no abdominal pain or nausea.    Supplemental Information  She recently received an antibiotic prescription from her dentist through cardiology.    SOCIAL HISTORY  The patient usually has one drink of alcohol two to four times a week.    MEDICATIONS  Current: Coumadin, anastrozole, rosuvastatin          Objective:     Exam:  /66   Pulse 70   Temp 36.1 °C (97 °F) (Temporal)   Ht 1.524 m (5')   Wt 58.5 kg (129 lb)   SpO2 97%   BMI 25.19 kg/m²  Body mass index " is 25.19 kg/m².    Physical Exam  Vitals reviewed.   Constitutional:       General: She is not in acute distress.     Appearance: Normal appearance.   HENT:      Head: Normocephalic and atraumatic.   Cardiovascular:      Rate and Rhythm: Normal rate and regular rhythm.      Heart sounds: Normal heart sounds.   Pulmonary:      Effort: Pulmonary effort is normal. No respiratory distress.      Breath sounds: Normal breath sounds.   Abdominal:      General: There is no distension.      Palpations: Abdomen is soft.      Tenderness: There is no abdominal tenderness. There is no guarding.   Skin:     General: Skin is warm and dry.      Findings: No rash.   Neurological:      Mental Status: She is alert. Mental status is at baseline.   Psychiatric:         Mood and Affect: Mood normal.         Behavior: Behavior normal.               Results  Laboratory Studies  ALT was 71. AST was 65. Total cholesterol is 151, LDL is 88.    Imaging  Right upper quadrant ultrasound showed liver is normal contour. There is no evidence of solid mass or lesion that measures 11.76 cm.      Assessment & Plan:       1. Elevated LFTs  - US-ABDOMEN COMPLETE WITH ELASTOGRAPHY (COMBO); Future  - HEPATIC FUNCTION PANEL; Future  - Basic Metabolic Panel; Future    2. Malignant neoplasm of upper-outer quadrant of left breast in female, estrogen receptor positive (HCC)    3. Rash    4. Hypercholesteremia  - HEPATIC FUNCTION PANEL; Future      Assessment & Plan  1. Elevated liver enzymes.  Her ELF score is 10.2, indicating a moderate risk for fibrosis development. The [facility] did not identify any known DNA markers for disease. The last imaging of her liver was conducted in October 2021, revealing a normal contour with no evidence of solid mass or lesion. Liver enzymes have shown a slight increase, with ALT at 71 and AST at 65, compared to her usual levels in the 40s. This could potentially be attributed to her ongoing treatments. She is  currently on rosuvastatin 20 mg, which can cause some elevation in liver enzymes. She is advised to maintain a healthy diet, preferably plant-based, avoid sweets and highly processed foods, and continue regular exercise. She is also advised to limit alcohol consumption and continue her exercise regimen. A repeat imaging of her liver will be ordered, along with a follow-up on her liver enzymes.    2. Breast cancer.  She is currently taking anastrozole 1 mg daily for breast cancer. She will continue her current medication regimen.    3. Rash.  She has not experienced a recurrence of the rash previously treated by dermatology and has not started methotrexate. She will monitor for any return of symptoms and will not start any new treatment unless the rash recurs.    4. Cholesterol management.  She is on rosuvastatin 20 mg for cholesterol control. Her total cholesterol is 151, and LDL is 88, which is well-controlled. She will continue her current medication regimen of rosuvastatin 20 mg.    PROCEDURE  The patient underwent a surgical procedure involving the removal of two lymph nodes in 07/2024.         Return in about 7 months (around 8/21/2025), or if symptoms worsen or fail to improve, for Annual Medicare.      This note was created using voice recognition software (Dragon). The accuracy of the dictation is limited by the abilities of the software. I have reviewed the note prior to signing, however some errors in grammar and context are still possible. If you have any questions related to this note please do not hesitate to contact our office.

## 2025-01-22 ENCOUNTER — APPOINTMENT (OUTPATIENT)
Dept: PHYSICAL THERAPY | Facility: REHABILITATION | Age: 70
End: 2025-01-22
Attending: SPECIALIST
Payer: MEDICARE

## 2025-01-29 ENCOUNTER — APPOINTMENT (OUTPATIENT)
Dept: PHYSICAL THERAPY | Facility: REHABILITATION | Age: 70
End: 2025-01-29
Attending: SPECIALIST
Payer: MEDICARE

## 2025-02-01 LAB — INR PPP: 3 (ref 2–3.5)

## 2025-02-03 ENCOUNTER — ANTICOAGULATION MONITORING (OUTPATIENT)
Dept: VASCULAR LAB | Facility: MEDICAL CENTER | Age: 70
End: 2025-02-03
Payer: MEDICARE

## 2025-02-03 ENCOUNTER — TELEPHONE (OUTPATIENT)
Dept: VASCULAR LAB | Facility: MEDICAL CENTER | Age: 70
End: 2025-02-03
Payer: MEDICARE

## 2025-02-03 DIAGNOSIS — Z95.2 S/P AVR (AORTIC VALVE REPLACEMENT): ICD-10-CM

## 2025-02-03 NOTE — TELEPHONE ENCOUNTER
Please see Anticoagulation Monitoring Encounter dated 2/3/25 for further details.   Deanna Acosta, PharmD

## 2025-02-03 NOTE — PROGRESS NOTES
Anticoagulation Summary  As of 2/3/2025      INR goal:  2.0-3.0   TTR:  56.9% (7 y)   INR used for dosing:  3.00 (2/1/2025)   Warfarin maintenance plan:  2.5 mg (5 mg x 0.5) every Mon, Wed, Fri; 5 mg (5 mg x 1) all other days   Weekly warfarin total:  27.5 mg   Plan last modified:  Santo JoD (1/9/2024)   Next INR check:  2/17/2025   Target end date:  Indefinite    Indications    S/P AVR (aortic valve replacement) [Z95.2]                 Anticoagulation Episode Summary       INR check location:  Home Draw    Preferred lab:  --    Send INR reminders to:  --    Comments:  Denny CELESTIN  only call if out of range          Anticoagulation Care Providers       Provider Role Specialty Phone number    Chinyere Marcus M.D. Referring Cardiovascular Disease (Cardiology) [phone]    Santo EncisoD Responsible Pharmacy           Anticoagulation Patient Findings      Per chart review, pt does not want to be contacted if INR is therapeutic     INR therapeutic  Reason(s) for out of range INR today: N/A      Unless patient reports any changes that would warrant an adjustment to the plan:  Warfarin Plan: Continue regimen as listed above.    Next INR in 2 week(s).    Drew Craft, SantoD, BCACP

## 2025-02-06 ENCOUNTER — APPOINTMENT (OUTPATIENT)
Dept: PHYSICAL THERAPY | Facility: REHABILITATION | Age: 70
End: 2025-02-06
Attending: SPECIALIST
Payer: MEDICARE

## 2025-02-13 ENCOUNTER — APPOINTMENT (OUTPATIENT)
Dept: PHYSICAL THERAPY | Facility: REHABILITATION | Age: 70
End: 2025-02-13
Attending: SPECIALIST
Payer: MEDICARE

## 2025-02-14 ENCOUNTER — TELEPHONE (OUTPATIENT)
Dept: VASCULAR LAB | Facility: MEDICAL CENTER | Age: 70
End: 2025-02-14
Payer: MEDICARE

## 2025-02-14 ENCOUNTER — ANTICOAGULATION MONITORING (OUTPATIENT)
Dept: VASCULAR LAB | Facility: MEDICAL CENTER | Age: 70
End: 2025-02-14
Payer: MEDICARE

## 2025-02-14 DIAGNOSIS — Z95.2 S/P AVR (AORTIC VALVE REPLACEMENT): ICD-10-CM

## 2025-02-14 LAB — INR PPP: 3 (ref 2–3.5)

## 2025-02-14 NOTE — TELEPHONE ENCOUNTER
PT Name: Marline Perry      PT Reports INR Value: 3.0    Date of INR Results: 2/14/25    Concerns/Questions Reported: or N/A: n/a    Callback Number: [phone]      Thank you    -Johnnie GOMES

## 2025-02-14 NOTE — PROGRESS NOTES
Anticoagulation Summary  As of 2/14/2025      INR goal:  2.0-3.0   TTR:  57.1% (7 y)   INR used for dosing:  3.00 (2/14/2025)   Warfarin maintenance plan:  2.5 mg (5 mg x 0.5) every Mon, Wed, Fri; 5 mg (5 mg x 1) all other days   Weekly warfarin total:  27.5 mg   Plan last modified:  Diann Warren, PharmD (1/9/2024)   Next INR check:  2/28/2025   Target end date:  Indefinite    Indications    S/P AVR (aortic valve replacement) [Z95.2]                 Anticoagulation Episode Summary       INR check location:  Home Draw    Preferred lab:  --    Send INR reminders to:  --    Comments:  Denny CELESTIN  only call if out of range          Anticoagulation Care Providers       Provider Role Specialty Phone number    Chinyere Marcus M.D. Referring Cardiovascular Disease (Cardiology) [phone]    Yossi Khalil, PharmD Responsible Pharmacy           Anticoagulation Patient Findings      INR is therapeutic      Per Chart Review patient prefers to only be contacted if INR is out of range.    Pt is not on antiplatelet therapy.    Warfarin Plan: Continue regimen as listed above.    Next INR in 2 week(s).    Cody Prince, PharmD

## 2025-02-20 ENCOUNTER — APPOINTMENT (OUTPATIENT)
Dept: PHYSICAL THERAPY | Facility: REHABILITATION | Age: 70
End: 2025-02-20
Attending: SPECIALIST
Payer: MEDICARE

## 2025-02-26 ENCOUNTER — OFFICE VISIT (OUTPATIENT)
Dept: SURGERY | Facility: MEDICAL CENTER | Age: 70
End: 2025-02-26
Payer: MEDICARE

## 2025-02-26 VITALS
HEART RATE: 83 BPM | OXYGEN SATURATION: 93 % | SYSTOLIC BLOOD PRESSURE: 132 MMHG | HEIGHT: 60 IN | BODY MASS INDEX: 25.6 KG/M2 | TEMPERATURE: 97.9 F | WEIGHT: 130.4 LBS | DIASTOLIC BLOOD PRESSURE: 64 MMHG

## 2025-02-26 DIAGNOSIS — Z17.0 MALIGNANT NEOPLASM OF UPPER-OUTER QUADRANT OF LEFT BREAST IN FEMALE, ESTROGEN RECEPTOR POSITIVE (HCC): ICD-10-CM

## 2025-02-26 DIAGNOSIS — C50.412 MALIGNANT NEOPLASM OF UPPER-OUTER QUADRANT OF LEFT BREAST IN FEMALE, ESTROGEN RECEPTOR POSITIVE (HCC): ICD-10-CM

## 2025-02-26 ASSESSMENT — ENCOUNTER SYMPTOMS
GASTROINTESTINAL NEGATIVE: 1
CARDIOVASCULAR NEGATIVE: 1
BRUISES/BLEEDS EASILY: 1
SHORTNESS OF BREATH: 1
COUGH: 1
CONSTITUTIONAL NEGATIVE: 1
ARTHRALGIAS: 1
EYES NEGATIVE: 1
WHEEZING: 1
ENDOCRINE NEGATIVE: 1
PSYCHIATRIC NEGATIVE: 1
HEADACHES: 1

## 2025-02-26 ASSESSMENT — FIBROSIS 4 INDEX: FIB4 SCORE: 2.01

## 2025-02-26 NOTE — PROGRESS NOTES
"    Subjective:   2/26/2025 10:04 AM    Imaging facility:  [facility]  Primary care provider:  Amrik Duncan D.O.  Cardiologist: James Catherine M.D.   Medical oncologist: Michael Kaur M.D  Radiation oncologist:  Dr. Feliciano Whyte      Chief Complaint:   Chief Complaint   Patient presents with    Follow-Up     7/2024 Lt. Partial mastectomy       History of presenting illness:  This pleasant 69 y.o. year old female is scheduled for routine FU regarding LEFT partial mastectomy with wire localization, lattice, and LEFT sentinel lymph node biopsy 7/16/24.  She has some residual tenderness near the incision site but feels that it has improved greatly. Reports baseline range of motion. Denies any breast or arm swelling. She reports that she has not been consistently performing self breast exams and that she still feels discomfort from her bra requiring additional padding.     She started Anastrozole in October 2024 and reports doing very well with it.     The patient is accompanied by her  Angel.     LABS:  No results found for: \"HBA1C\"     Patient Active Problem List   Diagnosis    Non-rheumatic mitral regurgitation    S/P AVR (aortic valve replacement)    Sjoegren syndrome    Warfarin anticoagulation    Thoracic aortic aneurysm without rupture (HCC)    Bronchiectasis without complication (HCC)    Diffuse connective tissue disease (HCC)    Fibromyalgia    Hypercholesteremia    Migraine with aura and without status migrainosus, not intractable    Chronic pain of right knee    Transaminitis    Vitamin B12 deficiency    Skin rash    Osteopenia of multiple sites    Chronic rhinitis    Malignant neoplasm of upper-outer quadrant of left breast in female, estrogen receptor positive (HCC)    Long term (current) use of aromatase inhibitors       Past Surgical History:   Procedure Laterality Date    PB MASTECTOMY, PARTIAL Left 7/16/2024    Procedure: LEFT PARTIAL MASTECTOMY, INTRAOPERATIVE WIRE LOCALIZATION, LEFT " SENTINEL LYMPH NODE INJECTION WITH EXCISION OF AXILLARY NODES;  Surgeon: Sari Ross M.D.;  Location: SURGERY SAME DAY [facility];  Service: General    NODE BIOPSY SENTINEL Left 7/16/2024    Procedure: BIOPSY, LYMPH NODE, SENTINEL;  Surgeon: Sari Ross M.D.;  Location: SURGERY SAME DAY [facility];  Service: General    AORTIC VALVE REPLACEMENT      BRONCHOSCOPY      HYSTERECTOMY LAPAROSCOPY      SINUSCOPE         Allergies   Allergen Reactions    Spironolactone Rash     ALL OVER BODY     Latex Rash     .    Neomycin Rash     CONTACT DERMATITIS     Tape Rash     Adhesives-RASH   LATEX        Current Outpatient Medications   Medication Sig    amoxicillin (AMOXIL) 500 MG Cap Take 4 Capsules by mouth one time as needed (Please take 30-60 minutes prior to any dental procedure or cleaning) for up to 1 dose.    rosuvastatin (CRESTOR) 20 MG Tab TAKE 1 TABLET EVERY EVENING    hydrocortisone 2.5 % Cream topical cream AAA face BID PRN itching, redness, rash. Stop use after 2-3 weeks. Avoid use around eyes    anastrozole (ARIMIDEX) 1 MG Tab Take 1 Tablet by mouth every day.    Flaxseed, Linseed, (FLAXSEED OIL) 1000 MG Cap Take 2,000 mg by mouth every day.    Vitamin D, Cholecalciferol, 50 MCG (2000 UT) Cap Take 50 mcg by mouth every day.    Ascorbic Acid (VITAMIN C) 500 MG Cap Take 500 mg by mouth every day.    SUMAtriptan (IMITREX) 50 MG Tab Take 1 Tablet by mouth one time as needed for Migraine for up to 1 dose.    warfarin (COUMADIN) 5 MG Tab TAKE ONE-HALF (1/2) TO ONE TABLET DAILY OR AS DIRECTED BY ANTICOAGULATION CLINIC.    BIOTIN PO Take 1 Capsule by mouth every day.       MEDICATION INSTRUCTIONS FOR SURGERY/PROCEDURE SCHEDULED FOR 7/16/24   DO NOT TAKE 7 DAYS PRIOR TO SURGERY    acetaminophen (TYLENOL) 325 MG Tab Take 650 mg by mouth every four hours as needed for Mild Pain.       MEDICATION INSTRUCTIONS FOR SURGERY/PROCEDURE SCHEDULED FOR 7/16/24   YOU MAY CONTINUE TAKING UP TO AND ON DAY OR SURGERY       Social  History     Tobacco Use    Smoking status: Never    Smokeless tobacco: Never   Vaping Use    Vaping status: Never Used   Substance Use Topics    Alcohol use: Yes     Alcohol/week: 1.8 - 2.4 oz     Types: 3 - 4 Shots of liquor per week    Drug use: No        Family and Occupational History     Socioeconomic History    Marital status:      Spouse name: Not on file    Number of children: Not on file    Years of education: Not on file    Highest education level: Not on file   Occupational History    Not on file       Family History   Problem Relation Age of Onset    Arthritis Mother     Hypertension Father     Kidney Disease Father     Stroke Sister     Arthritis Sister     No Known Problems Brother     No Known Problems Brother     Cancer Maternal Uncle         Lung ca- smoker    Breast Cancer Paternal Aunt     Colon Cancer Paternal Uncle     Heart Attack Maternal Grandmother     Stroke Maternal Grandfather     Heart Disease Paternal Grandmother     No Known Problems Paternal Grandfather     No Known Problems Son     Cancer Other         Gallbladder cancer       OB History    Para Term  AB Living   3 2 0 0 1 0   SAB IAB Ectopic Molar Multiple Live Births   1 0 0 0 0 0   Obstetric Comments   Age of first delivery: 19   Menarche: 13   LMP: 1982, HRT used for a few years, stopped in early          Review of Systems   Constitutional: Negative.    HENT:  Negative.     Eyes: Negative.    Respiratory:  Positive for cough, shortness of breath and wheezing (history of bronchiectasis).    Cardiovascular: Negative.    Gastrointestinal: Negative.    Endocrine: Negative.    Genitourinary: Negative.     Musculoskeletal:  Positive for arthralgias.   Skin: Negative.    Neurological:  Positive for headaches (history of migraines).   Hematological:  Bruises/bleeds easily (on coumadin).   Psychiatric/Behavioral: Negative.            Objective:   /64 (BP Location: Left arm, Patient Position: Sitting, BP  Cuff Size: Large adult)   Pulse 83   Temp 36.6 °C (97.9 °F) (Temporal)   Ht 1.524 m (5')   Wt 59.1 kg (130 lb 6.4 oz)   SpO2 93%   BMI 25.47 kg/m²       Physical Exam  Constitutional:       Appearance: Normal appearance.   HENT:      Head: Normocephalic.   Cardiovascular:      Rate and Rhythm: Normal rate and regular rhythm.      Pulses: Normal pulses.      Heart sounds: Normal heart sounds.   Pulmonary:      Effort: Pulmonary effort is normal.      Breath sounds: Normal breath sounds.   Chest:      Comments: Bilateral breasts examined in the upright and supine positions.  No suspicious skin changes (erythema, peau d'orange).  No unexpected contour abnormalities. Breasts relatively symmetric. Bilateral breast tissue with no dominant masses or nodules.  Bilateral nipples everted without expressible discharge.  No palpable cervical, supraclavicular, or axillary adenopathy bilaterally.    Surgical site: LEFT upper outer and axillary incisions healed as expected.   Neurological:      Mental Status: She is alert and oriented to person, place, and time.   Psychiatric:         Mood and Affect: Mood normal.         Behavior: Behavior normal.      Comments: Very pleasant demeanor         FUNCTIONAL ASSESSMENT  Patient responses to questions:    Have you ever had shoulder surgery or been treated for a shoulder injury that resulted in a loss of range of motion?  No     Are you unable to reach into an upper cabinet and retrieve a cup or plate?  No     Do you have any limitations in daily activities because of your shoulder?  No     Overhead raise test for shoulder flexion:  Normal Range:  150-180 degrees  (L - 152, R - 152)    Diagnosis:     1. Malignant neoplasm of upper-outer quadrant of left breast in female, estrogen receptor positive (HCC)            Medical Decision Making:  Today's Assessment / Status / Plan:     ASSESSMENT:  LEFT upper outer (1:00, 6cm FN) IDC.  pT1c pN0 M0. Anatomic stage IA, Prognostic stage IA.  US biopsy 24 [facility]. Wire localized LEFT PM w/ lattice, SLNB 24. Anastrozole initiated 10/2024 (Natasha). Completed 5 fractions of APBI 2024 (Pato).   1.2 cm, invasive carcinoma at margin anteriorly - breadth of 2 mm  (6mm on US.  HM COIL.)              Grade 2.  LVI not seen.  (TIL 20% on biopsy)              ER/KY >90%.              Ki-67 15%.              HER2 low/neg (2+ IHC, FISH neg).                0 out of 2 axillary lymph nodes negative      Last bilateral mammogram 24 [facility]:  Scattered FGD.  Last prior mammo .     FH:  Paternal aunt with breast cancer.  Paternal uncle with colon cancer.  No known AJ heritage.  BRCA 1/2 negative ([facility] [place] [facility]).      Menopausal/HRT status:  .  Age of first delivery: 19   Menarche: 13   LMP: , HRT used for a few years, stopped in early          LIFESTYLE:  No smoking or vaping history.  3-4 alcohol per week. No drug history.  BMI 23.       ECOG 0= Fully active, able to carry on all pre-disease performance without restriction.      History of AVR (mechanical, ) and aortic arch replacement.  On Coumadin.       DISCUSSED/PLAN:  We have discussed the importance of self breast examination and to monitor for changes in the breast such as nipple discharge, skin changes, new masses or contour/nipple changes. If these are to occur she has been advised to notify our office. We discussed that scar tissue can be normal, but enlarging lumps should be further assessed. We also discussed the difference between screening and diagnostic mammograms.     She will be due for her next imaging MAY 2025. She had a future order for LEFT only mammogram for May, discussed with AD Marroquin. Order placed for BILATERAL diagnostic mammogram, results will be reviewed with Yolanda in  at patients next appointment.     We discussed being properly fitted for a bra as she has continued to experience discomfort since surgery. She expressed  interest in a referral for partial mastectomy bra fitting.     Advised patient to continue their follow-up schedule with medical oncology. She may return on an as needed basis.      Total time spent today, including personal review of records, face to face time (20 minutes), chart documentation, and  was 45 minutes.     REFERRALS:    Mastectomy bra fitting

## 2025-02-27 ENCOUNTER — APPOINTMENT (OUTPATIENT)
Dept: PHYSICAL THERAPY | Facility: REHABILITATION | Age: 70
End: 2025-02-27
Attending: SPECIALIST
Payer: MEDICARE

## 2025-03-02 LAB — INR PPP: 3.3 (ref 2–3.5)

## 2025-03-04 ENCOUNTER — ANTICOAGULATION MONITORING (OUTPATIENT)
Dept: CARDIOLOGY | Facility: MEDICAL CENTER | Age: 70
End: 2025-03-04
Payer: MEDICARE

## 2025-03-04 ENCOUNTER — ANTICOAGULATION MONITORING (OUTPATIENT)
Dept: MEDICAL GROUP | Facility: PHYSICIAN GROUP | Age: 70
End: 2025-03-04
Payer: MEDICARE

## 2025-03-04 DIAGNOSIS — Z95.2 S/P AVR (AORTIC VALVE REPLACEMENT): ICD-10-CM

## 2025-03-04 NOTE — PROGRESS NOTES
OP Anticoagulation Service Note    Date: 3/4/2025    Anticoagulation Summary  As of 3/4/2025      INR goal:  2.0-3.0   TTR:  56.7% (7.1 y)   INR used for dosing:  3.30 (3/2/2025)   Warfarin maintenance plan:  2.5 mg (5 mg x 0.5) every Mon, Wed, Fri; 5 mg (5 mg x 1) all other days   Weekly warfarin total:  27.5 mg   Plan last modified:  Diann Warren, PharmD (1/9/2024)   Next INR check:  --   Target end date:  Indefinite    Indications    S/P AVR (aortic valve replacement) [Z95.2]                 Anticoagulation Episode Summary       INR check location:  Home Draw    Preferred lab:  --    Send INR reminders to:  --    Comments:  Denny CELESTIN  only call if out of range          Anticoagulation Care Providers       Provider Role Specialty Phone number    Chinyere Marcus M.D. Referring Cardiovascular Disease (Cardiology) [phone]    Yossi Khalil, PharmD Responsible Pharmacy           Anticoagulation Patient Findings      Patient's preferred phone number:  Marline Perry  [address] [facility] [place] [id number] [phone]      HPI:   The reason for today's call is to prevent morbidity and mortality from a blood clot and/or stroke and to reduce the risk of bleeding while on a anticoagulant.     Care Team    PCP:  Amrik Duncan D.O.  [address] [facility] [address]  [place] [id number] [phone]      Patient Care Team                Amrik Duncan D.O. PCP - General, Family Medicine [phone]     [address] [facility] [address] [place] [address]    Dr.Dean Dempsey Family Medicine [phone]    [address] [place] [address] [address] [facility]  Respiratory Therapist     JAZMYNE Palm. Consulting Physician, Pulmonary Medicine [phone] [address] [facility] [place] [address]    Chinyere Marcus M.D. Consulting Physician, Cardiovascular Disease (Cardiology) [phone] [address] [facility] [address] [facility] [address]    Marilyn Talbot M.D. Consulting Physician, Pulmonary  Medicine [phone] [address] [facility] [address] [place] [address]    Michael Kaur M.D. Medical Oncology [phone]     [address] [facility] [address] [place] [address]            Assessment:     INR goal for this PT: 2.0-3.0  INR   Date Value Ref Range Status   03/02/2025 3.30 2.00 - 3.50 Final       Lab Results   Component Value Date/Time    BUN 16 10/10/2024 08:11 AM    CREATININE 0.97 10/10/2024 08:11 AM     Lab Results   Component Value Date/Time    HEMOGLOBIN 13.9 10/10/2024 08:11 AM    HEMATOCRIT 42.4 10/10/2024 08:11 AM    PLATELETCT 265 10/10/2024 08:11 AM    ALKPHOSPHAT 83 10/10/2024 08:11 AM    ASTSGOT 65 (H) 10/10/2024 08:11 AM    ALTSGPT 71 (H) 10/10/2024 08:11 AM          Current Outpatient Medications:     amoxicillin, 2,000 mg, Oral, Once PRN    rosuvastatin, TAKE 1 TABLET EVERY EVENING    hydrocortisone, AAA face BID PRN itching, redness, rash. Stop use after 2-3 weeks. Avoid use around eyes    anastrozole, 1 mg, Oral, DAILY    Flaxseed Oil, 2,000 mg, Oral, DAILY    Vitamin D (Cholecalciferol), 50 mcg, Oral, DAILY    Vitamin C, 500 mg, Oral, DAILY    SUMAtriptan, 50 mg, Oral, Once PRN    warfarin, TAKE ONE-HALF (1/2) TO ONE TABLET DAILY OR AS DIRECTED BY ANTICOAGULATION CLINIC.    BIOTIN PO, 1 Capsule, Oral, DAILY    acetaminophen, 650 mg, Oral, Q4HRS PRN    [id number] Stroke Risk Points: N/A   Values used to calculate this score:    Points  Metrics       0        Has Congestive Heart Failure: No       0        Has Hypertension: No       1        Age: 69       0        Has Diabetes: No       0        Had Stroke: No                 Had TIA: No                 Had Thromboembolism: No       1        Has Vascular Disease: Yes       1        Clinically Relevant Sex: Female     No data recorded       Plan:     She held warfarin on Sunday.  She will then resume her normal dose and eat more greens    Follow-up:     On date seen above    Additional information discussed with patient:     Asked  patient to please call the anticoagulation clinic if they have any signs/symptoms of bleeding and/or thrombosis or any changes to diet or medications.      National recommendations regarding anticoagulation therapy:     The CHEST guidelines recommends frequent INR monitoring at regular intervals (a few days up to a max of 12 weeks) to ensure patients are on the proper dose of warfarin, and patients are not having any complications from therapy.  INRs can dramatically change over a short time period due to diet, medications, and medical conditions.       [facility]  Phone: [phone]  Fax: [phone]  On call: [phone]  General scheduling/information [phone]  For emergencies please dial 911  Please do not use [employer] for urgent matters, call the phone numbers listed above.    This note was created using voice recognition software (Dragon). The accuracy of the dictation is limited by the abilities of the software. I have reviewed the note prior to signing, however some errors in grammar and context are still possible. If you have any questions related to this note please do not hesitate to contact our office.

## 2025-03-05 DIAGNOSIS — Z17.0 MALIGNANT NEOPLASM OF UPPER-OUTER QUADRANT OF LEFT BREAST IN FEMALE, ESTROGEN RECEPTOR POSITIVE (HCC): ICD-10-CM

## 2025-03-05 DIAGNOSIS — C50.412 MALIGNANT NEOPLASM OF UPPER-OUTER QUADRANT OF LEFT BREAST IN FEMALE, ESTROGEN RECEPTOR POSITIVE (HCC): ICD-10-CM

## 2025-03-06 RX ORDER — ANASTROZOLE 1 MG/1
1 TABLET ORAL DAILY
Qty: 90 TABLET | Refills: 3 | Status: SHIPPED | OUTPATIENT
Start: 2025-03-06

## 2025-03-18 ENCOUNTER — ANTICOAGULATION MONITORING (OUTPATIENT)
Dept: VASCULAR LAB | Facility: MEDICAL CENTER | Age: 70
End: 2025-03-18
Payer: MEDICARE

## 2025-03-18 ENCOUNTER — TELEPHONE (OUTPATIENT)
Dept: VASCULAR LAB | Facility: MEDICAL CENTER | Age: 70
End: 2025-03-18
Payer: MEDICARE

## 2025-03-18 DIAGNOSIS — Z95.2 S/P AVR (AORTIC VALVE REPLACEMENT): ICD-10-CM

## 2025-03-18 LAB — INR PPP: 3.1 (ref 2–3.5)

## 2025-03-18 NOTE — PROGRESS NOTES
Anticoagulation Summary  As of 3/18/2025      INR goal:  2.0-3.0   TTR:  56.4% (7.1 y)   INR used for dosing:  3.10 (3/18/2025)   Warfarin maintenance plan:  2.5 mg (5 mg x 0.5) every Mon, Wed, Fri; 5 mg (5 mg x 1) all other days   Weekly warfarin total:  27.5 mg   Plan last modified:  Diann Warren, PharmD (1/9/2024)   Next INR check:  4/1/2025   Target end date:  Indefinite    Indications    S/P AVR (aortic valve replacement) [Z95.2]                 Anticoagulation Episode Summary       INR check location:  Home Draw    Preferred lab:  --    Send INR reminders to:  --    Comments:  Denny CELESTIN  only call if out of range          Anticoagulation Care Providers       Provider Role Specialty Phone number    Chinyere Marcus M.D. Referring Cardiovascular Disease (Cardiology) [phone]    Yossi Khalil, PharmD Responsible Pharmacy           Anticoagulation Patient Findings  Patient Findings       Positives:  Change in alcohol use, Change in diet/appetite    Negatives:  Signs/symptoms of thrombosis, Signs/symptoms of bleeding, Laboratory test error suspected, Change in health, Change in activity, Upcoming invasive procedure, Emergency department visit, Upcoming dental procedure, Missed doses, Extra doses, Change in medications, Hospital admission, Bruising, Other complaints    Comments:  Patient reports eating more greens and having more drinks around the holiday.            Called and spoke to patient to report supratherapeutic INR of 3.10. Patient will take 0.5 tablets today and resume schedule tomorrow. Requested patient to contact the clinic for any s/sx of unusual bleeding, bruising, clotting or any changes to diet or medications.    INR slightly supratherapeutic  Reason(s) for out of range INR today: Increased vitamin K intake and Increased EtOH K intake      Warfarin Plan: Take 2.5 mg today, then resume regular schedule tomorrow.    Next INR in 2 week(s).    Confirmed plan with Drew Craft  PharmD.    Amanda Ariza, Pharmacy Intern

## 2025-03-18 NOTE — TELEPHONE ENCOUNTER
PT Name: Marline Perry     PT Reports INR Value: 3.1    Date of INR Results: 3/18/25    Concerns/Questions Reported: or N/A: None    Callback Number: [phone]     Thank you,  Meseret KRUGER

## 2025-04-01 ENCOUNTER — ANTICOAGULATION MONITORING (OUTPATIENT)
Dept: VASCULAR LAB | Facility: MEDICAL CENTER | Age: 70
End: 2025-04-01
Payer: MEDICARE

## 2025-04-01 ENCOUNTER — TELEPHONE (OUTPATIENT)
Dept: VASCULAR LAB | Facility: MEDICAL CENTER | Age: 70
End: 2025-04-01
Payer: MEDICARE

## 2025-04-01 DIAGNOSIS — Z95.2 S/P AVR (AORTIC VALVE REPLACEMENT): ICD-10-CM

## 2025-04-01 LAB — INR PPP: 3.2 (ref 2–3.5)

## 2025-04-01 NOTE — PROGRESS NOTES
Anticoagulation Summary  As of 4/1/2025      INR goal:  2.0-3.0   TTR:  56.1% (7.2 y)   INR used for dosing:  3.20 (4/1/2025)   Warfarin maintenance plan:  2.5 mg (5 mg x 0.5) every Mon, Wed, Fri; 5 mg (5 mg x 1) all other days   Weekly warfarin total:  27.5 mg   Plan last modified:  Diann Warren, PharmD (1/9/2024)   Next INR check:  4/15/2025   Target end date:  Indefinite    Indications    S/P AVR (aortic valve replacement) [Z95.2]                 Anticoagulation Episode Summary       INR check location:  Home Draw    Preferred lab:  --    Send INR reminders to:  --    Comments:  Denny CELESTIN  only call if out of range          Anticoagulation Care Providers       Provider Role Specialty Phone number    Chinyere Marcus M.D. Referring Cardiovascular Disease (Cardiology) [phone]    Yossi Khalil, PharmD Responsible Pharmacy           Anticoagulation Patient Findings  Patient Findings       Positives:  Change in diet/appetite (eating fewer greens than usual)    Negatives:  Signs/symptoms of thrombosis, Signs/symptoms of bleeding, Laboratory test error suspected, Change in health, Change in alcohol use, Change in activity, Upcoming invasive procedure, Emergency department visit, Upcoming dental procedure, Missed doses, Extra doses, Change in medications, Hospital admission, Bruising, Other complaints            Called and spoke to patient to report INR of 3.20. Requested patient to contact the clinic for any s/sx of unusual bleeding, bruising, clotting or any changes to diet or medications.    INR slightly supratherapeutic  Reason(s) for out of range INR today: Decreased vitamin K intake      Warfarin Plan: Take 2.5 mg today, then resume regimen as written above.    Next INR in 2 week(s).    Amanda Ariza, Pharmacy Intern

## 2025-04-01 NOTE — TELEPHONE ENCOUNTER
PT Name: Marline Perry      PT Reports INR Value: 3.2      Date of INR Results: 4/1/25    Concerns/Questions Reported: or N/A: n/a    Callback Number: [phone]        Thank you    -Johnnie GOMES

## 2025-04-16 ENCOUNTER — ANTICOAGULATION MONITORING (OUTPATIENT)
Dept: VASCULAR LAB | Facility: MEDICAL CENTER | Age: 70
End: 2025-04-16
Payer: MEDICARE

## 2025-04-16 ENCOUNTER — TELEPHONE (OUTPATIENT)
Dept: VASCULAR LAB | Facility: MEDICAL CENTER | Age: 70
End: 2025-04-16
Payer: MEDICARE

## 2025-04-16 DIAGNOSIS — Z95.2 S/P AVR (AORTIC VALVE REPLACEMENT): ICD-10-CM

## 2025-04-16 LAB — INR PPP: 3 (ref 2–3.5)

## 2025-04-16 NOTE — PROGRESS NOTES
Anticoagulation Summary  As of 4/16/2025      INR goal:  2.0-3.0   TTR:  55.7% (7.2 y)   INR used for dosing:  3.00 (4/16/2025)   Warfarin maintenance plan:  2.5 mg (5 mg x 0.5) every Mon, Wed, Fri; 5 mg (5 mg x 1) all other days   Weekly warfarin total:  27.5 mg   Plan last modified:  Diann Warren, PharmD (1/9/2024)   Next INR check:  4/30/2025   Target end date:  Indefinite    Indications    S/P AVR (aortic valve replacement) [Z95.2]                 Anticoagulation Episode Summary       INR check location:  Home Draw    Preferred lab:  --    Send INR reminders to:  --    Comments:  Denny CELESTIN  only call if out of range          Anticoagulation Care Providers       Provider Role Specialty Phone number    Chinyere Marcus M.D. Referring Cardiovascular Disease (Cardiology) [phone]    Yossi Khalil, PharmD Responsible Pharmacy           Anticoagulation Patient Findings    INR is therapeutic    Per Chart Review patient prefers to only be contacted if INR is out of range.    Warfarin Plan: Continue regimen as listed above.    Next INR in 2 week(s).    Cody Prince, PharmD

## 2025-04-16 NOTE — TELEPHONE ENCOUNTER
Please see Anticoagulation Monitoring Encounter dated 4/16/25 for further details.   Deanna Acosta, PharmD

## 2025-04-16 NOTE — TELEPHONE ENCOUNTER
PT Name: Marline Perry    PT Reports INR Value: 3.0    Date of INR Results: 4/16/25    Callback Number: [phone]    Thank You   Hallie MONSON    Alternatives Discussed Intro (Do Not Add Period): I discussed alternative treatments to Mohs surgery and specifically discussed the risks and benefits of

## 2025-04-30 ENCOUNTER — TELEPHONE (OUTPATIENT)
Dept: VASCULAR LAB | Facility: MEDICAL CENTER | Age: 70
End: 2025-04-30
Payer: MEDICARE

## 2025-04-30 ENCOUNTER — ANTICOAGULATION MONITORING (OUTPATIENT)
Dept: VASCULAR LAB | Facility: MEDICAL CENTER | Age: 70
End: 2025-04-30
Payer: MEDICARE

## 2025-04-30 DIAGNOSIS — Z95.2 S/P AVR (AORTIC VALVE REPLACEMENT): ICD-10-CM

## 2025-04-30 LAB — INR PPP: 3 (ref 2–3.5)

## 2025-04-30 NOTE — TELEPHONE ENCOUNTER
Please see Anticoagulation Monitoring Encounter dated 4/30/25 for further details.   Deanna Acosta, PharmD

## 2025-04-30 NOTE — PROGRESS NOTES
Anticoagulation Summary  As of 4/30/2025      INR goal:  2.0-3.0   TTR:  56.0% (7.2 y)   INR used for dosing:  3.00 (4/30/2025)   Warfarin maintenance plan:  2.5 mg (5 mg x 0.5) every Mon, Wed, Fri; 5 mg (5 mg x 1) all other days   Weekly warfarin total:  27.5 mg   Plan last modified:  Diann Warren, PharmD (1/9/2024)   Next INR check:  5/14/2025   Target end date:  Indefinite    Indications    S/P AVR (aortic valve replacement) [Z95.2]                 Anticoagulation Episode Summary       INR check location:  Home Draw    Preferred lab:  --    Send INR reminders to:  --    Comments:  Denny CELESTIN  only call if out of range          Anticoagulation Care Providers       Provider Role Specialty Phone number    Chinyere Marcus M.D. Referring Cardiovascular Disease (Cardiology) [phone]    Santo EncisoD Responsible Pharmacy           Anticoagulation Patient Findings    INR therapeutic at 3.0.  Per chart notes, patient requests contact only when out of range.  Pt is to continue with current warfarin dosing regimen.  Follow up in 2 weeks, to reduce risk of adverse events related to this high risk medication,  Warfarin.    Tulio Sotelo, SantoD, BCACP

## 2025-04-30 NOTE — TELEPHONE ENCOUNTER
PT Name: Marline Perry     PT Reports INR Value: 3.0    Date of INR Results: 4/30/25    Concerns/Questions Reported: or N/A: None    Callback Number: [phone]     Thank you,  Meseret KRUGER

## 2025-05-14 ENCOUNTER — ANTICOAGULATION MONITORING (OUTPATIENT)
Dept: VASCULAR LAB | Facility: MEDICAL CENTER | Age: 70
End: 2025-05-14
Payer: MEDICARE

## 2025-05-14 ENCOUNTER — TELEPHONE (OUTPATIENT)
Dept: VASCULAR LAB | Facility: MEDICAL CENTER | Age: 70
End: 2025-05-14
Payer: MEDICARE

## 2025-05-14 DIAGNOSIS — Z95.2 S/P AVR (AORTIC VALVE REPLACEMENT): Primary | ICD-10-CM

## 2025-05-14 LAB — INR PPP: 3 (ref 2–3.5)

## 2025-05-14 NOTE — TELEPHONE ENCOUNTER
PT Name: Marline Perry    PT Reports INR Value: 3.0    Date of INR Results: 5/14/25    Concerns/Questions Reported or N/A: n/a    Callback Number: [phone]    Thank you,  Nica GOMES

## 2025-05-14 NOTE — PROGRESS NOTES
Anticoagulation Summary  As of 5/14/2025      INR goal:  2.0-3.0   TTR:  56.2% (7.3 y)   INR used for dosing:  3.00 (5/14/2025)   Warfarin maintenance plan:  2.5 mg (5 mg x 0.5) every Mon, Wed, Fri; 5 mg (5 mg x 1) all other days   Weekly warfarin total:  27.5 mg   Plan last modified:  Diann Warren, PharmD (1/9/2024)   Next INR check:  5/28/2025   Target end date:  Indefinite    Indications    S/P AVR (aortic valve replacement) [Z95.2]                 Anticoagulation Episode Summary       INR check location:  Home Draw    Preferred lab:  --    Send INR reminders to:  --    Comments:  Denny CELESTIN  only call if out of range          Anticoagulation Care Providers       Provider Role Specialty Phone number    Cihnyere Marcus M.D. Referring Cardiovascular Disease (Cardiology) [phone]    Yossi Khalil, PharmD Responsible Pharmacy           Anticoagulation Patient Findings        INR is therapeutic      Per Chart Review patient prefers to only be contacted if INR is out of range.    Warfarin Plan: Continue regimen as listed above.    Next INR in 2 week(s).    Cody Prince, PharmD

## 2025-05-21 ENCOUNTER — APPOINTMENT (OUTPATIENT)
Facility: MEDICAL CENTER | Age: 70
End: 2025-05-21
Payer: MEDICARE

## 2025-05-23 NOTE — PROGRESS NOTES
cc: Chronic pain      Subjective:     Marline Perry is a 63 y.o. female presenting for the following:     Chronic pain- patient has had issues with chronic pain for many years.  She was diagnosed with Sjogren's syndrome and diffuse connective tissue disease in the past.  She was recently seen by rheumatology and they did not see any evidence of active disease or inflammation.    Although it is reassuring that she is not having active disease, she still suffers from chronic pain since these diagnoses.  She is very sensitive to firm touch and this will often be painful for her.  Things that would seem to only mildly hurt other people will be very severely painful for her.  She also does have some chronic tiredness, worse in the mornings, that has not changed for many years.  No recent change in these symptoms but they do affect her activity and she is unable to even complete normal housework on many days.    Review of systems:  All others reviewed and are negative.       Current Outpatient Medications:   •  sertraline (ZOLOFT) 25 MG tablet, Take 1 Tab by mouth every day., Disp: 90 Tab, Rfl: 1  •  amitriptyline (ELAVIL) 10 MG Tab, Take 1 Tab by mouth every evening., Disp: 90 Tab, Rfl: 1  •  Fluticasone Furoate-Vilanterol (BREO) 100-25 MCG/INH AEROSOL POWDER, BREATH ACTIVATED, Inhale 1 Puff by mouth every day., Disp: 3 Each, Rfl: 3  •  warfarin (COUMADIN) 5 MG Tab, Take 0.5 to 1 tablets by mouth daily as directed by [facility]., Disp: 90 Tab, Rfl: 1  •  rosuvastatin (CRESTOR) 20 MG Tab, Take 1 Tab by mouth every evening., Disp: 90 Tab, Rfl: 3  •  SPIRIVA RESPIMAT 2.5 MCG/ACT Aero Soln, 2 INHALATIONS BY MOUTH EVERY DAY . ASSEMBLE AND PRIME, Disp: 1 Inhaler, Rfl: 5  •  SUMAtriptan (IMITREX) 50 MG Tab, , Disp: , Rfl:   •  albuterol (PROVENTIL) 2.5mg/3ml Nebu Soln solution for nebulization, 3 mL by Nebulization route every four hours as needed for Shortness of Breath., Disp: 360 mL, Rfl: 3  •  ascorbic acid  (ASCORBIC ACID) 500 MG Tab, Take 500 mg by mouth every day., Disp: , Rfl:   •  Biotin 58924 MCG Tab, Take  by mouth., Disp: , Rfl:   •  Flaxseed, Linseed, (FLAX SEED OIL) 1000 MG Cap, Take  by mouth 2 Times a Day., Disp: , Rfl:   •  Cholecalciferol (VITAMIN D3) 2000 units Tab, Take  by mouth., Disp: , Rfl:     Allergies, past medical history, past surgical history, family history, social history reviewed and updated    Objective:     Vitals: /68 (BP Location: Left arm, Patient Position: Sitting, BP Cuff Size: Adult)   Pulse 74   Temp 36.8 °C (98.2 °F) (Temporal)   Resp 18   Ht 1.524 m (5')   Wt 56.7 kg (125 lb)   SpO2 94%   BMI 24.41 kg/m²   General: Alert, pleasant, NAD  Heart: Regular rate and rhythm.  S1 and S2 normal.  No murmurs appreciated.  Neurological: No tremors, CN2-12 grossly intact. Patient tender over 18/18 fibromyalgia points.   Psych:  Affect is normal, judgement is good, memory is intact, grooming is appropriate.    Assessment/Plan:     Marline was seen today for follow-up.    Diagnoses and all orders for this visit:    Other chronic pain/Fibromyalgia: Patient with severe chronic pain for many years.  Rheumatologist does not believe there is active autoimmune disease currently in patients symptoms and exam are consistent with fibromyalgia.  Will trial low-dose SSRI in the morning and low-dose try cyclic antidepressant in the evening for sleep and pain.  Patient warned of common side effects of these medications and to discontinue immediately if any negative effect on mood.    Other orders  -     sertraline (ZOLOFT) 25 MG tablet; Take 1 Tab by mouth every day.  -     amitriptyline (ELAVIL) 10 MG Tab; Take 1 Tab by mouth every evening.        Return in about 2 months (around 10/27/2019), or if symptoms worsen or fail to improve.   stretcher

## 2025-05-27 ENCOUNTER — HOSPITAL ENCOUNTER (OUTPATIENT)
Dept: LAB | Facility: MEDICAL CENTER | Age: 70
End: 2025-05-27
Attending: FAMILY MEDICINE
Payer: MEDICARE

## 2025-05-27 ENCOUNTER — RESULTS FOLLOW-UP (OUTPATIENT)
Dept: MEDICAL GROUP | Facility: MEDICAL CENTER | Age: 70
End: 2025-05-27
Payer: MEDICARE

## 2025-05-27 ENCOUNTER — HOSPITAL ENCOUNTER (OUTPATIENT)
Dept: LAB | Facility: MEDICAL CENTER | Age: 70
End: 2025-05-27
Attending: INTERNAL MEDICINE
Payer: MEDICARE

## 2025-05-27 DIAGNOSIS — E78.00 HYPERCHOLESTEREMIA: ICD-10-CM

## 2025-05-27 DIAGNOSIS — C50.412 MALIGNANT NEOPLASM OF UPPER-OUTER QUADRANT OF LEFT BREAST IN FEMALE, ESTROGEN RECEPTOR POSITIVE (HCC): ICD-10-CM

## 2025-05-27 DIAGNOSIS — R79.89 ELEVATED LFTS: ICD-10-CM

## 2025-05-27 DIAGNOSIS — Z17.0 MALIGNANT NEOPLASM OF UPPER-OUTER QUADRANT OF LEFT BREAST IN FEMALE, ESTROGEN RECEPTOR POSITIVE (HCC): ICD-10-CM

## 2025-05-27 LAB
ALBUMIN SERPL BCP-MCNC: 3.6 G/DL (ref 3.2–4.9)
ALBUMIN SERPL BCP-MCNC: 3.6 G/DL (ref 3.2–4.9)
ALBUMIN/GLOB SERPL: 0.9 G/DL
ALP SERPL-CCNC: 89 U/L (ref 30–99)
ALP SERPL-CCNC: 90 U/L (ref 30–99)
ALT SERPL-CCNC: 40 U/L (ref 2–50)
ALT SERPL-CCNC: 41 U/L (ref 2–50)
ANION GAP SERPL CALC-SCNC: 10 MMOL/L (ref 7–16)
ANION GAP SERPL CALC-SCNC: 9 MMOL/L (ref 7–16)
AST SERPL-CCNC: 44 U/L (ref 12–45)
AST SERPL-CCNC: 44 U/L (ref 12–45)
BASOPHILS # BLD AUTO: 1.3 % (ref 0–1.8)
BASOPHILS # BLD: 0.06 K/UL (ref 0–0.12)
BILIRUB CONJ SERPL-MCNC: <0.2 MG/DL (ref 0.1–0.5)
BILIRUB INDIRECT SERPL-MCNC: NORMAL MG/DL (ref 0–1)
BILIRUB SERPL-MCNC: 0.4 MG/DL (ref 0.1–1.5)
BILIRUB SERPL-MCNC: 0.4 MG/DL (ref 0.1–1.5)
BUN SERPL-MCNC: 16 MG/DL (ref 8–22)
BUN SERPL-MCNC: 16 MG/DL (ref 8–22)
CALCIUM ALBUM COR SERPL-MCNC: 9.5 MG/DL (ref 8.5–10.5)
CALCIUM SERPL-MCNC: 9.2 MG/DL (ref 8.5–10.5)
CALCIUM SERPL-MCNC: 9.3 MG/DL (ref 8.5–10.5)
CEA SERPL-MCNC: 1 NG/ML (ref 0–3)
CHLORIDE SERPL-SCNC: 109 MMOL/L (ref 96–112)
CHLORIDE SERPL-SCNC: 110 MMOL/L (ref 96–112)
CO2 SERPL-SCNC: 24 MMOL/L (ref 20–33)
CO2 SERPL-SCNC: 24 MMOL/L (ref 20–33)
CREAT SERPL-MCNC: 1.06 MG/DL (ref 0.5–1.4)
CREAT SERPL-MCNC: 1.07 MG/DL (ref 0.5–1.4)
EOSINOPHIL # BLD AUTO: 0.18 K/UL (ref 0–0.51)
EOSINOPHIL NFR BLD: 3.8 % (ref 0–6.9)
ERYTHROCYTE [DISTWIDTH] IN BLOOD BY AUTOMATED COUNT: 45.7 FL (ref 35.9–50)
GFR SERPLBLD CREATININE-BSD FMLA CKD-EPI: 56 ML/MIN/1.73 M 2
GFR SERPLBLD CREATININE-BSD FMLA CKD-EPI: 57 ML/MIN/1.73 M 2
GLOBULIN SER CALC-MCNC: 3.9 G/DL (ref 1.9–3.5)
GLUCOSE SERPL-MCNC: 93 MG/DL (ref 65–99)
GLUCOSE SERPL-MCNC: 93 MG/DL (ref 65–99)
HCT VFR BLD AUTO: 40.9 % (ref 37–47)
HGB BLD-MCNC: 13.8 G/DL (ref 12–16)
IMM GRANULOCYTES # BLD AUTO: 0.01 K/UL (ref 0–0.11)
IMM GRANULOCYTES NFR BLD AUTO: 0.2 % (ref 0–0.9)
LYMPHOCYTES # BLD AUTO: 1.54 K/UL (ref 1–4.8)
LYMPHOCYTES NFR BLD: 32.4 % (ref 22–41)
MCH RBC QN AUTO: 30.7 PG (ref 27–33)
MCHC RBC AUTO-ENTMCNC: 33.7 G/DL (ref 32.2–35.5)
MCV RBC AUTO: 91.1 FL (ref 81.4–97.8)
MONOCYTES # BLD AUTO: 0.57 K/UL (ref 0–0.85)
MONOCYTES NFR BLD AUTO: 12 % (ref 0–13.4)
NEUTROPHILS # BLD AUTO: 2.39 K/UL (ref 1.82–7.42)
NEUTROPHILS NFR BLD: 50.3 % (ref 44–72)
NRBC # BLD AUTO: 0 K/UL
NRBC BLD-RTO: 0 /100 WBC (ref 0–0.2)
PLATELET # BLD AUTO: 248 K/UL (ref 164–446)
PMV BLD AUTO: 9.5 FL (ref 9–12.9)
POTASSIUM SERPL-SCNC: 4 MMOL/L (ref 3.6–5.5)
POTASSIUM SERPL-SCNC: 4.1 MMOL/L (ref 3.6–5.5)
PROT SERPL-MCNC: 7.4 G/DL (ref 6–8.2)
PROT SERPL-MCNC: 7.5 G/DL (ref 6–8.2)
RBC # BLD AUTO: 4.49 M/UL (ref 4.2–5.4)
SODIUM SERPL-SCNC: 143 MMOL/L (ref 135–145)
SODIUM SERPL-SCNC: 143 MMOL/L (ref 135–145)
WBC # BLD AUTO: 4.8 K/UL (ref 4.8–10.8)

## 2025-05-27 PROCEDURE — 80048 BASIC METABOLIC PNL TOTAL CA: CPT

## 2025-05-27 PROCEDURE — 80053 COMPREHEN METABOLIC PANEL: CPT

## 2025-05-27 PROCEDURE — 86300 IMMUNOASSAY TUMOR CA 15-3: CPT

## 2025-05-27 PROCEDURE — 36415 COLL VENOUS BLD VENIPUNCTURE: CPT

## 2025-05-27 PROCEDURE — 85025 COMPLETE CBC W/AUTO DIFF WBC: CPT

## 2025-05-27 PROCEDURE — 82378 CARCINOEMBRYONIC ANTIGEN: CPT

## 2025-05-27 PROCEDURE — 80076 HEPATIC FUNCTION PANEL: CPT

## 2025-05-28 LAB — CANCER AG27-29 SERPL-ACNC: 22.3 U/ML

## 2025-05-30 ENCOUNTER — ANTICOAGULATION MONITORING (OUTPATIENT)
Dept: VASCULAR LAB | Facility: MEDICAL CENTER | Age: 70
End: 2025-05-30
Payer: MEDICARE

## 2025-05-30 ENCOUNTER — TELEPHONE (OUTPATIENT)
Dept: VASCULAR LAB | Facility: MEDICAL CENTER | Age: 70
End: 2025-05-30
Payer: MEDICARE

## 2025-05-30 DIAGNOSIS — Z95.2 S/P AVR (AORTIC VALVE REPLACEMENT): Primary | ICD-10-CM

## 2025-05-30 LAB — INR PPP: 2.6 (ref 2–3.5)

## 2025-05-30 NOTE — PROGRESS NOTES
Anticoagulation Summary  As of 2025      INR goal:  2.0-3.0   TTR:  56.5% (7.3 y)   INR used for dosin.60 (2025)   Warfarin maintenance plan:  2.5 mg (5 mg x 0.5) every Mon, Wed, Fri; 5 mg (5 mg x 1) all other days   Weekly warfarin total:  27.5 mg   Plan last modified:  Santo JoD (2024)   Next INR check:  2025   Target end date:  Indefinite    Indications    S/P AVR (aortic valve replacement) [Z95.2]                 Anticoagulation Episode Summary       INR check location:  Home Draw    Preferred lab:  --    Send INR reminders to:  --    Comments:  Denny CELESTIN  only call if out of range          Anticoagulation Care Providers       Provider Role Specialty Phone number    Chinyere Marcus M.D. Referring Cardiovascular Disease (Cardiology) [phone]    Santo EncisoD Responsible Pharmacy           Anticoagulation Patient Findings        Pt reported a therapeutic INR  Per chart review, pt does not want to be contacted if INR is therapeutic   Pt to continue with current warfarin dosing regimen. Requested pt contact the clinic for any s/s of unusual bleeding, bruising, clotting or any changes to diet or medication.    FU INR in 2 week(s).    Santo LantiguaD

## 2025-05-30 NOTE — TELEPHONE ENCOUNTER
PT Name: Marline Perry    PT Reports INR Value: 2.6    Date of INR Results: 5.30.25    Concerns/Questions Reported: or N/A: N/A    Callback Number: [phone]    Thank you,     Dottie HOOK

## 2025-06-04 ENCOUNTER — TELEPHONE (OUTPATIENT)
Dept: HEMATOLOGY ONCOLOGY | Facility: MEDICAL CENTER | Age: 70
End: 2025-06-04
Payer: MEDICARE

## 2025-06-04 NOTE — TELEPHONE ENCOUNTER
Called patient to reschedule 6 mon fv with [place]. Let her know that Pink is no longer with the office, and her visit will now be with Marilyn Weston. Patient was okay with this, and was also notified of this visit being at the [facility].

## 2025-06-13 ENCOUNTER — ANTICOAGULATION MONITORING (OUTPATIENT)
Dept: VASCULAR LAB | Facility: MEDICAL CENTER | Age: 70
End: 2025-06-13
Payer: MEDICARE

## 2025-06-13 DIAGNOSIS — Z95.2 S/P AVR (AORTIC VALVE REPLACEMENT): Primary | ICD-10-CM

## 2025-06-13 LAB — INR PPP: 2.6 (ref 2–3.5)

## 2025-06-13 NOTE — PROGRESS NOTES
Anticoagulation Summary  As of 2025      INR goal:  2.0-3.0   TTR:  56.7% (7.4 y)   INR used for dosin.60 (2025)   Warfarin maintenance plan:  2.5 mg (5 mg x 0.5) every Mon, Wed, Fri; 5 mg (5 mg x 1) all other days   Weekly warfarin total:  27.5 mg   Plan last modified:  Santo JoD (2024)   Next INR check:  2025   Target end date:  Indefinite    Indications    S/P AVR (aortic valve replacement) [Z95.2]                 Anticoagulation Episode Summary       INR check location:  Home Draw    Preferred lab:  --    Send INR reminders to:  AMB ANTICOAG POOL    Comments:  Denny CELESTIN  only call if out of range          Anticoagulation Care Providers       Provider Role Specialty Phone number    Chinyere Marcus M.D. Referring Cardiovascular Disease (Cardiology) [phone]    Santo EncisoD Responsible Pharmacy             Refer to Anticoagulation Patient Findings for HPI    INR therapeutic at 2.6.      Per chart review, pt prefers no phone call if INR in range.    Pt to continue with current warfarin dosing regimen.     Will follow up in 2 week(s).     Huseyin Conrad, PharmD, BCACP

## 2025-06-19 ENCOUNTER — HOSPITAL ENCOUNTER (OUTPATIENT)
Dept: RADIOLOGY | Facility: MEDICAL CENTER | Age: 70
End: 2025-06-19
Attending: FAMILY MEDICINE
Payer: MEDICARE

## 2025-06-19 DIAGNOSIS — R79.89 ELEVATED LFTS: ICD-10-CM

## 2025-06-19 PROCEDURE — 76981 USE PARENCHYMA: CPT | Mod: XU

## 2025-06-20 ENCOUNTER — HOSPITAL ENCOUNTER (OUTPATIENT)
Facility: MEDICAL CENTER | Age: 70
End: 2025-06-20
Payer: MEDICARE

## 2025-06-20 ENCOUNTER — RESULTS FOLLOW-UP (OUTPATIENT)
Dept: MEDICAL GROUP | Facility: MEDICAL CENTER | Age: 70
End: 2025-06-20
Payer: MEDICARE

## 2025-06-20 DIAGNOSIS — C50.412 MALIGNANT NEOPLASM OF UPPER-OUTER QUADRANT OF LEFT BREAST IN FEMALE, ESTROGEN RECEPTOR POSITIVE (HCC): ICD-10-CM

## 2025-06-20 DIAGNOSIS — Z17.0 MALIGNANT NEOPLASM OF UPPER-OUTER QUADRANT OF LEFT BREAST IN FEMALE, ESTROGEN RECEPTOR POSITIVE (HCC): ICD-10-CM

## 2025-06-20 PROCEDURE — G0279 TOMOSYNTHESIS, MAMMO: HCPCS

## 2025-06-23 ENCOUNTER — APPOINTMENT (OUTPATIENT)
Dept: RADIOLOGY | Facility: MEDICAL CENTER | Age: 70
End: 2025-06-23
Attending: FAMILY MEDICINE
Payer: MEDICARE

## 2025-06-23 DIAGNOSIS — Z12.31 VISIT FOR SCREENING MAMMOGRAM: ICD-10-CM

## 2025-06-25 ENCOUNTER — HOSPITAL ENCOUNTER (OUTPATIENT)
Facility: MEDICAL CENTER | Age: 70
End: 2025-06-25
Attending: NURSE PRACTITIONER
Payer: MEDICARE

## 2025-06-25 VITALS
DIASTOLIC BLOOD PRESSURE: 60 MMHG | RESPIRATION RATE: 16 BRPM | TEMPERATURE: 97.2 F | HEIGHT: 60 IN | WEIGHT: 125.66 LBS | BODY MASS INDEX: 24.67 KG/M2 | HEART RATE: 63 BPM | SYSTOLIC BLOOD PRESSURE: 108 MMHG | OXYGEN SATURATION: 97 %

## 2025-06-25 DIAGNOSIS — C50.412 MALIGNANT NEOPLASM OF UPPER-OUTER QUADRANT OF LEFT BREAST IN FEMALE, ESTROGEN RECEPTOR POSITIVE (HCC): Primary | ICD-10-CM

## 2025-06-25 DIAGNOSIS — Z17.0 MALIGNANT NEOPLASM OF UPPER-OUTER QUADRANT OF LEFT BREAST IN FEMALE, ESTROGEN RECEPTOR POSITIVE (HCC): Primary | ICD-10-CM

## 2025-06-25 DIAGNOSIS — Z79.811 LONG TERM (CURRENT) USE OF AROMATASE INHIBITORS: ICD-10-CM

## 2025-06-25 PROCEDURE — 99212 OFFICE O/P EST SF 10 MIN: CPT | Performed by: NURSE PRACTITIONER

## 2025-06-25 ASSESSMENT — ENCOUNTER SYMPTOMS
PALPITATIONS: 0
HEADACHES: 1
NAUSEA: 0
TINGLING: 0
FEVER: 0
SHORTNESS OF BREATH: 0
CONSTIPATION: 0
DIZZINESS: 0
CHILLS: 0
WEIGHT LOSS: 0
MYALGIAS: 0
DIAPHORESIS: 0
VOMITING: 0
DIARRHEA: 0
COUGH: 1

## 2025-06-25 ASSESSMENT — FIBROSIS 4 INDEX: FIB4 SCORE: 1.91

## 2025-06-25 ASSESSMENT — PAIN SCALES - GENERAL: PAINLEVEL_OUTOF10: NO PAIN

## 2025-06-25 NOTE — PROGRESS NOTES
Subjective     Marline Perry is a 69 y.o. female who presents with Breast Cancer (Schwartzbeg/ 6 month FV)          HPI    Referring Physician: Sari Ross MD   Primary Care:  Amrik Duncan D.O.      Diagnosis: Invasive ductal carcinoma, grade 2, stage I (pT1c, PN 0) ER greater than 90%, DE greater than 90%, HER2 2+ IHC FISH negative, Ki-67 15%      Chief Complaint: Patient seen today for evaluation of her HR positive HER2 negative left breast cancer, for continued monitoring of symptoms and side effects of cancer treatments, employing Anastrazole.    Oncology history of presenting illness:  Marline Perry is a 68 y.o. female who had an an abnormal screening mammogram on 5/22/2024. This showed an irregular lobulated mass in the left upper outer quadrant. On 621 she underwent a needle biopsy of the left breast and axillary lymph node. Pathology demonstrated invasive ductal carcinoma grade 2, no lymph vascular invasion, ER positive greater than 90%, DE positive greater than 90%, Ki-67 15%, HER2 2+ IHC FISH negative, left axillary lymph node negative. She saw Dr. Ross in consultation and underwent left partial mastectomy and sentinel lymph node biopsy on 7/16/2024. Pathology demonstrated 1.2 cm grade 2 invasive ductal carcinoma with a separate focus of intermediate grade DCIS. 2 sentinel lymph nodes were negative. Postoperative course was unremarkable. She is in good health for age with a artificial aortic valve placed 20 years ago for a congenital bicuspid valve. She has been on Coumadin subsequently. She is physically active. She had menopause in her late 40s and did not have hot flashes or night sweats. She had a bone density several years ago and apparently has osteopenia in her femur. It has not been repeated recently. She is on calcium and vitamin D supplementation. She has not yet seen radiation but will be scheduled.     Interval histories:  Interval history 9/25/2024: She underwent 5  fraction APBI to the left breast and completed in early September 2024.  Overall she tolerated extremely well.  Her only ongoing and new problem is a diffuse rash on her forearms predominantly and some on her back and legs.  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  She was prescribed low-dose methotrexate after labs which have yet been drawn.  She also had a DEXA scan that showed improvement in her bone density with ongoing osteopenia in the lumbar spine and minimal osteopenia in the femur.  She is taking vitamin D and calcium.  She has no hot flashes or night sweats.  Her anticoagulation shows excellent therapeutic range.     Interval history 12/17/2024: She is tolerating anastrozole which she started the beginning of October extremely well.  She has no hot flashes night sweats or musculoskeletal symptoms.  She had healthy [place] testing which was negative for BRCA 1 and 2 or other abnormalities.  Primary care is keeping an eye on her liver functions.    Interval history 06/25/25 (Douglassoeligio, APRN):  Patient is doing very well overall.  She does have a mild cough which is chronic.  She also has chronic lower extremity pain on that right which resolves with Tylenol if needed.  She also does state that she has sinus headaches and migraines at times but has not had any for a few weeks.  But this is chronic as well.  She is tolerating anastrozole without any significant side effects.  She denies any hot flashes or generalized myalgias.  She had most recent mammogram on 6/20/2025 which was negative for any malignancy.  I reviewed patient's labs from 5/27/2025.  Liver functions are within normal limits.  Ultrasound of the liver was completed per PCP which shows mild hepatic steatosis.  CEA and CA 27. 29 are within normal limits.  Discussed with patient today.    Treatment history:  07/16/25: Left partial mastectomy -   08/28 - 09/04/24: 5 fractions APBI left breast -   [place]  10/2024: Initiation of Anastrazole      Allergies[1]    Medications Ordered Prior to Encounter[2]    Review of Systems   Constitutional:  Negative for chills, diaphoresis, fever, malaise/fatigue and weight loss.   Respiratory:  Positive for cough (chronic). Negative for shortness of breath.    Cardiovascular:  Negative for chest pain and palpitations.   Gastrointestinal:  Negative for constipation, diarrhea, nausea and vomiting.   Genitourinary:  Negative for dysuria.   Musculoskeletal:  Positive for joint pain (sometimes will have discomfort in the right lower extremity - resolves with Tylenol if needed). Negative for myalgias.   Neurological:  Positive for headaches (sinus HA or migraines but this is chronic). Negative for dizziness and tingling.              Objective     /60   Pulse 63   Temp 36.2 °C (97.2 °F) (Temporal)   Resp 16   Ht 1.524 m (5')   Wt 57 kg (125 lb 10.6 oz)   SpO2 97%   BMI 24.54 kg/m²      Physical Exam  Vitals reviewed.   Constitutional:       General: She is not in acute distress.     Appearance: Normal appearance. She is not diaphoretic.   HENT:      Head: Atraumatic.   Cardiovascular:      Rate and Rhythm: Normal rate and regular rhythm.      Heart sounds: Murmur heard.      No friction rub. No gallop.      Comments: 07/26/24: Loud A2 consistent with artificial valve.  Pulmonary:      Effort: Pulmonary effort is normal. No respiratory distress.      Breath sounds: Normal breath sounds. No wheezing.   Chest:   Breasts:     Right: No swelling, bleeding, inverted nipple, mass, nipple discharge, skin change or tenderness.      Left: No swelling, bleeding, inverted nipple, mass, nipple discharge, skin change or tenderness.      Comments: 06/25/25: Unremarkable bilateral breast exam today.  07/26/24: Good cosmetic result in the left breast at the lumpectomy and sentinel lymph node biopsy.  Both breasts without masses nipple discharge or tenderness.  Abdominal:       General: Bowel sounds are normal. There is no distension.      Palpations: Abdomen is soft.      Tenderness: There is no abdominal tenderness.   Musculoskeletal:         General: No swelling or tenderness. Normal range of motion.   Lymphadenopathy:      Upper Body:      Right upper body: No supraclavicular or axillary adenopathy.      Left upper body: No supraclavicular or axillary adenopathy.   Skin:     General: Skin is warm and dry.   Neurological:      Mental Status: She is alert and oriented to person, place, and time.   Psychiatric:         Mood and Affect: Mood normal.         Behavior: Behavior normal.            MA-DIAGNOSTIC MAMMO BILAT W/TOMOSYNTHESIS W/CAD   06/20/25  IMPRESSION:     1.  Postsurgical change in the left breast and left axilla with no mammographic evidence of malignancy bilaterally.     2.  Recommend annual follow-up mammography, monthly self breast exam and routine clinical evaluation.     These results were given to the patient at the time of visit.     BI-RADS Category: R2 - CATEGORY 2: BENIGN FINDING(S)      DS-BONE DENSITY STUDY (DEXA)   08/15/24  IMPRESSION:     According to the World Health Organization classification, bone mineral density of this patient is osteopenic in the spine and osteopenic in the proximal right femur.         Latest Reference Range & Units 05/27/25 07:16   WBC 4.8 - 10.8 K/uL 4.8   RBC 4.20 - 5.40 M/uL 4.49   Hemoglobin 12.0 - 16.0 g/dL 13.8   Hematocrit 37.0 - 47.0 % 40.9   MCV 81.4 - 97.8 fL 91.1   MCH 27.0 - 33.0 pg 30.7   MCHC 32.2 - 35.5 g/dL 33.7   RDW 35.9 - 50.0 fL 45.7   Platelet Count 164 - 446 K/uL 248   MPV 9.0 - 12.9 fL 9.5   Neutrophils-Polys 44.00 - 72.00 % 50.30   Neutrophils (Absolute) 1.82 - 7.42 K/uL 2.39   Lymphocytes 22.00 - 41.00 % 32.40   Lymphs (Absolute) 1.00 - 4.80 K/uL 1.54   Monocytes 0.00 - 13.40 % 12.00   Monos (Absolute) 0.00 - 0.85 K/uL 0.57   Eosinophils 0.00 - 6.90 % 3.80   Eos (Absolute) 0.00 - 0.51 K/uL 0.18   Basophils  0.00 - 1.80 % 1.30   Baso (Absolute) 0.00 - 0.12 K/uL 0.06   Immature Granulocytes 0.00 - 0.90 % 0.20   Immature Granulocytes (abs) 0.00 - 0.11 K/uL 0.01   Nucleated RBC 0.00 - 0.20 /100 WBC 0.00   NRBC (Absolute) K/uL 0.00   Sodium 135 - 145 mmol/L  135 - 145 mmol/L 143  143   Potassium 3.6 - 5.5 mmol/L  3.6 - 5.5 mmol/L 4.0  4.1   Chloride 96 - 112 mmol/L  96 - 112 mmol/L 109  110   Co2 20 - 33 mmol/L  20 - 33 mmol/L 24  24   Anion Gap 7.0 - 16.0   7.0 - 16.0  10.0  9.0   Glucose 65 - 99 mg/dL  65 - 99 mg/dL 93  93   Bun 8 - 22 mg/dL  8 - 22 mg/dL 16  16   Creatinine 0.50 - 1.40 mg/dL  0.50 - 1.40 mg/dL 1.06  1.07   GFR (CKD-EPI) >60 mL/min/1.73 m 2  >60 mL/min/1.73 m 2 57 !  56 !   Calcium 8.5 - 10.5 mg/dL  8.5 - 10.5 mg/dL 9.2  9.3   Correct Calcium 8.5 - 10.5 mg/dL 9.5   AST(SGOT) 12 - 45 U/L  12 - 45 U/L 44  44   ALT(SGPT) 2 - 50 U/L  2 - 50 U/L 40  41   Alkaline Phosphatase 30 - 99 U/L  30 - 99 U/L 90  89   Total Bilirubin 0.1 - 1.5 mg/dL  0.1 - 1.5 mg/dL 0.4  0.4   Direct Bilirubin 0.1 - 0.5 mg/dL <0.2   Indirect Bilirubin 0.0 - 1.0 mg/dL see below   Albumin 3.2 - 4.9 g/dL  3.2 - 4.9 g/dL 3.6  3.6   Total Protein 6.0 - 8.2 g/dL  6.0 - 8.2 g/dL 7.5  7.4   Globulin 1.9 - 3.5 g/dL 3.9 (H)   A-G Ratio g/dL 0.9   Ca 27.29 <=39.0 U/mL 22.3   Carcinoembryonic Antigen 0.0 - 3.0 ng/mL 1.0               Assessment & Plan     1. Malignant neoplasm of upper-outer quadrant of left breast in female, estrogen receptor positive (HCC)        2. Long term (current) use of aromatase inhibitors                Assessment & Plan:  1.  Invasive ductal carcinoma, grade 2, stage I (pT1c, PN 0) ER greater than 90%, WA greater than 90%, HER2 2+ IHC FISH negative, Ki-67 15%.  Status post partial mastectomy and APBI radiation.  On anastrozole since October 2024 with excellent tolerance.  Annual mammogram completed on 6/20/2025 shows no evidence of malignancy.  Next mammogram due in June 2026.  2.  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  Resolved without methotrexate  3.  Osteopenia with improved bone density - on vitamin D and calcium supplements. Next DEXA scan due August 2026.        Plan:   Patient is doing very well overall.  She is to continue on anastrozole as planned.  Follow-up in the clinic in 6 months, or sooner if needed      Please note that this dictation was created using voice recognition software. I have made every reasonable attempt to correct obvious errors, but I expect that there are errors of grammar and possibly content that I did not discover before finalizing the note.           [1]   Allergies  Allergen Reactions    Spironolactone Rash     ALL OVER BODY     Latex Rash     .    Neomycin Rash     CONTACT DERMATITIS     Tape Rash     Adhesives-RASH   LATEX    [2]   Current Outpatient Medications on File Prior to Encounter   Medication Sig Dispense Refill    anastrozole (ARIMIDEX) 1 MG Tab TAKE 1 TABLET DAILY 90 Tablet 3    amoxicillin (AMOXIL) 500 MG Cap Take 4 Capsules by mouth one time as needed (Please take 30-60 minutes prior to any dental procedure or cleaning) for up to 1 dose. 16 Capsule 0    rosuvastatin (CRESTOR) 20 MG Tab TAKE 1 TABLET EVERY EVENING 90 Tablet 3    hydrocortisone 2.5 % Cream topical cream AAA face BID PRN itching, redness, rash. Stop use after 2-3 weeks. Avoid use around eyes 20 g 0    Flaxseed, Linseed, (FLAXSEED OIL) 1000 MG Cap Take 2,000 mg by mouth every day.      Vitamin D, Cholecalciferol, 50 MCG (2000 UT) Cap Take 50 mcg by mouth every day.      Ascorbic Acid (VITAMIN C) 500 MG Cap Take 500 mg by mouth every day.      SUMAtriptan (IMITREX) 50 MG Tab Take 1 Tablet by mouth one time as needed for Migraine for up to 1 dose. 10 Tablet 3    warfarin (COUMADIN) 5 MG Tab TAKE ONE-HALF (1/2) TO ONE TABLET DAILY OR AS DIRECTED BY ANTICOAGULATION CLINIC. 100 Tablet 3    BIOTIN PO Take 1 Capsule by mouth every day.       MEDICATION INSTRUCTIONS FOR SURGERY/PROCEDURE SCHEDULED FOR 7/16/24    DO NOT TAKE 7 DAYS PRIOR TO SURGERY      acetaminophen (TYLENOL) 325 MG Tab Take 650 mg by mouth every four hours as needed for Mild Pain.       MEDICATION INSTRUCTIONS FOR SURGERY/PROCEDURE SCHEDULED FOR 7/16/24   YOU MAY CONTINUE TAKING UP TO AND ON DAY OR SURGERY       No current facility-administered medications on file prior to encounter.

## 2025-07-03 ENCOUNTER — ANTICOAGULATION MONITORING (OUTPATIENT)
Dept: MEDICAL GROUP | Facility: MEDICAL CENTER | Age: 70
End: 2025-07-03
Payer: MEDICARE

## 2025-07-03 DIAGNOSIS — Z95.2 S/P AVR (AORTIC VALVE REPLACEMENT): Primary | ICD-10-CM

## 2025-07-03 LAB — INR PPP: 2.2 (ref 2–3.5)

## 2025-07-03 NOTE — PROGRESS NOTES
Anticoagulation Summary  As of 7/3/2025      INR goal:  2.0-3.0   TTR:  57.0% (7.4 y)   INR used for dosin.20 (7/3/2025)   Warfarin maintenance plan:  2.5 mg (5 mg x 0.5) every Mon, Wed, Fri; 5 mg (5 mg x 1) all other days   Weekly warfarin total:  27.5 mg   Plan last modified:  Santo JoD (2024)   Next INR check:  2025   Target end date:  Indefinite    Indications    S/P AVR (aortic valve replacement) [Z95.2]                 Anticoagulation Episode Summary       INR check location:  Home Draw    Preferred lab:  --    Send INR reminders to:  AMB ANTICOAG POOL    Comments:  Denny CELESTIN  only call if out of range          Anticoagulation Care Providers       Provider Role Specialty Phone number    Chinyere Marcus M.D. Referring Cardiovascular Disease (Cardiology) [phone]    [facility] Responsible Pharmacy             Refer to Anticoagulation Patient Findings for HPI    INR therapeutic at 2.2.      Per chart review, pt prefers no phone call if INR in range.    Pt to continue with current warfarin dosing regimen.     Will follow up in 2 week(s).     Huseyin Conrad, PharmD, BCACP

## 2025-07-18 ENCOUNTER — ANTICOAGULATION MONITORING (OUTPATIENT)
Dept: VASCULAR LAB | Facility: MEDICAL CENTER | Age: 70
End: 2025-07-18
Payer: MEDICARE

## 2025-07-18 ENCOUNTER — TELEPHONE (OUTPATIENT)
Dept: VASCULAR LAB | Facility: MEDICAL CENTER | Age: 70
End: 2025-07-18
Payer: MEDICARE

## 2025-07-18 DIAGNOSIS — Z95.2 S/P AVR (AORTIC VALVE REPLACEMENT): Primary | ICD-10-CM

## 2025-07-18 LAB — INR PPP: 3.2 (ref 2–3.5)

## 2025-07-18 NOTE — PROGRESS NOTES
Anticoagulation Summary  As of 7/18/2025      INR goal:  2.0-3.0   TTR:  57.1% (7.5 y)   INR used for dosing:  3.20 (7/18/2025)   Warfarin maintenance plan:  2.5 mg (5 mg x 0.5) every Mon, Wed, Fri; 5 mg (5 mg x 1) all other days   Weekly warfarin total:  27.5 mg   Plan last modified:  Diann Warren, PharmD (1/9/2024)   Next INR check:  8/1/2025   Target end date:  Indefinite    Indications    S/P AVR (aortic valve replacement) [Z95.2]                 Anticoagulation Episode Summary       INR check location:  Home Draw    Preferred lab:  --    Send INR reminders to:  AMB ANTICOAG POOL    Comments:  Denny CELESTIN  only call if out of range          Anticoagulation Care Providers       Provider Role Specialty Phone number    Chinyere Marcus M.D. Referring Cardiovascular Disease (Cardiology) [phone]    Yossi Khalil, PharmD Responsible Pharmacy             Refer to Anticoagulation Patient Findings for HPI  Patient Findings       Positives:  Change in alcohol use (increased amount of wine coolers), Change in diet/appetite (less amount of greens)    Negatives:  Signs/symptoms of thrombosis, Signs/symptoms of bleeding, Laboratory test error suspected, Change in health, Change in activity, Upcoming invasive procedure, Emergency department visit, Upcoming dental procedure, Missed doses, Extra doses, Change in medications, Hospital admission, Bruising, Other complaints          Clinical Outcomes       Negatives:  Major bleeding event, Thromboembolic event, Anticoagulation-related hospital admission, Anticoagulation-related ED visit, Anticoagulation-related fatality        Spoke with pt; INR is slightly supra-therapeutic (3.2).     Pt verifies warfarin weekly dosing.     Will have pt hold dose x1 then resume normal dosing schedule.     Repeat INR in 2 week(s). Pt agrees.     Discussed pt regimen with Tulio Sotelo, PharmD.     Gabriela Aiken, Pharmacy Intern

## 2025-07-18 NOTE — TELEPHONE ENCOUNTER
PT Name: Marline Perry     PT Reports INR Value: 3.2    Date of INR Results: 7/18/25    Callback Number: [phone]    Thank You   Hallie MONSON

## 2025-07-27 DIAGNOSIS — Z79.01 CHRONIC ANTICOAGULATION: ICD-10-CM

## 2025-07-27 DIAGNOSIS — Z95.2 S/P AVR (AORTIC VALVE REPLACEMENT): ICD-10-CM

## 2025-07-28 RX ORDER — WARFARIN SODIUM 5 MG/1
TABLET ORAL
Qty: 100 TABLET | Refills: 1 | Status: SHIPPED | OUTPATIENT
Start: 2025-07-28

## 2025-07-30 ENCOUNTER — ANTICOAGULATION MONITORING (OUTPATIENT)
Dept: VASCULAR LAB | Facility: MEDICAL CENTER | Age: 70
End: 2025-07-30
Payer: MEDICARE

## 2025-07-30 DIAGNOSIS — Z95.2 S/P AVR (AORTIC VALVE REPLACEMENT): Primary | ICD-10-CM

## 2025-07-30 LAB — INR PPP: 3.1 (ref 2–3.5)

## 2025-07-30 NOTE — PROGRESS NOTES
Anticoagulation Summary  As of 7/30/2025      INR goal:  2.0-3.0   TTR:  56.9% (7.5 y)   INR used for dosing:  3.10 (7/30/2025)   Warfarin maintenance plan:  2.5 mg (5 mg x 0.5) every Mon, Wed, Fri; 5 mg (5 mg x 1) all other days   Weekly warfarin total:  27.5 mg   Plan last modified:  Diann Warren, PharmD (1/9/2024)   Next INR check:  8/6/2025   Target end date:  Indefinite    Indications    S/P AVR (aortic valve replacement) [Z95.2]                 Anticoagulation Episode Summary       INR check location:  Home Draw    Preferred lab:  --    Send INR reminders to:  AMB ANTICOAG POOL    Comments:  Denny CELESTIN  only call if out of range          Anticoagulation Care Providers       Provider Role Specialty Phone number    Chinyere Marcus M.D. Referring Cardiovascular Disease (Cardiology) [phone]    Yossi Khalil, PharmD Responsible Pharmacy           Anticoagulation Patient Findings  Patient Findings       Negatives:  Signs/symptoms of thrombosis, Signs/symptoms of bleeding, Laboratory test error suspected, Change in health, Change in alcohol use, Change in activity, Upcoming invasive procedure, Emergency department visit, Upcoming dental procedure, Missed doses, Extra doses, Change in medications, Change in diet/appetite, Hospital admission, Bruising, Other complaints          Clinical Outcomes       Negatives:  Major bleeding event, Thromboembolic event, Anticoagulation-related hospital admission, Anticoagulation-related ED visit, Anticoagulation-related fatality            INR is slightly supratherapeutic  Reason(s) for out of range INR today: No obvious causes      Called and spoke to patient.      Warfarin Plan: Decrease to Warfarin 2.5 mg tomorrow and then continue previous warfarin regimen.     Next INR in 1 week(s).    Yolanda Beck, SantoD

## 2025-08-14 ENCOUNTER — OFFICE VISIT (OUTPATIENT)
Dept: MEDICAL GROUP | Facility: MEDICAL CENTER | Age: 70
End: 2025-08-14
Payer: MEDICARE

## 2025-08-14 VITALS
HEART RATE: 73 BPM | HEIGHT: 60 IN | SYSTOLIC BLOOD PRESSURE: 116 MMHG | BODY MASS INDEX: 24.7 KG/M2 | DIASTOLIC BLOOD PRESSURE: 60 MMHG | OXYGEN SATURATION: 94 % | WEIGHT: 125.8 LBS | TEMPERATURE: 97.2 F | RESPIRATION RATE: 12 BRPM

## 2025-08-14 DIAGNOSIS — E78.00 HYPERCHOLESTEREMIA: ICD-10-CM

## 2025-08-14 DIAGNOSIS — M35.00 SJOGREN'S SYNDROME, WITH UNSPECIFIED ORGAN INVOLVEMENT (HCC): ICD-10-CM

## 2025-08-14 DIAGNOSIS — Z00.00 WELLNESS EXAMINATION: ICD-10-CM

## 2025-08-14 PROCEDURE — G0439 PPPS, SUBSEQ VISIT: HCPCS | Performed by: FAMILY MEDICINE

## 2025-08-14 PROCEDURE — 3078F DIAST BP <80 MM HG: CPT | Performed by: FAMILY MEDICINE

## 2025-08-14 PROCEDURE — 3074F SYST BP LT 130 MM HG: CPT | Performed by: FAMILY MEDICINE

## 2025-08-14 ASSESSMENT — ACTIVITIES OF DAILY LIVING (ADL): BATHING_REQUIRES_ASSISTANCE: 0

## 2025-08-14 ASSESSMENT — FIBROSIS 4 INDEX: FIB4 SCORE: 1.91

## 2025-08-14 ASSESSMENT — PATIENT HEALTH QUESTIONNAIRE - PHQ9: CLINICAL INTERPRETATION OF PHQ2 SCORE: 0

## 2025-08-14 ASSESSMENT — ENCOUNTER SYMPTOMS: GENERAL WELL-BEING: GOOD

## 2025-08-15 LAB — INR PPP: 2.9 (ref 2–3.5)

## 2025-08-18 ENCOUNTER — ANTICOAGULATION MONITORING (OUTPATIENT)
Dept: MEDICAL GROUP | Facility: PHYSICIAN GROUP | Age: 70
End: 2025-08-18
Payer: MEDICARE

## 2025-08-18 DIAGNOSIS — Z95.2 S/P AVR (AORTIC VALVE REPLACEMENT): Primary | ICD-10-CM

## (undated) DEVICE — BINDER BREAST FLORAL PINK MEDIUM 34-36 (1EA)"

## (undated) DEVICE — GLOVE SZ 6 BIOGEL PI MICRO - PF LF (50PR/BX 4BX/CA)

## (undated) DEVICE — CANISTER SUCTION 3000ML MECHANICAL FILTER AUTO SHUTOFF MEDI-VAC NONSTERILE LF DISP  (40EA/CA)

## (undated) DEVICE — LACTATED RINGERS INJ 1000 ML - (14EA/CA 60CA/PF)

## (undated) DEVICE — GLOVE BIOGEL PI INDICATOR SZ 6.0 SURGICAL PF LF -(200PR/CA)

## (undated) DEVICE — CANNULA O2 COMFORT SOFT EAR ADULT 7 FT TUBING (50/CA)

## (undated) DEVICE — TUBING CLEARLINK DUO-VENT - C-FLO (48EA/CA)

## (undated) DEVICE — COVER SHEATH PROBE FOR SAVI SCOUT (20EA/BX)

## (undated) DEVICE — GOWN WARMING STANDARD FLEX - (30/CA)

## (undated) DEVICE — DRESSING TRANSPARENT FILM TEGADERM 4 X 4.75" (50EA/BX)"

## (undated) DEVICE — SET LEADWIRE 5 LEAD BEDSIDE DISPOSABLE ECG (1SET OF 5/EA)

## (undated) DEVICE — DRESSING TRANSPARENT FILM TEGADERM 2.375 X 2.75"  (100EA/BX)"

## (undated) DEVICE — CLOSURE SKIN STRIP 1/2 X 4 IN - (STERI STRIP) (50/BX 4BX/CA)

## (undated) DEVICE — CANISTER SUCTION RIGID RED 1500CC (40EA/CA)

## (undated) DEVICE — SPONGE XRAY 8X4 STERL. 12PL - (10EA/TY 80TY/CA)

## (undated) DEVICE — PLUMEPEN ULTRA 3/8 IN X 10 FT HOSE (20EA/CA)

## (undated) DEVICE — DRAPE LARGE 3 QUARTER - (20/CA)

## (undated) DEVICE — BLADE ELECTRODE COATED EDGE (50EA/PK)

## (undated) DEVICE — CHLORAPREP 26 ML APPLICATOR - ORANGE TINT(25/CA)

## (undated) DEVICE — SLEEVE VASO CALF MED - (10PR/CA)

## (undated) DEVICE — SUTURE 2-0 PDS II CT-2 - (36/BX)

## (undated) DEVICE — TUBE CONNECTING SUCTION - CLEAR PLASTIC STERILE 72 IN (50EA/CA)

## (undated) DEVICE — TOWEL STOP TIMEOUT SAFETY FLAG (40EA/CA)

## (undated) DEVICE — CLIP APPLIER OPEN SMALL (6EA/BX)

## (undated) DEVICE — SUTURE 3-0 VICRYL PLUS SH - 8X 18 INCH (12/BX)

## (undated) DEVICE — SODIUM CHL IRRIGATION 0.9% 1000ML (12EA/CA)

## (undated) DEVICE — KIT  I.V. START (100EA/CA)

## (undated) DEVICE — Device

## (undated) DEVICE — GLOVE BIOGEL PI INDICATOR SZ 6.5 SURGICAL PF LF - (50/BX 4BX/CA)

## (undated) DEVICE — COVER CIV-FLEX TRANSDUCER - (24/BX)

## (undated) DEVICE — PAD MAGNETIC INSTRUMENT FOAM HOLDER (200/CA)

## (undated) DEVICE — SOLUTION PREP PVP IODINE 3/4 OZ POUCH PACKET CONTAINER STERILE LATEX FREE

## (undated) DEVICE — SUTURE GENERAL

## (undated) DEVICE — SENSOR OXIMETER ADULT SPO2 RD SET (20EA/BX)

## (undated) DEVICE — SUCTION INSTRUMENT YANKAUER BULBOUS TIP W/O VENT (50EA/CA)

## (undated) DEVICE — MASK OXYGEN VNYL ADLT MED CONC WITH 7 FOOT TUBING  - (50EA/CA)

## (undated) DEVICE — ELECTRODE DUAL RETURN W/ CORD - (50/PK)

## (undated) DEVICE — SHEET TRANSVERSE LAP - (12EA/CA)

## (undated) DEVICE — SUTURE 4-0 MONOCRYL PLUS PS-2 - 27 INCH (36/BX)

## (undated) DEVICE — SPONGE GAUZESTER. 2X2 4-PL - (2/PK 50PK/BX 30BX/CS)